# Patient Record
Sex: FEMALE | Race: WHITE | Employment: FULL TIME | URBAN - METROPOLITAN AREA
[De-identification: names, ages, dates, MRNs, and addresses within clinical notes are randomized per-mention and may not be internally consistent; named-entity substitution may affect disease eponyms.]

---

## 2017-02-16 ENCOUNTER — HOSPITAL ENCOUNTER (OUTPATIENT)
Age: 37
Setting detail: SPECIMEN
Discharge: HOME | End: 2017-02-16
Payer: MEDICARE

## 2017-02-16 ENCOUNTER — OFFICE VISIT (OUTPATIENT)
Dept: OBGYN | Facility: CLINIC | Age: 37
End: 2017-02-16

## 2017-02-16 VITALS
SYSTOLIC BLOOD PRESSURE: 114 MMHG | BODY MASS INDEX: 24.91 KG/M2 | HEIGHT: 66 IN | DIASTOLIC BLOOD PRESSURE: 72 MMHG | RESPIRATION RATE: 18 BRPM | HEART RATE: 78 BPM | WEIGHT: 155 LBS

## 2017-02-16 DIAGNOSIS — N89.8 VAGINAL DISCHARGE: Primary | ICD-10-CM

## 2017-02-16 LAB
DIRECT EXAM: ABNORMAL
Lab: ABNORMAL
SPECIMEN DESCRIPTION: ABNORMAL
SPECIMEN DESCRIPTION: ABNORMAL
STATUS: ABNORMAL

## 2017-02-16 PROCEDURE — 87480 CANDIDA DNA DIR PROBE: CPT

## 2017-02-16 PROCEDURE — 99213 OFFICE O/P EST LOW 20 MIN: CPT | Performed by: ADVANCED PRACTICE MIDWIFE

## 2017-02-16 PROCEDURE — 87491 CHLMYD TRACH DNA AMP PROBE: CPT

## 2017-02-16 PROCEDURE — 87510 GARDNER VAG DNA DIR PROBE: CPT

## 2017-02-16 PROCEDURE — 87591 N.GONORRHOEAE DNA AMP PROB: CPT

## 2017-02-16 PROCEDURE — 87660 TRICHOMONAS VAGIN DIR PROBE: CPT

## 2017-02-16 ASSESSMENT — PATIENT HEALTH QUESTIONNAIRE - PHQ9
SUM OF ALL RESPONSES TO PHQ9 QUESTIONS 1 & 2: 0
2. FEELING DOWN, DEPRESSED OR HOPELESS: 0
SUM OF ALL RESPONSES TO PHQ QUESTIONS 1-9: 0
1. LITTLE INTEREST OR PLEASURE IN DOING THINGS: 0

## 2017-02-17 ENCOUNTER — TELEPHONE (OUTPATIENT)
Dept: OBGYN | Facility: CLINIC | Age: 37
End: 2017-02-17

## 2017-02-17 LAB
C TRACH DNA GENITAL QL NAA+PROBE: NEGATIVE
N. GONORRHOEAE DNA: NEGATIVE

## 2017-02-20 RX ORDER — FLUCONAZOLE 150 MG/1
150 TABLET ORAL ONCE
Qty: 2 TABLET | Refills: 0 | Status: SHIPPED | OUTPATIENT
Start: 2017-02-20 | End: 2017-02-20

## 2017-02-23 ENCOUNTER — HOSPITAL ENCOUNTER (OUTPATIENT)
Age: 37
Discharge: HOME | End: 2017-02-23
Payer: MEDICARE

## 2017-02-23 LAB — HCG QUANTITATIVE: <1 IU/L

## 2017-02-23 PROCEDURE — 84702 CHORIONIC GONADOTROPIN TEST: CPT

## 2017-02-23 PROCEDURE — 36415 COLL VENOUS BLD VENIPUNCTURE: CPT

## 2017-02-24 ENCOUNTER — PROCEDURE VISIT (OUTPATIENT)
Dept: OBGYN | Facility: CLINIC | Age: 37
End: 2017-02-24

## 2017-02-24 VITALS
HEART RATE: 60 BPM | WEIGHT: 154 LBS | SYSTOLIC BLOOD PRESSURE: 100 MMHG | HEIGHT: 66 IN | DIASTOLIC BLOOD PRESSURE: 60 MMHG | BODY MASS INDEX: 24.75 KG/M2

## 2017-02-24 DIAGNOSIS — Z30.430 ENCOUNTER FOR IUD INSERTION: Primary | ICD-10-CM

## 2017-02-24 PROCEDURE — 58300 INSERT INTRAUTERINE DEVICE: CPT | Performed by: OBSTETRICS & GYNECOLOGY

## 2017-05-18 ENCOUNTER — HOSPITAL ENCOUNTER (EMERGENCY)
Age: 37
Discharge: HOME | End: 2017-05-18
Attending: EMERGENCY MEDICINE
Payer: MEDICARE

## 2017-05-18 VITALS
WEIGHT: 155 LBS | HEART RATE: 102 BPM | SYSTOLIC BLOOD PRESSURE: 109 MMHG | RESPIRATION RATE: 16 BRPM | BODY MASS INDEX: 24.91 KG/M2 | OXYGEN SATURATION: 100 % | TEMPERATURE: 98.4 F | DIASTOLIC BLOOD PRESSURE: 71 MMHG | HEIGHT: 66 IN

## 2017-05-18 DIAGNOSIS — N30.00 ACUTE CYSTITIS WITHOUT HEMATURIA: Primary | ICD-10-CM

## 2017-05-18 DIAGNOSIS — B09 VIRAL EXANTHEM: ICD-10-CM

## 2017-05-18 LAB
-: ABNORMAL
-: NORMAL
ABSOLUTE EOS #: 0.2 K/UL (ref 0–0.4)
ABSOLUTE LYMPH #: 2 K/UL (ref 1–4.8)
ABSOLUTE MONO #: 0.8 K/UL (ref 0.1–1.3)
ALBUMIN SERPL-MCNC: 4 G/DL (ref 3.5–5.2)
ALBUMIN/GLOBULIN RATIO: NORMAL (ref 1–2.5)
ALP BLD-CCNC: 49 U/L (ref 35–104)
ALT SERPL-CCNC: 6 U/L (ref 5–33)
AMORPHOUS: ABNORMAL
ANION GAP SERPL CALCULATED.3IONS-SCNC: 14 MMOL/L (ref 9–17)
AST SERPL-CCNC: 9 U/L
BACTERIA: ABNORMAL
BASOPHILS # BLD: 1 %
BASOPHILS ABSOLUTE: 0.1 K/UL (ref 0–0.2)
BILIRUB SERPL-MCNC: 0.4 MG/DL (ref 0.3–1.2)
BILIRUBIN DIRECT: 0.12 MG/DL
BILIRUBIN URINE: NEGATIVE
BILIRUBIN, INDIRECT: 0.28 MG/DL (ref 0–1)
BUN BLDV-MCNC: 8 MG/DL (ref 6–20)
CALCIUM SERPL-MCNC: 8.9 MG/DL (ref 8.6–10.4)
CASTS UA: ABNORMAL /LPF
CHLORIDE BLD-SCNC: 106 MMOL/L (ref 98–107)
CHP ED QC CHECK: NORMAL
CO2: 21 MMOL/L (ref 20–31)
COLOR: YELLOW
COMMENT UA: ABNORMAL
CREAT SERPL-MCNC: 0.77 MG/DL (ref 0.5–0.9)
CRYSTALS, UA: ABNORMAL /HPF
DIFFERENTIAL TYPE: NORMAL
DIRECT EXAM: NORMAL
EOSINOPHILS RELATIVE PERCENT: 2 %
EPITHELIAL CELLS UA: ABNORMAL /HPF
GFR AFRICAN AMERICAN: >60 ML/MIN
GFR NON-AFRICAN AMERICAN: >60 ML/MIN
GFR SERPL CREATININE-BSD FRML MDRD: NORMAL ML/MIN/{1.73_M2}
GFR SERPL CREATININE-BSD FRML MDRD: NORMAL ML/MIN/{1.73_M2}
GLUCOSE BLD-MCNC: 96 MG/DL (ref 70–99)
GLUCOSE URINE: NEGATIVE
HCG, PREGNANCY URINE (POC): NEGATIVE
HCT VFR BLD CALC: 44.1 % (ref 36–46)
HEMOGLOBIN: 14.8 G/DL (ref 12–16)
KETONES, URINE: ABNORMAL
LEUKOCYTE ESTERASE, URINE: ABNORMAL
LYMPHOCYTES # BLD: 22 %
Lab: NORMAL
MCH RBC QN AUTO: 30.6 PG (ref 26–34)
MCHC RBC AUTO-ENTMCNC: 33.5 G/DL (ref 31–37)
MCV RBC AUTO: 91.4 FL (ref 80–100)
MONOCYTES # BLD: 9 %
MUCUS: ABNORMAL
NITRITE, URINE: NEGATIVE
OTHER OBSERVATIONS UA: ABNORMAL
PDW BLD-RTO: 13.5 % (ref 11.5–14.9)
PH UA: 6 (ref 5–8)
PLATELET # BLD: 228 K/UL (ref 150–450)
PLATELET ESTIMATE: NORMAL
PMV BLD AUTO: 9.3 FL (ref 6–12)
POTASSIUM SERPL-SCNC: 4.5 MMOL/L (ref 3.7–5.3)
PREGNANCY TEST URINE, POC: NEGATIVE
PROTEIN UA: NEGATIVE
RBC # BLD: 4.83 M/UL (ref 4–5.2)
RBC # BLD: NORMAL 10*6/UL
RBC UA: ABNORMAL /HPF
RENAL EPITHELIAL, UA: ABNORMAL /HPF
SEG NEUTROPHILS: 66 %
SEGMENTED NEUTROPHILS ABSOLUTE COUNT: 5.9 K/UL (ref 1.3–9.1)
SODIUM BLD-SCNC: 141 MMOL/L (ref 135–144)
SPECIFIC GRAVITY UA: 1.01 (ref 1–1.03)
SPECIMEN DESCRIPTION: NORMAL
SPECIMEN DESCRIPTION: NORMAL
STATUS: NORMAL
TOTAL PROTEIN: 7 G/DL (ref 6.4–8.3)
TRICHOMONAS: ABNORMAL
TURBIDITY: ABNORMAL
URINE HGB: ABNORMAL
UROBILINOGEN, URINE: NORMAL
WBC # BLD: 8.8 K/UL (ref 3.5–11)
WBC # BLD: NORMAL 10*3/UL
WBC UA: ABNORMAL /HPF
YEAST: ABNORMAL

## 2017-05-18 PROCEDURE — 85025 COMPLETE CBC W/AUTO DIFF WBC: CPT

## 2017-05-18 PROCEDURE — 84703 CHORIONIC GONADOTROPIN ASSAY: CPT

## 2017-05-18 PROCEDURE — 36415 COLL VENOUS BLD VENIPUNCTURE: CPT

## 2017-05-18 PROCEDURE — 87086 URINE CULTURE/COLONY COUNT: CPT

## 2017-05-18 PROCEDURE — 87880 STREP A ASSAY W/OPTIC: CPT

## 2017-05-18 PROCEDURE — 99283 EMERGENCY DEPT VISIT LOW MDM: CPT

## 2017-05-18 PROCEDURE — 81001 URINALYSIS AUTO W/SCOPE: CPT

## 2017-05-18 PROCEDURE — 82248 BILIRUBIN DIRECT: CPT

## 2017-05-18 PROCEDURE — 6370000000 HC RX 637 (ALT 250 FOR IP): Performed by: PHYSICIAN ASSISTANT

## 2017-05-18 PROCEDURE — 80053 COMPREHEN METABOLIC PANEL: CPT

## 2017-05-18 RX ORDER — PREDNISONE 20 MG/1
20 TABLET ORAL DAILY
Qty: 5 TABLET | Refills: 0 | Status: SHIPPED | OUTPATIENT
Start: 2017-05-18 | End: 2017-05-23

## 2017-05-18 RX ORDER — CEPHALEXIN 500 MG/1
500 CAPSULE ORAL 3 TIMES DAILY
Qty: 21 CAPSULE | Refills: 0 | Status: SHIPPED | OUTPATIENT
Start: 2017-05-18 | End: 2017-05-25

## 2017-05-18 RX ORDER — PREDNISONE 20 MG/1
40 TABLET ORAL ONCE
Status: COMPLETED | OUTPATIENT
Start: 2017-05-18 | End: 2017-05-18

## 2017-05-18 RX ORDER — DIPHENHYDRAMINE HCL 25 MG
25 CAPSULE ORAL EVERY 4 HOURS PRN
Qty: 1 CAPSULE | Refills: 0 | Status: SHIPPED | OUTPATIENT
Start: 2017-05-18 | End: 2017-05-28

## 2017-05-18 RX ORDER — DIPHENHYDRAMINE HCL 25 MG
25 TABLET ORAL ONCE
Status: COMPLETED | OUTPATIENT
Start: 2017-05-18 | End: 2017-05-18

## 2017-05-18 RX ADMIN — DIPHENHYDRAMINE HCL 25 MG: 25 TABLET ORAL at 16:45

## 2017-05-18 RX ADMIN — PREDNISONE 40 MG: 20 TABLET ORAL at 16:45

## 2017-05-18 ASSESSMENT — ENCOUNTER SYMPTOMS
NAUSEA: 0
THROAT SWELLING: 0
VOMITING: 0
WHEEZING: 0
ABDOMINAL PAIN: 0
HOARSE VOICE: 0
DIARRHEA: 1
SHORTNESS OF BREATH: 0
SORE THROAT: 0

## 2017-05-19 LAB
CULTURE: NORMAL
CULTURE: NORMAL
Lab: NORMAL
SPECIMEN DESCRIPTION: NORMAL
SPECIMEN DESCRIPTION: NORMAL
STATUS: NORMAL

## 2017-06-21 ENCOUNTER — TELEPHONE (OUTPATIENT)
Dept: OBGYN CLINIC | Age: 37
End: 2017-06-21

## 2017-06-26 ENCOUNTER — TELEPHONE (OUTPATIENT)
Dept: OBGYN CLINIC | Age: 37
End: 2017-06-26

## 2017-07-12 ENCOUNTER — OFFICE VISIT (OUTPATIENT)
Dept: OBGYN CLINIC | Age: 37
End: 2017-07-12
Payer: MEDICARE

## 2017-07-12 ENCOUNTER — HOSPITAL ENCOUNTER (OUTPATIENT)
Age: 37
Setting detail: SPECIMEN
Discharge: HOME | End: 2017-07-12
Payer: MEDICARE

## 2017-07-12 VITALS
BODY MASS INDEX: 22.82 KG/M2 | HEIGHT: 66 IN | DIASTOLIC BLOOD PRESSURE: 70 MMHG | WEIGHT: 142 LBS | HEART RATE: 72 BPM | SYSTOLIC BLOOD PRESSURE: 114 MMHG | RESPIRATION RATE: 18 BRPM

## 2017-07-12 DIAGNOSIS — Z11.51 SPECIAL SCREENING EXAMINATION FOR HUMAN PAPILLOMAVIRUS (HPV): ICD-10-CM

## 2017-07-12 DIAGNOSIS — Z80.3 FAMILY HISTORY OF BREAST CANCER: ICD-10-CM

## 2017-07-12 DIAGNOSIS — Z01.419 WELL FEMALE EXAM WITH ROUTINE GYNECOLOGICAL EXAM: ICD-10-CM

## 2017-07-12 DIAGNOSIS — Z01.411 ENCOUNTER FOR GYNECOLOGICAL EXAMINATION (GENERAL) (ROUTINE) WITH ABNORMAL FINDINGS: ICD-10-CM

## 2017-07-12 DIAGNOSIS — Z01.419 WELL FEMALE EXAM WITH ROUTINE GYNECOLOGICAL EXAM: Primary | ICD-10-CM

## 2017-07-12 PROCEDURE — 87624 HPV HI-RISK TYP POOLED RSLT: CPT

## 2017-07-12 PROCEDURE — 99395 PREV VISIT EST AGE 18-39: CPT | Performed by: ADVANCED PRACTICE MIDWIFE

## 2017-07-12 PROCEDURE — G0145 SCR C/V CYTO,THINLAYER,RESCR: HCPCS

## 2017-07-12 ASSESSMENT — PATIENT HEALTH QUESTIONNAIRE - PHQ9
1. LITTLE INTEREST OR PLEASURE IN DOING THINGS: 0
SUM OF ALL RESPONSES TO PHQ QUESTIONS 1-9: 0
SUM OF ALL RESPONSES TO PHQ9 QUESTIONS 1 & 2: 0
2. FEELING DOWN, DEPRESSED OR HOPELESS: 0
1. LITTLE INTEREST OR PLEASURE IN DOING THINGS: 0
SUM OF ALL RESPONSES TO PHQ9 QUESTIONS 1 & 2: 0
SUM OF ALL RESPONSES TO PHQ QUESTIONS 1-9: 0
2. FEELING DOWN, DEPRESSED OR HOPELESS: 0
SUM OF ALL RESPONSES TO PHQ QUESTIONS 1-9: 0
2. FEELING DOWN, DEPRESSED OR HOPELESS: 0
SUM OF ALL RESPONSES TO PHQ9 QUESTIONS 1 & 2: 0
1. LITTLE INTEREST OR PLEASURE IN DOING THINGS: 0

## 2017-07-14 LAB
HPV SAMPLE: NORMAL
HPV SOURCE: NORMAL
HPV, GENOTYPE 16: NOT DETECTED
HPV, GENOTYPE 18: NOT DETECTED
HPV, HIGH RISK OTHER: NOT DETECTED
HPV, INTERPRETATION: NORMAL

## 2017-07-17 LAB — CYTOLOGY REPORT: NORMAL

## 2017-08-13 ENCOUNTER — APPOINTMENT (OUTPATIENT)
Dept: GENERAL RADIOLOGY | Age: 37
End: 2017-08-13
Payer: MEDICARE

## 2017-08-13 ENCOUNTER — HOSPITAL ENCOUNTER (EMERGENCY)
Age: 37
Discharge: HOME | End: 2017-08-13
Attending: EMERGENCY MEDICINE
Payer: MEDICARE

## 2017-08-13 VITALS
OXYGEN SATURATION: 96 % | BODY MASS INDEX: 22.5 KG/M2 | HEART RATE: 71 BPM | SYSTOLIC BLOOD PRESSURE: 99 MMHG | RESPIRATION RATE: 14 BRPM | TEMPERATURE: 99.6 F | DIASTOLIC BLOOD PRESSURE: 59 MMHG | HEIGHT: 66 IN | WEIGHT: 140 LBS

## 2017-08-13 DIAGNOSIS — M25.561 ACUTE PAIN OF RIGHT KNEE: Primary | ICD-10-CM

## 2017-08-13 LAB — URIC ACID: 3 MG/DL (ref 2.4–5.7)

## 2017-08-13 PROCEDURE — 20610 DRAIN/INJ JOINT/BURSA W/O US: CPT

## 2017-08-13 PROCEDURE — 84550 ASSAY OF BLOOD/URIC ACID: CPT

## 2017-08-13 PROCEDURE — 6360000002 HC RX W HCPCS: Performed by: EMERGENCY MEDICINE

## 2017-08-13 PROCEDURE — 99284 EMERGENCY DEPT VISIT MOD MDM: CPT

## 2017-08-13 PROCEDURE — 96372 THER/PROPH/DIAG INJ SC/IM: CPT

## 2017-08-13 PROCEDURE — 73562 X-RAY EXAM OF KNEE 3: CPT

## 2017-08-13 PROCEDURE — 2500000003 HC RX 250 WO HCPCS: Performed by: EMERGENCY MEDICINE

## 2017-08-13 PROCEDURE — 36415 COLL VENOUS BLD VENIPUNCTURE: CPT

## 2017-08-13 RX ORDER — LIDOCAINE HYDROCHLORIDE 20 MG/ML
100 INJECTION, SOLUTION INFILTRATION; PERINEURAL ONCE
Status: COMPLETED | OUTPATIENT
Start: 2017-08-13 | End: 2017-08-13

## 2017-08-13 RX ORDER — NAPROXEN 500 MG/1
500 TABLET ORAL 2 TIMES DAILY WITH MEALS
Qty: 60 TABLET | Refills: 0 | Status: ON HOLD | OUTPATIENT
Start: 2017-08-13 | End: 2017-08-20 | Stop reason: HOSPADM

## 2017-08-13 RX ORDER — KETOROLAC TROMETHAMINE 30 MG/ML
30 INJECTION, SOLUTION INTRAMUSCULAR; INTRAVENOUS ONCE
Status: COMPLETED | OUTPATIENT
Start: 2017-08-13 | End: 2017-08-13

## 2017-08-13 RX ADMIN — LIDOCAINE HYDROCHLORIDE 100 MG: 20 INJECTION, SOLUTION INFILTRATION; PERINEURAL at 16:04

## 2017-08-13 RX ADMIN — KETOROLAC TROMETHAMINE 30 MG: 30 INJECTION, SOLUTION INTRAMUSCULAR at 16:05

## 2017-08-13 ASSESSMENT — PAIN SCALES - GENERAL
PAINLEVEL_OUTOF10: 6
PAINLEVEL_OUTOF10: 10
PAINLEVEL_OUTOF10: 5

## 2017-08-13 ASSESSMENT — ENCOUNTER SYMPTOMS
NAUSEA: 0
VOMITING: 0

## 2017-08-15 ENCOUNTER — APPOINTMENT (OUTPATIENT)
Dept: GENERAL RADIOLOGY | Age: 37
End: 2017-08-15
Payer: MEDICARE

## 2017-08-15 ENCOUNTER — HOSPITAL ENCOUNTER (EMERGENCY)
Age: 37
Discharge: ANOTHER HOSPITAL | End: 2017-08-16
Attending: EMERGENCY MEDICINE
Payer: MEDICARE

## 2017-08-15 DIAGNOSIS — D72.825 BANDEMIA: ICD-10-CM

## 2017-08-15 DIAGNOSIS — L03.317 CELLULITIS OF RIGHT BUTTOCK: ICD-10-CM

## 2017-08-15 DIAGNOSIS — M70.41 PREPATELLAR BURSITIS OF RIGHT KNEE: ICD-10-CM

## 2017-08-15 DIAGNOSIS — R79.82 ELEVATED C-REACTIVE PROTEIN (CRP): ICD-10-CM

## 2017-08-15 DIAGNOSIS — R70.0 ELEVATED SED RATE: ICD-10-CM

## 2017-08-15 DIAGNOSIS — A41.9 SEPSIS, DUE TO UNSPECIFIED ORGANISM: Primary | ICD-10-CM

## 2017-08-15 DIAGNOSIS — R73.9 HYPERGLYCEMIA: ICD-10-CM

## 2017-08-15 LAB
ABSOLUTE EOS #: 0.33 K/UL (ref 0–0.4)
ABSOLUTE LYMPH #: 1.49 K/UL (ref 1–4.8)
ABSOLUTE MONO #: 0.5 K/UL (ref 0.1–1.3)
ANION GAP SERPL CALCULATED.3IONS-SCNC: 14 MMOL/L (ref 9–17)
BASOPHILS # BLD: 0 %
BASOPHILS ABSOLUTE: 0 K/UL (ref 0–0.2)
BUN BLDV-MCNC: 9 MG/DL (ref 6–20)
BUN/CREAT BLD: ABNORMAL (ref 9–20)
C-REACTIVE PROTEIN: 126.8 MG/L (ref 0–5)
CALCIUM SERPL-MCNC: 8.6 MG/DL (ref 8.6–10.4)
CHLORIDE BLD-SCNC: 106 MMOL/L (ref 98–107)
CO2: 21 MMOL/L (ref 20–31)
CREAT SERPL-MCNC: 0.71 MG/DL (ref 0.5–0.9)
DIFFERENTIAL TYPE: ABNORMAL
EOSINOPHILS RELATIVE PERCENT: 2 %
GFR AFRICAN AMERICAN: >60 ML/MIN
GFR NON-AFRICAN AMERICAN: >60 ML/MIN
GFR SERPL CREATININE-BSD FRML MDRD: ABNORMAL ML/MIN/{1.73_M2}
GFR SERPL CREATININE-BSD FRML MDRD: ABNORMAL ML/MIN/{1.73_M2}
GLUCOSE BLD-MCNC: 124 MG/DL (ref 70–99)
HCT VFR BLD CALC: 41.2 % (ref 36–46)
HEMOGLOBIN: 13.6 G/DL (ref 12–16)
LACTIC ACID: 0.7 MMOL/L (ref 0.5–2.2)
LYMPHOCYTES # BLD: 9 %
MCH RBC QN AUTO: 30.4 PG (ref 26–34)
MCHC RBC AUTO-ENTMCNC: 32.9 G/DL (ref 31–37)
MCV RBC AUTO: 92.4 FL (ref 80–100)
MONOCYTES # BLD: 3 %
MORPHOLOGY: NORMAL
PDW BLD-RTO: 13.8 % (ref 11.5–14.9)
PLATELET # BLD: 260 K/UL (ref 150–450)
PLATELET ESTIMATE: ABNORMAL
PMV BLD AUTO: 9.7 FL (ref 6–12)
POTASSIUM SERPL-SCNC: 3.8 MMOL/L (ref 3.7–5.3)
RBC # BLD: 4.46 M/UL (ref 4–5.2)
RBC # BLD: ABNORMAL 10*6/UL
SEDIMENTATION RATE, ERYTHROCYTE: 43 MM (ref 0–20)
SEG NEUTROPHILS: 86 %
SEGMENTED NEUTROPHILS ABSOLUTE COUNT: 14.18 K/UL (ref 1.3–9.1)
SODIUM BLD-SCNC: 141 MMOL/L (ref 135–144)
WBC # BLD: 16.5 K/UL (ref 3.5–11)
WBC # BLD: ABNORMAL 10*3/UL

## 2017-08-15 PROCEDURE — 6360000002 HC RX W HCPCS: Performed by: PHYSICIAN ASSISTANT

## 2017-08-15 PROCEDURE — 85651 RBC SED RATE NONAUTOMATED: CPT

## 2017-08-15 PROCEDURE — 87040 BLOOD CULTURE FOR BACTERIA: CPT

## 2017-08-15 PROCEDURE — 36415 COLL VENOUS BLD VENIPUNCTURE: CPT

## 2017-08-15 PROCEDURE — 83605 ASSAY OF LACTIC ACID: CPT

## 2017-08-15 PROCEDURE — 85025 COMPLETE CBC W/AUTO DIFF WBC: CPT

## 2017-08-15 PROCEDURE — 96367 TX/PROPH/DG ADDL SEQ IV INF: CPT

## 2017-08-15 PROCEDURE — 2580000003 HC RX 258: Performed by: EMERGENCY MEDICINE

## 2017-08-15 PROCEDURE — 96365 THER/PROPH/DIAG IV INF INIT: CPT

## 2017-08-15 PROCEDURE — 99284 EMERGENCY DEPT VISIT MOD MDM: CPT

## 2017-08-15 PROCEDURE — 6360000002 HC RX W HCPCS: Performed by: EMERGENCY MEDICINE

## 2017-08-15 PROCEDURE — 80048 BASIC METABOLIC PNL TOTAL CA: CPT

## 2017-08-15 PROCEDURE — 73562 X-RAY EXAM OF KNEE 3: CPT

## 2017-08-15 PROCEDURE — 6370000000 HC RX 637 (ALT 250 FOR IP): Performed by: EMERGENCY MEDICINE

## 2017-08-15 PROCEDURE — 86140 C-REACTIVE PROTEIN: CPT

## 2017-08-15 RX ORDER — 0.9 % SODIUM CHLORIDE 0.9 %
1000 INTRAVENOUS SOLUTION INTRAVENOUS ONCE
Status: COMPLETED | OUTPATIENT
Start: 2017-08-15 | End: 2017-08-16

## 2017-08-15 RX ORDER — ONDANSETRON 4 MG/1
4 TABLET, ORALLY DISINTEGRATING ORAL ONCE
Status: COMPLETED | OUTPATIENT
Start: 2017-08-15 | End: 2017-08-15

## 2017-08-15 RX ORDER — ACETAMINOPHEN 500 MG
1000 TABLET ORAL ONCE
Status: COMPLETED | OUTPATIENT
Start: 2017-08-15 | End: 2017-08-15

## 2017-08-15 RX ADMIN — ONDANSETRON 4 MG: 4 TABLET, ORALLY DISINTEGRATING ORAL at 21:03

## 2017-08-15 RX ADMIN — SODIUM CHLORIDE 1000 ML: 9 INJECTION, SOLUTION INTRAVENOUS at 21:15

## 2017-08-15 RX ADMIN — MEROPENEM 1 G: 1 INJECTION, POWDER, FOR SOLUTION INTRAVENOUS at 22:16

## 2017-08-15 RX ADMIN — ACETAMINOPHEN 1000 MG: 500 TABLET ORAL at 22:16

## 2017-08-15 RX ADMIN — VANCOMYCIN HYDROCHLORIDE 1000 MG: 1 INJECTION, POWDER, LYOPHILIZED, FOR SOLUTION INTRAVENOUS at 23:01

## 2017-08-15 ASSESSMENT — PAIN DESCRIPTION - FREQUENCY
FREQUENCY: CONTINUOUS
FREQUENCY: CONTINUOUS

## 2017-08-15 ASSESSMENT — PAIN DESCRIPTION - ORIENTATION
ORIENTATION: RIGHT
ORIENTATION: RIGHT

## 2017-08-15 ASSESSMENT — PAIN DESCRIPTION - DESCRIPTORS
DESCRIPTORS: SHOOTING;BURNING;SHARP
DESCRIPTORS: BURNING;SHOOTING

## 2017-08-15 ASSESSMENT — PAIN SCALES - GENERAL
PAINLEVEL_OUTOF10: 9

## 2017-08-15 ASSESSMENT — PAIN DESCRIPTION - LOCATION
LOCATION: BUTTOCKS;KNEE
LOCATION: BUTTOCKS;KNEE

## 2017-08-15 ASSESSMENT — PAIN DESCRIPTION - PROGRESSION
CLINICAL_PROGRESSION: GRADUALLY WORSENING
CLINICAL_PROGRESSION: NOT CHANGED

## 2017-08-15 ASSESSMENT — PAIN DESCRIPTION - ONSET
ONSET: ON-GOING
ONSET: ON-GOING

## 2017-08-15 ASSESSMENT — PAIN SCALES - WONG BAKER: WONGBAKER_NUMERICALRESPONSE: 0

## 2017-08-15 ASSESSMENT — PAIN DESCRIPTION - PAIN TYPE: TYPE: ACUTE PAIN

## 2017-08-16 ENCOUNTER — HOSPITAL ENCOUNTER (INPATIENT)
Age: 37
LOS: 4 days | Discharge: HOME | DRG: 710 | End: 2017-08-20
Attending: INTERNAL MEDICINE | Admitting: INTERNAL MEDICINE
Payer: MEDICARE

## 2017-08-16 VITALS
BODY MASS INDEX: 22.5 KG/M2 | HEIGHT: 66 IN | TEMPERATURE: 99.3 F | OXYGEN SATURATION: 97 % | DIASTOLIC BLOOD PRESSURE: 53 MMHG | SYSTOLIC BLOOD PRESSURE: 92 MMHG | RESPIRATION RATE: 16 BRPM | WEIGHT: 140 LBS | HEART RATE: 81 BPM

## 2017-08-16 PROBLEM — M70.40 PREPATELLAR BURSITIS: Status: ACTIVE | Noted: 2017-08-16

## 2017-08-16 PROBLEM — Z72.0 TOBACCO USE: Status: ACTIVE | Noted: 2017-08-16

## 2017-08-16 PROBLEM — M00.9 SEPTIC ARTHRITIS OF KNEE, RIGHT (HCC): Status: ACTIVE | Noted: 2017-08-16

## 2017-08-16 PROBLEM — E83.51 HYPOCALCEMIA: Status: ACTIVE | Noted: 2017-08-16

## 2017-08-16 PROBLEM — I95.9 LOW BLOOD PRESSURE: Status: ACTIVE | Noted: 2017-08-16

## 2017-08-16 PROBLEM — L02.31 ABSCESS, GLUTEAL, RIGHT: Status: ACTIVE | Noted: 2017-08-16

## 2017-08-16 PROBLEM — E87.6 HYPOKALEMIA: Status: ACTIVE | Noted: 2017-08-16

## 2017-08-16 LAB
ABSOLUTE EOS #: 0.2 K/UL (ref 0–0.4)
ABSOLUTE LYMPH #: 1.8 K/UL (ref 1–4.8)
ABSOLUTE MONO #: 1.3 K/UL (ref 0.1–1.2)
ALBUMIN SERPL-MCNC: 2.8 G/DL (ref 3.5–5.2)
ALBUMIN/GLOBULIN RATIO: 1.3 (ref 1–2.5)
ALP BLD-CCNC: 60 U/L (ref 35–104)
ALT SERPL-CCNC: 28 U/L (ref 5–33)
ANION GAP SERPL CALCULATED.3IONS-SCNC: 13 MMOL/L (ref 9–17)
AST SERPL-CCNC: 18 U/L
BASOPHILS # BLD: 0 %
BASOPHILS ABSOLUTE: 0 K/UL (ref 0–0.2)
BILIRUB SERPL-MCNC: 0.55 MG/DL (ref 0.3–1.2)
BUN BLDV-MCNC: 5 MG/DL (ref 6–20)
BUN/CREAT BLD: ABNORMAL (ref 9–20)
CALCIUM IONIZED: 1.11 MMOL/L (ref 1.13–1.33)
CALCIUM SERPL-MCNC: 7.2 MG/DL (ref 8.6–10.4)
CHLORIDE BLD-SCNC: 115 MMOL/L (ref 98–107)
CO2: 17 MMOL/L (ref 20–31)
CREAT SERPL-MCNC: 0.63 MG/DL (ref 0.5–0.9)
CRYSTALS, FLUID: NEGATIVE
DIFFERENTIAL TYPE: ABNORMAL
EOSINOPHILS RELATIVE PERCENT: 1 %
GFR AFRICAN AMERICAN: >60 ML/MIN
GFR NON-AFRICAN AMERICAN: >60 ML/MIN
GFR SERPL CREATININE-BSD FRML MDRD: ABNORMAL ML/MIN/{1.73_M2}
GFR SERPL CREATININE-BSD FRML MDRD: ABNORMAL ML/MIN/{1.73_M2}
GLUCOSE BLD-MCNC: 96 MG/DL (ref 70–99)
HCT VFR BLD CALC: 34.2 % (ref 36–46)
HEMOGLOBIN: 11.5 G/DL (ref 12–16)
LACTIC ACID, WHOLE BLOOD: 0.4 MMOL/L (ref 0.7–2.1)
LACTIC ACID, WHOLE BLOOD: 0.4 MMOL/L (ref 0.7–2.1)
LACTIC ACID: ABNORMAL MMOL/L
LACTIC ACID: ABNORMAL MMOL/L
LYMPHOCYTES # BLD: 13 %
MAGNESIUM: 2.2 MG/DL (ref 1.6–2.6)
MCH RBC QN AUTO: 30.9 PG (ref 26–34)
MCHC RBC AUTO-ENTMCNC: 33.6 G/DL (ref 31–37)
MCV RBC AUTO: 91.9 FL (ref 80–100)
MONOCYTES # BLD: 10 %
PDW BLD-RTO: 13.9 % (ref 12.5–15.4)
PLATELET # BLD: 220 K/UL (ref 140–450)
PLATELET ESTIMATE: ABNORMAL
PMV BLD AUTO: 9.5 FL (ref 6–12)
POTASSIUM SERPL-SCNC: 3.6 MMOL/L (ref 3.7–5.3)
RBC # BLD: 3.72 M/UL (ref 4–5.2)
RBC # BLD: ABNORMAL 10*6/UL
SEG NEUTROPHILS: 76 %
SEGMENTED NEUTROPHILS ABSOLUTE COUNT: 10.4 K/UL (ref 1.8–7.7)
SODIUM BLD-SCNC: 145 MMOL/L (ref 135–144)
SPECIMEN TYPE: NORMAL
TOTAL PROTEIN: 5 G/DL (ref 6.4–8.3)
TROPONIN INTERP: NORMAL
TROPONIN INTERP: NORMAL
TROPONIN T: <0.03 NG/ML
TROPONIN T: <0.03 NG/ML
WBC # BLD: 13.8 K/UL (ref 3.5–11)
WBC # BLD: ABNORMAL 10*3/UL

## 2017-08-16 PROCEDURE — 94762 N-INVAS EAR/PLS OXIMTRY CONT: CPT

## 2017-08-16 PROCEDURE — 96366 THER/PROPH/DIAG IV INF ADDON: CPT

## 2017-08-16 PROCEDURE — 6370000000 HC RX 637 (ALT 250 FOR IP): Performed by: SURGERY

## 2017-08-16 PROCEDURE — 6360000002 HC RX W HCPCS: Performed by: NURSE PRACTITIONER

## 2017-08-16 PROCEDURE — 1200000000 HC SEMI PRIVATE

## 2017-08-16 PROCEDURE — 36415 COLL VENOUS BLD VENIPUNCTURE: CPT

## 2017-08-16 PROCEDURE — 87075 CULTR BACTERIA EXCEPT BLOOD: CPT

## 2017-08-16 PROCEDURE — 99406 BEHAV CHNG SMOKING 3-10 MIN: CPT

## 2017-08-16 PROCEDURE — 89060 EXAM SYNOVIAL FLUID CRYSTALS: CPT

## 2017-08-16 PROCEDURE — 87070 CULTURE OTHR SPECIMN AEROBIC: CPT

## 2017-08-16 PROCEDURE — 85025 COMPLETE CBC W/AUTO DIFF WBC: CPT

## 2017-08-16 PROCEDURE — 2580000003 HC RX 258: Performed by: NURSE PRACTITIONER

## 2017-08-16 PROCEDURE — 6360000002 HC RX W HCPCS: Performed by: INTERNAL MEDICINE

## 2017-08-16 PROCEDURE — 80053 COMPREHEN METABOLIC PANEL: CPT

## 2017-08-16 PROCEDURE — 87205 SMEAR GRAM STAIN: CPT

## 2017-08-16 PROCEDURE — 87040 BLOOD CULTURE FOR BACTERIA: CPT

## 2017-08-16 PROCEDURE — 2580000003 HC RX 258: Performed by: EMERGENCY MEDICINE

## 2017-08-16 PROCEDURE — 87186 SC STD MICRODIL/AGAR DIL: CPT

## 2017-08-16 PROCEDURE — 0J990ZZ DRAINAGE OF BUTTOCK SUBCUTANEOUS TISSUE AND FASCIA, OPEN APPROACH: ICD-10-PCS | Performed by: COLON & RECTAL SURGERY

## 2017-08-16 PROCEDURE — 0S9C3ZX DRAINAGE OF RIGHT KNEE JOINT, PERCUTANEOUS APPROACH, DIAGNOSTIC: ICD-10-PCS | Performed by: ORTHOPAEDIC SURGERY

## 2017-08-16 PROCEDURE — 84484 ASSAY OF TROPONIN QUANT: CPT

## 2017-08-16 PROCEDURE — 6370000000 HC RX 637 (ALT 250 FOR IP): Performed by: NURSE PRACTITIONER

## 2017-08-16 PROCEDURE — 83735 ASSAY OF MAGNESIUM: CPT

## 2017-08-16 PROCEDURE — 89051 BODY FLUID CELL COUNT: CPT

## 2017-08-16 PROCEDURE — 86403 PARTICLE AGGLUT ANTBDY SCRN: CPT

## 2017-08-16 PROCEDURE — 83605 ASSAY OF LACTIC ACID: CPT

## 2017-08-16 PROCEDURE — 2580000003 HC RX 258: Performed by: INTERNAL MEDICINE

## 2017-08-16 PROCEDURE — 99222 1ST HOSP IP/OBS MODERATE 55: CPT | Performed by: INTERNAL MEDICINE

## 2017-08-16 PROCEDURE — 6360000002 HC RX W HCPCS: Performed by: SURGERY

## 2017-08-16 PROCEDURE — 82330 ASSAY OF CALCIUM: CPT

## 2017-08-16 RX ORDER — TRAMADOL HYDROCHLORIDE 50 MG/1
100 TABLET ORAL EVERY 6 HOURS PRN
Status: DISCONTINUED | OUTPATIENT
Start: 2017-08-16 | End: 2017-08-16

## 2017-08-16 RX ORDER — ACETAMINOPHEN 325 MG/1
650 TABLET ORAL EVERY 4 HOURS PRN
Status: DISCONTINUED | OUTPATIENT
Start: 2017-08-16 | End: 2017-08-20 | Stop reason: HOSPADM

## 2017-08-16 RX ORDER — NALBUPHINE HCL 10 MG/ML
10 AMPUL (ML) INJECTION
Status: DISCONTINUED | OUTPATIENT
Start: 2017-08-16 | End: 2017-08-20 | Stop reason: HOSPADM

## 2017-08-16 RX ORDER — 0.9 % SODIUM CHLORIDE 0.9 %
1000 INTRAVENOUS SOLUTION INTRAVENOUS ONCE
Status: COMPLETED | OUTPATIENT
Start: 2017-08-16 | End: 2017-08-16

## 2017-08-16 RX ORDER — FENTANYL CITRATE 50 UG/ML
50 INJECTION, SOLUTION INTRAMUSCULAR; INTRAVENOUS ONCE
Status: COMPLETED | OUTPATIENT
Start: 2017-08-16 | End: 2017-08-16

## 2017-08-16 RX ORDER — HYDROCODONE BITARTRATE AND ACETAMINOPHEN 5; 325 MG/1; MG/1
2 TABLET ORAL EVERY 4 HOURS PRN
Status: DISCONTINUED | OUTPATIENT
Start: 2017-08-16 | End: 2017-08-16

## 2017-08-16 RX ORDER — SODIUM CHLORIDE 9 MG/ML
INJECTION, SOLUTION INTRAVENOUS CONTINUOUS
Status: DISCONTINUED | OUTPATIENT
Start: 2017-08-16 | End: 2017-08-16

## 2017-08-16 RX ORDER — SODIUM CHLORIDE 0.9 % (FLUSH) 0.9 %
10 SYRINGE (ML) INJECTION EVERY 12 HOURS SCHEDULED
Status: DISCONTINUED | OUTPATIENT
Start: 2017-08-16 | End: 2017-08-20 | Stop reason: HOSPADM

## 2017-08-16 RX ORDER — LIDOCAINE HYDROCHLORIDE 10 MG/ML
INJECTION, SOLUTION INFILTRATION; PERINEURAL
Status: DISPENSED
Start: 2017-08-16 | End: 2017-08-16

## 2017-08-16 RX ORDER — SODIUM CHLORIDE 0.9 % (FLUSH) 0.9 %
10 SYRINGE (ML) INJECTION PRN
Status: DISCONTINUED | OUTPATIENT
Start: 2017-08-16 | End: 2017-08-20 | Stop reason: HOSPADM

## 2017-08-16 RX ORDER — VANCOMYCIN HYDROCHLORIDE 1 G/200ML
1000 INJECTION, SOLUTION INTRAVENOUS EVERY 12 HOURS
Status: DISCONTINUED | OUTPATIENT
Start: 2017-08-16 | End: 2017-08-20 | Stop reason: HOSPADM

## 2017-08-16 RX ORDER — HYDROCODONE BITARTRATE AND ACETAMINOPHEN 5; 325 MG/1; MG/1
1 TABLET ORAL EVERY 4 HOURS PRN
Status: DISCONTINUED | OUTPATIENT
Start: 2017-08-16 | End: 2017-08-16

## 2017-08-16 RX ORDER — LIDOCAINE HYDROCHLORIDE 10 MG/ML
20 INJECTION, SOLUTION INFILTRATION; PERINEURAL ONCE
Status: DISCONTINUED | OUTPATIENT
Start: 2017-08-16 | End: 2017-08-20 | Stop reason: HOSPADM

## 2017-08-16 RX ORDER — TRAMADOL HYDROCHLORIDE 50 MG/1
50 TABLET ORAL EVERY 6 HOURS PRN
Status: DISCONTINUED | OUTPATIENT
Start: 2017-08-16 | End: 2017-08-16

## 2017-08-16 RX ORDER — 0.9 % SODIUM CHLORIDE 0.9 %
500 INTRAVENOUS SOLUTION INTRAVENOUS ONCE
Status: COMPLETED | OUTPATIENT
Start: 2017-08-16 | End: 2017-08-16

## 2017-08-16 RX ORDER — OXYCODONE HYDROCHLORIDE AND ACETAMINOPHEN 5; 325 MG/1; MG/1
2 TABLET ORAL EVERY 4 HOURS PRN
Status: DISCONTINUED | OUTPATIENT
Start: 2017-08-16 | End: 2017-08-20 | Stop reason: HOSPADM

## 2017-08-16 RX ORDER — OXYCODONE HYDROCHLORIDE AND ACETAMINOPHEN 5; 325 MG/1; MG/1
1 TABLET ORAL EVERY 4 HOURS PRN
Status: DISCONTINUED | OUTPATIENT
Start: 2017-08-16 | End: 2017-08-20 | Stop reason: HOSPADM

## 2017-08-16 RX ADMIN — NALBUPHINE HYDROCHLORIDE 10 MG: 10 INJECTION, SOLUTION INTRAMUSCULAR; INTRAVENOUS; SUBCUTANEOUS at 09:43

## 2017-08-16 RX ADMIN — ENOXAPARIN SODIUM 40 MG: 40 INJECTION SUBCUTANEOUS at 13:59

## 2017-08-16 RX ADMIN — MEROPENEM 1 G: 1 INJECTION, POWDER, FOR SOLUTION INTRAVENOUS at 06:56

## 2017-08-16 RX ADMIN — NALBUPHINE HYDROCHLORIDE 10 MG: 10 INJECTION, SOLUTION INTRAMUSCULAR; INTRAVENOUS; SUBCUTANEOUS at 05:41

## 2017-08-16 RX ADMIN — VANCOMYCIN HYDROCHLORIDE 1000 MG: 1 INJECTION, SOLUTION INTRAVENOUS at 23:37

## 2017-08-16 RX ADMIN — TRAMADOL HYDROCHLORIDE 100 MG: 50 TABLET, FILM COATED ORAL at 02:34

## 2017-08-16 RX ADMIN — OXYCODONE HYDROCHLORIDE AND ACETAMINOPHEN 1 TABLET: 5; 325 TABLET ORAL at 19:55

## 2017-08-16 RX ADMIN — CALCIUM GLUCONATE 1 G: 94 INJECTION, SOLUTION INTRAVENOUS at 11:17

## 2017-08-16 RX ADMIN — SODIUM CHLORIDE: 9 INJECTION, SOLUTION INTRAVENOUS at 02:13

## 2017-08-16 RX ADMIN — SODIUM CHLORIDE 1000 ML: 9 INJECTION, SOLUTION INTRAVENOUS at 00:50

## 2017-08-16 RX ADMIN — ACETAMINOPHEN 650 MG: 325 TABLET ORAL at 21:06

## 2017-08-16 RX ADMIN — POTASSIUM CHLORIDE: 149 INJECTION, SOLUTION, CONCENTRATE INTRAVENOUS at 12:50

## 2017-08-16 RX ADMIN — SODIUM CHLORIDE 500 ML: 9 INJECTION, SOLUTION INTRAVENOUS at 05:15

## 2017-08-16 RX ADMIN — FENTANYL CITRATE 50 MCG: 50 INJECTION, SOLUTION INTRAMUSCULAR; INTRAVENOUS at 12:19

## 2017-08-16 RX ADMIN — VANCOMYCIN HYDROCHLORIDE 1000 MG: 1 INJECTION, SOLUTION INTRAVENOUS at 12:50

## 2017-08-16 ASSESSMENT — PAIN SCALES - GENERAL
PAINLEVEL_OUTOF10: 7
PAINLEVEL_OUTOF10: 7
PAINLEVEL_OUTOF10: 8
PAINLEVEL_OUTOF10: 7
PAINLEVEL_OUTOF10: 7
PAINLEVEL_OUTOF10: 5
PAINLEVEL_OUTOF10: 8

## 2017-08-16 ASSESSMENT — ENCOUNTER SYMPTOMS
CONSTIPATION: 0
ABDOMINAL PAIN: 0
NAUSEA: 1
NAUSEA: 0
COUGH: 0
SORE THROAT: 0
BACK PAIN: 0
VOMITING: 0
SHORTNESS OF BREATH: 0
VOMITING: 1
BLOOD IN STOOL: 0
SHORTNESS OF BREATH: 0
DIARRHEA: 0
BLOOD IN STOOL: 0
DIARRHEA: 0
RHINORRHEA: 0
CONSTIPATION: 0
COLOR CHANGE: 1
CHEST TIGHTNESS: 0

## 2017-08-16 ASSESSMENT — PAIN DESCRIPTION - PAIN TYPE: TYPE: ACUTE PAIN

## 2017-08-16 ASSESSMENT — PAIN DESCRIPTION - DESCRIPTORS: DESCRIPTORS: BURNING;CONSTANT

## 2017-08-16 ASSESSMENT — PAIN DESCRIPTION - ORIENTATION: ORIENTATION: RIGHT

## 2017-08-16 ASSESSMENT — PAIN DESCRIPTION - LOCATION: LOCATION: BUTTOCKS;KNEE

## 2017-08-16 ASSESSMENT — PAIN DESCRIPTION - FREQUENCY: FREQUENCY: CONTINUOUS

## 2017-08-16 NOTE — ED NOTES
Pt arrive to ED v/o of abscess on lower right buttock. Per pt she first noticed it yesterday. Pt states she couldn't see it at all but it was hard and tender and what she thought was about an inch in circumference. Pt stated her knee was sore/tender about 1 week ago but the pain, discoloration, warm to touch did not start until this past Sunday 8/13. PTA pt states she had a meeting for work today and [facility] [employer] [facility] [place]. Pt states she was dry heaving while at work. Pt states she started getting hot/cold flashes but did not know she had a fever until she got here. Current pain level upon arrival to ER was 9-10 out of 10 scale.   Pt alertt & oriented x4       Triny Barlow RN  08/15/17 1976

## 2017-08-16 NOTE — ED PROVIDER NOTES
Partners: Male      Comment: IUD     Other Topics Concern    Not on file     Social History Narrative    ** Merged History Encounter **            Family History   Problem Relation Age of Onset    Diabetes Paternal Grandfather     Diabetes Paternal Grandmother     Uterine Cancer Paternal Grandmother     Cervical Cancer Paternal Grandmother     Cancer Maternal Grandmother     Breast Cancer Maternal Grandmother        Allergies:  Codeine and Penicillins    Home Medications:  Prior to Admission medications    Medication Sig Start Date End Date Taking? Authorizing Provider   naproxen (NAPROSYN) 500 MG tablet Take 1 tablet by mouth 2 times daily (with meals) for 30 doses 8/13/17 8/28/17 Yes Debbi Spring MD       REVIEW OF SYSTEMS    (2-9 systems for level 4, 10 or more for level 5)      Review of Systems   Constitutional: Positive for chills and fever. Negative for activity change, appetite change and fatigue. HENT: Negative for congestion, rhinorrhea and sore throat. Eyes: Negative for visual disturbance. Respiratory: Negative for chest tightness and shortness of breath. Cardiovascular: Negative for chest pain. Gastrointestinal: Positive for nausea and vomiting. Negative for abdominal pain, blood in stool, constipation and diarrhea. Endocrine: Negative for polyuria. Genitourinary: Negative for dysuria, frequency, hematuria and urgency. Musculoskeletal: Positive for arthralgias and myalgias. Negative for back pain. Skin: Positive for color change and rash. Neurological: Negative for headaches. PHYSICAL EXAM   (up to 7 for level 4, 8 or more for level 5)      INITIAL VITALS:   BP (!) 92/53  Pulse 81  Temp 99.3 °F (37.4 °C) (Oral)   Resp 16  Ht 5' 6\" (1.676 m)  Wt 140 lb (63.5 kg)  LMP 08/07/2017 (Approximate)  SpO2 97%  BMI 22.6 kg/m2    Physical Exam   Constitutional: She is oriented to person, place, and time. She appears well-developed and well-nourished. No distress. Patient is alert and oriented x4, does not appear to be in acute distress, lying uncomfortably in bed   HENT:   Head: Normocephalic and atraumatic. Eyes: Conjunctivae and EOM are normal. Pupils are equal, round, and reactive to light. Right eye exhibits no discharge. Left eye exhibits no discharge. Neck: Normal range of motion. Neck supple. Cardiovascular: Normal rate, regular rhythm, normal heart sounds and intact distal pulses. Exam reveals no gallop and no friction rub. No murmur heard. Pulmonary/Chest: Effort normal and breath sounds normal. No respiratory distress. She has no wheezes. She has no rales. She exhibits no tenderness. Abdominal: Soft. Bowel sounds are normal. She exhibits no distension and no mass. There is no tenderness. There is no rebound and no guarding. Musculoskeletal: Normal range of motion. She exhibits no edema, tenderness or deformity. Neurological: She is alert and oriented to person, place, and time. Skin: Skin is warm and dry. Rash noted. She is not diaphoretic. There is erythema.    8 x 10 area of erythema, induration, warmth, no fluctuance, with noted small area of active bloody drainage,    Right knee as well as lateral right knee with erythema, tenderness to palpation, edema, warmth, no induration or fluctuance, normal range of motion with mild limitation secondary to pain, 5/5 strength in all extremities, dorsalis pedis and posterior tibial pulses intact bilaterally       DIFFERENTIAL  DIAGNOSIS     PLAN (LABS / IMAGING / EKG):  Orders Placed This Encounter   Procedures    Culture Blood #1    Culture Blood #1    XR Knee Right Standard    Lactic Acid    CBC Auto Differential    Basic Metabolic Panel    C-Reactive Protein    Sedimentation Rate       MEDICATIONS ORDERED:  Orders Placed This Encounter   Medications    ondansetron (ZOFRAN-ODT) disintegrating tablet 4 mg    acetaminophen (TYLENOL) tablet 1,000 mg    0.9 % sodium chloride bolus    right knee pain and right buttock abscess. Patient is febrile and tachycardic. Vitals and exam findings concerning for cellulitis/abscess/sepsis. And given analgesics and fluids as well as antiemetics. Extensive workup was done including labs and imaging as well as blood cultures were collected. Imaging results were unremarkable except for prepatellar soft tissue swelling. Labs were remarkable for elevated white count of 16.5, elevated CRP of 126.8, ESR of 43, and hyperglycemia. Given findings, patient was also started on vancomycin and meropenem. Given that patient has allergies to penicillin, I spoke with Carlos Ordoñez from pharmacy who recommended using meropenem and watching patient for any allergic reaction. Given that there are no more beds here, spoke to patient about transferring to [facility] patient was in agreement. I spoke with Sara Arceo NP, who is willing to admit patient for further workup and treatment. Arrangements were made to transfer patient to [facility]. Patient reevaluated in pain is improving. RADIOLOGY:  Xr Knee Right Standard    Result Date: 8/15/2017  EXAMINATION: 3 VIEWS OF THE RIGHT KNEE 8/15/2017 9:37 pm COMPARISON: August 13, 2017 HISTORY: ORDERING SYSTEM PROVIDED HISTORY: swelling, erythema TECHNOLOGIST PROVIDED HISTORY: Reason for exam:->swelling, erythema Ordering Physician Provided Reason for Exam: swelling, erythema Acuity: Acute Type of Exam: Initial Additional signs and symptoms: Pt c/o swelling and redness to right knee - abscess. FINDINGS: There is prepatellar soft tissue swelling. There is no effusion. No evidence of acute fracture or dislocation. No focal osseous lesion. No evidence of joint effusion. No focal soft tissue abnormality. No acute abnormality of the knee. Prepatellar bursitis     Xr Knee Right Standard    Result Date: 8/13/2017  EXAMINATION: 3 VIEWS OF THE RIGHT KNEE 8/13/2017 1:35 pm COMPARISON: None.  HISTORY: ORDERING

## 2017-08-17 DIAGNOSIS — I80.201 DEEP VEIN THROMBOPHLEBITIS OF LOWER LEG, RIGHT (HCC): Primary | ICD-10-CM

## 2017-08-17 LAB
ABSOLUTE EOS #: 0.2 K/UL (ref 0–0.4)
ABSOLUTE LYMPH #: 2 K/UL (ref 1–4.8)
ABSOLUTE MONO #: 0.9 K/UL (ref 0.1–1.2)
ANION GAP SERPL CALCULATED.3IONS-SCNC: 12 MMOL/L (ref 9–17)
APPEARANCE FLUID: NORMAL
BASO FLUID: NORMAL %
BASOPHILS # BLD: 0 %
BASOPHILS ABSOLUTE: 0 K/UL (ref 0–0.2)
BUN BLDV-MCNC: 5 MG/DL (ref 6–20)
BUN/CREAT BLD: ABNORMAL (ref 9–20)
C-REACTIVE PROTEIN: 107.8 MG/L (ref 0–5)
CALCIUM IONIZED: 1.17 MMOL/L (ref 1.13–1.33)
CALCIUM SERPL-MCNC: 7.9 MG/DL (ref 8.6–10.4)
CHLORIDE BLD-SCNC: 112 MMOL/L (ref 98–107)
CO2: 21 MMOL/L (ref 20–31)
COLOR FLUID: NORMAL
CREAT SERPL-MCNC: 0.76 MG/DL (ref 0.5–0.9)
DIFFERENTIAL TYPE: ABNORMAL
EOSINOPHIL FLUID: NORMAL %
EOSINOPHILS RELATIVE PERCENT: 2 %
FLUID DIFF COMMENT: NORMAL
GFR AFRICAN AMERICAN: >60 ML/MIN
GFR NON-AFRICAN AMERICAN: >60 ML/MIN
GFR SERPL CREATININE-BSD FRML MDRD: ABNORMAL ML/MIN/{1.73_M2}
GFR SERPL CREATININE-BSD FRML MDRD: ABNORMAL ML/MIN/{1.73_M2}
GLUCOSE BLD-MCNC: 97 MG/DL (ref 70–99)
HCT VFR BLD CALC: 33.8 % (ref 36–46)
HEMOGLOBIN: 11.4 G/DL (ref 12–16)
INR BLD: 1
LYMPHOCYTES # BLD: 18 %
LYMPHOCYTES, BODY FLUID: 33 %
MAGNESIUM: 2.2 MG/DL (ref 1.6–2.6)
MCH RBC QN AUTO: 31 PG (ref 26–34)
MCHC RBC AUTO-ENTMCNC: 33.7 G/DL (ref 31–37)
MCV RBC AUTO: 92.1 FL (ref 80–100)
MONOCYTE, FLUID: NORMAL %
MONOCYTES # BLD: 8 %
NEUTROPHIL, FLUID: 63 %
OTHER CELLS FLUID: NORMAL %
PDW BLD-RTO: 14.1 % (ref 12.5–15.4)
PLATELET # BLD: 248 K/UL (ref 140–450)
PLATELET ESTIMATE: ABNORMAL
PMV BLD AUTO: 8.8 FL (ref 6–12)
POTASSIUM SERPL-SCNC: 4 MMOL/L (ref 3.7–5.3)
PROTHROMBIN TIME: 11.2 SEC (ref 9.4–12.6)
RBC # BLD: 3.67 M/UL (ref 4–5.2)
RBC # BLD: ABNORMAL 10*6/UL
RBC FLUID: NORMAL /MM3
SEG NEUTROPHILS: 72 %
SEGMENTED NEUTROPHILS ABSOLUTE COUNT: 7.9 K/UL (ref 1.8–7.7)
SODIUM BLD-SCNC: 145 MMOL/L (ref 135–144)
SPECIMEN TYPE: NORMAL
VANCOMYCIN TROUGH DATE LAST DOSE: NORMAL
VANCOMYCIN TROUGH DOSE AMOUNT: NORMAL
VANCOMYCIN TROUGH TIME LAST DOSE: NORMAL
VANCOMYCIN TROUGH: 19.8 UG/ML (ref 10–20)
WBC # BLD: 11 K/UL (ref 3.5–11)
WBC # BLD: ABNORMAL 10*3/UL
WBC FLUID: 611 /MM3

## 2017-08-17 PROCEDURE — 6370000000 HC RX 637 (ALT 250 FOR IP): Performed by: SURGERY

## 2017-08-17 PROCEDURE — 93971 EXTREMITY STUDY: CPT

## 2017-08-17 PROCEDURE — 36415 COLL VENOUS BLD VENIPUNCTURE: CPT

## 2017-08-17 PROCEDURE — 80202 ASSAY OF VANCOMYCIN: CPT

## 2017-08-17 PROCEDURE — 6360000002 HC RX W HCPCS: Performed by: NURSE PRACTITIONER

## 2017-08-17 PROCEDURE — 2580000003 HC RX 258: Performed by: NURSE PRACTITIONER

## 2017-08-17 PROCEDURE — 99232 SBSQ HOSP IP/OBS MODERATE 35: CPT | Performed by: INTERNAL MEDICINE

## 2017-08-17 PROCEDURE — 85610 PROTHROMBIN TIME: CPT

## 2017-08-17 PROCEDURE — 86140 C-REACTIVE PROTEIN: CPT

## 2017-08-17 PROCEDURE — 6360000002 HC RX W HCPCS: Performed by: INTERNAL MEDICINE

## 2017-08-17 PROCEDURE — 2580000003 HC RX 258: Performed by: INTERNAL MEDICINE

## 2017-08-17 PROCEDURE — 85025 COMPLETE CBC W/AUTO DIFF WBC: CPT

## 2017-08-17 PROCEDURE — 94762 N-INVAS EAR/PLS OXIMTRY CONT: CPT

## 2017-08-17 PROCEDURE — 1200000000 HC SEMI PRIVATE

## 2017-08-17 PROCEDURE — 82330 ASSAY OF CALCIUM: CPT

## 2017-08-17 PROCEDURE — 83735 ASSAY OF MAGNESIUM: CPT

## 2017-08-17 PROCEDURE — 80048 BASIC METABOLIC PNL TOTAL CA: CPT

## 2017-08-17 RX ORDER — ONDANSETRON 2 MG/ML
4 INJECTION INTRAMUSCULAR; INTRAVENOUS EVERY 6 HOURS PRN
Status: DISCONTINUED | OUTPATIENT
Start: 2017-08-17 | End: 2017-08-19

## 2017-08-17 RX ADMIN — OXYCODONE HYDROCHLORIDE AND ACETAMINOPHEN 2 TABLET: 5; 325 TABLET ORAL at 11:20

## 2017-08-17 RX ADMIN — VANCOMYCIN HYDROCHLORIDE 1000 MG: 1 INJECTION, SOLUTION INTRAVENOUS at 23:35

## 2017-08-17 RX ADMIN — OXYCODONE HYDROCHLORIDE AND ACETAMINOPHEN 2 TABLET: 5; 325 TABLET ORAL at 00:26

## 2017-08-17 RX ADMIN — NALBUPHINE HYDROCHLORIDE 10 MG: 10 INJECTION, SOLUTION INTRAMUSCULAR; INTRAVENOUS; SUBCUTANEOUS at 15:47

## 2017-08-17 RX ADMIN — VANCOMYCIN HYDROCHLORIDE 1000 MG: 1 INJECTION, SOLUTION INTRAVENOUS at 11:25

## 2017-08-17 RX ADMIN — POTASSIUM CHLORIDE: 149 INJECTION, SOLUTION, CONCENTRATE INTRAVENOUS at 01:41

## 2017-08-17 RX ADMIN — ONDANSETRON 4 MG: 2 INJECTION, SOLUTION INTRAMUSCULAR; INTRAVENOUS at 05:14

## 2017-08-17 RX ADMIN — Medication 10 ML: at 08:44

## 2017-08-17 RX ADMIN — ONDANSETRON 4 MG: 2 INJECTION, SOLUTION INTRAMUSCULAR; INTRAVENOUS at 11:20

## 2017-08-17 RX ADMIN — ENOXAPARIN SODIUM 40 MG: 40 INJECTION SUBCUTANEOUS at 08:43

## 2017-08-17 RX ADMIN — OXYCODONE HYDROCHLORIDE AND ACETAMINOPHEN 2 TABLET: 5; 325 TABLET ORAL at 06:36

## 2017-08-17 ASSESSMENT — PAIN SCALES - GENERAL
PAINLEVEL_OUTOF10: 4
PAINLEVEL_OUTOF10: 4
PAINLEVEL_OUTOF10: 0
PAINLEVEL_OUTOF10: 7
PAINLEVEL_OUTOF10: 8
PAINLEVEL_OUTOF10: 7
PAINLEVEL_OUTOF10: 8
PAINLEVEL_OUTOF10: 4
PAINLEVEL_OUTOF10: 4

## 2017-08-17 ASSESSMENT — ENCOUNTER SYMPTOMS
SHORTNESS OF BREATH: 0
VOMITING: 0
DIARRHEA: 0
COUGH: 0
CONSTIPATION: 0
BLOOD IN STOOL: 0
NAUSEA: 0

## 2017-08-17 ASSESSMENT — PAIN DESCRIPTION - DESCRIPTORS: DESCRIPTORS: BURNING;SHARP

## 2017-08-17 ASSESSMENT — PAIN DESCRIPTION - PAIN TYPE: TYPE: ACUTE PAIN

## 2017-08-17 ASSESSMENT — PAIN DESCRIPTION - LOCATION: LOCATION: BUTTOCKS

## 2017-08-18 PROBLEM — A41.9 SEPSIS (HCC): Status: ACTIVE | Noted: 2017-08-18

## 2017-08-18 LAB
ABSOLUTE EOS #: 0.1 K/UL (ref 0–0.4)
ABSOLUTE LYMPH #: 1.9 K/UL (ref 1–4.8)
ABSOLUTE MONO #: 0.8 K/UL (ref 0.1–1.2)
ANION GAP SERPL CALCULATED.3IONS-SCNC: 14 MMOL/L (ref 9–17)
BASOPHILS # BLD: 0 %
BASOPHILS ABSOLUTE: 0 K/UL (ref 0–0.2)
BUN BLDV-MCNC: 3 MG/DL (ref 6–20)
BUN/CREAT BLD: ABNORMAL (ref 9–20)
CALCIUM SERPL-MCNC: 8.1 MG/DL (ref 8.6–10.4)
CHLORIDE BLD-SCNC: 106 MMOL/L (ref 98–107)
CO2: 20 MMOL/L (ref 20–31)
CREAT SERPL-MCNC: 0.63 MG/DL (ref 0.5–0.9)
DIFFERENTIAL TYPE: ABNORMAL
DIRECT EXAM: NORMAL
DIRECT EXAM: NORMAL
EOSINOPHILS RELATIVE PERCENT: 1 %
GFR AFRICAN AMERICAN: >60 ML/MIN
GFR NON-AFRICAN AMERICAN: >60 ML/MIN
GFR SERPL CREATININE-BSD FRML MDRD: ABNORMAL ML/MIN/{1.73_M2}
GFR SERPL CREATININE-BSD FRML MDRD: ABNORMAL ML/MIN/{1.73_M2}
GLUCOSE BLD-MCNC: 119 MG/DL (ref 65–105)
GLUCOSE BLD-MCNC: 93 MG/DL (ref 70–99)
HCT VFR BLD CALC: 33.4 % (ref 36–46)
HEMOGLOBIN: 11.4 G/DL (ref 12–16)
LYMPHOCYTES # BLD: 16 %
Lab: NORMAL
MAGNESIUM: 2.1 MG/DL (ref 1.6–2.6)
MCH RBC QN AUTO: 30.9 PG (ref 26–34)
MCHC RBC AUTO-ENTMCNC: 34.1 G/DL (ref 31–37)
MCV RBC AUTO: 90.6 FL (ref 80–100)
MONOCYTES # BLD: 7 %
PDW BLD-RTO: 14.1 % (ref 12.5–15.4)
PLATELET # BLD: 276 K/UL (ref 140–450)
PLATELET ESTIMATE: ABNORMAL
PMV BLD AUTO: 9.3 FL (ref 6–12)
POTASSIUM SERPL-SCNC: 3.8 MMOL/L (ref 3.7–5.3)
RBC # BLD: 3.69 M/UL (ref 4–5.2)
RBC # BLD: ABNORMAL 10*6/UL
SEG NEUTROPHILS: 76 %
SEGMENTED NEUTROPHILS ABSOLUTE COUNT: 9 K/UL (ref 1.8–7.7)
SODIUM BLD-SCNC: 140 MMOL/L (ref 135–144)
SPECIMEN DESCRIPTION: NORMAL
STATUS: NORMAL
WBC # BLD: 11.9 K/UL (ref 3.5–11)
WBC # BLD: ABNORMAL 10*3/UL

## 2017-08-18 PROCEDURE — 87205 SMEAR GRAM STAIN: CPT

## 2017-08-18 PROCEDURE — 85025 COMPLETE CBC W/AUTO DIFF WBC: CPT

## 2017-08-18 PROCEDURE — 6360000002 HC RX W HCPCS: Performed by: NURSE PRACTITIONER

## 2017-08-18 PROCEDURE — 0S9C3ZX DRAINAGE OF RIGHT KNEE JOINT, PERCUTANEOUS APPROACH, DIAGNOSTIC: ICD-10-PCS | Performed by: ORTHOPAEDIC SURGERY

## 2017-08-18 PROCEDURE — 87075 CULTR BACTERIA EXCEPT BLOOD: CPT

## 2017-08-18 PROCEDURE — 2580000003 HC RX 258: Performed by: NURSE PRACTITIONER

## 2017-08-18 PROCEDURE — 94762 N-INVAS EAR/PLS OXIMTRY CONT: CPT

## 2017-08-18 PROCEDURE — 87070 CULTURE OTHR SPECIMN AEROBIC: CPT

## 2017-08-18 PROCEDURE — 82947 ASSAY GLUCOSE BLOOD QUANT: CPT

## 2017-08-18 PROCEDURE — 83735 ASSAY OF MAGNESIUM: CPT

## 2017-08-18 PROCEDURE — 99232 SBSQ HOSP IP/OBS MODERATE 35: CPT | Performed by: INTERNAL MEDICINE

## 2017-08-18 PROCEDURE — 89051 BODY FLUID CELL COUNT: CPT

## 2017-08-18 PROCEDURE — 80048 BASIC METABOLIC PNL TOTAL CA: CPT

## 2017-08-18 PROCEDURE — 1200000000 HC SEMI PRIVATE

## 2017-08-18 PROCEDURE — 36415 COLL VENOUS BLD VENIPUNCTURE: CPT

## 2017-08-18 RX ORDER — KETOROLAC TROMETHAMINE 30 MG/ML
15 INJECTION, SOLUTION INTRAMUSCULAR; INTRAVENOUS ONCE
Status: DISCONTINUED | OUTPATIENT
Start: 2017-08-18 | End: 2017-08-18

## 2017-08-18 RX ORDER — FENTANYL CITRATE 50 UG/ML
INJECTION, SOLUTION INTRAMUSCULAR; INTRAVENOUS
Status: DISCONTINUED
Start: 2017-08-18 | End: 2017-08-19

## 2017-08-18 RX ORDER — FENTANYL CITRATE 50 UG/ML
50 INJECTION, SOLUTION INTRAMUSCULAR; INTRAVENOUS ONCE
Status: DISCONTINUED | OUTPATIENT
Start: 2017-08-18 | End: 2017-08-18

## 2017-08-18 RX ADMIN — Medication 10 ML: at 21:04

## 2017-08-18 RX ADMIN — NALBUPHINE HYDROCHLORIDE 5 MG: 10 INJECTION, SOLUTION INTRAMUSCULAR; INTRAVENOUS; SUBCUTANEOUS at 20:05

## 2017-08-18 RX ADMIN — ENOXAPARIN SODIUM 40 MG: 40 INJECTION SUBCUTANEOUS at 09:13

## 2017-08-18 RX ADMIN — VANCOMYCIN HYDROCHLORIDE 1000 MG: 1 INJECTION, SOLUTION INTRAVENOUS at 13:07

## 2017-08-18 ASSESSMENT — ENCOUNTER SYMPTOMS
COUGH: 0
BLOOD IN STOOL: 0
CONSTIPATION: 0
VOMITING: 0
SHORTNESS OF BREATH: 0
DIARRHEA: 0
NAUSEA: 0

## 2017-08-18 ASSESSMENT — PAIN SCALES - GENERAL
PAINLEVEL_OUTOF10: 7
PAINLEVEL_OUTOF10: 0
PAINLEVEL_OUTOF10: 2

## 2017-08-19 PROBLEM — A49.02 MRSA INFECTION: Status: ACTIVE | Noted: 2017-08-19

## 2017-08-19 LAB
ABSOLUTE EOS #: 0.2 K/UL (ref 0–0.4)
ABSOLUTE LYMPH #: 1.9 K/UL (ref 1–4.8)
ABSOLUTE MONO #: 1 K/UL (ref 0.1–1.2)
ANION GAP SERPL CALCULATED.3IONS-SCNC: 13 MMOL/L (ref 9–17)
BASOPHILS # BLD: 1 %
BASOPHILS ABSOLUTE: 0.1 K/UL (ref 0–0.2)
BUN BLDV-MCNC: 6 MG/DL (ref 6–20)
BUN/CREAT BLD: ABNORMAL (ref 9–20)
C-REACTIVE PROTEIN: 54.6 MG/L (ref 0–5)
CALCIUM SERPL-MCNC: 8.5 MG/DL (ref 8.6–10.4)
CHLORIDE BLD-SCNC: 110 MMOL/L (ref 98–107)
CO2: 24 MMOL/L (ref 20–31)
CREAT SERPL-MCNC: 0.54 MG/DL (ref 0.5–0.9)
DIFFERENTIAL TYPE: ABNORMAL
EOSINOPHILS RELATIVE PERCENT: 2 %
GFR AFRICAN AMERICAN: >60 ML/MIN
GFR NON-AFRICAN AMERICAN: >60 ML/MIN
GFR SERPL CREATININE-BSD FRML MDRD: ABNORMAL ML/MIN/{1.73_M2}
GFR SERPL CREATININE-BSD FRML MDRD: ABNORMAL ML/MIN/{1.73_M2}
GLUCOSE BLD-MCNC: 100 MG/DL (ref 70–99)
HCT VFR BLD CALC: 35.3 % (ref 36–46)
HEMOGLOBIN: 11.8 G/DL (ref 12–16)
LYMPHOCYTES # BLD: 21 %
MAGNESIUM: 1.9 MG/DL (ref 1.6–2.6)
MCH RBC QN AUTO: 30.4 PG (ref 26–34)
MCHC RBC AUTO-ENTMCNC: 33.3 G/DL (ref 31–37)
MCV RBC AUTO: 91.5 FL (ref 80–100)
MONOCYTES # BLD: 10 %
PDW BLD-RTO: 14.7 % (ref 12.5–15.4)
PLATELET # BLD: 328 K/UL (ref 140–450)
PLATELET ESTIMATE: ABNORMAL
PMV BLD AUTO: 8.9 FL (ref 6–12)
POTASSIUM SERPL-SCNC: 3.7 MMOL/L (ref 3.7–5.3)
RBC # BLD: 3.86 M/UL (ref 4–5.2)
RBC # BLD: ABNORMAL 10*6/UL
SEG NEUTROPHILS: 66 %
SEGMENTED NEUTROPHILS ABSOLUTE COUNT: 6.2 K/UL (ref 1.8–7.7)
SODIUM BLD-SCNC: 147 MMOL/L (ref 135–144)
WBC # BLD: 9.4 K/UL (ref 3.5–11)
WBC # BLD: ABNORMAL 10*3/UL

## 2017-08-19 PROCEDURE — 86140 C-REACTIVE PROTEIN: CPT

## 2017-08-19 PROCEDURE — 80048 BASIC METABOLIC PNL TOTAL CA: CPT

## 2017-08-19 PROCEDURE — 1200000000 HC SEMI PRIVATE

## 2017-08-19 PROCEDURE — 6360000002 HC RX W HCPCS: Performed by: INTERNAL MEDICINE

## 2017-08-19 PROCEDURE — 36415 COLL VENOUS BLD VENIPUNCTURE: CPT

## 2017-08-19 PROCEDURE — 6370000000 HC RX 637 (ALT 250 FOR IP): Performed by: NURSE PRACTITIONER

## 2017-08-19 PROCEDURE — 6360000002 HC RX W HCPCS: Performed by: NURSE PRACTITIONER

## 2017-08-19 PROCEDURE — 80202 ASSAY OF VANCOMYCIN: CPT

## 2017-08-19 PROCEDURE — 94762 N-INVAS EAR/PLS OXIMTRY CONT: CPT

## 2017-08-19 PROCEDURE — 83735 ASSAY OF MAGNESIUM: CPT

## 2017-08-19 PROCEDURE — 99232 SBSQ HOSP IP/OBS MODERATE 35: CPT | Performed by: INTERNAL MEDICINE

## 2017-08-19 PROCEDURE — 85025 COMPLETE CBC W/AUTO DIFF WBC: CPT

## 2017-08-19 PROCEDURE — 2580000003 HC RX 258: Performed by: NURSE PRACTITIONER

## 2017-08-19 RX ORDER — PROMETHAZINE HYDROCHLORIDE 25 MG/ML
12.5 INJECTION, SOLUTION INTRAMUSCULAR; INTRAVENOUS EVERY 6 HOURS PRN
Status: DISCONTINUED | OUTPATIENT
Start: 2017-08-19 | End: 2017-08-20 | Stop reason: HOSPADM

## 2017-08-19 RX ORDER — MORPHINE SULFATE 2 MG/ML
2 INJECTION, SOLUTION INTRAMUSCULAR; INTRAVENOUS EVERY 4 HOURS PRN
Status: DISCONTINUED | OUTPATIENT
Start: 2017-08-19 | End: 2017-08-20 | Stop reason: HOSPADM

## 2017-08-19 RX ADMIN — ENOXAPARIN SODIUM 40 MG: 40 INJECTION SUBCUTANEOUS at 09:24

## 2017-08-19 RX ADMIN — VANCOMYCIN HYDROCHLORIDE 1000 MG: 1 INJECTION, SOLUTION INTRAVENOUS at 11:26

## 2017-08-19 RX ADMIN — ACETAMINOPHEN 650 MG: 325 TABLET ORAL at 06:13

## 2017-08-19 RX ADMIN — NALBUPHINE HYDROCHLORIDE 10 MG: 10 INJECTION, SOLUTION INTRAMUSCULAR; INTRAVENOUS; SUBCUTANEOUS at 14:10

## 2017-08-19 RX ADMIN — NALBUPHINE HYDROCHLORIDE 10 MG: 10 INJECTION, SOLUTION INTRAMUSCULAR; INTRAVENOUS; SUBCUTANEOUS at 00:19

## 2017-08-19 RX ADMIN — NALBUPHINE HYDROCHLORIDE 10 MG: 10 INJECTION, SOLUTION INTRAMUSCULAR; INTRAVENOUS; SUBCUTANEOUS at 07:04

## 2017-08-19 RX ADMIN — VANCOMYCIN HYDROCHLORIDE 1000 MG: 1 INJECTION, SOLUTION INTRAVENOUS at 00:15

## 2017-08-19 RX ADMIN — Medication 10 ML: at 09:25

## 2017-08-19 RX ADMIN — VANCOMYCIN HYDROCHLORIDE 1000 MG: 1 INJECTION, SOLUTION INTRAVENOUS at 22:44

## 2017-08-19 RX ADMIN — Medication 10 ML: at 20:58

## 2017-08-19 RX ADMIN — MORPHINE SULFATE 2 MG: 2 INJECTION, SOLUTION INTRAMUSCULAR; INTRAVENOUS at 21:02

## 2017-08-19 ASSESSMENT — PAIN DESCRIPTION - PAIN TYPE: TYPE: ACUTE PAIN

## 2017-08-19 ASSESSMENT — PAIN DESCRIPTION - LOCATION: LOCATION: BUTTOCKS

## 2017-08-19 ASSESSMENT — ENCOUNTER SYMPTOMS
VOMITING: 0
SHORTNESS OF BREATH: 0
CONSTIPATION: 0
DIARRHEA: 0
COUGH: 0
BLOOD IN STOOL: 0
NAUSEA: 0

## 2017-08-19 ASSESSMENT — PAIN DESCRIPTION - ONSET: ONSET: ON-GOING

## 2017-08-19 ASSESSMENT — PAIN SCALES - GENERAL
PAINLEVEL_OUTOF10: 3
PAINLEVEL_OUTOF10: 9
PAINLEVEL_OUTOF10: 6
PAINLEVEL_OUTOF10: 0
PAINLEVEL_OUTOF10: 4
PAINLEVEL_OUTOF10: 9
PAINLEVEL_OUTOF10: 2
PAINLEVEL_OUTOF10: 7

## 2017-08-19 ASSESSMENT — PAIN DESCRIPTION - PROGRESSION: CLINICAL_PROGRESSION: NOT CHANGED

## 2017-08-19 ASSESSMENT — PAIN DESCRIPTION - DESCRIPTORS: DESCRIPTORS: BURNING;STABBING

## 2017-08-19 ASSESSMENT — PAIN DESCRIPTION - ORIENTATION: ORIENTATION: RIGHT

## 2017-08-19 ASSESSMENT — PAIN DESCRIPTION - FREQUENCY: FREQUENCY: CONTINUOUS

## 2017-08-20 VITALS
OXYGEN SATURATION: 99 % | RESPIRATION RATE: 16 BRPM | HEART RATE: 70 BPM | HEIGHT: 66 IN | TEMPERATURE: 98.5 F | SYSTOLIC BLOOD PRESSURE: 95 MMHG | BODY MASS INDEX: 22.31 KG/M2 | WEIGHT: 138.8 LBS | DIASTOLIC BLOOD PRESSURE: 61 MMHG

## 2017-08-20 LAB
ABSOLUTE EOS #: 0.2 K/UL (ref 0–0.4)
ABSOLUTE LYMPH #: 1.9 K/UL (ref 1–4.8)
ABSOLUTE MONO #: 0.9 K/UL (ref 0.1–1.2)
ANION GAP SERPL CALCULATED.3IONS-SCNC: 14 MMOL/L (ref 9–17)
BASOPHILS # BLD: 1 %
BASOPHILS ABSOLUTE: 0.1 K/UL (ref 0–0.2)
BUN BLDV-MCNC: 7 MG/DL (ref 6–20)
BUN/CREAT BLD: NORMAL (ref 9–20)
CALCIUM SERPL-MCNC: 8.6 MG/DL (ref 8.6–10.4)
CHLORIDE BLD-SCNC: 104 MMOL/L (ref 98–107)
CO2: 25 MMOL/L (ref 20–31)
CREAT SERPL-MCNC: 0.65 MG/DL (ref 0.5–0.9)
DIFFERENTIAL TYPE: ABNORMAL
EOSINOPHILS RELATIVE PERCENT: 2 %
GFR AFRICAN AMERICAN: >60 ML/MIN
GFR NON-AFRICAN AMERICAN: >60 ML/MIN
GFR SERPL CREATININE-BSD FRML MDRD: NORMAL ML/MIN/{1.73_M2}
GFR SERPL CREATININE-BSD FRML MDRD: NORMAL ML/MIN/{1.73_M2}
GLUCOSE BLD-MCNC: 94 MG/DL (ref 70–99)
HCT VFR BLD CALC: 35.6 % (ref 36–46)
HEMOGLOBIN: 12.3 G/DL (ref 12–16)
LYMPHOCYTES # BLD: 21 %
MAGNESIUM: 2.1 MG/DL (ref 1.6–2.6)
MCH RBC QN AUTO: 31.1 PG (ref 26–34)
MCHC RBC AUTO-ENTMCNC: 34.5 G/DL (ref 31–37)
MCV RBC AUTO: 90.2 FL (ref 80–100)
MONOCYTES # BLD: 10 %
PDW BLD-RTO: 14.3 % (ref 12.5–15.4)
PLATELET # BLD: 365 K/UL (ref 140–450)
PLATELET ESTIMATE: ABNORMAL
PMV BLD AUTO: 8.9 FL (ref 6–12)
POTASSIUM SERPL-SCNC: 4.1 MMOL/L (ref 3.7–5.3)
RBC # BLD: 3.95 M/UL (ref 4–5.2)
RBC # BLD: ABNORMAL 10*6/UL
SEG NEUTROPHILS: 66 %
SEGMENTED NEUTROPHILS ABSOLUTE COUNT: 5.9 K/UL (ref 1.8–7.7)
SODIUM BLD-SCNC: 143 MMOL/L (ref 135–144)
VANCOMYCIN TROUGH DATE LAST DOSE: ABNORMAL
VANCOMYCIN TROUGH DATE LAST DOSE: NORMAL
VANCOMYCIN TROUGH DOSE AMOUNT: ABNORMAL
VANCOMYCIN TROUGH DOSE AMOUNT: NORMAL
VANCOMYCIN TROUGH TIME LAST DOSE: ABNORMAL
VANCOMYCIN TROUGH TIME LAST DOSE: NORMAL
VANCOMYCIN TROUGH: 13.1 UG/ML (ref 10–20)
VANCOMYCIN TROUGH: 49.8 UG/ML (ref 10–20)
WBC # BLD: 9 K/UL (ref 3.5–11)
WBC # BLD: ABNORMAL 10*3/UL

## 2017-08-20 PROCEDURE — 99232 SBSQ HOSP IP/OBS MODERATE 35: CPT | Performed by: INTERNAL MEDICINE

## 2017-08-20 PROCEDURE — 2580000003 HC RX 258: Performed by: NURSE PRACTITIONER

## 2017-08-20 PROCEDURE — 6360000002 HC RX W HCPCS: Performed by: NURSE PRACTITIONER

## 2017-08-20 PROCEDURE — 6360000002 HC RX W HCPCS: Performed by: INTERNAL MEDICINE

## 2017-08-20 PROCEDURE — 36415 COLL VENOUS BLD VENIPUNCTURE: CPT

## 2017-08-20 PROCEDURE — 83735 ASSAY OF MAGNESIUM: CPT

## 2017-08-20 PROCEDURE — 80048 BASIC METABOLIC PNL TOTAL CA: CPT

## 2017-08-20 PROCEDURE — 85025 COMPLETE CBC W/AUTO DIFF WBC: CPT

## 2017-08-20 PROCEDURE — 80202 ASSAY OF VANCOMYCIN: CPT

## 2017-08-20 PROCEDURE — 94762 N-INVAS EAR/PLS OXIMTRY CONT: CPT

## 2017-08-20 RX ORDER — OXYCODONE HYDROCHLORIDE AND ACETAMINOPHEN 5; 325 MG/1; MG/1
1 TABLET ORAL EVERY 4 HOURS PRN
Qty: 20 TABLET | Refills: 0 | Status: SHIPPED | OUTPATIENT
Start: 2017-08-20 | End: 2017-08-27

## 2017-08-20 RX ORDER — CLINDAMYCIN HYDROCHLORIDE 300 MG/1
300 CAPSULE ORAL 4 TIMES DAILY
Qty: 40 CAPSULE | Refills: 0 | Status: SHIPPED | OUTPATIENT
Start: 2017-08-20 | End: 2017-08-30

## 2017-08-20 RX ORDER — IBUPROFEN 800 MG/1
800 TABLET ORAL EVERY 6 HOURS PRN
Qty: 30 TABLET | Refills: 0 | Status: SHIPPED | OUTPATIENT
Start: 2017-08-20 | End: 2019-05-16

## 2017-08-20 RX ADMIN — Medication 10 ML: at 08:54

## 2017-08-20 RX ADMIN — VANCOMYCIN HYDROCHLORIDE 1000 MG: 1 INJECTION, SOLUTION INTRAVENOUS at 11:28

## 2017-08-20 RX ADMIN — MORPHINE SULFATE 2 MG: 2 INJECTION, SOLUTION INTRAMUSCULAR; INTRAVENOUS at 11:43

## 2017-08-20 RX ADMIN — MORPHINE SULFATE 2 MG: 2 INJECTION, SOLUTION INTRAMUSCULAR; INTRAVENOUS at 06:57

## 2017-08-20 ASSESSMENT — PAIN SCALES - GENERAL
PAINLEVEL_OUTOF10: 2
PAINLEVEL_OUTOF10: 4
PAINLEVEL_OUTOF10: 2
PAINLEVEL_OUTOF10: 4

## 2017-08-20 ASSESSMENT — PAIN DESCRIPTION - ORIENTATION: ORIENTATION: RIGHT

## 2017-08-20 ASSESSMENT — PAIN DESCRIPTION - PROGRESSION
CLINICAL_PROGRESSION: NOT CHANGED

## 2017-08-20 ASSESSMENT — ENCOUNTER SYMPTOMS
BLOOD IN STOOL: 0
DIARRHEA: 0
VOMITING: 0
COUGH: 0
NAUSEA: 0
CONSTIPATION: 0
SHORTNESS OF BREATH: 0

## 2017-08-20 ASSESSMENT — PAIN DESCRIPTION - LOCATION: LOCATION: BUTTOCKS

## 2017-08-20 ASSESSMENT — PAIN DESCRIPTION - FREQUENCY: FREQUENCY: CONTINUOUS

## 2017-08-20 ASSESSMENT — PAIN DESCRIPTION - PAIN TYPE: TYPE: ACUTE PAIN

## 2017-08-20 ASSESSMENT — PAIN DESCRIPTION - ONSET: ONSET: ON-GOING

## 2017-08-20 ASSESSMENT — PAIN DESCRIPTION - DESCRIPTORS: DESCRIPTORS: BURNING

## 2017-08-21 LAB
CULTURE: ABNORMAL
DIRECT EXAM: ABNORMAL
Lab: ABNORMAL
Lab: ABNORMAL
ORGANISM: ABNORMAL
SPECIMEN DESCRIPTION: ABNORMAL
SPECIMEN DESCRIPTION: ABNORMAL
STATUS: ABNORMAL
STATUS: ABNORMAL

## 2017-08-22 LAB
CULTURE: ABNORMAL
CULTURE: ABNORMAL
CULTURE: NORMAL
DIRECT EXAM: ABNORMAL
Lab: ABNORMAL
Lab: NORMAL
SPECIMEN DESCRIPTION: ABNORMAL
SPECIMEN DESCRIPTION: NORMAL
STATUS: ABNORMAL
STATUS: NORMAL

## 2017-08-23 LAB
CULTURE: ABNORMAL
CULTURE: ABNORMAL
DIRECT EXAM: ABNORMAL
DIRECT EXAM: ABNORMAL
Lab: ABNORMAL
SPECIMEN DESCRIPTION: ABNORMAL
STATUS: ABNORMAL

## 2017-09-13 ENCOUNTER — HOSPITAL ENCOUNTER (OUTPATIENT)
Dept: WOMENS IMAGING | Age: 37
Discharge: HOME | End: 2017-09-13
Payer: MEDICARE

## 2017-09-13 DIAGNOSIS — Z80.3 FAMILY HISTORY OF BREAST CANCER: ICD-10-CM

## 2017-09-13 PROCEDURE — G0202 SCR MAMMO BI INCL CAD: HCPCS

## 2018-03-05 ENCOUNTER — ANESTHESIA (OUTPATIENT)
Dept: OPERATING ROOM | Age: 38
End: 2018-03-05
Payer: MEDICARE

## 2018-03-05 ENCOUNTER — HOSPITAL ENCOUNTER (OUTPATIENT)
Age: 38
Setting detail: OBSERVATION
Discharge: HOME | End: 2018-03-06
Attending: EMERGENCY MEDICINE | Admitting: SURGERY
Payer: MEDICARE

## 2018-03-05 ENCOUNTER — ANESTHESIA EVENT (OUTPATIENT)
Dept: OPERATING ROOM | Age: 38
End: 2018-03-05
Payer: MEDICARE

## 2018-03-05 ENCOUNTER — APPOINTMENT (OUTPATIENT)
Dept: CT IMAGING | Age: 38
End: 2018-03-05
Payer: MEDICARE

## 2018-03-05 ENCOUNTER — APPOINTMENT (OUTPATIENT)
Dept: ULTRASOUND IMAGING | Age: 38
End: 2018-03-05
Payer: MEDICARE

## 2018-03-05 VITALS — DIASTOLIC BLOOD PRESSURE: 57 MMHG | SYSTOLIC BLOOD PRESSURE: 103 MMHG | OXYGEN SATURATION: 100 % | TEMPERATURE: 95.4 F

## 2018-03-05 DIAGNOSIS — K35.20 ACUTE APPENDICITIS WITH GENERALIZED PERITONITIS: Primary | ICD-10-CM

## 2018-03-05 PROBLEM — K35.80 APPENDICITIS, ACUTE: Status: ACTIVE | Noted: 2018-03-05

## 2018-03-05 PROBLEM — Z90.49 S/P LAPAROSCOPIC APPENDECTOMY: Status: ACTIVE | Noted: 2018-03-05

## 2018-03-05 LAB
-: NORMAL
ABSOLUTE EOS #: 0.2 K/UL (ref 0–0.4)
ABSOLUTE IMMATURE GRANULOCYTE: ABNORMAL K/UL (ref 0–0.3)
ABSOLUTE LYMPH #: 2.5 K/UL (ref 1–4.8)
ABSOLUTE MONO #: 0.6 K/UL (ref 0.1–1.3)
ALBUMIN SERPL-MCNC: 4 G/DL (ref 3.5–5.2)
ALBUMIN/GLOBULIN RATIO: ABNORMAL (ref 1–2.5)
ALP BLD-CCNC: 34 U/L (ref 35–104)
ALT SERPL-CCNC: 11 U/L (ref 5–33)
AMORPHOUS: NORMAL
ANION GAP SERPL CALCULATED.3IONS-SCNC: 12 MMOL/L (ref 9–17)
AST SERPL-CCNC: 11 U/L
BACTERIA: NORMAL
BASOPHILS # BLD: 0 % (ref 0–2)
BASOPHILS ABSOLUTE: 0 K/UL (ref 0–0.2)
BILIRUB SERPL-MCNC: 0.39 MG/DL (ref 0.3–1.2)
BILIRUBIN URINE: NEGATIVE
BUN BLDV-MCNC: 17 MG/DL (ref 6–20)
BUN/CREAT BLD: ABNORMAL (ref 9–20)
CALCIUM SERPL-MCNC: 8.9 MG/DL (ref 8.6–10.4)
CASTS UA: NORMAL /LPF
CHLORIDE BLD-SCNC: 107 MMOL/L (ref 98–107)
CO2: 22 MMOL/L (ref 20–31)
COLOR: YELLOW
COMMENT UA: ABNORMAL
CREAT SERPL-MCNC: 0.83 MG/DL (ref 0.5–0.9)
CRYSTALS, UA: NORMAL /HPF
DIFFERENTIAL TYPE: ABNORMAL
EOSINOPHILS RELATIVE PERCENT: 3 % (ref 0–4)
EPITHELIAL CELLS UA: NORMAL /HPF
GFR AFRICAN AMERICAN: >60 ML/MIN
GFR NON-AFRICAN AMERICAN: >60 ML/MIN
GFR SERPL CREATININE-BSD FRML MDRD: ABNORMAL ML/MIN/{1.73_M2}
GFR SERPL CREATININE-BSD FRML MDRD: ABNORMAL ML/MIN/{1.73_M2}
GLUCOSE BLD-MCNC: 100 MG/DL (ref 70–99)
GLUCOSE URINE: NEGATIVE
HCG QUALITATIVE: NEGATIVE
HCT VFR BLD CALC: 40.5 % (ref 36–46)
HEMOGLOBIN: 13.6 G/DL (ref 12–16)
IMMATURE GRANULOCYTES: ABNORMAL %
KETONES, URINE: NEGATIVE
LEUKOCYTE ESTERASE, URINE: NEGATIVE
LIPASE: 20 U/L (ref 13–60)
LYMPHOCYTES # BLD: 37 % (ref 24–44)
MCH RBC QN AUTO: 31.4 PG (ref 26–34)
MCHC RBC AUTO-ENTMCNC: 33.7 G/DL (ref 31–37)
MCV RBC AUTO: 93.2 FL (ref 80–100)
MONOCYTES # BLD: 9 % (ref 1–7)
MUCUS: NORMAL
NITRITE, URINE: NEGATIVE
NRBC AUTOMATED: ABNORMAL PER 100 WBC
OTHER OBSERVATIONS UA: NORMAL
PDW BLD-RTO: 13.2 % (ref 11.5–14.9)
PH UA: 5 (ref 5–8)
PLATELET # BLD: 239 K/UL (ref 150–450)
PLATELET ESTIMATE: ABNORMAL
PMV BLD AUTO: 8.9 FL (ref 6–12)
POTASSIUM SERPL-SCNC: 3.9 MMOL/L (ref 3.7–5.3)
PROTEIN UA: NEGATIVE
RBC # BLD: 4.34 M/UL (ref 4–5.2)
RBC # BLD: ABNORMAL 10*6/UL
RBC UA: NORMAL /HPF
RENAL EPITHELIAL, UA: NORMAL /HPF
SEG NEUTROPHILS: 51 % (ref 36–66)
SEGMENTED NEUTROPHILS ABSOLUTE COUNT: 3.5 K/UL (ref 1.3–9.1)
SODIUM BLD-SCNC: 141 MMOL/L (ref 135–144)
SPECIFIC GRAVITY UA: 1.02 (ref 1–1.03)
TOTAL PROTEIN: 6.7 G/DL (ref 6.4–8.3)
TRICHOMONAS: NORMAL
TURBIDITY: CLEAR
URINE HGB: ABNORMAL
UROBILINOGEN, URINE: NORMAL
WBC # BLD: 6.8 K/UL (ref 3.5–11)
WBC # BLD: ABNORMAL 10*3/UL
WBC UA: NORMAL /HPF
YEAST: NORMAL

## 2018-03-05 PROCEDURE — 3700000001 HC ADD 15 MINUTES (ANESTHESIA): Performed by: SURGERY

## 2018-03-05 PROCEDURE — 6370000000 HC RX 637 (ALT 250 FOR IP): Performed by: ANESTHESIOLOGY

## 2018-03-05 PROCEDURE — 2580000003 HC RX 258: Performed by: EMERGENCY MEDICINE

## 2018-03-05 PROCEDURE — 96375 TX/PRO/DX INJ NEW DRUG ADDON: CPT

## 2018-03-05 PROCEDURE — 6360000002 HC RX W HCPCS: Performed by: EMERGENCY MEDICINE

## 2018-03-05 PROCEDURE — G0378 HOSPITAL OBSERVATION PER HR: HCPCS

## 2018-03-05 PROCEDURE — 85025 COMPLETE CBC W/AUTO DIFF WBC: CPT

## 2018-03-05 PROCEDURE — 83690 ASSAY OF LIPASE: CPT

## 2018-03-05 PROCEDURE — 88313 SPECIAL STAINS GROUP 2: CPT

## 2018-03-05 PROCEDURE — 7100000000 HC PACU RECOVERY - FIRST 15 MIN: Performed by: SURGERY

## 2018-03-05 PROCEDURE — 2500000003 HC RX 250 WO HCPCS: Performed by: SURGERY

## 2018-03-05 PROCEDURE — 88341 IMHCHEM/IMCYTCHM EA ADD ANTB: CPT

## 2018-03-05 PROCEDURE — 3600000003 HC SURGERY LEVEL 3 BASE: Performed by: SURGERY

## 2018-03-05 PROCEDURE — 2720000010 HC SURG SUPPLY STERILE: Performed by: SURGERY

## 2018-03-05 PROCEDURE — 99285 EMERGENCY DEPT VISIT HI MDM: CPT

## 2018-03-05 PROCEDURE — 7100000001 HC PACU RECOVERY - ADDTL 15 MIN: Performed by: SURGERY

## 2018-03-05 PROCEDURE — 96365 THER/PROPH/DIAG IV INF INIT: CPT

## 2018-03-05 PROCEDURE — 3600000013 HC SURGERY LEVEL 3 ADDTL 15MIN: Performed by: SURGERY

## 2018-03-05 PROCEDURE — 96376 TX/PRO/DX INJ SAME DRUG ADON: CPT

## 2018-03-05 PROCEDURE — 6360000002 HC RX W HCPCS: Performed by: SURGERY

## 2018-03-05 PROCEDURE — 36415 COLL VENOUS BLD VENIPUNCTURE: CPT

## 2018-03-05 PROCEDURE — 3700000000 HC ANESTHESIA ATTENDED CARE: Performed by: SURGERY

## 2018-03-05 PROCEDURE — 88342 IMHCHEM/IMCYTCHM 1ST ANTB: CPT

## 2018-03-05 PROCEDURE — 6360000002 HC RX W HCPCS: Performed by: ANESTHESIOLOGY

## 2018-03-05 PROCEDURE — 81001 URINALYSIS AUTO W/SCOPE: CPT

## 2018-03-05 PROCEDURE — 2780000010 HC IMPLANT OTHER: Performed by: SURGERY

## 2018-03-05 PROCEDURE — 74176 CT ABD & PELVIS W/O CONTRAST: CPT

## 2018-03-05 PROCEDURE — 6360000002 HC RX W HCPCS

## 2018-03-05 PROCEDURE — 2580000003 HC RX 258

## 2018-03-05 PROCEDURE — 76830 TRANSVAGINAL US NON-OB: CPT

## 2018-03-05 PROCEDURE — 84703 CHORIONIC GONADOTROPIN ASSAY: CPT

## 2018-03-05 PROCEDURE — 96366 THER/PROPH/DIAG IV INF ADDON: CPT

## 2018-03-05 PROCEDURE — 96367 TX/PROPH/DG ADDL SEQ IV INF: CPT

## 2018-03-05 PROCEDURE — 80053 COMPREHEN METABOLIC PANEL: CPT

## 2018-03-05 PROCEDURE — 88360 TUMOR IMMUNOHISTOCHEM/MANUAL: CPT

## 2018-03-05 PROCEDURE — 2500000003 HC RX 250 WO HCPCS

## 2018-03-05 PROCEDURE — 88304 TISSUE EXAM BY PATHOLOGIST: CPT

## 2018-03-05 PROCEDURE — 2500000003 HC RX 250 WO HCPCS: Performed by: EMERGENCY MEDICINE

## 2018-03-05 RX ORDER — CIPROFLOXACIN 2 MG/ML
400 INJECTION, SOLUTION INTRAVENOUS EVERY 12 HOURS
Status: COMPLETED | OUTPATIENT
Start: 2018-03-05 | End: 2018-03-05

## 2018-03-05 RX ORDER — DEXAMETHASONE SODIUM PHOSPHATE 4 MG/ML
INJECTION, SOLUTION INTRA-ARTICULAR; INTRALESIONAL; INTRAMUSCULAR; INTRAVENOUS; SOFT TISSUE PRN
Status: DISCONTINUED | OUTPATIENT
Start: 2018-03-05 | End: 2018-03-05 | Stop reason: SDUPTHER

## 2018-03-05 RX ORDER — HYDROCODONE BITARTRATE AND ACETAMINOPHEN 5; 325 MG/1; MG/1
2 TABLET ORAL EVERY 4 HOURS PRN
Status: DISCONTINUED | OUTPATIENT
Start: 2018-03-05 | End: 2018-03-06 | Stop reason: HOSPADM

## 2018-03-05 RX ORDER — PROPOFOL 10 MG/ML
INJECTION, EMULSION INTRAVENOUS PRN
Status: DISCONTINUED | OUTPATIENT
Start: 2018-03-05 | End: 2018-03-05 | Stop reason: SDUPTHER

## 2018-03-05 RX ORDER — KETOROLAC TROMETHAMINE 30 MG/ML
30 INJECTION, SOLUTION INTRAMUSCULAR; INTRAVENOUS EVERY 6 HOURS
Status: DISCONTINUED | OUTPATIENT
Start: 2018-03-05 | End: 2018-03-06 | Stop reason: HOSPADM

## 2018-03-05 RX ORDER — SODIUM CHLORIDE 0.9 % (FLUSH) 0.9 %
10 SYRINGE (ML) INJECTION PRN
Status: DISCONTINUED | OUTPATIENT
Start: 2018-03-05 | End: 2018-03-06 | Stop reason: HOSPADM

## 2018-03-05 RX ORDER — LIDOCAINE HYDROCHLORIDE 10 MG/ML
INJECTION, SOLUTION EPIDURAL; INFILTRATION; INTRACAUDAL; PERINEURAL PRN
Status: DISCONTINUED | OUTPATIENT
Start: 2018-03-05 | End: 2018-03-05 | Stop reason: SDUPTHER

## 2018-03-05 RX ORDER — ACETAMINOPHEN 325 MG/1
650 TABLET ORAL EVERY 4 HOURS PRN
Status: DISCONTINUED | OUTPATIENT
Start: 2018-03-05 | End: 2018-03-06 | Stop reason: HOSPADM

## 2018-03-05 RX ORDER — SODIUM CHLORIDE 0.9 % (FLUSH) 0.9 %
10 SYRINGE (ML) INJECTION EVERY 12 HOURS SCHEDULED
Status: DISCONTINUED | OUTPATIENT
Start: 2018-03-05 | End: 2018-03-06 | Stop reason: HOSPADM

## 2018-03-05 RX ORDER — GLYCOPYRROLATE 0.2 MG/ML
INJECTION INTRAMUSCULAR; INTRAVENOUS PRN
Status: DISCONTINUED | OUTPATIENT
Start: 2018-03-05 | End: 2018-03-05 | Stop reason: SDUPTHER

## 2018-03-05 RX ORDER — MORPHINE SULFATE 4 MG/ML
4 INJECTION, SOLUTION INTRAMUSCULAR; INTRAVENOUS ONCE
Status: COMPLETED | OUTPATIENT
Start: 2018-03-05 | End: 2018-03-05

## 2018-03-05 RX ORDER — ONDANSETRON 2 MG/ML
4 INJECTION INTRAMUSCULAR; INTRAVENOUS ONCE
Status: COMPLETED | OUTPATIENT
Start: 2018-03-05 | End: 2018-03-05

## 2018-03-05 RX ORDER — HYDROCODONE BITARTRATE AND ACETAMINOPHEN 5; 325 MG/1; MG/1
1 TABLET ORAL EVERY 4 HOURS PRN
Status: DISCONTINUED | OUTPATIENT
Start: 2018-03-05 | End: 2018-03-06 | Stop reason: HOSPADM

## 2018-03-05 RX ORDER — ONDANSETRON 2 MG/ML
4 INJECTION INTRAMUSCULAR; INTRAVENOUS EVERY 6 HOURS PRN
Status: DISCONTINUED | OUTPATIENT
Start: 2018-03-05 | End: 2018-03-06 | Stop reason: HOSPADM

## 2018-03-05 RX ORDER — BUPIVACAINE HYDROCHLORIDE 2.5 MG/ML
INJECTION, SOLUTION EPIDURAL; INFILTRATION; INTRACAUDAL PRN
Status: DISCONTINUED | OUTPATIENT
Start: 2018-03-05 | End: 2018-03-05 | Stop reason: HOSPADM

## 2018-03-05 RX ORDER — DEXTROSE, SODIUM CHLORIDE, AND POTASSIUM CHLORIDE 5; .45; .15 G/100ML; G/100ML; G/100ML
INJECTION INTRAVENOUS CONTINUOUS
Status: DISCONTINUED | OUTPATIENT
Start: 2018-03-05 | End: 2018-03-06 | Stop reason: HOSPADM

## 2018-03-05 RX ORDER — SCOLOPAMINE TRANSDERMAL SYSTEM 1 MG/1
1 PATCH, EXTENDED RELEASE TRANSDERMAL
Status: DISCONTINUED | OUTPATIENT
Start: 2018-03-05 | End: 2018-03-06 | Stop reason: HOSPADM

## 2018-03-05 RX ORDER — DIPHENHYDRAMINE HYDROCHLORIDE 50 MG/ML
12.5 INJECTION INTRAMUSCULAR; INTRAVENOUS
Status: DISCONTINUED | OUTPATIENT
Start: 2018-03-05 | End: 2018-03-05 | Stop reason: HOSPADM

## 2018-03-05 RX ORDER — 0.9 % SODIUM CHLORIDE 0.9 %
1000 INTRAVENOUS SOLUTION INTRAVENOUS ONCE
Status: COMPLETED | OUTPATIENT
Start: 2018-03-05 | End: 2018-03-05

## 2018-03-05 RX ORDER — SODIUM CHLORIDE 9 MG/ML
INJECTION, SOLUTION INTRAVENOUS CONTINUOUS PRN
Status: DISCONTINUED | OUTPATIENT
Start: 2018-03-05 | End: 2018-03-05 | Stop reason: SDUPTHER

## 2018-03-05 RX ORDER — SUCCINYLCHOLINE CHLORIDE 20 MG/ML
INJECTION INTRAMUSCULAR; INTRAVENOUS PRN
Status: DISCONTINUED | OUTPATIENT
Start: 2018-03-05 | End: 2018-03-05 | Stop reason: SDUPTHER

## 2018-03-05 RX ORDER — MIDAZOLAM HYDROCHLORIDE 1 MG/ML
INJECTION INTRAMUSCULAR; INTRAVENOUS PRN
Status: DISCONTINUED | OUTPATIENT
Start: 2018-03-05 | End: 2018-03-05 | Stop reason: SDUPTHER

## 2018-03-05 RX ORDER — MORPHINE SULFATE 2 MG/ML
1 INJECTION, SOLUTION INTRAMUSCULAR; INTRAVENOUS EVERY 5 MIN PRN
Status: DISCONTINUED | OUTPATIENT
Start: 2018-03-05 | End: 2018-03-05 | Stop reason: HOSPADM

## 2018-03-05 RX ORDER — PROMETHAZINE HYDROCHLORIDE 25 MG/ML
6.25 INJECTION, SOLUTION INTRAMUSCULAR; INTRAVENOUS
Status: DISCONTINUED | OUTPATIENT
Start: 2018-03-05 | End: 2018-03-05 | Stop reason: HOSPADM

## 2018-03-05 RX ORDER — ROCURONIUM BROMIDE 10 MG/ML
INJECTION, SOLUTION INTRAVENOUS PRN
Status: DISCONTINUED | OUTPATIENT
Start: 2018-03-05 | End: 2018-03-05 | Stop reason: SDUPTHER

## 2018-03-05 RX ORDER — MORPHINE SULFATE 8 MG/ML
INJECTION, SOLUTION INTRAMUSCULAR; INTRAVENOUS PRN
Status: DISCONTINUED | OUTPATIENT
Start: 2018-03-05 | End: 2018-03-05 | Stop reason: SDUPTHER

## 2018-03-05 RX ORDER — ONDANSETRON 2 MG/ML
INJECTION INTRAMUSCULAR; INTRAVENOUS PRN
Status: DISCONTINUED | OUTPATIENT
Start: 2018-03-05 | End: 2018-03-05 | Stop reason: SDUPTHER

## 2018-03-05 RX ADMIN — SUCCINYLCHOLINE CHLORIDE 160 MG: 20 INJECTION, SOLUTION INTRAMUSCULAR; INTRAVENOUS at 12:04

## 2018-03-05 RX ADMIN — HYDROMORPHONE HYDROCHLORIDE 0.5 MG: 1 INJECTION, SOLUTION INTRAMUSCULAR; INTRAVENOUS; SUBCUTANEOUS at 13:34

## 2018-03-05 RX ADMIN — SODIUM CHLORIDE 1000 ML: 9 INJECTION, SOLUTION INTRAVENOUS at 08:50

## 2018-03-05 RX ADMIN — CIPROFLOXACIN 400 MG: 2 INJECTION, SOLUTION INTRAVENOUS at 15:37

## 2018-03-05 RX ADMIN — SODIUM CHLORIDE: 9 INJECTION, SOLUTION INTRAVENOUS at 11:59

## 2018-03-05 RX ADMIN — PROPOFOL 200 MG: 10 INJECTION, EMULSION INTRAVENOUS at 12:04

## 2018-03-05 RX ADMIN — KETOROLAC TROMETHAMINE 30 MG: 30 INJECTION, SOLUTION INTRAMUSCULAR; INTRAVENOUS at 20:58

## 2018-03-05 RX ADMIN — METRONIDAZOLE 500 MG: 500 INJECTION, SOLUTION INTRAVENOUS at 09:04

## 2018-03-05 RX ADMIN — SODIUM CHLORIDE: 9 INJECTION, SOLUTION INTRAVENOUS at 12:00

## 2018-03-05 RX ADMIN — MIDAZOLAM 1 MG: 1 INJECTION INTRAMUSCULAR; INTRAVENOUS at 12:04

## 2018-03-05 RX ADMIN — ONDANSETRON 4 MG: 2 INJECTION INTRAMUSCULAR; INTRAVENOUS at 12:25

## 2018-03-05 RX ADMIN — ROCURONIUM BROMIDE 2.5 MG: 10 INJECTION INTRAVENOUS at 12:04

## 2018-03-05 RX ADMIN — MORPHINE SULFATE 4 MG: 8 INJECTION, SOLUTION INTRAMUSCULAR; INTRAVENOUS at 12:04

## 2018-03-05 RX ADMIN — MORPHINE SULFATE 4 MG: 4 INJECTION, SOLUTION INTRAMUSCULAR; INTRAVENOUS at 09:09

## 2018-03-05 RX ADMIN — KETOROLAC TROMETHAMINE 30 MG: 30 INJECTION, SOLUTION INTRAMUSCULAR; INTRAVENOUS at 15:37

## 2018-03-05 RX ADMIN — MORPHINE SULFATE 4 MG: 4 INJECTION, SOLUTION INTRAMUSCULAR; INTRAVENOUS at 08:19

## 2018-03-05 RX ADMIN — ROCURONIUM BROMIDE 40 MG: 10 INJECTION INTRAVENOUS at 12:23

## 2018-03-05 RX ADMIN — MIDAZOLAM 1 MG: 1 INJECTION INTRAMUSCULAR; INTRAVENOUS at 11:59

## 2018-03-05 RX ADMIN — METRONIDAZOLE 500 MG: 500 INJECTION, SOLUTION INTRAVENOUS at 16:52

## 2018-03-05 RX ADMIN — DEXAMETHASONE SODIUM PHOSPHATE 4 MG: 4 INJECTION, SOLUTION INTRAMUSCULAR; INTRAVENOUS at 12:25

## 2018-03-05 RX ADMIN — WATER 1 G: 1 INJECTION INTRAMUSCULAR; INTRAVENOUS; SUBCUTANEOUS at 09:45

## 2018-03-05 RX ADMIN — HYDROMORPHONE HYDROCHLORIDE 0.5 MG: 1 INJECTION, SOLUTION INTRAMUSCULAR; INTRAVENOUS; SUBCUTANEOUS at 13:18

## 2018-03-05 RX ADMIN — GLYCOPYRROLATE 0.2 MG: 0.2 INJECTION, SOLUTION INTRAMUSCULAR; INTRAVENOUS at 12:25

## 2018-03-05 RX ADMIN — ONDANSETRON 4 MG: 2 INJECTION INTRAMUSCULAR; INTRAVENOUS at 08:19

## 2018-03-05 RX ADMIN — POTASSIUM CHLORIDE, DEXTROSE MONOHYDRATE AND SODIUM CHLORIDE: 150; 5; 450 INJECTION, SOLUTION INTRAVENOUS at 15:37

## 2018-03-05 RX ADMIN — LIDOCAINE HYDROCHLORIDE 50 MG: 10 INJECTION, SOLUTION EPIDURAL; INFILTRATION; INTRACAUDAL; PERINEURAL at 12:04

## 2018-03-05 RX ADMIN — MORPHINE SULFATE 2 MG: 2 INJECTION, SOLUTION INTRAMUSCULAR; INTRAVENOUS at 11:59

## 2018-03-05 RX ADMIN — SUGAMMADEX 200 MG: 100 INJECTION, SOLUTION INTRAVENOUS at 12:50

## 2018-03-05 ASSESSMENT — PULMONARY FUNCTION TESTS
PIF_VALUE: 12
PIF_VALUE: 15
PIF_VALUE: 15
PIF_VALUE: 17
PIF_VALUE: 12
PIF_VALUE: 10
PIF_VALUE: 0
PIF_VALUE: 17
PIF_VALUE: 12
PIF_VALUE: 0
PIF_VALUE: 10
PIF_VALUE: 12
PIF_VALUE: 11
PIF_VALUE: 15
PIF_VALUE: 13
PIF_VALUE: 13
PIF_VALUE: 11
PIF_VALUE: 17
PIF_VALUE: 16
PIF_VALUE: 13
PIF_VALUE: 1
PIF_VALUE: 17
PIF_VALUE: 2
PIF_VALUE: 15
PIF_VALUE: 27
PIF_VALUE: 15
PIF_VALUE: 17
PIF_VALUE: 13
PIF_VALUE: 11
PIF_VALUE: 16
PIF_VALUE: 12
PIF_VALUE: 17
PIF_VALUE: 16
PIF_VALUE: 1
PIF_VALUE: 0
PIF_VALUE: 16
PIF_VALUE: 17
PIF_VALUE: 12
PIF_VALUE: 15
PIF_VALUE: 17
PIF_VALUE: 17
PIF_VALUE: 14
PIF_VALUE: 2
PIF_VALUE: 12
PIF_VALUE: 12
PIF_VALUE: 13
PIF_VALUE: 15
PIF_VALUE: 17
PIF_VALUE: 16
PIF_VALUE: 12
PIF_VALUE: 16
PIF_VALUE: 2
PIF_VALUE: 12

## 2018-03-05 ASSESSMENT — PAIN DESCRIPTION - DESCRIPTORS
DESCRIPTORS: CONSTANT
DESCRIPTORS: ACHING

## 2018-03-05 ASSESSMENT — PAIN DESCRIPTION - LOCATION
LOCATION: ABDOMEN

## 2018-03-05 ASSESSMENT — PAIN DESCRIPTION - PAIN TYPE
TYPE: SURGICAL PAIN
TYPE: SURGICAL PAIN
TYPE: ACUTE PAIN
TYPE: ACUTE PAIN
TYPE: SURGICAL PAIN

## 2018-03-05 ASSESSMENT — PAIN SCALES - GENERAL
PAINLEVEL_OUTOF10: 5
PAINLEVEL_OUTOF10: 7
PAINLEVEL_OUTOF10: 5
PAINLEVEL_OUTOF10: 5
PAINLEVEL_OUTOF10: 9
PAINLEVEL_OUTOF10: 3
PAINLEVEL_OUTOF10: 9
PAINLEVEL_OUTOF10: 7
PAINLEVEL_OUTOF10: 10
PAINLEVEL_OUTOF10: 9

## 2018-03-05 ASSESSMENT — ENCOUNTER SYMPTOMS
SHORTNESS OF BREATH: 0
EYE REDNESS: 0
BACK PAIN: 0
EYE PAIN: 0
ABDOMINAL PAIN: 1
RHINORRHEA: 0
COUGH: 0
VOMITING: 1
NAUSEA: 1

## 2018-03-05 ASSESSMENT — LIFESTYLE VARIABLES: SMOKING_STATUS: 1

## 2018-03-05 ASSESSMENT — PAIN - FUNCTIONAL ASSESSMENT: PAIN_FUNCTIONAL_ASSESSMENT: 0-10

## 2018-03-05 NOTE — CONSULTS
Contrast    Result Date: 3/5/2018  EXAMINATION: CT OF THE ABDOMEN AND PELVIS WITHOUT CONTRAST 3/5/2018 8:27 am TECHNIQUE: CT of the abdomen and pelvis was performed without the administration of intravenous contrast. Multiplanar reformatted images are provided for review. Dose modulation, iterative reconstruction, and/or weight based adjustment of the mA/kV was utilized to reduce the radiation dose to as low as reasonably achievable. COMPARISON: None. HISTORY: ORDERING SYSTEM PROVIDED HISTORY: abd pain TECHNOLOGIST PROVIDED HISTORY: Ordering Physician Provided Reason for Exam: GENERALIZED LOW ABDOMEN PAIN SINCE THIS MORNING AT 0645 FINDINGS: Lower Chest: No acute findings. No pericardial or pleural effusions. Organs: Suboptimal evaluation due to non use of IV contrast.  Liver granulomas noted. Gallbladder, spleen and adrenal glands appear unremarkable. There is questionable fullness along the pancreatic tail incompletely characterized due to lack of IV and oral contrast.  This is seen on series 2, image 51 measuring approximately 6.8 cm in greatest axial dimension. Kidneys demonstrate no evidence of stone or hydronephrosis. Abdominal aorta appears normal in caliber. GI/Bowel: Stomach is grossly unremarkable. Small bowel appears nondilated. The appendix is seen anterior to the cecum and appears dilated measuring 1.1 cm in greatest axial dimension. There is mild inflammatory changes seen within the right lower quadrant. Findings suggest early acute appendicitis. No evidence of perforation is identified. Pelvis: IUD is in place with its tips projecting outside of the uterine wall. No adnexal mass or cyst.  Urinary bladder demonstrate punctate droplets of nondependent air. Peritoneum/Retroperitoneum: No free air or free fluid. No definitive lymphadenopathy. Bones/Soft Tissues: Abdominal wall demonstrates no acute findings. Osseous structures demonstrate degenerative changes.      *CT evidence of early acute appendicitis. No evidence of perforation or abscess. *Questionable fullness along the pancreatic tail incompletely characterized due to lack of IV and oral contrast measuring 6.8 cm in greatest axial dimension. Findings may represent a conglomeration of small-bowel loops, however pancreatic mass cannot be excluded. Repeat evaluation with IV normal contrast is suggested if this finding is of clinical concern. *IUD present with its tips projecting outside of the uterine wall. Finding may represent uterine perforation. *Urinary bladder demonstrates punctate droplets of nondependent air. Please correlate with history of instrumentation. Underlying infectious process cannot be excluded. ASSESSMENT & PLAN:    Ying Salgado is a 44 y.o. female [id number] s/p laparoscopic appendectomy (POD#0)   - IUD placed on 2/24/17   - IUD strings visualized on speculum exam   - Will obtain TVUS to confirm IUD placement   - Pt denies abdominal pain with menses or prior to her appendicitis   - OB/GYN was consulted after patient had laparoscopic procedure performed   - Op note reviewed and no mention of IUD protruding from uterus   - IUD most likely in uterus (in appropriate position) given that strings were visualized on exam with 2cm string protruding from cervical os   - Will follow results of US; Patient will need to follow up outpatient    Thank you for this consult. Please do not hesitate to call with any questions or concerns.      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

## 2018-03-05 NOTE — ANESTHESIA POSTPROCEDURE EVALUATION
POST- ANESTHESIA EVALUATION       Pt Name: Sharda Dubon  MRN: [id number]  YOB: 1980  Date of evaluation: 3/5/2018  Time:  2:24 PM      /61   Pulse 50   Temp 98.1 °F (36.7 °C) (Oral)   Resp 18   Ht 5' 6\" (1.676 m)   Wt 135 lb (61.2 kg)   LMP  (LMP Unknown)   SpO2 100%   BMI 21.79 kg/m²      Consciousness Level  Awake  Cardiopulmonary Status  Stable  Pain Adequately Treated YES  Nausea / Vomiting  NO  Adequate Hydration  YES  Anesthesia Related Complications NONE      Electronically signed by Ricky Leventhal, MD on 3/5/2018 at 2:24 PM       Department of Anesthesiology  Postprocedure Note    Patient: Sharda Dubon  MRN: [id number]  YOB: 1980  Date of evaluation: 3/5/2018  Time:  2:24 PM     Procedure Summary     Date:  03/05/18 Room / Location:  [address] [place] [address] [address] / [address] [place] [address] Arm[address] Crowell    Anesthesia Start:  1159 Anesthesia Stop:  8119    Procedure:  APPENDECTOMY LAPAROSCOPIC (N/A Abdomen) Diagnosis:  (ACUTE APPENDICITIS)    Surgeon:  Aletha Herrera MD Responsible Provider:  Ricky Leventhal, MD    Anesthesia Type:  general ASA Status:  2 - Emergent          Anesthesia Type: general    Tyson Phase I: Tyson Score: 8    Tyson Phase II:      Last vitals: Reviewed and per EMR flowsheets.        Anesthesia Post Evaluation

## 2018-03-05 NOTE — ED PROVIDER NOTES
Pulmonary/Chest: Effort normal and breath sounds normal.   Abdominal: Soft. Bowel sounds are normal. There is tenderness (Moderate generalized with guarding). Musculoskeletal: Normal range of motion. Neurological: She is alert and oriented to person, place, and time. Skin: Skin is warm and dry. Nursing note and vitals reviewed. DIFFERENTIAL DIAGNOSIS/ MDM:     Patient here abdominal pain along with nausea and vomiting. Vital signs are stable. Exam as noted. Labs urine and CT pending    0900 CT with evidence of acute early appendicitis. No competition seen. Patient updated. Patient wanted more pain medication. At this time patient without a surgical preference and Dr. Rebecca Negro who is on staff call was called and is accepting the patient with no further requests. DIAGNOSTIC RESULTS       RADIOLOGY:   I directly visualized the following  images and reviewed the radiologist interpretations:  CT ABDOMEN PELVIS WO CONTRAST   Final Result   *CT evidence of early acute appendicitis. No evidence of perforation or   abscess. *Questionable fullness along the pancreatic tail incompletely characterized   due to lack of IV and oral contrast measuring 6.8 cm in greatest axial   dimension. Findings may represent a conglomeration of small-bowel loops,   however pancreatic mass cannot be excluded. Repeat evaluation with IV normal   contrast is suggested if this finding is of clinical concern. *IUD present with its tips projecting outside of the uterine wall. Finding   may represent uterine perforation. *Urinary bladder demonstrates punctate droplets of nondependent air. Please   correlate with history of instrumentation. Underlying infectious process   cannot be excluded.                 LABS:  Labs Reviewed   CBC WITH AUTO DIFFERENTIAL - Abnormal; Notable for the following:        Result Value    Monocytes 9 (*)     All other components within normal limits   COMPREHENSIVE METABOLIC PANEL -

## 2018-03-05 NOTE — H&P
HISTORY and [address] [facility] [id number]       NAME:  Chely Rodriguez  MRN: [id number]   YOB: 1980   Date: 3/5/2018   Age: 44 y.o. Gender: female       COMPLAINT AND PRESENT HISTORY:   44 y o female who began having abdominal pain morning of ED visit. Pain to 5 1/2 on scale. Started low in abdomen and radiated upward into entire abdomen. Pain associated with nausea and dry heaves. No fever/chills, chest pain or SOB. Abdominal CT reveals:  CT ABDOMEN PELVIS WO CONTRAST   Final Result   *CT evidence of early acute appendicitis. No evidence of perforation or   abscess. *Questionable fullness along the pancreatic tail incompletely characterized   due to lack of IV and oral contrast measuring 6.8 cm in greatest axial   dimension. Findings may represent a conglomeration of small-bowel loops,   however pancreatic mass cannot be excluded. Repeat evaluation with IV normal   contrast is suggested if this finding is of clinical concern. *IUD present with its tips projecting outside of the uterine wall. Finding   may represent uterine perforation. *Urinary bladder demonstrates punctate droplets of nondependent air. Please   correlate with history of instrumentation. Underlying infectious process   cannot be excluded.                 PAST MEDICAL HISTORY     Past Medical History:   Diagnosis Date    Anemia     Family history of breast cancer     Family history of cervical cancer     Family history of uterine cancer     Herpes     MRSA (methicillin resistant staph aureus) culture positive 08/16/2017    abscess    Postpartum depression 11/7/2016       Pt denies any history of Diabetes mellitus type 2, hypertension, stroke, heart disease, COPD, Asthma, GERD, HLD, Cancer, Seizures,Thyroid disease, Kidney Disease, Hepatitis, TB, or Substance abuse.     SURGICAL HISTORY       Past Surgical History:   Procedure Laterality Date    DILATION AND CURETTAGE      DILATION AND CURETTAGE OF swelling. Neuropsychiatric:  No referable complaints. See HPI. GENERAL PHYSICAL EXAM:     Vitals: BP (!) 141/86   Pulse 59   Temp 97.7 °F (36.5 °C) (Oral)   Resp 16   Ht 5' 6\" (1.676 m)   Wt 135 lb (61.2 kg)   LMP  (LMP Unknown)   SpO2 100%   BMI 21.79 kg/m²  Body mass index is 21.79 kg/m². GENERAL APPEARANCE:   Mary Dong is 44 y.o.,  female, not obese, nourished, conscious, alert. Does not appear to be distress or pain at this time. SKIN:  Warm, dry, no cyanosis or jaundice. HEAD:  Normocephalic, atraumatic, no swelling or tenderness. EYES:  Pupils equal, reactive to light. EARS:  No discharge, no marked hearing loss. NOSE:  No rhinorrhea, epistaxis or septal deformity. THROAT:  Not congested. No ulceration bleeding or discharge. NECK:  No stiffness, trachea central.  No palpable masses or L.N.                 CHEST:  Symmetrical and equal on expansion. HEART:  RRR S1 > S2. No audible murmurs or gallops. LUNGS:  Equal on expansion, normal breath sounds. No adventitious sounds. ABDOMEN:    Soft on palpation. Mid abdominal tenderness with some guarding with  tenderness. No guarding or rigidity. No palpable organomegaly. LYMPHATICS:  No palpable cervical lymphadenopathy. LOCOMOTOR, BACK AND SPINE:  No tenderness or deformities. EXTREMITIES:  Symmetrical, no pedal edema. Marlys sign negative. No discoloration or ulcerations. NEUROLOGIC:  The patient is conscious, alert, oriented, No apparent focal sensory or motor deficits.                                                                                      PROVISIONAL DIAGNOSES / SURGERY:      Acute appendicitis  Appendectomy     Patient Active Problem List    Diagnosis Date Noted    History of Congenital

## 2018-03-06 VITALS
BODY MASS INDEX: 21.69 KG/M2 | SYSTOLIC BLOOD PRESSURE: 86 MMHG | HEART RATE: 53 BPM | OXYGEN SATURATION: 99 % | TEMPERATURE: 98.4 F | HEIGHT: 66 IN | RESPIRATION RATE: 16 BRPM | WEIGHT: 135 LBS | DIASTOLIC BLOOD PRESSURE: 46 MMHG

## 2018-03-06 PROCEDURE — 2580000003 HC RX 258: Performed by: SURGERY

## 2018-03-06 PROCEDURE — 6360000002 HC RX W HCPCS: Performed by: SURGERY

## 2018-03-06 PROCEDURE — 96376 TX/PRO/DX INJ SAME DRUG ADON: CPT

## 2018-03-06 PROCEDURE — 96366 THER/PROPH/DIAG IV INF ADDON: CPT

## 2018-03-06 PROCEDURE — 6370000000 HC RX 637 (ALT 250 FOR IP): Performed by: SURGERY

## 2018-03-06 PROCEDURE — 2500000003 HC RX 250 WO HCPCS: Performed by: SURGERY

## 2018-03-06 PROCEDURE — G0378 HOSPITAL OBSERVATION PER HR: HCPCS

## 2018-03-06 RX ORDER — CIPROFLOXACIN 500 MG/1
500 TABLET, FILM COATED ORAL 2 TIMES DAILY
Qty: 6 TABLET | Refills: 0
Start: 2018-03-06 | End: 2018-03-09

## 2018-03-06 RX ORDER — 0.9 % SODIUM CHLORIDE 0.9 %
500 INTRAVENOUS SOLUTION INTRAVENOUS ONCE
Status: COMPLETED | OUTPATIENT
Start: 2018-03-06 | End: 2018-03-06

## 2018-03-06 RX ORDER — HYDROCODONE BITARTRATE AND ACETAMINOPHEN 5; 325 MG/1; MG/1
1 TABLET ORAL EVERY 4 HOURS PRN
Qty: 10 TABLET | Refills: 0
Start: 2018-03-06 | End: 2018-03-13

## 2018-03-06 RX ORDER — 0.9 % SODIUM CHLORIDE 0.9 %
500 INTRAVENOUS SOLUTION INTRAVENOUS ONCE
Status: DISCONTINUED | OUTPATIENT
Start: 2018-03-06 | End: 2018-03-06

## 2018-03-06 RX ADMIN — POTASSIUM CHLORIDE, DEXTROSE MONOHYDRATE AND SODIUM CHLORIDE: 150; 5; 450 INJECTION, SOLUTION INTRAVENOUS at 00:46

## 2018-03-06 RX ADMIN — KETOROLAC TROMETHAMINE 30 MG: 30 INJECTION, SOLUTION INTRAMUSCULAR; INTRAVENOUS at 02:26

## 2018-03-06 RX ADMIN — SODIUM CHLORIDE 500 ML: 0.9 INJECTION, SOLUTION INTRAVENOUS at 07:51

## 2018-03-06 RX ADMIN — METRONIDAZOLE 500 MG: 500 INJECTION, SOLUTION INTRAVENOUS at 00:45

## 2018-03-06 RX ADMIN — HYDROCODONE BITARTRATE AND ACETAMINOPHEN 2 TABLET: 5; 325 TABLET ORAL at 00:45

## 2018-03-06 RX ADMIN — HYDROCODONE BITARTRATE AND ACETAMINOPHEN 1 TABLET: 5; 325 TABLET ORAL at 14:41

## 2018-03-06 RX ADMIN — KETOROLAC TROMETHAMINE 30 MG: 30 INJECTION, SOLUTION INTRAMUSCULAR; INTRAVENOUS at 07:51

## 2018-03-06 ASSESSMENT — PAIN SCALES - GENERAL
PAINLEVEL_OUTOF10: 4
PAINLEVEL_OUTOF10: 6

## 2018-03-06 ASSESSMENT — PAIN DESCRIPTION - PAIN TYPE
TYPE: SURGICAL PAIN
TYPE: SURGICAL PAIN

## 2018-03-06 ASSESSMENT — PAIN DESCRIPTION - LOCATION
LOCATION: ABDOMEN
LOCATION: ABDOMEN

## 2018-03-06 NOTE — FLOWSHEET NOTE
03/06/18 1218   Encounter Summary   Services provided to: Patient   Referral/Consult From: [facility] (3/6/18)   Volunteer Visit Yes   Complexity of Encounter Low   Length of Encounter 15 minutes   Routine   Type Initial   Intervention Provided reading materials/devotional materials

## 2018-03-06 NOTE — DISCHARGE SUMMARY
Hospital Discharge Summary      Patient ID: Nato Kat      Patient's PCP: No primary care provider on file. Admit Date: 3/5/2018     Discharge Date:       Admitting Physician: Wade Avalos MD    Discharge Physician: Wade Avalos MD     Discharge Diagnoses: Active Hospital Problems    Diagnosis Date Noted    Appendicitis, acute [K35.80] 2018    S/P laparoscopic appendectomy [Z90.49] 2018       The patient was seen and examined on day of discharge and this discharge summary is in conjunction with any daily progress note from day of discharge. Hospital Course: satisfactory   The encounter diagnosis was Acute appendicitis with generalized peritonitis. Consults: as noted in the chart    Treatments: as above    Disposition: home    Discharged Condition: Stable    Follow Up:  No Follow-up on file. Discharge Medications:    Rocío Waters   Home Medication Instructions SD    Printed on:18 1041   Medication Information                      ibuprofen (ADVIL;MOTRIN) 800 MG tablet  Take 1 tablet by mouth every 6 hours as needed for Pain                  Activity: activity as tolerated unless otherwise noted     Electronically signed by Wade Avalos MD on 3/6/2018 at 10:41 AM     Thank you No primary care provider on file. for the opportunity to be involved in this patient's care.

## 2018-03-06 NOTE — PROGRESS NOTES
RN notified of pt's transvaginal ultrasound results. Attempted to notify OB residents when seen note stating they had already seen results and would be signing off. Will continue to follow.

## 2018-03-06 NOTE — DISCHARGE INSTR - DIET

## 2018-03-09 ENCOUNTER — OFFICE VISIT (OUTPATIENT)
Dept: OBGYN CLINIC | Age: 38
End: 2018-03-09
Payer: MEDICARE

## 2018-03-09 VITALS
SYSTOLIC BLOOD PRESSURE: 110 MMHG | RESPIRATION RATE: 20 BRPM | HEART RATE: 78 BPM | HEIGHT: 66 IN | DIASTOLIC BLOOD PRESSURE: 70 MMHG | BODY MASS INDEX: 22.5 KG/M2 | WEIGHT: 140 LBS

## 2018-03-09 DIAGNOSIS — T83.32XA MALPOSITIONED INTRAUTERINE DEVICE (IUD), INITIAL ENCOUNTER: Primary | ICD-10-CM

## 2018-03-09 PROCEDURE — G8420 CALC BMI NORM PARAMETERS: HCPCS | Performed by: OBSTETRICS & GYNECOLOGY

## 2018-03-09 PROCEDURE — 4004F PT TOBACCO SCREEN RCVD TLK: CPT | Performed by: OBSTETRICS & GYNECOLOGY

## 2018-03-09 PROCEDURE — G8484 FLU IMMUNIZE NO ADMIN: HCPCS | Performed by: OBSTETRICS & GYNECOLOGY

## 2018-03-09 PROCEDURE — G8427 DOCREV CUR MEDS BY ELIG CLIN: HCPCS | Performed by: OBSTETRICS & GYNECOLOGY

## 2018-03-09 PROCEDURE — 99213 OFFICE O/P EST LOW 20 MIN: CPT | Performed by: OBSTETRICS & GYNECOLOGY

## 2018-03-09 NOTE — PROGRESS NOTES
Norma Paradal  3/9/2018      Mary Dong is a 44 y.o. female [id number]      The patient was seen today. She was here to follow-up regarding her IUD at her ER visit for RLQ pain. She had acute appendicitis. She had a CT scan and Ultrasound:    ICD-10-CM ICD-9-CM    1. Malpositioned intrauterine device (IUD), initial encounter T83. 32XA 996.76        She does not have any specific chief complaint today. Her bowels are regular and she is voiding without difficulty. She has no pain. She recently had surgery and is declining removal today in the office. I instructed her not to have coitus and no lifting. She will RTO 1-3 weeks when ever she feels better form her surgery for removal. She states she was doing string checks without problems then stopped 4 months ago. I reviewed the images and will plan to removal. Op note reviewed from [facility] . No mention of female anatomy evaluation.       Past Medical History:   Diagnosis Date    Anemia     Family history of breast cancer     Family history of cervical cancer     Family history of uterine cancer     Herpes     MRSA (methicillin resistant staph aureus) culture positive 08/16/2017    abscess    Postpartum depression 11/7/2016         Past Surgical History:   Procedure Laterality Date    DILATION AND CURETTAGE      DILATION AND CURETTAGE OF UTERUS      Miscarriage    LAPAROSCOPY      for pain    [place] LAP,APPENDECTOMY N/A 3/5/2018    APPENDECTOMY LAPAROSCOPIC performed by Brianna Chaney MD at [address] [place] [address] History   Problem Relation Age of Onset    Diabetes Paternal Grandfather     Diabetes Paternal Grandmother     Uterine Cancer Paternal Grandmother     Cervical Cancer Paternal Grandmother     Cancer Maternal Grandmother     Breast Cancer Maternal Grandmother          Social History   Substance Use Topics    Smoking status: Current Every Day Smoker     Packs/day: 0.25    Smokeless tobacco: Never Used    Alcohol use No      Comment: social MEDICATIONS:  Current Outpatient Prescriptions   Medication Sig Dispense Refill    HYDROcodone-acetaminophen (NORCO) 5-325 MG per tablet Take 1 tablet by mouth every 4 hours as needed for Pain for up to 7 days. 10 tablet 0    ibuprofen (ADVIL;MOTRIN) 800 MG tablet Take 1 tablet by mouth every 6 hours as needed for Pain 30 tablet 0    ciprofloxacin (CIPRO) 500 MG tablet Take 1 tablet by mouth 2 times daily for 3 days 6 tablet 0     No current facility-administered medications for this visit. ALLERGIES:  Allergies as of 03/09/2018 - Review Complete 03/09/2018   Allergen Reaction Noted    Codeine  05/04/2016    Fentanyl Other (See Comments) 08/19/2017    Penicillins Hives 05/04/2016         Blood pressure 110/70, pulse 78, resp. rate 20, height 5' 6\" (1.676 m), weight 140 lb (63.5 kg), last menstrual period 02/05/2018, not currently breastfeeding. Abdomen: Soft non-tender; good bowel sounds. No guarding, rebound or rigidity. No CVA tenderness bilaterally. Extremities: No calf tenderness, DTR 2/4, and No edema bilaterally    Pelvic: Declined         Diagnostics:  Ct Abdomen Pelvis Wo Contrast    Result Date: 3/5/2018  EXAMINATION: CT OF THE ABDOMEN AND PELVIS WITHOUT CONTRAST 3/5/2018 8:27 am TECHNIQUE: CT of the abdomen and pelvis was performed without the administration of intravenous contrast. Multiplanar reformatted images are provided for review. Dose modulation, iterative reconstruction, and/or weight based adjustment of the mA/kV was utilized to reduce the radiation dose to as low as reasonably achievable. COMPARISON: None. HISTORY: ORDERING SYSTEM PROVIDED HISTORY: abd pain TECHNOLOGIST PROVIDED HISTORY: Ordering Physician Provided Reason for Exam: GENERALIZED LOW ABDOMEN PAIN SINCE THIS MORNING AT 0645 FINDINGS: Lower Chest: No acute findings. No pericardial or pleural effusions. Organs: Suboptimal evaluation due to non use of IV contrast.  Liver granulomas noted.   Gallbladder, ultrasound was performed. No color Doppler evaluation was performed. COMPARISON: CT abdomen pelvis earlier same day HISTORY: ORDERING SYSTEM PROVIDED HISTORY: Confirmation of IUD placement TECHNOLOGIST PROVIDED HISTORY: Acuity: Acute Type of Exam: Initial FINDINGS: Measurements: Uterus:  8.6 x 5.1 x 5.9 cm Endometrial stripe:  5 mm Right Ovary:  2.0 x 1.6 x 2.2 cm Left Ovary:  2.0 x 2.2 x 2.6 cm. Ultrasound Findings: Uterus: Uterus demonstrates normal myometrial echotexture. IUD is in place however it is not position within the fundal endometrium in appears to enter into the myometrium. Endometrial stripe: Endometrial stripe is within normal limits. Right Ovary: Right ovary is within normal limits. Left Ovary:  Left ovary is within normal limits. Free Fluid: No evidence of free fluid. IUD is in place however it is not position within the fundal endometrium in appears to enter into the myometrium as seen on CT examination. The findings were sent to the Radiology Results [address] at 7:49 pm on 3/5/2018to be communicated to a licensed caregiver.          Lab Results:  Results for orders placed or performed during the hospital encounter of 03/05/18   HCG Qualitative, Serum   Result Value Ref Range    hCG Qual NEGATIVE NEG   CBC Auto Differential   Result Value Ref Range    WBC 6.8 3.5 - 11.0 k/uL    RBC 4.34 4.0 - 5.2 m/uL    Hemoglobin 13.6 12.0 - 16.0 g/dL    Hematocrit 40.5 36 - 46 %    MCV 93.2 80 - 100 fL    MCH 31.4 26 - 34 pg    MCHC 33.7 31 - 37 g/dL    RDW 13.2 11.5 - 14.9 %    Platelets 216 418 - 890 k/uL    MPV 8.9 6.0 - 12.0 fL    NRBC Automated NOT REPORTED per 100 WBC    Differential Type NOT REPORTED     Seg Neutrophils 51 36 - 66 %    Lymphocytes 37 24 - 44 %    Monocytes 9 (H) 1 - 7 %    Eosinophils % 3 0 - 4 %    Basophils 0 0 - 2 %    Immature Granulocytes NOT REPORTED 0 %    Segs Absolute 3.50 1.3 - 9.1 k/uL    Absolute Lymph # 2.50 1.0 - 4.8 k/uL    Absolute Mono # 0.60 0.1 - 1.3 k/uL    Absolute Eos # 0.20 0.0 - 0.4 k/uL    Basophils # 0.00 0.0 - 0.2 k/uL    Absolute Immature Granulocyte NOT REPORTED 0.00 - 0.30 k/uL    WBC Morphology NOT REPORTED     RBC Morphology NOT REPORTED     Platelet Estimate NOT REPORTED    Comprehensive Metabolic Panel   Result Value Ref Range    Glucose 100 (H) 70 - 99 mg/dL    BUN 17 6 - 20 mg/dL    CREATININE 0.83 0.50 - 0.90 mg/dL    Bun/Cre Ratio NOT REPORTED 9 - 20    Calcium 8.9 8.6 - 10.4 mg/dL    Sodium 141 135 - 144 mmol/L    Potassium 3.9 3.7 - 5.3 mmol/L    Chloride 107 98 - 107 mmol/L    CO2 22 20 - 31 mmol/L    Anion Gap 12 9 - 17 mmol/L    Alkaline Phosphatase 34 (L) 35 - 104 U/L    ALT 11 5 - 33 U/L    AST 11 <32 U/L    Total Bilirubin 0.39 0.3 - 1.2 mg/dL    Total Protein 6.7 6.4 - 8.3 g/dL    Alb 4.0 3.5 - 5.2 g/dL    Albumin/Globulin Ratio NOT REPORTED 1.0 - 2.5    GFR Non-African American >60 >60 mL/min    GFR African American >60 >60 mL/min    GFR Comment          GFR Staging NOT REPORTED    Lipase   Result Value Ref Range    Lipase 20 13 - 60 U/L   Urinalysis   Result Value Ref Range    Color, UA YELLOW YEL    Turbidity UA CLEAR CLEAR    Glucose, Ur NEGATIVE NEG    Bilirubin Urine NEGATIVE NEG    Ketones, Urine NEGATIVE NEG    Specific [place], UA 1.017 1.000 - 1.030    Urine Hgb TRACE (A) NEG    pH, UA 5.0 5.0 - 8.0    Protein, UA NEGATIVE NEG    Urobilinogen, Urine Normal NORM    Nitrite, Urine NEGATIVE NEG    Leukocyte Esterase, Urine NEGATIVE NEG    Urinalysis Comments NOT REPORTED    Microscopic Urinalysis   Result Value Ref Range    -          WBC, UA 0 TO 2 /HPF    RBC, UA None /HPF    Casts UA NOT REPORTED /LPF    Crystals UA NOT REPORTED NONE /HPF    Epithelial Cells UA NOT REPORTED /HPF    Renal Epithelial, Urine NOT REPORTED 0 /HPF    Bacteria, UA NOT REPORTED NONE    Mucus, UA NOT REPORTED NONE    Trichomonas, UA NOT REPORTED NONE    Amorphous, UA NOT REPORTED NONE    Other Observations UA NOT REPORTED NREQ    Yeast, UA NOT

## 2018-03-15 ENCOUNTER — OFFICE VISIT (OUTPATIENT)
Dept: OBGYN CLINIC | Age: 38
End: 2018-03-15
Payer: MEDICARE

## 2018-03-15 ENCOUNTER — HOSPITAL ENCOUNTER (OUTPATIENT)
Age: 38
Setting detail: SPECIMEN
Discharge: HOME | End: 2018-03-15
Payer: MEDICARE

## 2018-03-15 VITALS
HEART RATE: 64 BPM | SYSTOLIC BLOOD PRESSURE: 98 MMHG | BODY MASS INDEX: 20.89 KG/M2 | WEIGHT: 130 LBS | DIASTOLIC BLOOD PRESSURE: 60 MMHG | HEIGHT: 66 IN

## 2018-03-15 DIAGNOSIS — Z30.432 ENCOUNTER FOR IUD REMOVAL: Primary | ICD-10-CM

## 2018-03-15 PROCEDURE — 88300 SURGICAL PATH GROSS: CPT

## 2018-03-15 PROCEDURE — 58301 REMOVE INTRAUTERINE DEVICE: CPT | Performed by: OBSTETRICS & GYNECOLOGY

## 2018-03-15 NOTE — PROGRESS NOTES
Norma Villaseñorn  3/15/2018  Patient's last menstrual period was 02/05/2018 (approximate). HPI:The patient is requesting that her IUD be removed as she is NOT planning on becoming pregnant. The patient was counseled on the procedure. Risks, benefits and alternatives were reviewed. A consent was reviewed and obtained. The patient denies that she has been completing her string checks as directed. She has no other chief complaint today. Pt had a CT scan and pelvic sono done prior to her appendectomy. CT/ Sono showed IUD migrated into uterine wall. Laparoscopic appendectomy done prior to consult being done. Pt has not been completed string checks. All other forms of birth control both male and female reversible and non were reviewed with the patient. She is not a smoker. The patient denies any family member or herself as having a blood clot in their leg, lung, or brain. She denies that any member of her family has had a sudden cardiac death under the age of 49 yo.     Past Medical History:   Diagnosis Date    Anemia     Family history of breast cancer     Family history of cervical cancer     Family history of uterine cancer     Herpes     MRSA (methicillin resistant staph aureus) culture positive 08/16/2017    abscess    Postpartum depression 11/7/2016       Past Surgical History:   Procedure Laterality Date    DILATION AND CURETTAGE      DILATION AND CURETTAGE OF UTERUS      Miscarriage    LAPAROSCOPY      for pain    [place] LAP,APPENDECTOMY N/A 3/5/2018    APPENDECTOMY LAPAROSCOPIC performed by Edin Martines MD at [address] [place] [address] History   Substance Use Topics    Smoking status: Current Every Day Smoker     Packs/day: 0.25    Smokeless tobacco: Never Used    Alcohol use No      Comment: social            MEDICATIONS:  Current Outpatient Prescriptions   Medication Sig Dispense Refill    ibuprofen (ADVIL;MOTRIN) 800 MG tablet Take 1 tablet by mouth every 6 hours as needed for Pain 30 tablet 0

## 2018-03-16 LAB — SURGICAL PATHOLOGY REPORT: NORMAL

## 2018-03-19 ENCOUNTER — HOSPITAL ENCOUNTER (INPATIENT)
Age: 38
LOS: 2 days | Discharge: HOME | DRG: 231 | End: 2018-03-22
Attending: SURGERY | Admitting: SURGERY
Payer: MEDICARE

## 2018-03-19 ENCOUNTER — TELEPHONE (OUTPATIENT)
Dept: OBGYN CLINIC | Age: 38
End: 2018-03-19

## 2018-03-19 DIAGNOSIS — Z90.49 S/P COLECTOMY: Primary | ICD-10-CM

## 2018-03-19 PROBLEM — D3A.020 CARCINOID TUMOR OF APPENDIX: Status: ACTIVE | Noted: 2018-03-19

## 2018-03-19 LAB
ABSOLUTE EOS #: 0.1 K/UL (ref 0–0.4)
ABSOLUTE IMMATURE GRANULOCYTE: ABNORMAL K/UL (ref 0–0.3)
ABSOLUTE LYMPH #: 2.4 K/UL (ref 1–4.8)
ABSOLUTE MONO #: 0.5 K/UL (ref 0.1–1.3)
ANION GAP SERPL CALCULATED.3IONS-SCNC: 10 MMOL/L (ref 9–17)
BASOPHILS # BLD: 1 % (ref 0–2)
BASOPHILS ABSOLUTE: 0.1 K/UL (ref 0–0.2)
CHLORIDE BLD-SCNC: 105 MMOL/L (ref 98–107)
CO2: 28 MMOL/L (ref 20–31)
DIFFERENTIAL TYPE: ABNORMAL
EOSINOPHILS RELATIVE PERCENT: 3 % (ref 0–4)
HCT VFR BLD CALC: 42.5 % (ref 36–46)
HEMOGLOBIN: 14.1 G/DL (ref 12–16)
IMMATURE GRANULOCYTES: ABNORMAL %
LYMPHOCYTES # BLD: 42 % (ref 24–44)
MCH RBC QN AUTO: 31.2 PG (ref 26–34)
MCHC RBC AUTO-ENTMCNC: 33.2 G/DL (ref 31–37)
MCV RBC AUTO: 93.9 FL (ref 80–100)
MONOCYTES # BLD: 9 % (ref 1–7)
NRBC AUTOMATED: ABNORMAL PER 100 WBC
PDW BLD-RTO: 12.8 % (ref 11.5–14.9)
PLATELET # BLD: 272 K/UL (ref 150–450)
PLATELET ESTIMATE: ABNORMAL
PMV BLD AUTO: 9.2 FL (ref 6–12)
POTASSIUM SERPL-SCNC: 4.6 MMOL/L (ref 3.7–5.3)
RBC # BLD: 4.53 M/UL (ref 4–5.2)
RBC # BLD: ABNORMAL 10*6/UL
SEG NEUTROPHILS: 45 % (ref 36–66)
SEGMENTED NEUTROPHILS ABSOLUTE COUNT: 2.6 K/UL (ref 1.3–9.1)
SODIUM BLD-SCNC: 143 MMOL/L (ref 135–144)
WBC # BLD: 5.7 K/UL (ref 3.5–11)
WBC # BLD: ABNORMAL 10*3/UL

## 2018-03-19 PROCEDURE — 96367 TX/PROPH/DG ADDL SEQ IV INF: CPT

## 2018-03-19 PROCEDURE — G0378 HOSPITAL OBSERVATION PER HR: HCPCS

## 2018-03-19 PROCEDURE — 85025 COMPLETE CBC W/AUTO DIFF WBC: CPT

## 2018-03-19 PROCEDURE — 80051 ELECTROLYTE PANEL: CPT

## 2018-03-19 PROCEDURE — 96366 THER/PROPH/DIAG IV INF ADDON: CPT

## 2018-03-19 PROCEDURE — 2580000003 HC RX 258: Performed by: SURGERY

## 2018-03-19 PROCEDURE — 96365 THER/PROPH/DIAG IV INF INIT: CPT

## 2018-03-19 PROCEDURE — 36415 COLL VENOUS BLD VENIPUNCTURE: CPT

## 2018-03-19 PROCEDURE — 2500000003 HC RX 250 WO HCPCS: Performed by: SURGERY

## 2018-03-19 PROCEDURE — 6370000000 HC RX 637 (ALT 250 FOR IP): Performed by: SURGERY

## 2018-03-19 PROCEDURE — 6360000002 HC RX W HCPCS: Performed by: SURGERY

## 2018-03-19 PROCEDURE — G0379 DIRECT REFER HOSPITAL OBSERV: HCPCS

## 2018-03-19 RX ORDER — CIPROFLOXACIN 2 MG/ML
400 INJECTION, SOLUTION INTRAVENOUS EVERY 12 HOURS
Status: DISCONTINUED | OUTPATIENT
Start: 2018-03-19 | End: 2018-03-19

## 2018-03-19 RX ORDER — SODIUM PHOSPHATE, DIBASIC AND SODIUM PHOSPHATE, MONOBASIC 7; 19 G/133ML; G/133ML
1 ENEMA RECTAL
Status: DISCONTINUED | OUTPATIENT
Start: 2018-03-20 | End: 2018-03-19

## 2018-03-19 RX ORDER — SODIUM CHLORIDE 9 MG/ML
INJECTION, SOLUTION INTRAVENOUS CONTINUOUS
Status: DISCONTINUED | OUTPATIENT
Start: 2018-03-19 | End: 2018-03-22 | Stop reason: HOSPADM

## 2018-03-19 RX ORDER — NEOMYCIN SULFATE 500 MG/1
1000 TABLET ORAL ONCE
Status: COMPLETED | OUTPATIENT
Start: 2018-03-19 | End: 2018-03-19

## 2018-03-19 RX ORDER — ERYTHROMYCIN 250 MG/1
1000 TABLET, DELAYED RELEASE ORAL ONCE
Status: COMPLETED | OUTPATIENT
Start: 2018-03-19 | End: 2018-03-19

## 2018-03-19 RX ORDER — CIPROFLOXACIN 2 MG/ML
400 INJECTION, SOLUTION INTRAVENOUS EVERY 12 HOURS
Status: DISCONTINUED | OUTPATIENT
Start: 2018-03-19 | End: 2018-03-22 | Stop reason: HOSPADM

## 2018-03-19 RX ORDER — SODIUM PHOSPHATE, DIBASIC AND SODIUM PHOSPHATE, MONOBASIC 7; 19 G/133ML; G/133ML
1 ENEMA RECTAL
Status: ACTIVE | OUTPATIENT
Start: 2018-03-20 | End: 2018-03-20

## 2018-03-19 RX ORDER — ERYTHROMYCIN 250 MG/1
1000 CAPSULE, DELAYED RELEASE ORAL ONCE
Status: COMPLETED | OUTPATIENT
Start: 2018-03-19 | End: 2018-03-19

## 2018-03-19 RX ORDER — ERYTHROMYCIN 250 MG/1
1000 TABLET, DELAYED RELEASE ORAL ONCE
Status: DISCONTINUED | OUTPATIENT
Start: 2018-03-19 | End: 2018-03-19

## 2018-03-19 RX ADMIN — NEOMYCIN SULFATE 1000 MG: 500 TABLET ORAL at 17:00

## 2018-03-19 RX ADMIN — ERYTHROMYCIN 1000 MG: 250 CAPSULE, DELAYED RELEASE PELLETS ORAL at 22:02

## 2018-03-19 RX ADMIN — SODIUM CHLORIDE: 9 INJECTION, SOLUTION INTRAVENOUS at 17:01

## 2018-03-19 RX ADMIN — POLYETHYLENE GLYCOL-3350 AND ELECTROLYTES 2000 ML: 236; 6.74; 5.86; 2.97; 22.74 POWDER, FOR SOLUTION ORAL at 13:26

## 2018-03-19 RX ADMIN — METRONIDAZOLE 500 MG: 500 INJECTION, SOLUTION INTRAVENOUS at 16:59

## 2018-03-19 RX ADMIN — NEOMYCIN SULFATE 1000 MG: 500 TABLET ORAL at 22:02

## 2018-03-19 RX ADMIN — ERYTHROMYCIN 1000 MG: 250 TABLET, DELAYED RELEASE ORAL at 16:59

## 2018-03-19 RX ADMIN — CIPROFLOXACIN 400 MG: 2 INJECTION, SOLUTION INTRAVENOUS at 20:00

## 2018-03-19 ASSESSMENT — PAIN SCALES - GENERAL: PAINLEVEL_OUTOF10: 0

## 2018-03-19 NOTE — TELEPHONE ENCOUNTER
Patient called and requested we hold off on scheduling her Tubal occlusion. Patient stated that she is having other medical problems. Patient will call when she is ready to reschedule.

## 2018-03-19 NOTE — CARE COORDINATION
CASE MANAGEMENT NOTE:    Admission Date:  3/19/2018 Noemi Doll is a 44 y.o.  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 No    Family Members/Caregivers that pt would like involved in their care:    Yes    If yes, list name here:  Florencio Arauz    Transportation Provider:  Family                      Discharge Plan:  3/19/18 Doyle Adv Pt is from home with her spouse and six children Pt uses no dme and denies need for vns plan is to discharge to home with no needs will continue to follow for needs . //tv                 Readmission Risk              Readmission Risk:        8       Age 72 or Greater:  0    Admitted from SNF or Requires Paid or Family Care:  0    Currently has CHF,COPD,ARF,CRI,or is on dialysis:  0    Takes more than 5 Prescription Medications:  0    Takes Digoxin,Insulin,Anticoagulants,Narcotics or ASA/Plavix:  [address] in Past 12 Months:  10    On Disability:  0    Patient Considers own Health:  0          Electronically signed by:  Bryan Howard RN on 3/19/2018 at 12:57 PM

## 2018-03-20 ENCOUNTER — ANESTHESIA EVENT (OUTPATIENT)
Dept: OPERATING ROOM | Age: 38
DRG: 231 | End: 2018-03-20
Payer: MEDICARE

## 2018-03-20 ENCOUNTER — ANESTHESIA (OUTPATIENT)
Dept: OPERATING ROOM | Age: 38
DRG: 231 | End: 2018-03-20
Payer: MEDICARE

## 2018-03-20 VITALS
DIASTOLIC BLOOD PRESSURE: 73 MMHG | TEMPERATURE: 97 F | SYSTOLIC BLOOD PRESSURE: 119 MMHG | RESPIRATION RATE: 17 BRPM | OXYGEN SATURATION: 100 %

## 2018-03-20 LAB
GLUCOSE BLD-MCNC: 114 MG/DL (ref 65–105)
GLUCOSE BLD-MCNC: 93 MG/DL (ref 65–105)
HCG(URINE) PREGNANCY TEST: NEGATIVE

## 2018-03-20 PROCEDURE — 87086 URINE CULTURE/COLONY COUNT: CPT

## 2018-03-20 PROCEDURE — 96375 TX/PRO/DX INJ NEW DRUG ADDON: CPT

## 2018-03-20 PROCEDURE — 2500000003 HC RX 250 WO HCPCS: Performed by: SURGERY

## 2018-03-20 PROCEDURE — 7100000000 HC PACU RECOVERY - FIRST 15 MIN: Performed by: SURGERY

## 2018-03-20 PROCEDURE — 3700000000 HC ANESTHESIA ATTENDED CARE: Performed by: SURGERY

## 2018-03-20 PROCEDURE — 6360000002 HC RX W HCPCS: Performed by: SURGERY

## 2018-03-20 PROCEDURE — 82947 ASSAY GLUCOSE BLOOD QUANT: CPT

## 2018-03-20 PROCEDURE — 7100000001 HC PACU RECOVERY - ADDTL 15 MIN: Performed by: SURGERY

## 2018-03-20 PROCEDURE — 84703 CHORIONIC GONADOTROPIN ASSAY: CPT

## 2018-03-20 PROCEDURE — 88307 TISSUE EXAM BY PATHOLOGIST: CPT

## 2018-03-20 PROCEDURE — 96376 TX/PRO/DX INJ SAME DRUG ADON: CPT

## 2018-03-20 PROCEDURE — 0DTF0ZZ RESECTION OF RIGHT LARGE INTESTINE, OPEN APPROACH: ICD-10-PCS | Performed by: SURGERY

## 2018-03-20 PROCEDURE — 2500000003 HC RX 250 WO HCPCS

## 2018-03-20 PROCEDURE — 3700000001 HC ADD 15 MINUTES (ANESTHESIA): Performed by: SURGERY

## 2018-03-20 PROCEDURE — 88331 PATH CONSLTJ SURG 1 BLK 1SPC: CPT

## 2018-03-20 PROCEDURE — 2720000010 HC SURG SUPPLY STERILE: Performed by: SURGERY

## 2018-03-20 PROCEDURE — 6360000002 HC RX W HCPCS

## 2018-03-20 PROCEDURE — 1200000000 HC SEMI PRIVATE

## 2018-03-20 PROCEDURE — 2780000010 HC IMPLANT OTHER: Performed by: SURGERY

## 2018-03-20 PROCEDURE — 88342 IMHCHEM/IMCYTCHM 1ST ANTB: CPT

## 2018-03-20 PROCEDURE — 2580000003 HC RX 258: Performed by: SURGERY

## 2018-03-20 PROCEDURE — 3600000003 HC SURGERY LEVEL 3 BASE: Performed by: SURGERY

## 2018-03-20 PROCEDURE — 96366 THER/PROPH/DIAG IV INF ADDON: CPT

## 2018-03-20 PROCEDURE — 3600000013 HC SURGERY LEVEL 3 ADDTL 15MIN: Performed by: SURGERY

## 2018-03-20 RX ORDER — LIDOCAINE HYDROCHLORIDE 10 MG/ML
INJECTION, SOLUTION EPIDURAL; INFILTRATION; INTRACAUDAL; PERINEURAL PRN
Status: DISCONTINUED | OUTPATIENT
Start: 2018-03-20 | End: 2018-03-20 | Stop reason: SDUPTHER

## 2018-03-20 RX ORDER — CIPROFLOXACIN 2 MG/ML
400 INJECTION, SOLUTION INTRAVENOUS EVERY 12 HOURS
Status: COMPLETED | OUTPATIENT
Start: 2018-03-20 | End: 2018-03-20

## 2018-03-20 RX ORDER — PROPOFOL 10 MG/ML
INJECTION, EMULSION INTRAVENOUS PRN
Status: DISCONTINUED | OUTPATIENT
Start: 2018-03-20 | End: 2018-03-20 | Stop reason: SDUPTHER

## 2018-03-20 RX ORDER — ACETAMINOPHEN 325 MG/1
650 TABLET ORAL EVERY 4 HOURS PRN
Status: DISCONTINUED | OUTPATIENT
Start: 2018-03-20 | End: 2018-03-22 | Stop reason: HOSPADM

## 2018-03-20 RX ORDER — ONDANSETRON 2 MG/ML
4 INJECTION INTRAMUSCULAR; INTRAVENOUS EVERY 6 HOURS PRN
Status: DISCONTINUED | OUTPATIENT
Start: 2018-03-20 | End: 2018-03-22 | Stop reason: HOSPADM

## 2018-03-20 RX ORDER — BUPIVACAINE HYDROCHLORIDE 2.5 MG/ML
INJECTION, SOLUTION EPIDURAL; INFILTRATION; INTRACAUDAL PRN
Status: DISCONTINUED | OUTPATIENT
Start: 2018-03-20 | End: 2018-03-20 | Stop reason: HOSPADM

## 2018-03-20 RX ORDER — DIPHENHYDRAMINE HYDROCHLORIDE 50 MG/ML
12.5 INJECTION INTRAMUSCULAR; INTRAVENOUS
Status: DISCONTINUED | OUTPATIENT
Start: 2018-03-20 | End: 2018-03-20 | Stop reason: HOSPADM

## 2018-03-20 RX ORDER — MEPERIDINE HYDROCHLORIDE 50 MG/ML
12.5 INJECTION INTRAMUSCULAR; INTRAVENOUS; SUBCUTANEOUS EVERY 5 MIN PRN
Status: DISCONTINUED | OUTPATIENT
Start: 2018-03-20 | End: 2018-03-20 | Stop reason: HOSPADM

## 2018-03-20 RX ORDER — CIPROFLOXACIN 2 MG/ML
400 INJECTION, SOLUTION INTRAVENOUS ONCE
Status: DISCONTINUED | OUTPATIENT
Start: 2018-03-20 | End: 2018-03-22 | Stop reason: HOSPADM

## 2018-03-20 RX ORDER — HYDROMORPHONE HCL 110MG/55ML
PATIENT CONTROLLED ANALGESIA SYRINGE INTRAVENOUS PRN
Status: DISCONTINUED | OUTPATIENT
Start: 2018-03-20 | End: 2018-03-20 | Stop reason: SDUPTHER

## 2018-03-20 RX ORDER — DEXTROSE, SODIUM CHLORIDE, AND POTASSIUM CHLORIDE 5; .45; .15 G/100ML; G/100ML; G/100ML
INJECTION INTRAVENOUS CONTINUOUS
Status: DISCONTINUED | OUTPATIENT
Start: 2018-03-20 | End: 2018-03-22 | Stop reason: HOSPADM

## 2018-03-20 RX ORDER — HYDROCODONE BITARTRATE AND ACETAMINOPHEN 5; 325 MG/1; MG/1
2 TABLET ORAL EVERY 4 HOURS PRN
Status: DISCONTINUED | OUTPATIENT
Start: 2018-03-20 | End: 2018-03-22 | Stop reason: HOSPADM

## 2018-03-20 RX ORDER — ONDANSETRON 2 MG/ML
4 INJECTION INTRAMUSCULAR; INTRAVENOUS
Status: DISCONTINUED | OUTPATIENT
Start: 2018-03-20 | End: 2018-03-20 | Stop reason: HOSPADM

## 2018-03-20 RX ORDER — GLYCOPYRROLATE 0.2 MG/ML
INJECTION INTRAMUSCULAR; INTRAVENOUS PRN
Status: DISCONTINUED | OUTPATIENT
Start: 2018-03-20 | End: 2018-03-20 | Stop reason: SDUPTHER

## 2018-03-20 RX ORDER — ONDANSETRON 2 MG/ML
INJECTION INTRAMUSCULAR; INTRAVENOUS PRN
Status: DISCONTINUED | OUTPATIENT
Start: 2018-03-20 | End: 2018-03-20 | Stop reason: SDUPTHER

## 2018-03-20 RX ORDER — MORPHINE SULFATE 2 MG/ML
2 INJECTION, SOLUTION INTRAMUSCULAR; INTRAVENOUS EVERY 5 MIN PRN
Status: DISCONTINUED | OUTPATIENT
Start: 2018-03-20 | End: 2018-03-20 | Stop reason: HOSPADM

## 2018-03-20 RX ORDER — SODIUM CHLORIDE 0.9 % (FLUSH) 0.9 %
10 SYRINGE (ML) INJECTION PRN
Status: DISCONTINUED | OUTPATIENT
Start: 2018-03-20 | End: 2018-03-22 | Stop reason: HOSPADM

## 2018-03-20 RX ORDER — KETOROLAC TROMETHAMINE 30 MG/ML
30 INJECTION, SOLUTION INTRAMUSCULAR; INTRAVENOUS EVERY 6 HOURS
Status: COMPLETED | OUTPATIENT
Start: 2018-03-20 | End: 2018-03-22

## 2018-03-20 RX ORDER — ONDANSETRON 2 MG/ML
4 INJECTION INTRAMUSCULAR; INTRAVENOUS EVERY 6 HOURS PRN
Status: DISCONTINUED | OUTPATIENT
Start: 2018-03-20 | End: 2018-03-22 | Stop reason: SDUPTHER

## 2018-03-20 RX ORDER — DEXAMETHASONE SODIUM PHOSPHATE 4 MG/ML
INJECTION, SOLUTION INTRA-ARTICULAR; INTRALESIONAL; INTRAMUSCULAR; INTRAVENOUS; SOFT TISSUE PRN
Status: DISCONTINUED | OUTPATIENT
Start: 2018-03-20 | End: 2018-03-20 | Stop reason: SDUPTHER

## 2018-03-20 RX ORDER — ROCURONIUM BROMIDE 10 MG/ML
INJECTION, SOLUTION INTRAVENOUS PRN
Status: DISCONTINUED | OUTPATIENT
Start: 2018-03-20 | End: 2018-03-20 | Stop reason: SDUPTHER

## 2018-03-20 RX ORDER — PROMETHAZINE HYDROCHLORIDE 25 MG/ML
12.5 INJECTION, SOLUTION INTRAMUSCULAR; INTRAVENOUS EVERY 6 HOURS PRN
Status: DISCONTINUED | OUTPATIENT
Start: 2018-03-20 | End: 2018-03-22 | Stop reason: HOSPADM

## 2018-03-20 RX ORDER — HYDROCODONE BITARTRATE AND ACETAMINOPHEN 5; 325 MG/1; MG/1
1 TABLET ORAL EVERY 4 HOURS PRN
Status: DISCONTINUED | OUTPATIENT
Start: 2018-03-20 | End: 2018-03-22 | Stop reason: HOSPADM

## 2018-03-20 RX ORDER — NEOSTIGMINE METHYLSULFATE 1 MG/ML
INJECTION, SOLUTION INTRAVENOUS PRN
Status: DISCONTINUED | OUTPATIENT
Start: 2018-03-20 | End: 2018-03-20 | Stop reason: SDUPTHER

## 2018-03-20 RX ORDER — SODIUM CHLORIDE 0.9 % (FLUSH) 0.9 %
10 SYRINGE (ML) INJECTION EVERY 12 HOURS SCHEDULED
Status: DISCONTINUED | OUTPATIENT
Start: 2018-03-20 | End: 2018-03-22 | Stop reason: HOSPADM

## 2018-03-20 RX ORDER — MIDAZOLAM HYDROCHLORIDE 1 MG/ML
INJECTION INTRAMUSCULAR; INTRAVENOUS PRN
Status: DISCONTINUED | OUTPATIENT
Start: 2018-03-20 | End: 2018-03-20 | Stop reason: SDUPTHER

## 2018-03-20 RX ADMIN — HYDROMORPHONE HYDROCHLORIDE 0.5 MG: 2 INJECTION, SOLUTION INTRAMUSCULAR; INTRAVENOUS; SUBCUTANEOUS at 13:49

## 2018-03-20 RX ADMIN — ONDANSETRON 4 MG: 2 INJECTION INTRAMUSCULAR; INTRAVENOUS at 07:33

## 2018-03-20 RX ADMIN — CIPROFLOXACIN 400 MG: 2 INJECTION, SOLUTION INTRAVENOUS at 09:06

## 2018-03-20 RX ADMIN — POTASSIUM CHLORIDE, DEXTROSE MONOHYDRATE AND SODIUM CHLORIDE: 150; 5; 450 INJECTION, SOLUTION INTRAVENOUS at 17:00

## 2018-03-20 RX ADMIN — ONDANSETRON 4 MG: 2 INJECTION INTRAMUSCULAR; INTRAVENOUS at 12:13

## 2018-03-20 RX ADMIN — LIDOCAINE HYDROCHLORIDE 50 MG: 10 INJECTION, SOLUTION EPIDURAL; INFILTRATION; INTRACAUDAL; PERINEURAL at 11:58

## 2018-03-20 RX ADMIN — PROPOFOL 150 MG: 10 INJECTION, EMULSION INTRAVENOUS at 11:58

## 2018-03-20 RX ADMIN — ROCURONIUM BROMIDE 40 MG: 10 INJECTION INTRAVENOUS at 11:58

## 2018-03-20 RX ADMIN — HYDROMORPHONE HYDROCHLORIDE 0.25 MG: 1 INJECTION, SOLUTION INTRAMUSCULAR; INTRAVENOUS; SUBCUTANEOUS at 22:15

## 2018-03-20 RX ADMIN — METRONIDAZOLE 500 MG: 500 INJECTION, SOLUTION INTRAVENOUS at 16:47

## 2018-03-20 RX ADMIN — GLYCOPYRROLATE 0.6 MG: 0.2 INJECTION, SOLUTION INTRAMUSCULAR; INTRAVENOUS at 13:21

## 2018-03-20 RX ADMIN — DEXAMETHASONE SODIUM PHOSPHATE 4 MG: 4 INJECTION, SOLUTION INTRAMUSCULAR; INTRAVENOUS at 12:13

## 2018-03-20 RX ADMIN — PROMETHAZINE HYDROCHLORIDE 12.5 MG: 25 INJECTION INTRAMUSCULAR; INTRAVENOUS at 03:25

## 2018-03-20 RX ADMIN — METRONIDAZOLE 500 MG: 500 INJECTION, SOLUTION INTRAVENOUS at 01:39

## 2018-03-20 RX ADMIN — NEOSTIGMINE METHYLSULFATE 3 MG: 1 INJECTION, SOLUTION INTRAVENOUS at 13:21

## 2018-03-20 RX ADMIN — SODIUM CHLORIDE: 9 INJECTION, SOLUTION INTRAVENOUS at 13:00

## 2018-03-20 RX ADMIN — HYDROMORPHONE HYDROCHLORIDE 1 MG: 2 INJECTION, SOLUTION INTRAMUSCULAR; INTRAVENOUS; SUBCUTANEOUS at 12:20

## 2018-03-20 RX ADMIN — KETOROLAC TROMETHAMINE 30 MG: 30 INJECTION, SOLUTION INTRAMUSCULAR; INTRAVENOUS at 21:44

## 2018-03-20 RX ADMIN — METRONIDAZOLE 500 MG: 500 INJECTION, SOLUTION INTRAVENOUS at 08:01

## 2018-03-20 RX ADMIN — CIPROFLOXACIN 400 MG: 2 INJECTION, SOLUTION INTRAVENOUS at 21:30

## 2018-03-20 RX ADMIN — MIDAZOLAM 4 MG: 1 INJECTION INTRAMUSCULAR; INTRAVENOUS at 11:53

## 2018-03-20 RX ADMIN — ONDANSETRON 4 MG: 2 INJECTION INTRAMUSCULAR; INTRAVENOUS at 01:38

## 2018-03-20 RX ADMIN — KETOROLAC TROMETHAMINE 30 MG: 30 INJECTION, SOLUTION INTRAMUSCULAR; INTRAVENOUS at 16:47

## 2018-03-20 RX ADMIN — HYDROMORPHONE HYDROCHLORIDE 0.5 MG: 2 INJECTION, SOLUTION INTRAMUSCULAR; INTRAVENOUS; SUBCUTANEOUS at 13:41

## 2018-03-20 RX ADMIN — METRONIDAZOLE 500 MG: 500 INJECTION, SOLUTION INTRAVENOUS at 23:57

## 2018-03-20 ASSESSMENT — PULMONARY FUNCTION TESTS
PIF_VALUE: 11
PIF_VALUE: 11
PIF_VALUE: 10
PIF_VALUE: 11
PIF_VALUE: 10
PIF_VALUE: 19
PIF_VALUE: 11
PIF_VALUE: 11
PIF_VALUE: 10
PIF_VALUE: 10
PIF_VALUE: 3
PIF_VALUE: 1
PIF_VALUE: 11
PIF_VALUE: 26
PIF_VALUE: 3
PIF_VALUE: 11
PIF_VALUE: 0
PIF_VALUE: 11
PIF_VALUE: 10
PIF_VALUE: 10
PIF_VALUE: 11
PIF_VALUE: 11
PIF_VALUE: 10
PIF_VALUE: 11
PIF_VALUE: 6
PIF_VALUE: 9
PIF_VALUE: 11
PIF_VALUE: 10
PIF_VALUE: 10
PIF_VALUE: 11
PIF_VALUE: 10
PIF_VALUE: 10
PIF_VALUE: 11
PIF_VALUE: 0
PIF_VALUE: 11
PIF_VALUE: 11
PIF_VALUE: 10
PIF_VALUE: 11
PIF_VALUE: 10
PIF_VALUE: 11
PIF_VALUE: 2
PIF_VALUE: 10
PIF_VALUE: 10
PIF_VALUE: 11
PIF_VALUE: 10
PIF_VALUE: 11
PIF_VALUE: 10
PIF_VALUE: 11
PIF_VALUE: 2
PIF_VALUE: 11
PIF_VALUE: 10
PIF_VALUE: 11
PIF_VALUE: 9
PIF_VALUE: 11
PIF_VALUE: 11
PIF_VALUE: 10
PIF_VALUE: 19
PIF_VALUE: 10
PIF_VALUE: 19
PIF_VALUE: 11
PIF_VALUE: 11
PIF_VALUE: 10
PIF_VALUE: 11
PIF_VALUE: 11
PIF_VALUE: 10
PIF_VALUE: 11
PIF_VALUE: 10
PIF_VALUE: 2
PIF_VALUE: 11
PIF_VALUE: 10
PIF_VALUE: 11
PIF_VALUE: 2
PIF_VALUE: 10
PIF_VALUE: 11
PIF_VALUE: 5
PIF_VALUE: 11
PIF_VALUE: 10
PIF_VALUE: 10
PIF_VALUE: 3
PIF_VALUE: 11

## 2018-03-20 ASSESSMENT — PAIN SCALES - GENERAL
PAINLEVEL_OUTOF10: 6
PAINLEVEL_OUTOF10: 6
PAINLEVEL_OUTOF10: 4
PAINLEVEL_OUTOF10: 6
PAINLEVEL_OUTOF10: 3

## 2018-03-20 ASSESSMENT — PAIN - FUNCTIONAL ASSESSMENT: PAIN_FUNCTIONAL_ASSESSMENT: 0-10

## 2018-03-20 ASSESSMENT — PAIN DESCRIPTION - LOCATION
LOCATION: ABDOMEN
LOCATION: ABDOMEN

## 2018-03-20 ASSESSMENT — ENCOUNTER SYMPTOMS: STRIDOR: 0

## 2018-03-20 NOTE — PLAN OF CARE
Problem: Safety:  Goal: Free from accidental physical injury  Free from accidental physical injury   Outcome: Ongoing  Patient remains free of incidence/ injury. Bed remains in low position. Patient uses call light appropriately. Up per self. Problem: Pain:  Goal: Control of acute pain  Control of acute pain   Outcome: Ongoing  Patient denies need for pain medication during this shift. Expresses some pain while having episodes of emesis. Problem: Fluid Volume - Imbalance:  Goal: Absence of imbalanced fluid volume signs and symptoms  Absence of imbalanced fluid volume signs and symptoms   Outcome: Ongoing  I &Os as charted.

## 2018-03-20 NOTE — ANESTHESIA POSTPROCEDURE EVALUATION
POST- ANESTHESIA EVALUATION       Pt Name: Sha Little  MRN: [id number]  YOB: 1980  Date of evaluation: 3/20/2018  Time:  2:45 PM      BP (!) 95/55   Pulse (!) 47   Temp 97.7 °F (36.5 °C)   Resp 11   Ht 5' 6\" (1.676 m)   Wt 137 lb 2 oz (62.2 kg)   LMP 02/05/2018 (Approximate) Comment: negative  SpO2 99%   BMI 22.13 kg/m²      Consciousness Level  Awake  Cardiopulmonary Status  Stable  Pain Adequately Treated YES  Nausea / Vomiting  NO  Adequate Hydration  YES  Anesthesia Related Complications NONE      Electronically signed by Asha Dupree MD on 3/20/2018 at 2:45 PM       Department of Anesthesiology  Postprocedure Note    Patient: Sha Little  MRN: [id number]  YOB: 1980  Date of evaluation: 3/20/2018  Time:  2:45 PM     Procedure Summary     Date:  03/20/18 Room / Location:  [address] [place] [address] Armani Crowell 06 / [id number]    Anesthesia Start:  0464 Anesthesia Stop:  8711    Procedure:  BOWEL RESECTION HEMICOLECTOMY - Right (N/A ) Diagnosis:  (CARCOMOID TUMOR OF APPENDEX)    Surgeon:  Valerio Kibmrough MD Responsible Provider:  Asha Dupree MD    Anesthesia Type:  general ASA Status:  2          Anesthesia Type: general    Tyson Phase I: Tyson Score: 9    Tyson Phase II:      Last vitals: Reviewed and per EMR flowsheets.        Anesthesia Post Evaluation

## 2018-03-20 NOTE — H&P
HISTORY and [address]       NAME:  Uvaldo Marion  MRN: [id number]   YOB: 1980   Date: 3/20/2018   Age: 44 y.o. Gender: female     H&P Update Note    H&P from 2018  reviewed and updated, Patient examined. Vitals: /70   Pulse 58   Temp 98.7 °F (37.1 °C) (Oral)   Resp 18   Ht 5' 6\" (1.676 m)   Wt 135 lb 5.8 oz (61.4 kg)   LMP 2018 (Approximate)   SpO2 98%   BMI 21.85 kg/m²  Body mass index is 21.85 kg/m². No interval changes except for appendix that was removed was positive for carcinoid tumor. Patient presents for hemicolectomy, bowel resection. Prior Incision is well healed. Patient denies current abdominal pain. She has experienced some weight loss and poor energy. Otherwise denies chills/fever, chest pain or SOB. States she usually feels \"cold\". I concur with the findings.       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     Priority: High    Herpes      Priority: High    Carcinoid tumor of appendix 2018    Appendicitis, acute 2018    S/P laparoscopic appendectomy 2018    MRSA infection - buttock and prepatellar bursa  2017    Sepsis ([address] 2017    Prepatellar bursitis of right knee     Tobacco use 2017    Abscess, gluteal, right 2017    Hypokalemia 2017    Hypocalcemia 2017    Hypotension 2017    Postpartum depression 2016     10/22 M Apg 8/ Wt 5#7 10/22/2016     (spontaneous vaginal delivery)     Family history of breast cancer     Family history of uterine cancer     Family history of cervical cancer            KRISTOPHER BONNER NP on 3/20/2018 at 8:04 AM      Fabian Ryan NP Nurse Practitioner Signed General Surgery  H&P Date of Service: 3/5/2018 10:19 AM   Related encounter: ED to Hosp-Admission (Discharged) from 3/5/2018 in [address] [place] [address] All    []Hide copied text        HISTORY and [address]         NAME:  Ella Claire  MRN: [id number]   YOB: 1980   Date: 3/5/2018   Age: 44 y.o. Gender: female         COMPLAINT AND PRESENT HISTORY:   44 y o female who began having abdominal pain morning of ED visit. Pain to 5 1/2 on scale. Started low in abdomen and radiated upward into entire abdomen. Pain associated with nausea and dry heaves. No fever/chills, chest pain or SOB. Abdominal CT reveals:  CT ABDOMEN PELVIS WO CONTRAST   Final Result   *CT evidence of early acute appendicitis.  No evidence of perforation or   abscess. *Questionable fullness along the pancreatic tail incompletely characterized   due to lack of IV and oral contrast measuring 6.8 cm in greatest axial   dimension.  Findings may represent a conglomeration of small-bowel loops,   however pancreatic mass cannot be excluded.  Repeat evaluation with IV normal   contrast is suggested if this finding is of clinical concern. *IUD present with its tips projecting outside of the uterine wall.  Finding   may represent uterine perforation.    *Urinary bladder demonstrates punctate droplets of nondependent air.  Please   correlate with history of instrumentation.  Underlying infectious process   cannot be excluded.                    PAST MEDICAL HISTORY      Past Medical History        Past Medical History:   Diagnosis Date    Anemia      Family history of breast cancer      Family history of cervical cancer      Family history of uterine cancer      Herpes      MRSA (methicillin resistant staph aureus) culture positive 08/16/2017     abscess    Postpartum depression 11/7/2016            Pt denies any history of Diabetes mellitus type 2, hypertension, stroke, heart disease, COPD, Asthma, GERD, HLD,

## 2018-03-20 NOTE — ANESTHESIA PRE PROCEDURE
2 oz (62.2 kg)   03/15/18 130 lb (59 kg)   03/09/18 140 lb (63.5 kg)     Body mass index is 22.13 kg/m². CBC:   Lab Results   Component Value Date    WBC 5.7 03/19/2018    RBC 4.53 03/19/2018    HGB 14.1 03/19/2018    HCT 42.5 03/19/2018    MCV 93.9 03/19/2018    RDW 12.8 03/19/2018     03/19/2018       CMP:   Lab Results   Component Value Date     03/19/2018    K 4.6 03/19/2018     03/19/2018    CO2 28 03/19/2018    BUN 17 03/05/2018    CREATININE 0.83 03/05/2018    GFRAA >60 03/05/2018    LABGLOM >60 03/05/2018    GLUCOSE 100 03/05/2018    PROT 6.7 03/05/2018    CALCIUM 8.9 03/05/2018    BILITOT 0.39 03/05/2018    ALKPHOS 34 03/05/2018    AST 11 03/05/2018    ALT 11 03/05/2018       POC Tests:   Recent Labs      03/20/18   0710   POCGLU  114*       Coags:   Lab Results   Component Value Date    PROTIME 11.2 08/17/2017    INR 1.0 08/17/2017       HCG (If Applicable):   Lab Results   Component Value Date    PREGTESTUR NEGATIVE 03/20/2018    HCG NEGATIVE 05/18/2017    HCGQUANT <1 02/23/2017        ABGs: No results found for: PHART, PO2ART, LDR1BJA, AZA5NBE, BEART, Z3KQEDTA     Type & Screen (If Applicable):  No results found for: LABABO, [address]    Anesthesia Evaluation  Patient summary reviewed  Airway: Mallampati: II  TM distance: >3 FB   Neck ROM: full  Mouth opening: > = 3 FB Dental:          Pulmonary:Negative Pulmonary ROS and normal exam        (-) rhonchi, wheezes, rales and stridor                           Cardiovascular:Negative CV ROS        (-) murmur,  friction rub, carotid bruit and peripheral edema      Rhythm: regular  Rate: normal                    Neuro/Psych:   (+) psychiatric history: stable without treatment            GI/Hepatic/Renal:   (+) bowel prep,           Endo/Other: Negative Endo/Other ROS                    Abdominal:       Abdomen: tender. Vascular: negative vascular ROS.                                        Anesthesia Plan      general     ASA 2 Induction: intravenous. MIPS: Postoperative opioids intended and Prophylactic antiemetics administered. Anesthetic plan and risks discussed with patient. Plan discussed with CRNA.                   Rome Connelly MD   3/20/2018

## 2018-03-21 LAB
CULTURE: NO GROWTH
CULTURE: NORMAL
Lab: NORMAL
SPECIMEN DESCRIPTION: NORMAL
SPECIMEN DESCRIPTION: NORMAL
STATUS: NORMAL
SURGICAL PATHOLOGY REPORT: NORMAL

## 2018-03-21 PROCEDURE — 1200000000 HC SEMI PRIVATE

## 2018-03-21 PROCEDURE — 6370000000 HC RX 637 (ALT 250 FOR IP): Performed by: SURGERY

## 2018-03-21 PROCEDURE — 6360000002 HC RX W HCPCS: Performed by: SURGERY

## 2018-03-21 PROCEDURE — 2500000003 HC RX 250 WO HCPCS: Performed by: SURGERY

## 2018-03-21 RX ADMIN — KETOROLAC TROMETHAMINE 30 MG: 30 INJECTION, SOLUTION INTRAMUSCULAR; INTRAVENOUS at 09:34

## 2018-03-21 RX ADMIN — POTASSIUM CHLORIDE, DEXTROSE MONOHYDRATE AND SODIUM CHLORIDE: 150; 5; 450 INJECTION, SOLUTION INTRAVENOUS at 05:34

## 2018-03-21 RX ADMIN — KETOROLAC TROMETHAMINE 30 MG: 30 INJECTION, SOLUTION INTRAMUSCULAR; INTRAVENOUS at 16:45

## 2018-03-21 RX ADMIN — KETOROLAC TROMETHAMINE 30 MG: 30 INJECTION, SOLUTION INTRAMUSCULAR; INTRAVENOUS at 03:59

## 2018-03-21 RX ADMIN — HYDROCODONE BITARTRATE AND ACETAMINOPHEN 2 TABLET: 5; 325 TABLET ORAL at 14:44

## 2018-03-21 RX ADMIN — POTASSIUM CHLORIDE, DEXTROSE MONOHYDRATE AND SODIUM CHLORIDE: 150; 5; 450 INJECTION, SOLUTION INTRAVENOUS at 16:45

## 2018-03-21 RX ADMIN — KETOROLAC TROMETHAMINE 30 MG: 30 INJECTION, SOLUTION INTRAMUSCULAR; INTRAVENOUS at 21:44

## 2018-03-21 RX ADMIN — HYDROMORPHONE HYDROCHLORIDE 0.25 MG: 1 INJECTION, SOLUTION INTRAMUSCULAR; INTRAVENOUS; SUBCUTANEOUS at 06:16

## 2018-03-21 ASSESSMENT — PAIN SCALES - GENERAL
PAINLEVEL_OUTOF10: 5
PAINLEVEL_OUTOF10: 8
PAINLEVEL_OUTOF10: 2
PAINLEVEL_OUTOF10: 5
PAINLEVEL_OUTOF10: 4
PAINLEVEL_OUTOF10: 6
PAINLEVEL_OUTOF10: 7
PAINLEVEL_OUTOF10: 4

## 2018-03-21 NOTE — PLAN OF CARE
Problem: Safety:  Goal: Free from accidental physical injury  Free from accidental physical injury   Outcome: Ongoing  Patient remains free of incidence/ injury. Bed remains in low position. Patient uses call light appropriately. Up with standby assist.     Problem: Pain:  Goal: Control of acute pain  Control of acute pain   Outcome: Ongoing  Patient expresses relief following administration of prn pain medication. Problem: Gas Exchange - Impaired:  Goal: Levels of oxygenation will improve  Levels of oxygenation will improve   Outcome: Ongoing  Oxygen saturation as charted on room air.

## 2018-03-21 NOTE — CARE COORDINATION
ONGOING DISCHARGE PLAN:    Plan remains for patient to be discharged home without needs. POD#1 Hemicolectomy. On full liquid diet, doing well. Anticipate d/c tomorrow.      Electronically signed by Nuria Aguilera RN on 3/21/2018 at 12:29 PM

## 2018-03-21 NOTE — PLAN OF CARE
Problem: Safety:  Goal: Free from accidental physical injury  Free from accidental physical injury   Outcome: Met This Shift    Goal: Free from intentional harm  Free from intentional harm   Outcome: Met This Shift      Problem: Daily Care:  Goal: Daily care needs are met  Daily care needs are met   Outcome: Met This Shift      Problem: Pain:  Goal: Patient's pain/discomfort is manageable  Patient's pain/discomfort is manageable   Outcome: Ongoing      Problem: Fluid Volume - Imbalance:  Goal: Absence of imbalanced fluid volume signs and symptoms  Absence of imbalanced fluid volume signs and symptoms   Outcome: Met This Shift

## 2018-03-22 VITALS
HEART RATE: 55 BPM | DIASTOLIC BLOOD PRESSURE: 50 MMHG | WEIGHT: 137.13 LBS | SYSTOLIC BLOOD PRESSURE: 89 MMHG | TEMPERATURE: 98.1 F | OXYGEN SATURATION: 99 % | RESPIRATION RATE: 18 BRPM | BODY MASS INDEX: 22.04 KG/M2 | HEIGHT: 66 IN

## 2018-03-22 PROCEDURE — 6360000002 HC RX W HCPCS: Performed by: SURGERY

## 2018-03-22 PROCEDURE — 6370000000 HC RX 637 (ALT 250 FOR IP): Performed by: SURGERY

## 2018-03-22 PROCEDURE — 2500000003 HC RX 250 WO HCPCS: Performed by: SURGERY

## 2018-03-22 RX ORDER — HYDROCODONE BITARTRATE AND ACETAMINOPHEN 5; 325 MG/1; MG/1
1 TABLET ORAL EVERY 6 HOURS PRN
Qty: 15 TABLET | Refills: 0
Start: 2018-03-22 | End: 2018-03-25

## 2018-03-22 RX ORDER — HEPARIN SODIUM 5000 [USP'U]/ML
5000 INJECTION, SOLUTION INTRAVENOUS; SUBCUTANEOUS 2 TIMES DAILY
Status: DISCONTINUED | OUTPATIENT
Start: 2018-03-22 | End: 2018-03-22 | Stop reason: HOSPADM

## 2018-03-22 RX ADMIN — KETOROLAC TROMETHAMINE 30 MG: 30 INJECTION, SOLUTION INTRAMUSCULAR; INTRAVENOUS at 04:16

## 2018-03-22 RX ADMIN — KETOROLAC TROMETHAMINE 30 MG: 30 INJECTION, SOLUTION INTRAMUSCULAR; INTRAVENOUS at 09:08

## 2018-03-22 RX ADMIN — HYDROCODONE BITARTRATE AND ACETAMINOPHEN 1 TABLET: 5; 325 TABLET ORAL at 04:18

## 2018-03-22 RX ADMIN — POTASSIUM CHLORIDE, DEXTROSE MONOHYDRATE AND SODIUM CHLORIDE: 150; 5; 450 INJECTION, SOLUTION INTRAVENOUS at 01:41

## 2018-03-22 ASSESSMENT — PAIN SCALES - GENERAL
PAINLEVEL_OUTOF10: 3
PAINLEVEL_OUTOF10: 6
PAINLEVEL_OUTOF10: 6

## 2018-03-22 NOTE — DISCHARGE INSTR - ACTIVITY
No heavy lifting pushing or pulling. You may shower, do not submerge incisions. No tub bathing, swimming or hot tubs. Drink plenty of fluids. Monitor incisions for redness, and warmth. Monitor for fever.

## 2018-03-22 NOTE — CARE COORDINATION
Reviewed discharge, follow up and home medications with patient. Patient denies questions at this time.

## 2018-03-22 NOTE — CARE COORDINATION
ONGOING DISCHARGE PLAN:    Spoke with patient regarding discharge plan and patient confirms that plan is to return home, no needs  s/p Right hemicolectomy. , doing well . To d/c home today  Will continue to follow for additional discharge needs.     Electronically signed by Christina Ramírez RN on 3/22/2018 at 11:08 AM

## 2018-03-23 LAB — SURGICAL PATHOLOGY REPORT: NORMAL

## 2018-03-28 ENCOUNTER — TELEPHONE (OUTPATIENT)
Dept: ONCOLOGY | Age: 38
End: 2018-03-28

## 2018-03-29 ENCOUNTER — TELEPHONE (OUTPATIENT)
Dept: OBGYN CLINIC | Age: 38
End: 2018-03-29

## 2018-03-29 RX ORDER — ETONOGESTREL AND ETHINYL ESTRADIOL 11.7; 2.7 MG/1; MG/1
1 INSERT, EXTENDED RELEASE VAGINAL
Qty: 1 EACH | Refills: 3 | Status: SHIPPED | OUTPATIENT
Start: 2018-03-29 | End: 2018-07-30 | Stop reason: SDUPTHER

## 2018-06-25 ENCOUNTER — HOSPITAL ENCOUNTER (EMERGENCY)
Age: 38
Discharge: HOME | End: 2018-06-25
Attending: EMERGENCY MEDICINE
Payer: MEDICARE

## 2018-06-25 VITALS
DIASTOLIC BLOOD PRESSURE: 63 MMHG | HEART RATE: 61 BPM | HEIGHT: 66 IN | WEIGHT: 130 LBS | OXYGEN SATURATION: 100 % | BODY MASS INDEX: 20.89 KG/M2 | TEMPERATURE: 98.4 F | RESPIRATION RATE: 18 BRPM | SYSTOLIC BLOOD PRESSURE: 108 MMHG

## 2018-06-25 DIAGNOSIS — L02.91 ABSCESS: Primary | ICD-10-CM

## 2018-06-25 PROCEDURE — 10061 I&D ABSCESS COMP/MULTIPLE: CPT

## 2018-06-25 PROCEDURE — 99282 EMERGENCY DEPT VISIT SF MDM: CPT

## 2018-06-25 PROCEDURE — 2500000003 HC RX 250 WO HCPCS: Performed by: EMERGENCY MEDICINE

## 2018-06-25 PROCEDURE — 6370000000 HC RX 637 (ALT 250 FOR IP): Performed by: EMERGENCY MEDICINE

## 2018-06-25 RX ORDER — SULFAMETHOXAZOLE AND TRIMETHOPRIM 800; 160 MG/1; MG/1
1 TABLET ORAL 2 TIMES DAILY
Qty: 14 TABLET | Refills: 0 | Status: SHIPPED | OUTPATIENT
Start: 2018-06-25 | End: 2018-07-02

## 2018-06-25 RX ORDER — SULFAMETHOXAZOLE AND TRIMETHOPRIM 800; 160 MG/1; MG/1
1 TABLET ORAL ONCE
Status: COMPLETED | OUTPATIENT
Start: 2018-06-25 | End: 2018-06-25

## 2018-06-25 RX ORDER — IBUPROFEN 800 MG/1
800 TABLET ORAL ONCE
Status: DISCONTINUED | OUTPATIENT
Start: 2018-06-25 | End: 2018-06-25 | Stop reason: HOSPADM

## 2018-06-25 RX ORDER — LIDOCAINE HYDROCHLORIDE 10 MG/ML
20 INJECTION, SOLUTION EPIDURAL; INFILTRATION; INTRACAUDAL; PERINEURAL ONCE
Status: COMPLETED | OUTPATIENT
Start: 2018-06-25 | End: 2018-06-25

## 2018-06-25 RX ADMIN — SULFAMETHOXAZOLE AND TRIMETHOPRIM 1 TABLET: 800; 160 TABLET ORAL at 21:14

## 2018-06-25 RX ADMIN — LIDOCAINE HYDROCHLORIDE 20 ML: 10 INJECTION, SOLUTION EPIDURAL; INFILTRATION; INTRACAUDAL; PERINEURAL at 21:31

## 2018-06-25 ASSESSMENT — ENCOUNTER SYMPTOMS
COUGH: 0
NAUSEA: 0
SHORTNESS OF BREATH: 0
CONSTIPATION: 0
DIARRHEA: 0
VOMITING: 0
ABDOMINAL PAIN: 0

## 2018-06-25 ASSESSMENT — PAIN DESCRIPTION - DESCRIPTORS: DESCRIPTORS: BURNING;PRESSURE;TENDER

## 2018-06-25 ASSESSMENT — PAIN DESCRIPTION - PAIN TYPE: TYPE: ACUTE PAIN

## 2018-06-25 ASSESSMENT — PAIN DESCRIPTION - FREQUENCY: FREQUENCY: CONTINUOUS

## 2018-06-25 ASSESSMENT — PAIN SCALES - GENERAL: PAINLEVEL_OUTOF10: 4

## 2018-06-25 ASSESSMENT — PAIN DESCRIPTION - LOCATION: LOCATION: NECK

## 2018-07-30 RX ORDER — ETONOGESTREL/ETHINYL ESTRADIOL .12-.015MG
RING, VAGINAL VAGINAL
Qty: 3 EACH | Refills: 0 | Status: SHIPPED | OUTPATIENT
Start: 2018-07-30 | End: 2018-11-06 | Stop reason: SDUPTHER

## 2018-11-06 RX ORDER — ETONOGESTREL/ETHINYL ESTRADIOL .12-.015MG
RING, VAGINAL VAGINAL
Qty: 1 EACH | Refills: 5 | Status: SHIPPED | OUTPATIENT
Start: 2018-11-06 | End: 2019-06-17 | Stop reason: SDUPTHER

## 2018-12-03 ENCOUNTER — APPOINTMENT (OUTPATIENT)
Dept: CT IMAGING | Age: 38
End: 2018-12-03
Payer: MEDICARE

## 2018-12-03 ENCOUNTER — HOSPITAL ENCOUNTER (EMERGENCY)
Age: 38
Discharge: HOME | End: 2018-12-04
Attending: EMERGENCY MEDICINE
Payer: MEDICARE

## 2018-12-03 DIAGNOSIS — K64.4 EXTERNAL HEMORRHOID: Primary | ICD-10-CM

## 2018-12-03 LAB
ABSOLUTE EOS #: 0 K/UL (ref 0–0.4)
ABSOLUTE IMMATURE GRANULOCYTE: ABNORMAL K/UL (ref 0–0.3)
ABSOLUTE LYMPH #: 2.8 K/UL (ref 1–4.8)
ABSOLUTE MONO #: 0.8 K/UL (ref 0.1–1.3)
ALBUMIN SERPL-MCNC: 4.1 G/DL (ref 3.5–5.2)
ALBUMIN/GLOBULIN RATIO: ABNORMAL (ref 1–2.5)
ALP BLD-CCNC: 41 U/L (ref 35–104)
ALT SERPL-CCNC: 12 U/L (ref 5–33)
ANION GAP SERPL CALCULATED.3IONS-SCNC: 10 MMOL/L (ref 9–17)
AST SERPL-CCNC: 13 U/L
BASOPHILS # BLD: 1 % (ref 0–2)
BASOPHILS ABSOLUTE: 0.1 K/UL (ref 0–0.2)
BILIRUB SERPL-MCNC: 0.66 MG/DL (ref 0.3–1.2)
BUN BLDV-MCNC: 11 MG/DL (ref 6–20)
BUN/CREAT BLD: ABNORMAL (ref 9–20)
CALCIUM SERPL-MCNC: 9.3 MG/DL (ref 8.6–10.4)
CHLORIDE BLD-SCNC: 107 MMOL/L (ref 98–107)
CO2: 25 MMOL/L (ref 20–31)
CREAT SERPL-MCNC: 0.92 MG/DL (ref 0.5–0.9)
DIFFERENTIAL TYPE: ABNORMAL
EOSINOPHILS RELATIVE PERCENT: 0 % (ref 0–4)
GFR AFRICAN AMERICAN: >60 ML/MIN
GFR NON-AFRICAN AMERICAN: >60 ML/MIN
GFR SERPL CREATININE-BSD FRML MDRD: ABNORMAL ML/MIN/{1.73_M2}
GFR SERPL CREATININE-BSD FRML MDRD: ABNORMAL ML/MIN/{1.73_M2}
GLUCOSE BLD-MCNC: 109 MG/DL (ref 70–99)
HCG QUALITATIVE: NEGATIVE
HCT VFR BLD CALC: 38.1 % (ref 36–46)
HEMOGLOBIN: 12.6 G/DL (ref 12–16)
IMMATURE GRANULOCYTES: ABNORMAL %
LYMPHOCYTES # BLD: 23 % (ref 24–44)
MCH RBC QN AUTO: 29.5 PG (ref 26–34)
MCHC RBC AUTO-ENTMCNC: 33 G/DL (ref 31–37)
MCV RBC AUTO: 89.5 FL (ref 80–100)
MONOCYTES # BLD: 7 % (ref 1–7)
NRBC AUTOMATED: ABNORMAL PER 100 WBC
PDW BLD-RTO: 13 % (ref 11.5–14.9)
PLATELET # BLD: 293 K/UL (ref 150–450)
PLATELET ESTIMATE: ABNORMAL
PMV BLD AUTO: 9.3 FL (ref 6–12)
POTASSIUM SERPL-SCNC: 4 MMOL/L (ref 3.7–5.3)
RBC # BLD: 4.26 M/UL (ref 4–5.2)
RBC # BLD: ABNORMAL 10*6/UL
SEG NEUTROPHILS: 69 % (ref 36–66)
SEGMENTED NEUTROPHILS ABSOLUTE COUNT: 8.2 K/UL (ref 1.3–9.1)
SODIUM BLD-SCNC: 142 MMOL/L (ref 135–144)
TOTAL PROTEIN: 6.7 G/DL (ref 6.4–8.3)
WBC # BLD: 11.9 K/UL (ref 3.5–11)
WBC # BLD: ABNORMAL 10*3/UL

## 2018-12-03 PROCEDURE — 99284 EMERGENCY DEPT VISIT MOD MDM: CPT

## 2018-12-03 PROCEDURE — 96374 THER/PROPH/DIAG INJ IV PUSH: CPT

## 2018-12-03 PROCEDURE — 6360000004 HC RX CONTRAST MEDICATION: Performed by: EMERGENCY MEDICINE

## 2018-12-03 PROCEDURE — 2580000003 HC RX 258: Performed by: EMERGENCY MEDICINE

## 2018-12-03 PROCEDURE — 84703 CHORIONIC GONADOTROPIN ASSAY: CPT

## 2018-12-03 PROCEDURE — 36415 COLL VENOUS BLD VENIPUNCTURE: CPT

## 2018-12-03 PROCEDURE — 74177 CT ABD & PELVIS W/CONTRAST: CPT

## 2018-12-03 PROCEDURE — 80053 COMPREHEN METABOLIC PANEL: CPT

## 2018-12-03 PROCEDURE — 6360000002 HC RX W HCPCS: Performed by: EMERGENCY MEDICINE

## 2018-12-03 PROCEDURE — 85025 COMPLETE CBC W/AUTO DIFF WBC: CPT

## 2018-12-03 RX ORDER — ONDANSETRON 2 MG/ML
4 INJECTION INTRAMUSCULAR; INTRAVENOUS ONCE
Status: COMPLETED | OUTPATIENT
Start: 2018-12-03 | End: 2018-12-03

## 2018-12-03 RX ORDER — 0.9 % SODIUM CHLORIDE 0.9 %
1000 INTRAVENOUS SOLUTION INTRAVENOUS ONCE
Status: COMPLETED | OUTPATIENT
Start: 2018-12-03 | End: 2018-12-04

## 2018-12-03 RX ADMIN — SODIUM CHLORIDE 1000 ML: 9 INJECTION, SOLUTION INTRAVENOUS at 23:22

## 2018-12-03 RX ADMIN — ONDANSETRON 4 MG: 2 INJECTION INTRAMUSCULAR; INTRAVENOUS at 23:24

## 2018-12-03 ASSESSMENT — PAIN DESCRIPTION - DESCRIPTORS: DESCRIPTORS: PRESSURE

## 2018-12-03 ASSESSMENT — ENCOUNTER SYMPTOMS
VOMITING: 0
NAUSEA: 0
BACK PAIN: 0
ABDOMINAL PAIN: 1
COUGH: 0
ANAL BLEEDING: 1
DIARRHEA: 1
BLOOD IN STOOL: 1
SHORTNESS OF BREATH: 0
CHEST TIGHTNESS: 0

## 2018-12-03 ASSESSMENT — PAIN SCALES - GENERAL: PAINLEVEL_OUTOF10: 7

## 2018-12-03 ASSESSMENT — PAIN DESCRIPTION - FREQUENCY: FREQUENCY: CONTINUOUS

## 2018-12-04 VITALS
WEIGHT: 128 LBS | RESPIRATION RATE: 16 BRPM | HEIGHT: 66 IN | DIASTOLIC BLOOD PRESSURE: 58 MMHG | SYSTOLIC BLOOD PRESSURE: 92 MMHG | OXYGEN SATURATION: 95 % | HEART RATE: 88 BPM | TEMPERATURE: 98.5 F | BODY MASS INDEX: 20.57 KG/M2

## 2018-12-04 PROCEDURE — 6360000004 HC RX CONTRAST MEDICATION: Performed by: EMERGENCY MEDICINE

## 2018-12-04 PROCEDURE — 2580000003 HC RX 258: Performed by: EMERGENCY MEDICINE

## 2018-12-04 RX ORDER — 0.9 % SODIUM CHLORIDE 0.9 %
80 INTRAVENOUS SOLUTION INTRAVENOUS ONCE
Status: COMPLETED | OUTPATIENT
Start: 2018-12-04 | End: 2018-12-04

## 2018-12-04 RX ORDER — SODIUM CHLORIDE 0.9 % (FLUSH) 0.9 %
10 SYRINGE (ML) INJECTION PRN
Status: DISCONTINUED | OUTPATIENT
Start: 2018-12-04 | End: 2018-12-04 | Stop reason: HOSPADM

## 2018-12-04 RX ADMIN — Medication 10 ML: at 00:08

## 2018-12-04 RX ADMIN — IOPAMIDOL 75 ML: 755 INJECTION, SOLUTION INTRAVENOUS at 00:08

## 2018-12-04 RX ADMIN — SODIUM CHLORIDE 80 ML: 9 INJECTION, SOLUTION INTRAVENOUS at 00:08

## 2018-12-04 NOTE — ED NOTES

## 2019-05-16 ENCOUNTER — HOSPITAL ENCOUNTER (EMERGENCY)
Age: 39
Discharge: HOME | End: 2019-05-16
Attending: EMERGENCY MEDICINE
Payer: MEDICARE

## 2019-05-16 VITALS
WEIGHT: 129 LBS | HEART RATE: 66 BPM | OXYGEN SATURATION: 99 % | RESPIRATION RATE: 14 BRPM | BODY MASS INDEX: 20.25 KG/M2 | HEIGHT: 67 IN | DIASTOLIC BLOOD PRESSURE: 63 MMHG | SYSTOLIC BLOOD PRESSURE: 96 MMHG | TEMPERATURE: 98.3 F

## 2019-05-16 DIAGNOSIS — R53.83 OTHER FATIGUE: Primary | ICD-10-CM

## 2019-05-16 DIAGNOSIS — R53.1 GENERAL WEAKNESS: ICD-10-CM

## 2019-05-16 DIAGNOSIS — M79.2 NEUROPATHIC PAIN: ICD-10-CM

## 2019-05-16 DIAGNOSIS — R63.4 WEIGHT LOSS: ICD-10-CM

## 2019-05-16 DIAGNOSIS — L65.9 ALOPECIA: ICD-10-CM

## 2019-05-16 LAB
-: ABNORMAL
ABSOLUTE EOS #: 0.07 K/UL (ref 0–0.4)
ABSOLUTE IMMATURE GRANULOCYTE: NORMAL K/UL (ref 0–0.3)
ABSOLUTE LYMPH #: 2.7 K/UL (ref 1–4.8)
ABSOLUTE MONO #: 0.43 K/UL (ref 0.1–1.3)
ALBUMIN SERPL-MCNC: 4.5 G/DL (ref 3.5–5.2)
ALBUMIN/GLOBULIN RATIO: NORMAL (ref 1–2.5)
ALP BLD-CCNC: 39 U/L (ref 35–104)
ALT SERPL-CCNC: 12 U/L (ref 5–33)
AMORPHOUS: ABNORMAL
ANION GAP SERPL CALCULATED.3IONS-SCNC: 15 MMOL/L (ref 9–17)
AST SERPL-CCNC: 11 U/L
ATYPICAL LYMPHOCYTE ABSOLUTE COUNT: 0.5 K/UL
ATYPICAL LYMPHOCYTES: 7 %
BACTERIA: ABNORMAL
BASOPHILS # BLD: 1 % (ref 0–2)
BASOPHILS ABSOLUTE: 0.07 K/UL (ref 0–0.2)
BILIRUB SERPL-MCNC: 0.35 MG/DL (ref 0.3–1.2)
BILIRUBIN URINE: NEGATIVE
BUN BLDV-MCNC: 15 MG/DL (ref 6–20)
BUN/CREAT BLD: NORMAL (ref 9–20)
CALCIUM SERPL-MCNC: 8.9 MG/DL (ref 8.6–10.4)
CASTS UA: ABNORMAL /LPF
CHLORIDE BLD-SCNC: 105 MMOL/L (ref 98–107)
CO2: 22 MMOL/L (ref 20–31)
COLOR: YELLOW
COMMENT UA: ABNORMAL
CREAT SERPL-MCNC: 0.85 MG/DL (ref 0.5–0.9)
CRYSTALS, UA: ABNORMAL /HPF
DIFFERENTIAL TYPE: NORMAL
EOSINOPHILS RELATIVE PERCENT: 1 % (ref 0–4)
EPITHELIAL CELLS UA: ABNORMAL /HPF
GFR AFRICAN AMERICAN: >60 ML/MIN
GFR NON-AFRICAN AMERICAN: >60 ML/MIN
GFR SERPL CREATININE-BSD FRML MDRD: NORMAL ML/MIN/{1.73_M2}
GFR SERPL CREATININE-BSD FRML MDRD: NORMAL ML/MIN/{1.73_M2}
GLUCOSE BLD-MCNC: 89 MG/DL (ref 70–99)
GLUCOSE URINE: NEGATIVE
HCG QUALITATIVE: NEGATIVE
HCT VFR BLD CALC: 42.4 % (ref 36–46)
HEMOGLOBIN: 13.9 G/DL (ref 12–16)
IMMATURE GRANULOCYTES: NORMAL %
KETONES, URINE: NEGATIVE
LEUKOCYTE ESTERASE, URINE: NEGATIVE
LYMPHOCYTES # BLD: 38 % (ref 24–44)
MAGNESIUM: 2.5 MG/DL (ref 1.6–2.6)
MCH RBC QN AUTO: 30 PG (ref 26–34)
MCHC RBC AUTO-ENTMCNC: 32.8 G/DL (ref 31–37)
MCV RBC AUTO: 91.3 FL (ref 80–100)
MONOCYTES # BLD: 6 % (ref 1–7)
MORPHOLOGY: NORMAL
MORPHOLOGY: NORMAL
MUCUS: ABNORMAL
NITRITE, URINE: NEGATIVE
NRBC AUTOMATED: NORMAL PER 100 WBC
OTHER OBSERVATIONS UA: ABNORMAL
PDW BLD-RTO: 13.6 % (ref 11.5–14.9)
PH UA: 7 (ref 5–8)
PHOSPHORUS: 4.1 MG/DL (ref 2.6–4.5)
PLATELET # BLD: 267 K/UL (ref 150–450)
PLATELET ESTIMATE: NORMAL
PMV BLD AUTO: 9.1 FL (ref 6–12)
POTASSIUM SERPL-SCNC: 3.8 MMOL/L (ref 3.7–5.3)
PROTEIN UA: NEGATIVE
RBC # BLD: 4.64 M/UL (ref 4–5.2)
RBC # BLD: NORMAL 10*6/UL
RBC UA: ABNORMAL /HPF
RENAL EPITHELIAL, UA: ABNORMAL /HPF
SEG NEUTROPHILS: 47 % (ref 36–66)
SEGMENTED NEUTROPHILS ABSOLUTE COUNT: 3.33 K/UL (ref 1.3–9.1)
SODIUM BLD-SCNC: 142 MMOL/L (ref 135–144)
SPECIFIC GRAVITY UA: 1.01 (ref 1–1.03)
TOTAL PROTEIN: 7.7 G/DL (ref 6.4–8.3)
TRICHOMONAS: ABNORMAL
TSH SERPL DL<=0.05 MIU/L-ACNC: 1.42 MIU/L (ref 0.3–5)
TURBIDITY: CLEAR
URINE HGB: NEGATIVE
UROBILINOGEN, URINE: NORMAL
WBC # BLD: 7.1 K/UL (ref 3.5–11)
WBC # BLD: NORMAL 10*3/UL
WBC UA: ABNORMAL /HPF
YEAST: ABNORMAL

## 2019-05-16 PROCEDURE — 84443 ASSAY THYROID STIM HORMONE: CPT

## 2019-05-16 PROCEDURE — 85025 COMPLETE CBC W/AUTO DIFF WBC: CPT

## 2019-05-16 PROCEDURE — 84703 CHORIONIC GONADOTROPIN ASSAY: CPT

## 2019-05-16 PROCEDURE — 36415 COLL VENOUS BLD VENIPUNCTURE: CPT

## 2019-05-16 PROCEDURE — 99284 EMERGENCY DEPT VISIT MOD MDM: CPT

## 2019-05-16 PROCEDURE — 6370000000 HC RX 637 (ALT 250 FOR IP): Performed by: STUDENT IN AN ORGANIZED HEALTH CARE EDUCATION/TRAINING PROGRAM

## 2019-05-16 PROCEDURE — 80053 COMPREHEN METABOLIC PANEL: CPT

## 2019-05-16 PROCEDURE — 81001 URINALYSIS AUTO W/SCOPE: CPT

## 2019-05-16 PROCEDURE — 83735 ASSAY OF MAGNESIUM: CPT

## 2019-05-16 PROCEDURE — 84100 ASSAY OF PHOSPHORUS: CPT

## 2019-05-16 RX ORDER — IBUPROFEN 800 MG/1
800 TABLET ORAL ONCE
Status: COMPLETED | OUTPATIENT
Start: 2019-05-16 | End: 2019-05-16

## 2019-05-16 RX ORDER — ACETAMINOPHEN 500 MG
1000 TABLET ORAL ONCE
Status: COMPLETED | OUTPATIENT
Start: 2019-05-16 | End: 2019-05-16

## 2019-05-16 RX ORDER — HYDROXYZINE HYDROCHLORIDE 25 MG/1
25 TABLET, FILM COATED ORAL ONCE
Status: COMPLETED | OUTPATIENT
Start: 2019-05-16 | End: 2019-05-16

## 2019-05-16 RX ORDER — HYDROXYZINE HYDROCHLORIDE 25 MG/1
25 TABLET, FILM COATED ORAL EVERY 6 HOURS PRN
Qty: 20 TABLET | Refills: 0 | Status: SHIPPED | OUTPATIENT
Start: 2019-05-16 | End: 2019-05-26

## 2019-05-16 RX ADMIN — IBUPROFEN 800 MG: 800 TABLET ORAL at 20:03

## 2019-05-16 RX ADMIN — ACETAMINOPHEN 1000 MG: 500 TABLET, FILM COATED ORAL at 20:03

## 2019-05-16 RX ADMIN — HYDROXYZINE HYDROCHLORIDE 25 MG: 25 TABLET, FILM COATED ORAL at 20:03

## 2019-05-16 ASSESSMENT — PAIN DESCRIPTION - LOCATION
LOCATION: ARM;LEG
LOCATION: GENERALIZED

## 2019-05-16 ASSESSMENT — PAIN SCALES - GENERAL
PAINLEVEL_OUTOF10: 7
PAINLEVEL_OUTOF10: 5
PAINLEVEL_OUTOF10: 4

## 2019-05-16 ASSESSMENT — PAIN DESCRIPTION - DESCRIPTORS
DESCRIPTORS: BURNING
DESCRIPTORS: ACHING

## 2019-05-16 ASSESSMENT — PAIN DESCRIPTION - PAIN TYPE
TYPE: CHRONIC PAIN
TYPE: ACUTE PAIN

## 2019-05-16 ASSESSMENT — PAIN DESCRIPTION - FREQUENCY: FREQUENCY: CONTINUOUS

## 2019-05-16 ASSESSMENT — PAIN DESCRIPTION - ORIENTATION: ORIENTATION: LEFT;RIGHT

## 2019-05-16 ASSESSMENT — PAIN DESCRIPTION - ONSET: ONSET: ON-GOING

## 2019-05-16 NOTE — ED PROVIDER NOTES
file    Number of children: Not on file    Years of education: Not on file    Highest education level: Not on file   Occupational History    Not on file   Social Needs    Financial resource strain: Not on file    Food insecurity:     Worry: Not on file     Inability: Not on file    Transportation needs:     Medical: Not on file     Non-medical: Not on file   Tobacco Use    Smoking status: Former Smoker     Packs/day: 0.25    Smokeless tobacco: Never Used   Substance and Sexual Activity    Alcohol use: No     Alcohol/week: 0.0 oz     Comment: social     Drug use: No    Sexual activity: Yes     Partners: Male     Comment: IUD   Lifestyle    Physical activity:     Days per week: Not on file     Minutes per session: Not on file    Stress: Not on file   Relationships    Social connections:     Talks on phone: Not on file     Gets together: Not on file     Attends Zoroastrian service: Not on file     Active member of club or organization: Not on file     Attends meetings of clubs or organizations: Not on file     Relationship status: Not on file    Intimate partner violence:     Fear of current or ex partner: Not on file     Emotionally abused: Not on file     Physically abused: Not on file     Forced sexual activity: Not on file   Other Topics Concern    Not on file   Social History Narrative    ** Merged History Encounter **            Family History   Problem Relation Age of Onset    Diabetes Paternal Grandfather     Diabetes Paternal Grandmother     Uterine Cancer Paternal Grandmother     Cervical Cancer Paternal Grandmother     Cancer Maternal Grandmother     Breast Cancer Maternal Grandmother        Allergies:  Codeine; Fentanyl; and Penicillins    Home Medications:  Prior to Admission medications    Medication Sig Start Date End Date Taking?  Authorizing Provider   hydrOXYzine (ATARAX) 25 MG tablet Take 1 tablet by mouth every 6 hours as needed for Itching 5/16/19 5/26/19 Yes Denia Fox MD NUVARING 0.12-0.015 MG/24HR vaginal ring INSERT ONE RING VAGINALLY FOR 21 DAYS, THEN REMOVE FOR 7 DAYS 11/6/18  Yes EVETTE Duckworth CNM       REVIEW OF SYSTEMS    (2-9 systems for level 4, 10 or more for level 5)      Review of Systems   Constitutional: Positive for activity change, chills, fatigue and unexpected weight change. Negative for fever. HENT: Negative for congestion and sore throat. Eyes: Negative for visual disturbance. Respiratory: Negative for cough and shortness of breath. Cardiovascular: Negative for chest pain. Gastrointestinal: Negative for abdominal pain, diarrhea, nausea and vomiting. Genitourinary: Negative for dysuria, flank pain and hematuria. Musculoskeletal: Negative for arthralgias, gait problem, neck pain and neck stiffness. Skin: Negative for rash and wound. Neurological: Positive for weakness (generalized). Negative for syncope, light-headedness, numbness and headaches. PHYSICAL EXAM   (up to 7 for level 4, 8 or more for level 5)      INITIAL VITALS:   BP 96/63   Pulse 66   Temp 98.3 °F (36.8 °C)   Resp 14   Ht 5' 6.5\" (1.689 m)   Wt 129 lb (58.5 kg)   LMP 05/02/2019   SpO2 99%   BMI 20.51 kg/m²     Physical Exam   Gen. Appearance: patient appears well, nondistressed. HEENT: head atraumatic, normocephalic. Pupils equal and reactive to light. Oropharynx clear and moist.  Neck: Supple, normal range of motion. No lymphadenopathy. Pulmonary: Lungs clear to auscultation bilaterally. Equal air entry right left. Cardiovascular:  Heart sounds normal, no murmurs. Peripheral pulses strong, regular, equal.   Abdomen: Soft, nontender, nondistended. Bowel sounds normal. No palpable masses. Neurology: GCS 15. Oriented to person place and time. Normal tone and power in all 4 extremities. No sensory deficits.     Skin: Warm, dry, well perfused      DIFFERENTIAL  DIAGNOSIS     PLAN (LABS / IMAGING / EKG):  Orders Placed This Encounter Procedures    CBC Auto Differential    Comprehensive Metabolic Panel    TSH w/reflex to FT4    Magnesium    Phosphorus    Urinalysis with Microscopic    HCG Qualitative, Serum       MEDICATIONS ORDERED:  Orders Placed This Encounter   Medications    hydrOXYzine (ATARAX) tablet 25 mg    acetaminophen (TYLENOL) tablet 1,000 mg    ibuprofen (ADVIL;MOTRIN) tablet 800 mg    hydrOXYzine (ATARAX) 25 MG tablet     Sig: Take 1 tablet by mouth every 6 hours as needed for Itching     Dispense:  20 tablet     Refill:  0       DDX: Thyroid abnormality, I'll actually disturbance, glucose derangement, autoimmune disorder, fibromyalgia, malignancy    DIAGNOSTIC RESULTS / EMERGENCY DEPARTMENT COURSE / MDM     LABS:  Results for orders placed or performed during the hospital encounter of 05/16/19   CBC Auto Differential   Result Value Ref Range    WBC 7.1 3.5 - 11.0 k/uL    RBC 4.64 4.0 - 5.2 m/uL    Hemoglobin 13.9 12.0 - 16.0 g/dL    Hematocrit 42.4 36 - 46 %    MCV 91.3 80 - 100 fL    MCH 30.0 26 - 34 pg    MCHC 32.8 31 - 37 g/dL    RDW 13.6 11.5 - 14.9 %    Platelets 454 800 - 758 k/uL    MPV 9.1 6.0 - 12.0 fL    NRBC Automated NOT REPORTED per 100 WBC    Differential Type NOT REPORTED     Immature Granulocytes NOT REPORTED 0 %    Absolute Immature Granulocyte NOT REPORTED 0.00 - 0.30 k/uL    WBC Morphology NOT REPORTED     RBC Morphology NOT REPORTED     Platelet Estimate NOT REPORTED     Seg Neutrophils 47 36 - 66 %    Lymphocytes 38 24 - 44 %    Atypical Lymphocytes 7 %    Monocytes 6 1 - 7 %    Eosinophils % 1 0 - 4 %    Basophils 1 0 - 2 %    Segs Absolute 3.33 1.3 - 9.1 k/uL    Absolute Lymph # 2.70 1.0 - 4.8 k/uL    Atypical Lymphocytes Absolute 0.50 k/uL    Absolute Mono # 0.43 0.1 - 1.3 k/uL    Absolute Eos # 0.07 0.0 - 0.4 k/uL    Basophils # 0.07 0.0 - 0.2 k/uL    Morphology 1+ TEARDROPS     Morphology LARGE PLATELETS PRESENT    Comprehensive Metabolic Panel   Result Value Ref Range    Glucose 89 70 - 99 mg/dL    BUN 15 6 - 20 mg/dL    CREATININE 0.85 0.50 - 0.90 mg/dL    Bun/Cre Ratio NOT REPORTED 9 - 20    Calcium 8.9 8.6 - 10.4 mg/dL    Sodium 142 135 - 144 mmol/L    Potassium 3.8 3.7 - 5.3 mmol/L    Chloride 105 98 - 107 mmol/L    CO2 22 20 - 31 mmol/L    Anion Gap 15 9 - 17 mmol/L    Alkaline Phosphatase 39 35 - 104 U/L    ALT 12 5 - 33 U/L    AST 11 <32 U/L    Total Bilirubin 0.35 0.3 - 1.2 mg/dL    Total Protein 7.7 6.4 - 8.3 g/dL    Alb 4.5 3.5 - 5.2 g/dL    Albumin/Globulin Ratio NOT REPORTED 1.0 - 2.5    GFR Non-African American >60 >60 mL/min    GFR African American >60 >60 mL/min    GFR Comment          GFR Staging NOT REPORTED    TSH w/reflex to FT4   Result Value Ref Range    TSH 1.42 0.30 - 5.00 mIU/L   Magnesium   Result Value Ref Range    Magnesium 2.5 1.6 - 2.6 mg/dL   Phosphorus   Result Value Ref Range    Phosphorus 4.1 2.6 - 4.5 mg/dL   Urinalysis with Microscopic   Result Value Ref Range    Color, UA YELLOW YELLOW    Turbidity UA CLEAR CLEAR    Glucose, Ur NEGATIVE NEGATIVE    Bilirubin Urine NEGATIVE NEGATIVE    Ketones, Urine NEGATIVE NEGATIVE    Specific [place], UA 1.015 1.000 - 1.030    Urine Hgb NEGATIVE NEGATIVE    pH, UA 7.0 5.0 - 8.0    Protein, UA NEGATIVE NEGATIVE    Urobilinogen, Urine Normal Normal    Nitrite, Urine NEGATIVE NEGATIVE    Leukocyte Esterase, Urine NEGATIVE NEGATIVE    Urinalysis Comments NOT REPORTED     -          WBC, UA 0 TO 2 /HPF    RBC, UA 0 TO 2 /HPF    Casts UA NOT REPORTED /LPF    Crystals UA NOT REPORTED None /HPF    Epithelial Cells UA 5 TO 10 /HPF    Renal Epithelial, Urine NOT REPORTED 0 /HPF    Bacteria, UA MODERATE (A) None    Mucus, UA NOT REPORTED None    Trichomonas, UA NOT REPORTED None    Amorphous, UA NOT REPORTED None    Other Observations UA NOT REPORTED NOT REQ.     Yeast, UA NOT REPORTED None   HCG Qualitative, Serum   Result Value Ref Range    hCG Qual NEGATIVE NEGATIVE       IMPRESSION: 43year old patient presents with weight loss, alopecia, fatigue, generalized weakness, cold intolerance, and neuropathic type pain. Patient is afebrile, hemodynamically stable, and in no acute distress. Appropriately interactive and cooperative with interview and examination. Normal respiratory effort, lungs clear bilaterally, heart sounds normal, abdomen benign, thorough neurological examination unremarkable. No rash on examination. Patient does not appear dehydrated clinically. No intraoral abnormality. We'll obtain basic blood work and TSH as well as urinalysis and obtain PCP appointment for patient. RADIOLOGY:  None    EKG  None    All EKG's are interpreted by the Emergency Department Physician who either signs or Co-signs this chart in the absence of a cardiologist.    EMERGENCY DEPARTMENT COURSE:  Laboratory investigations unremarkable. Results discussed with patient. Case management has obtained an appointment with a primary care provider for the patient and she does agree to follow-up. Patient counseled to return to the Emergency Department for any worsening symptoms, pain, shortness breath, excessive vomiting/inability to tolerate fluids within a few hours, or for any other cares or concerns. Patient verbalized understanding and agreement with plan. Discharged to home in stable condition and in no distress. PROCEDURES:  None    CONSULTS:  None    CRITICAL CARE:  None    FINAL IMPRESSION      1. Other fatigue    2. General weakness    3. Weight loss    4. Alopecia    5. Neuropathic pain          DISPOSITION / PLAN     DISPOSITION Decision To Discharge 05/16/2019 09:26:16 PM      PATIENT REFERRED TO:  Dennise Lundborg, MD   [address] [place] [address]  [phone]    On 5/29/2019  For post hospital follw-up appointment. Time: 3:30 PM    [facility]  [facility] [address] [place] [id number]  [phone]    As needed      DISCHARGE MEDICATIONS:  Discharge Medication List as of 5/16/2019  9:28 PM      START taking these medications    Details   hydrOXYzine (ATARAX) 25 MG tablet Take 1 tablet by mouth every 6 hours as needed for Itching, Disp-20 tablet, R-0Print             Miguel Pollack MD  Emergency Medicine Resident    (Please note that portions of thisnote were completed with a voice recognition program.  Efforts were made to edit the dictations but occasionally words are mis-transcribed.)        Miguel Pollack MD  05/17/19 0023

## 2019-05-17 ASSESSMENT — ENCOUNTER SYMPTOMS
VOMITING: 0
COUGH: 0
SHORTNESS OF BREATH: 0
SORE THROAT: 0
DIARRHEA: 0
NAUSEA: 0
ABDOMINAL PAIN: 0

## 2019-05-17 NOTE — ED PROVIDER NOTES
[address] ED  eMERGENCY dEPARTMENT eNCOUnter   Attending Attestation     Pt Name: Mahsa Wilson  MRN: [id number]  [place] 1980  Date of evaluation: 5/16/19       Mahsa Wilson is a 43 y.o. female who presents with Fatigue (for past 4-5 months); Alopecia; and Weight Loss      History:   Patient is here is complaining of multiple months worth of increasing fatigue hair loss weight loss and just feeling chilled. Patient denies any chest pain or shortness of breath. Patient states that her entire body skin just feels like it's on fire coming from her feet all the way to her head. Patient tried to get into a family doctor but they said it was given the months she's just couldn't take that anymore. Exam: Vitals:   Vitals:    05/16/19 1538 05/16/19 1832   BP: 116/69 110/71   Pulse: 78 66   Resp: 12 14   Temp: 98.5 °F (36.9 °C) 98.3 °F (36.8 °C)   SpO2: 100% 100%   Weight: 129 lb (58.5 kg)    Height: 5' 6.5\" (1.689 m)      Heart regular rate rhythm no murmurs. Lungs clear to auscultation bilaterally. Patient has no edema. I performed a history and physical examination of the patient and discussed management with the resident. I reviewed the residents note and agree with the documented findings and plan of care. Any areas of disagreement are noted on the chart. I was personally present for the key portions of any procedures. I have documented in the chart those procedures where I was not present during the key portions. I have personally reviewed all images and agree with the resident's interpretation. I have reviewed the emergency nurses triage note. I agree with the chief complaint, past medical history, past surgical history, allergies, medications, social and family history as documented unless otherwise noted below. Documentation of the HPI, Physical Exam and Medical Decision Making performed by medical students or scribes is based on my personal performance of the HPI, PE and MDM.  I personally evaluated and examined the patient in conjunction with the APC and agree with the assessment, treatment plan, and disposition of the patient as recorded by the APC. Additional findings are as noted.     Pedro Carrillo MD  Attending Emergency  Physician              Brie Campbell MD  05/16/19 2011 unknown

## 2019-05-29 ENCOUNTER — HOSPITAL ENCOUNTER (OUTPATIENT)
Age: 39
Discharge: HOME | End: 2019-05-29
Payer: MEDICARE

## 2019-05-29 ENCOUNTER — OFFICE VISIT (OUTPATIENT)
Dept: INTERNAL MEDICINE CLINIC | Age: 39
End: 2019-05-29
Payer: MEDICARE

## 2019-05-29 VITALS
WEIGHT: 131 LBS | HEIGHT: 66 IN | BODY MASS INDEX: 21.05 KG/M2 | HEART RATE: 72 BPM | OXYGEN SATURATION: 99 % | SYSTOLIC BLOOD PRESSURE: 98 MMHG | DIASTOLIC BLOOD PRESSURE: 70 MMHG

## 2019-05-29 DIAGNOSIS — F32.A DEPRESSION, UNSPECIFIED DEPRESSION TYPE: ICD-10-CM

## 2019-05-29 DIAGNOSIS — R20.8 BURNING SENSATION: Primary | ICD-10-CM

## 2019-05-29 DIAGNOSIS — D22.9 CHANGE IN MOLE: ICD-10-CM

## 2019-05-29 DIAGNOSIS — R20.8 BURNING SENSATION: ICD-10-CM

## 2019-05-29 LAB
FOLATE: 12 NG/ML
VITAMIN B-12: 248 PG/ML (ref 232–1245)
VITAMIN D 25-HYDROXY: 29.7 NG/ML (ref 30–100)

## 2019-05-29 PROCEDURE — 36415 COLL VENOUS BLD VENIPUNCTURE: CPT

## 2019-05-29 PROCEDURE — G8427 DOCREV CUR MEDS BY ELIG CLIN: HCPCS | Performed by: INTERNAL MEDICINE

## 2019-05-29 PROCEDURE — G8420 CALC BMI NORM PARAMETERS: HCPCS | Performed by: INTERNAL MEDICINE

## 2019-05-29 PROCEDURE — 82607 VITAMIN B-12: CPT

## 2019-05-29 PROCEDURE — 82746 ASSAY OF FOLIC ACID SERUM: CPT

## 2019-05-29 PROCEDURE — 1036F TOBACCO NON-USER: CPT | Performed by: INTERNAL MEDICINE

## 2019-05-29 PROCEDURE — 99214 OFFICE O/P EST MOD 30 MIN: CPT | Performed by: INTERNAL MEDICINE

## 2019-05-29 PROCEDURE — 82306 VITAMIN D 25 HYDROXY: CPT

## 2019-05-29 RX ORDER — MIRTAZAPINE 15 MG/1
15 TABLET, FILM COATED ORAL NIGHTLY
Qty: 30 TABLET | Refills: 3 | Status: SHIPPED | OUTPATIENT
Start: 2019-05-29 | End: 2019-06-17 | Stop reason: ALTCHOICE

## 2019-05-29 ASSESSMENT — ENCOUNTER SYMPTOMS
SHORTNESS OF BREATH: 0
EYE DISCHARGE: 0
EYE ITCHING: 0
APNEA: 0
BLOOD IN STOOL: 0
CHEST TIGHTNESS: 0
BACK PAIN: 0
COUGH: 0
COLOR CHANGE: 0
EYE REDNESS: 0
EYE PAIN: 0
ABDOMINAL DISTENTION: 0
CHOKING: 0
DIARRHEA: 0
ABDOMINAL PAIN: 0
CONSTIPATION: 0

## 2019-05-29 NOTE — PROGRESS NOTES
Subjective:      Chief Complaint   Patient presents with    Follow-Up from Hospital     F/u from [facility] Fatigue, skin tingling and burning        Patient ID: Brendon Aguilar is a 43 y.o. female. Visit Information    Have you changed or started any medications since your last visit including any over-the-counter medicines, vitamins, or herbal medicines? no   Are you having any side effects from any of your medications? -  no  Have you stopped taking any of your medications? Is so, why? -  no    Have you seen any other physician or provider since your last visit? No  Have you had any other diagnostic tests since your last visit? No  Have you been seen in the emergency room and/or had an admission to a hospital since we last saw you? Yes - Records Obtained  Have you had your routine dental cleaning in the past 6 months? no    Have you activated your [employer] account? If not, what are your barriers? No:      Patient Care Team:  Christie Tena MD as PCP - General (Internal Medicine)  Yoni Cali RN as Nurse Navigator (Oncology)    Medical History Review  Past Medical, Family, and Social History reviewed and does not contribute to the patient presenting condition    Health Maintenance   Topic Date Due    DTaP/Tdap/Td vaccine (1 - Tdap) 06/26/1998    Flu vaccine (Season Ended) 09/01/2019    Cervical cancer screen  07/12/2022    HIV screen  Completed    Pneumococcal 0-64 years Vaccine  Aged Out     HPI- Patient is here for establishing care .        HPI   1) Location/Symptom - burning sensation in all over skin   2) Timing/Onset: 3 months , worsening   3) Context/Setting: started with tingling in feet   4) Quality: Burning in nature   5) Duration: continuous   6) Modifying Factors: Touching makes it worse , Putting ice under feet gets it better   7) Severity: moderate   Noticed that her hairs is falling down , feels that she has lots of stress in her life   Works 70/hr a week , supporting 6 children and , she has poor appetite   Patient has concern that she might has lost sone weight    Pernicious Anemia Runs in her family in [place] mother     Review of Systems   Constitutional: Negative for activity change, appetite change, chills, diaphoresis, fatigue and fever. HENT: Negative for congestion, dental problem, drooling and ear discharge. Eyes: Negative for pain, discharge, redness and itching. Respiratory: Negative for apnea, cough, choking, chest tightness and shortness of breath. Cardiovascular: Negative for chest pain and leg swelling. Gastrointestinal: Negative for abdominal distention, abdominal pain, blood in stool, constipation and diarrhea. Endocrine: Negative for cold intolerance and heat intolerance. Genitourinary: Negative for difficulty urinating, dysuria, enuresis, flank pain and frequency. Musculoskeletal: Negative for arthralgias, back pain, gait problem and joint swelling. Skin: Negative for color change, pallor and rash. Mole on Back, Right side    Neurological: Negative for dizziness, facial asymmetry, light-headedness, numbness and headaches. Burning sensation in all over the body    Psychiatric/Behavioral: Negative for agitation, behavioral problems, confusion, decreased concentration and dysphoric mood. The patient is nervous/anxious. Objective:   Physical Exam   Constitutional: She is oriented to person, place, and time. She appears well-developed. Thin built person    HENT:   Head: Normocephalic and atraumatic. Mouth/Throat: No oropharyngeal exudate. Eyes: Pupils are equal, round, and reactive to light. Conjunctivae are normal. Right eye exhibits no discharge. Left eye exhibits no discharge. No scleral icterus. Neck: Normal range of motion. Neck supple. No JVD present. No tracheal deviation present. No thyromegaly present. Cardiovascular: Normal rate and normal heart sounds. Exam reveals no gallop. No murmur heard.   Pulmonary/Chest: Effort normal and breath sounds normal. No stridor. No respiratory distress. She has no wheezes. She has no rales. She exhibits no tenderness. Abdominal: Soft. Bowel sounds are normal. She exhibits no distension. There is no tenderness. There is no rebound and no guarding. Musculoskeletal: Normal range of motion. She exhibits no edema. Neurological: She is alert and oriented to person, place, and time. Skin: She is not diaphoretic. Mole around 1.5 cm x1.5 cm in back, on Right side       Assessment / Plan:   1. Burning sensation  - Vitamin B12 & Folate; Future  - Vitamin D 25 Hydroxy; Future    2. Depression, unspecified depression type    - mirtazapine (REMERON) 15 MG tablet; Take 1 tablet by mouth nightly  Dispense: 30 tablet; Refill: 3    3. Change in mole  - Beatriz Traylor MD, Dermatology, [place]      Return in about 1 month (around 6/26/2019). · Reviewed prior labs and health maintenance. · Discussed use, benefit, and side effects of prescribed medications. Barriers to medication compliance addressed. All patient questions answered. Pt voiced understanding. MD BENNIE Ho [facility]  5/29/2019, 3:42 PM    Please note that this chart was generated using voice recognition Dragon dictation software. Although every effort was made to ensure the accuracy of this automated transcription, some errors in transcription may have occurred.

## 2019-05-30 ENCOUNTER — TELEPHONE (OUTPATIENT)
Dept: INTERNAL MEDICINE CLINIC | Age: 39
End: 2019-05-30

## 2019-06-03 ENCOUNTER — NURSE ONLY (OUTPATIENT)
Dept: INTERNAL MEDICINE CLINIC | Age: 39
End: 2019-06-03
Payer: MEDICARE

## 2019-06-03 DIAGNOSIS — E53.8 B12 DEFICIENCY: Primary | ICD-10-CM

## 2019-06-03 PROCEDURE — 96372 THER/PROPH/DIAG INJ SC/IM: CPT | Performed by: INTERNAL MEDICINE

## 2019-06-03 RX ORDER — ETONOGESTREL/ETHINYL ESTRADIOL .12-.015MG
RING, VAGINAL VAGINAL
Refills: 4 | OUTPATIENT
Start: 2019-06-03

## 2019-06-03 RX ORDER — CYANOCOBALAMIN 1000 UG/ML
1000 INJECTION INTRAMUSCULAR; SUBCUTANEOUS ONCE
Status: COMPLETED | OUTPATIENT
Start: 2019-06-03 | End: 2019-06-03

## 2019-06-03 RX ADMIN — CYANOCOBALAMIN 1000 MCG: 1000 INJECTION INTRAMUSCULAR; SUBCUTANEOUS at 14:25

## 2019-06-10 ENCOUNTER — NURSE ONLY (OUTPATIENT)
Dept: INTERNAL MEDICINE CLINIC | Age: 39
End: 2019-06-10
Payer: MEDICARE

## 2019-06-10 DIAGNOSIS — E53.8 B12 DEFICIENCY: Primary | ICD-10-CM

## 2019-06-10 PROCEDURE — 96372 THER/PROPH/DIAG INJ SC/IM: CPT | Performed by: INTERNAL MEDICINE

## 2019-06-10 RX ORDER — CYANOCOBALAMIN 1000 UG/ML
1000 INJECTION INTRAMUSCULAR; SUBCUTANEOUS ONCE
Status: COMPLETED | OUTPATIENT
Start: 2019-06-10 | End: 2019-06-10

## 2019-06-10 RX ADMIN — CYANOCOBALAMIN 1000 MCG: 1000 INJECTION INTRAMUSCULAR; SUBCUTANEOUS at 12:36

## 2019-06-10 NOTE — PROGRESS NOTES
After obtaining consent, and per orders of Dr. Raffi Jensen, injection of B12 given in Left deltoid by Melani Pryor. Patient instructed to report any adverse reaction to me immediately.

## 2019-06-17 ENCOUNTER — OFFICE VISIT (OUTPATIENT)
Dept: INTERNAL MEDICINE CLINIC | Age: 39
End: 2019-06-17
Payer: MEDICARE

## 2019-06-17 ENCOUNTER — TELEPHONE (OUTPATIENT)
Dept: INTERNAL MEDICINE CLINIC | Age: 39
End: 2019-06-17

## 2019-06-17 VITALS
OXYGEN SATURATION: 98 % | SYSTOLIC BLOOD PRESSURE: 98 MMHG | WEIGHT: 127 LBS | BODY MASS INDEX: 20.41 KG/M2 | HEIGHT: 66 IN | DIASTOLIC BLOOD PRESSURE: 78 MMHG | HEART RATE: 97 BPM

## 2019-06-17 DIAGNOSIS — E55.9 VITAMIN D DEFICIENCY: ICD-10-CM

## 2019-06-17 DIAGNOSIS — F32.A DEPRESSION, UNSPECIFIED DEPRESSION TYPE: ICD-10-CM

## 2019-06-17 DIAGNOSIS — E53.8 B12 DEFICIENCY: Primary | ICD-10-CM

## 2019-06-17 PROCEDURE — G8420 CALC BMI NORM PARAMETERS: HCPCS | Performed by: INTERNAL MEDICINE

## 2019-06-17 PROCEDURE — 99214 OFFICE O/P EST MOD 30 MIN: CPT | Performed by: INTERNAL MEDICINE

## 2019-06-17 PROCEDURE — G8427 DOCREV CUR MEDS BY ELIG CLIN: HCPCS | Performed by: INTERNAL MEDICINE

## 2019-06-17 PROCEDURE — 1036F TOBACCO NON-USER: CPT | Performed by: INTERNAL MEDICINE

## 2019-06-17 RX ORDER — ETONOGESTREL/ETHINYL ESTRADIOL .12-.015MG
RING, VAGINAL VAGINAL
Qty: 1 EACH | Refills: 0 | Status: SHIPPED | OUTPATIENT
Start: 2019-06-17 | End: 2020-02-19

## 2019-06-17 RX ORDER — DULOXETIN HYDROCHLORIDE 30 MG/1
30 CAPSULE, DELAYED RELEASE ORAL DAILY
Qty: 30 CAPSULE | Refills: 3 | Status: SHIPPED | OUTPATIENT
Start: 2019-06-17 | End: 2020-02-19 | Stop reason: SDUPTHER

## 2019-06-17 NOTE — PROGRESS NOTES
Subjective:      Chief Complaint   Patient presents with    Tingling     Pt states her burning and tingling has got worst, pt has been getting B12 injection and she states it making the burning worst, pt had to leave work do to the pain     Medication Problem     Pt stop taking REMERON, she states the medication made her have bad moods swings        Patient ID: Jean Dsouza is a 43 y.o. female. Visit Information    Have you changed or started any medications since your last visit including any over-the-counter medicines, vitamins, or herbal medicines? no   Are you having any side effects from any of your medications? -  no  Have you stopped taking any of your medications? Is so, why? -  no    Have you seen any other physician or provider since your last visit? No  Have you had any other diagnostic tests since your last visit? No  Have you been seen in the emergency room and/or had an admission to a hospital since we last saw you? No  Have you had your routine dental cleaning in the past 6 months? no    Have you activated your [employer] account? If not, what are your barriers? No:      Patient Care Team:  Brooke Castelan MD as PCP - General (Internal Medicine)  Brooke Castelan MD as PCP - [facility] Empaneled Provider  Guillaume Culver RN as Nurse Navigator (Oncology)    Medical History Review  Past Medical, Family, and Social History reviewed and does not contribute to the patient presenting condition    Health Maintenance   Topic Date Due    DTaP/Tdap/Td vaccine (1 - Tdap) 06/26/1998    Flu vaccine (Season Ended) 09/01/2019    Cervical cancer screen  07/12/2022    HIV screen  Completed    Pneumococcal 0-64 years Vaccine  Aged Out     HPI-patient is here for evaluation of multiple medical problems. She has generalized body ache, symptoms are going on for long period of time. Pain get worse when she is touched anywhere in the body. During previous visit she was tested for vitamin B12.   Her vitamin B12 was borderline low, started on vitamin B12 replacement. She mentioned that her symptoms are not getting any better  She has depression. Was started on Remeron last visit. She mentioned that she has severe side effects including weird dreams with Remeron, she is stopped taking Remeron by herself. Review of Systems   Constitutional: Positive for fatigue. Negative for activity change, appetite change, chills, diaphoresis and fever. HENT: Negative for congestion, dental problem, drooling and ear discharge. Eyes: Negative for pain, discharge, redness and itching. Respiratory: Negative for apnea, cough, choking, chest tightness and shortness of breath. Cardiovascular: Negative for chest pain and leg swelling. Gastrointestinal: Negative for abdominal distention, abdominal pain, blood in stool, constipation and diarrhea. Endocrine: Negative for cold intolerance and heat intolerance. Genitourinary: Negative for difficulty urinating, dysuria, enuresis, flank pain and frequency. Musculoskeletal: Positive for arthralgias (All joints) and myalgias. Negative for back pain, gait problem and joint swelling. Skin: Negative for color change, pallor and rash. Neurological: Negative for dizziness, facial asymmetry, light-headedness, numbness and headaches. Psychiatric/Behavioral: Positive for dysphoric mood and sleep disturbance. Negative for agitation, behavioral problems, confusion and decreased concentration. Objective:   Physical Exam   Constitutional: She is oriented to person, place, and time. She appears well-developed. Thin built person   HENT:   Head: Normocephalic and atraumatic. Mouth/Throat: No oropharyngeal exudate. Eyes: Pupils are equal, round, and reactive to light. Conjunctivae are normal. Right eye exhibits no discharge. Left eye exhibits no discharge. No scleral icterus. Neck: Normal range of motion. Neck supple. No JVD present. No tracheal deviation present. No thyromegaly present. Cardiovascular: Normal rate and normal heart sounds. Exam reveals no gallop. No murmur heard. Pulmonary/Chest: Effort normal and breath sounds normal. No stridor. No respiratory distress. She has no wheezes. She has no rales. She exhibits no tenderness. Abdominal: Soft. Bowel sounds are normal. She exhibits no distension. There is no tenderness. There is no rebound and no guarding. Musculoskeletal: Normal range of motion. She exhibits no edema. Neurological: She is alert and oriented to person, place, and time. Skin: She is not diaphoretic. Assessment / Plan:   1. B12 deficiency  Has borderline low vitamin B12  - cyanocobalamin injection 1,000 mcg    2. Vitamin D deficiency    - vitamin D (CHOLECALCIFEROL) 1000 UNIT TABS tablet; Take 1 tablet by mouth daily  Dispense: 30 tablet; Refill: 3    3. Depression, unspecified depression type  DC Remeron  - DULoxetine (CYMBALTA) 30 MG extended release capsule; Take 1 capsule by mouth daily  Dispense: 30 capsule; Refill: 3  I suspect patient may have element of fibromyalgia, will stop Remeron and start Cymbalta    · Return in about 1 month (around 7/15/2019). · Reviewed prior labs and health maintenance. · Discussed use, benefit, and side effects of prescribed medications. Barriers to medication compliance addressed. All patient questions answered. Pt voiced understanding. MD [facility]  6/18/2019, 12:50 PM    Please note that this chart was generated using voice recognition Dragon dictation software. Although every effort was made to ensure the accuracy of this automated transcription, some errors in transcription may have occurred.

## 2019-06-17 NOTE — TELEPHONE ENCOUNTER
Pt called & stated that Dr. Roxanna Veronica ordered wkly B12 injections. Pt states that the wkly injections do not seem to be helping. Pt stated she has had 2 injections & if she comes in today it would be her 3 rd injection. Pt wants to know if she just needs to give the injections more time if her B12 levels may have been extremely low. Pt had to leave work d/t the pain. Please advise.

## 2019-06-18 PROCEDURE — 96372 THER/PROPH/DIAG INJ SC/IM: CPT | Performed by: INTERNAL MEDICINE

## 2019-06-18 RX ORDER — CYANOCOBALAMIN 1000 UG/ML
1000 INJECTION INTRAMUSCULAR; SUBCUTANEOUS ONCE
Status: COMPLETED | OUTPATIENT
Start: 2019-06-18 | End: 2019-06-18

## 2019-06-18 RX ADMIN — CYANOCOBALAMIN 1000 MCG: 1000 INJECTION INTRAMUSCULAR; SUBCUTANEOUS at 15:06

## 2019-06-18 ASSESSMENT — ENCOUNTER SYMPTOMS
CHOKING: 0
DIARRHEA: 0
CHEST TIGHTNESS: 0
APNEA: 0
EYE REDNESS: 0
ABDOMINAL DISTENTION: 0
COUGH: 0
BLOOD IN STOOL: 0
ABDOMINAL PAIN: 0
BACK PAIN: 0
EYE PAIN: 0
SHORTNESS OF BREATH: 0
CONSTIPATION: 0
EYE DISCHARGE: 0
COLOR CHANGE: 0
EYE ITCHING: 0

## 2019-08-16 ENCOUNTER — HOSPITAL ENCOUNTER (OUTPATIENT)
Dept: GENERAL RADIOLOGY | Age: 39
Discharge: HOME | End: 2019-08-18
Payer: COMMERCIAL

## 2019-08-16 ENCOUNTER — HOSPITAL ENCOUNTER (OUTPATIENT)
Age: 39
Discharge: HOME | End: 2019-08-16
Payer: COMMERCIAL

## 2019-08-16 DIAGNOSIS — R52 PAIN: ICD-10-CM

## 2019-08-16 PROCEDURE — 72072 X-RAY EXAM THORAC SPINE 3VWS: CPT

## 2019-08-16 PROCEDURE — 72100 X-RAY EXAM L-S SPINE 2/3 VWS: CPT

## 2019-08-20 ENCOUNTER — HOSPITAL ENCOUNTER (EMERGENCY)
Age: 39
Discharge: HOME | End: 2019-08-20
Attending: EMERGENCY MEDICINE
Payer: MEDICARE

## 2019-08-20 ENCOUNTER — APPOINTMENT (OUTPATIENT)
Dept: CT IMAGING | Age: 39
End: 2019-08-20
Payer: MEDICARE

## 2019-08-20 VITALS
OXYGEN SATURATION: 99 % | DIASTOLIC BLOOD PRESSURE: 65 MMHG | SYSTOLIC BLOOD PRESSURE: 103 MMHG | HEIGHT: 66 IN | RESPIRATION RATE: 16 BRPM | HEART RATE: 60 BPM | WEIGHT: 130 LBS | BODY MASS INDEX: 20.89 KG/M2

## 2019-08-20 DIAGNOSIS — R55 SYNCOPE AND COLLAPSE: ICD-10-CM

## 2019-08-20 DIAGNOSIS — E86.0 DEHYDRATION: Primary | ICD-10-CM

## 2019-08-20 LAB
-: ABNORMAL
ABSOLUTE EOS #: 0.1 K/UL (ref 0–0.4)
ABSOLUTE IMMATURE GRANULOCYTE: ABNORMAL K/UL (ref 0–0.3)
ABSOLUTE LYMPH #: 1.8 K/UL (ref 1–4.8)
ABSOLUTE MONO #: 0.5 K/UL (ref 0.1–1.3)
ALBUMIN SERPL-MCNC: 4.2 G/DL (ref 3.5–5.2)
ALBUMIN/GLOBULIN RATIO: ABNORMAL (ref 1–2.5)
ALP BLD-CCNC: 39 U/L (ref 35–104)
ALT SERPL-CCNC: 12 U/L (ref 5–33)
AMORPHOUS: ABNORMAL
ANION GAP SERPL CALCULATED.3IONS-SCNC: 8 MMOL/L (ref 9–17)
AST SERPL-CCNC: 15 U/L
BACTERIA: ABNORMAL
BASOPHILS # BLD: 1 % (ref 0–2)
BASOPHILS ABSOLUTE: 0.1 K/UL (ref 0–0.2)
BILIRUB SERPL-MCNC: 0.28 MG/DL (ref 0.3–1.2)
BILIRUBIN URINE: NEGATIVE
BUN BLDV-MCNC: 14 MG/DL (ref 6–20)
BUN/CREAT BLD: ABNORMAL (ref 9–20)
CALCIUM SERPL-MCNC: 9.2 MG/DL (ref 8.6–10.4)
CASTS UA: ABNORMAL /LPF
CHLORIDE BLD-SCNC: 109 MMOL/L (ref 98–107)
CO2: 26 MMOL/L (ref 20–31)
COLOR: YELLOW
COMMENT UA: ABNORMAL
CREAT SERPL-MCNC: 0.85 MG/DL (ref 0.5–0.9)
CRYSTALS, UA: ABNORMAL /HPF
D-DIMER QUANTITATIVE: 1.15 MG/L FEU (ref 0–0.59)
DIFFERENTIAL TYPE: ABNORMAL
EKG ATRIAL RATE: 65 BPM
EKG P AXIS: 75 DEGREES
EKG P-R INTERVAL: 128 MS
EKG Q-T INTERVAL: 396 MS
EKG QRS DURATION: 80 MS
EKG QTC CALCULATION (BAZETT): 411 MS
EKG R AXIS: 50 DEGREES
EKG T AXIS: 53 DEGREES
EKG VENTRICULAR RATE: 65 BPM
EOSINOPHILS RELATIVE PERCENT: 1 % (ref 0–4)
EPITHELIAL CELLS UA: ABNORMAL /HPF
GFR AFRICAN AMERICAN: >60 ML/MIN
GFR NON-AFRICAN AMERICAN: >60 ML/MIN
GFR SERPL CREATININE-BSD FRML MDRD: ABNORMAL ML/MIN/{1.73_M2}
GFR SERPL CREATININE-BSD FRML MDRD: ABNORMAL ML/MIN/{1.73_M2}
GLUCOSE BLD-MCNC: 100 MG/DL (ref 70–99)
GLUCOSE URINE: NEGATIVE
HCG QUALITATIVE: NEGATIVE
HCT VFR BLD CALC: 41.6 % (ref 36–46)
HEMOGLOBIN: 13.6 G/DL (ref 12–16)
IMMATURE GRANULOCYTES: ABNORMAL %
KETONES, URINE: NEGATIVE
LEUKOCYTE ESTERASE, URINE: NEGATIVE
LYMPHOCYTES # BLD: 27 % (ref 24–44)
MCH RBC QN AUTO: 29.8 PG (ref 26–34)
MCHC RBC AUTO-ENTMCNC: 32.7 G/DL (ref 31–37)
MCV RBC AUTO: 91.2 FL (ref 80–100)
MONOCYTES # BLD: 8 % (ref 1–7)
MUCUS: ABNORMAL
NITRITE, URINE: NEGATIVE
NRBC AUTOMATED: ABNORMAL PER 100 WBC
OTHER OBSERVATIONS UA: ABNORMAL
PDW BLD-RTO: 13.7 % (ref 11.5–14.9)
PH UA: 6 (ref 5–8)
PLATELET # BLD: 268 K/UL (ref 150–450)
PLATELET ESTIMATE: ABNORMAL
PMV BLD AUTO: 9.1 FL (ref 6–12)
POTASSIUM SERPL-SCNC: 5.1 MMOL/L (ref 3.7–5.3)
PROTEIN UA: NEGATIVE
RBC # BLD: 4.57 M/UL (ref 4–5.2)
RBC # BLD: ABNORMAL 10*6/UL
RBC UA: ABNORMAL /HPF
RENAL EPITHELIAL, UA: ABNORMAL /HPF
SEG NEUTROPHILS: 63 % (ref 36–66)
SEGMENTED NEUTROPHILS ABSOLUTE COUNT: 4.2 K/UL (ref 1.3–9.1)
SODIUM BLD-SCNC: 143 MMOL/L (ref 135–144)
SPECIFIC GRAVITY UA: 1.01 (ref 1–1.03)
TOTAL PROTEIN: 6.8 G/DL (ref 6.4–8.3)
TRICHOMONAS: ABNORMAL
TROPONIN INTERP: NORMAL
TROPONIN INTERP: NORMAL
TROPONIN T: NORMAL NG/ML
TROPONIN T: NORMAL NG/ML
TROPONIN, HIGH SENSITIVITY: <6 NG/L (ref 0–14)
TROPONIN, HIGH SENSITIVITY: <6 NG/L (ref 0–14)
TURBIDITY: CLEAR
URINE HGB: ABNORMAL
UROBILINOGEN, URINE: NORMAL
WBC # BLD: 6.6 K/UL (ref 3.5–11)
WBC # BLD: ABNORMAL 10*3/UL
WBC UA: ABNORMAL /HPF
YEAST: ABNORMAL

## 2019-08-20 PROCEDURE — 2580000003 HC RX 258: Performed by: EMERGENCY MEDICINE

## 2019-08-20 PROCEDURE — 71260 CT THORAX DX C+: CPT

## 2019-08-20 PROCEDURE — 85379 FIBRIN DEGRADATION QUANT: CPT

## 2019-08-20 PROCEDURE — 93005 ELECTROCARDIOGRAM TRACING: CPT | Performed by: EMERGENCY MEDICINE

## 2019-08-20 PROCEDURE — 81001 URINALYSIS AUTO W/SCOPE: CPT

## 2019-08-20 PROCEDURE — 96360 HYDRATION IV INFUSION INIT: CPT

## 2019-08-20 PROCEDURE — 85025 COMPLETE CBC W/AUTO DIFF WBC: CPT

## 2019-08-20 PROCEDURE — 70450 CT HEAD/BRAIN W/O DYE: CPT

## 2019-08-20 PROCEDURE — 99284 EMERGENCY DEPT VISIT MOD MDM: CPT

## 2019-08-20 PROCEDURE — 96361 HYDRATE IV INFUSION ADD-ON: CPT

## 2019-08-20 PROCEDURE — 80053 COMPREHEN METABOLIC PANEL: CPT

## 2019-08-20 PROCEDURE — 93010 ELECTROCARDIOGRAM REPORT: CPT | Performed by: INTERNAL MEDICINE

## 2019-08-20 PROCEDURE — 84703 CHORIONIC GONADOTROPIN ASSAY: CPT

## 2019-08-20 PROCEDURE — 36415 COLL VENOUS BLD VENIPUNCTURE: CPT

## 2019-08-20 PROCEDURE — 84484 ASSAY OF TROPONIN QUANT: CPT

## 2019-08-20 PROCEDURE — 6360000004 HC RX CONTRAST MEDICATION: Performed by: EMERGENCY MEDICINE

## 2019-08-20 RX ORDER — 0.9 % SODIUM CHLORIDE 0.9 %
1000 INTRAVENOUS SOLUTION INTRAVENOUS ONCE
Status: COMPLETED | OUTPATIENT
Start: 2019-08-20 | End: 2019-08-20

## 2019-08-20 RX ORDER — SODIUM CHLORIDE 0.9 % (FLUSH) 0.9 %
10 SYRINGE (ML) INJECTION PRN
Status: DISCONTINUED | OUTPATIENT
Start: 2019-08-20 | End: 2019-08-20 | Stop reason: HOSPADM

## 2019-08-20 RX ORDER — 0.9 % SODIUM CHLORIDE 0.9 %
80 INTRAVENOUS SOLUTION INTRAVENOUS ONCE
Status: COMPLETED | OUTPATIENT
Start: 2019-08-20 | End: 2019-08-20

## 2019-08-20 RX ADMIN — SODIUM CHLORIDE 1000 ML: 9 INJECTION, SOLUTION INTRAVENOUS at 11:06

## 2019-08-20 RX ADMIN — SODIUM CHLORIDE 80 ML: 9 INJECTION, SOLUTION INTRAVENOUS at 12:09

## 2019-08-20 RX ADMIN — Medication 10 ML: at 12:09

## 2019-08-20 RX ADMIN — IOVERSOL 75 ML: 741 INJECTION INTRA-ARTERIAL; INTRAVENOUS at 12:09

## 2019-08-20 ASSESSMENT — PAIN DESCRIPTION - PAIN TYPE: TYPE: ACUTE PAIN

## 2019-08-20 ASSESSMENT — PAIN SCALES - GENERAL: PAINLEVEL_OUTOF10: 4

## 2019-08-20 ASSESSMENT — PAIN DESCRIPTION - ORIENTATION: ORIENTATION: MID;UPPER

## 2019-08-20 ASSESSMENT — PAIN DESCRIPTION - DESCRIPTORS: DESCRIPTORS: ACHING

## 2019-08-20 ASSESSMENT — PAIN DESCRIPTION - LOCATION: LOCATION: BACK

## 2019-08-21 ENCOUNTER — TELEPHONE (OUTPATIENT)
Dept: INTERNAL MEDICINE CLINIC | Age: 39
End: 2019-08-21

## 2019-08-21 ASSESSMENT — ENCOUNTER SYMPTOMS
SORE THROAT: 0
CHEST TIGHTNESS: 0
CONSTIPATION: 0
ABDOMINAL PAIN: 0
SHORTNESS OF BREATH: 0
DIARRHEA: 0
VOMITING: 0
NAUSEA: 0
EYE PAIN: 0
COUGH: 0
BACK PAIN: 0

## 2019-09-04 ENCOUNTER — OFFICE VISIT (OUTPATIENT)
Dept: INTERNAL MEDICINE CLINIC | Age: 39
End: 2019-09-04
Payer: MEDICARE

## 2019-09-04 VITALS
OXYGEN SATURATION: 94 % | HEART RATE: 91 BPM | BODY MASS INDEX: 21.69 KG/M2 | DIASTOLIC BLOOD PRESSURE: 84 MMHG | WEIGHT: 135 LBS | SYSTOLIC BLOOD PRESSURE: 102 MMHG | HEIGHT: 66 IN

## 2019-09-04 DIAGNOSIS — J20.9 ACUTE BRONCHITIS, UNSPECIFIED ORGANISM: Primary | ICD-10-CM

## 2019-09-04 DIAGNOSIS — E53.8 B12 DEFICIENCY: ICD-10-CM

## 2019-09-04 PROCEDURE — G8420 CALC BMI NORM PARAMETERS: HCPCS | Performed by: INTERNAL MEDICINE

## 2019-09-04 PROCEDURE — 99213 OFFICE O/P EST LOW 20 MIN: CPT | Performed by: INTERNAL MEDICINE

## 2019-09-04 PROCEDURE — 1036F TOBACCO NON-USER: CPT | Performed by: INTERNAL MEDICINE

## 2019-09-04 PROCEDURE — G8427 DOCREV CUR MEDS BY ELIG CLIN: HCPCS | Performed by: INTERNAL MEDICINE

## 2019-09-04 RX ORDER — MIRTAZAPINE 15 MG/1
TABLET, FILM COATED ORAL
COMMUNITY
Start: 2019-08-30 | End: 2020-02-19

## 2019-09-04 RX ORDER — PREDNISONE 10 MG/1
10 TABLET ORAL DAILY
Qty: 5 TABLET | Refills: 0 | Status: SHIPPED | OUTPATIENT
Start: 2019-09-10 | End: 2019-09-15

## 2019-09-04 RX ORDER — AZITHROMYCIN 250 MG/1
250 TABLET, FILM COATED ORAL SEE ADMIN INSTRUCTIONS
Qty: 6 TABLET | Refills: 0 | Status: SHIPPED | OUTPATIENT
Start: 2019-09-04 | End: 2019-09-09

## 2019-09-04 RX ORDER — CYANOCOBALAMIN 1000 UG/ML
1000 INJECTION INTRAMUSCULAR; SUBCUTANEOUS ONCE
Status: COMPLETED | OUTPATIENT
Start: 2019-09-04 | End: 2019-09-04

## 2019-09-04 RX ORDER — PREDNISONE 20 MG/1
20 TABLET ORAL DAILY
Qty: 5 TABLET | Refills: 0 | Status: SHIPPED | OUTPATIENT
Start: 2019-09-04 | End: 2019-09-09

## 2019-09-04 RX ADMIN — CYANOCOBALAMIN 1000 MCG: 1000 INJECTION INTRAMUSCULAR; SUBCUTANEOUS at 14:10

## 2019-09-04 ASSESSMENT — ENCOUNTER SYMPTOMS
CHEST TIGHTNESS: 0
EYE ITCHING: 0
DIARRHEA: 0
COLOR CHANGE: 0
COUGH: 1
BACK PAIN: 0
RHINORRHEA: 1
SHORTNESS OF BREATH: 0
CONSTIPATION: 0
CHOKING: 0
BLOOD IN STOOL: 0
ABDOMINAL PAIN: 0
ABDOMINAL DISTENTION: 0
EYE DISCHARGE: 0
EYE REDNESS: 0
APNEA: 0
WHEEZING: 1
EYE PAIN: 0

## 2019-10-03 DIAGNOSIS — F32.A DEPRESSION, UNSPECIFIED DEPRESSION TYPE: ICD-10-CM

## 2019-10-03 RX ORDER — MIRTAZAPINE 15 MG/1
TABLET, FILM COATED ORAL
Qty: 30 TABLET | Refills: 5 | Status: SHIPPED | OUTPATIENT
Start: 2019-10-03 | End: 2020-02-19

## 2019-10-14 DIAGNOSIS — E55.9 VITAMIN D DEFICIENCY: ICD-10-CM

## 2019-10-14 RX ORDER — MELATONIN
Qty: 30 TABLET | Refills: 2 | Status: SHIPPED | OUTPATIENT
Start: 2019-10-14 | End: 2020-11-04

## 2020-02-03 ENCOUNTER — TELEPHONE (OUTPATIENT)
Dept: INTERNAL MEDICINE CLINIC | Age: 40
End: 2020-02-03

## 2020-02-19 ENCOUNTER — OFFICE VISIT (OUTPATIENT)
Dept: INTERNAL MEDICINE CLINIC | Age: 40
End: 2020-02-19
Payer: MEDICARE

## 2020-02-19 VITALS
HEART RATE: 93 BPM | OXYGEN SATURATION: 98 % | HEIGHT: 66 IN | DIASTOLIC BLOOD PRESSURE: 78 MMHG | WEIGHT: 140 LBS | BODY MASS INDEX: 22.5 KG/M2 | SYSTOLIC BLOOD PRESSURE: 110 MMHG

## 2020-02-19 PROCEDURE — 1036F TOBACCO NON-USER: CPT | Performed by: INTERNAL MEDICINE

## 2020-02-19 PROCEDURE — G8420 CALC BMI NORM PARAMETERS: HCPCS | Performed by: INTERNAL MEDICINE

## 2020-02-19 PROCEDURE — G8427 DOCREV CUR MEDS BY ELIG CLIN: HCPCS | Performed by: INTERNAL MEDICINE

## 2020-02-19 PROCEDURE — 99214 OFFICE O/P EST MOD 30 MIN: CPT | Performed by: INTERNAL MEDICINE

## 2020-02-19 PROCEDURE — G8484 FLU IMMUNIZE NO ADMIN: HCPCS | Performed by: INTERNAL MEDICINE

## 2020-02-19 RX ORDER — UBIDECARENONE 75 MG
100 CAPSULE ORAL DAILY
Qty: 30 TABLET | Refills: 3 | Status: SHIPPED | OUTPATIENT
Start: 2020-02-19 | End: 2021-03-24

## 2020-02-19 RX ORDER — DULOXETIN HYDROCHLORIDE 60 MG/1
60 CAPSULE, DELAYED RELEASE ORAL DAILY
Qty: 30 CAPSULE | Refills: 3 | Status: SHIPPED | OUTPATIENT
Start: 2020-02-19 | End: 2020-06-22

## 2020-02-19 NOTE — PROGRESS NOTES
sounds: Normal heart sounds. No murmur. No gallop. Pulmonary:      Effort: Pulmonary effort is normal. No respiratory distress. Breath sounds: Normal breath sounds. No stridor. No wheezing or rales. Chest:      Chest wall: No tenderness. Abdominal:      General: Bowel sounds are normal. There is no distension. Palpations: Abdomen is soft. Tenderness: There is no abdominal tenderness. There is no guarding or rebound. Musculoskeletal: Normal range of motion. Neurological:      Mental Status: She is alert and oriented to person, place, and time. Assessment / Plan:   1. Depression, unspecified depression type  - vitamin B-12 (CYANOCOBALAMIN) 100 MCG tablet; Take 1 tablet by mouth daily  Dispense: 30 tablet; Refill: 3  - DULoxetine (CYMBALTA) 60 MG extended release capsule; Take 1 capsule by mouth daily  Dispense: 30 capsule; Refill: 3    2. Neuropathy  Increasing Cymbalta to 60 from 30   - DULoxetine (CYMBALTA) 60 MG extended release capsule; Take 1 capsule by mouth daily  Dispense: 30 capsule; Refill: 3      · Return in about 3 months (around 5/19/2020). · Reviewed prior labs and health maintenance. · Discussed use, benefit, and side effects of prescribed medications. Barriers to medication compliance addressed. All patient questions answered. Pt voiced understanding. MD BENNIE [facility]  2/25/2020, 8:25 PM    Please note that this chart was generated using voice recognition Dragon dictation software. Although every effort was made to ensure the accuracy of this automated transcription, some errors in transcription may have occurred.

## 2020-02-25 ASSESSMENT — ENCOUNTER SYMPTOMS
EYE ITCHING: 0
ABDOMINAL DISTENTION: 0
COLOR CHANGE: 0
EYE DISCHARGE: 0
CHEST TIGHTNESS: 0
APNEA: 0
CONSTIPATION: 0
COUGH: 0
EYE PAIN: 0
CHOKING: 0
SHORTNESS OF BREATH: 0
EYE REDNESS: 0
BACK PAIN: 0
BLOOD IN STOOL: 0
DIARRHEA: 0
ABDOMINAL PAIN: 0

## 2020-04-27 ENCOUNTER — APPOINTMENT (OUTPATIENT)
Dept: CT IMAGING | Age: 40
End: 2020-04-27
Payer: MEDICARE

## 2020-04-27 ENCOUNTER — HOSPITAL ENCOUNTER (EMERGENCY)
Age: 40
Discharge: HOME | End: 2020-04-27
Attending: EMERGENCY MEDICINE
Payer: MEDICARE

## 2020-04-27 ENCOUNTER — OFFICE VISIT (OUTPATIENT)
Dept: INTERNAL MEDICINE CLINIC | Age: 40
End: 2020-04-27
Payer: MEDICARE

## 2020-04-27 VITALS
RESPIRATION RATE: 14 BRPM | HEART RATE: 87 BPM | WEIGHT: 142 LBS | SYSTOLIC BLOOD PRESSURE: 109 MMHG | TEMPERATURE: 98.9 F | HEIGHT: 66 IN | OXYGEN SATURATION: 100 % | DIASTOLIC BLOOD PRESSURE: 73 MMHG | BODY MASS INDEX: 22.82 KG/M2

## 2020-04-27 VITALS
BODY MASS INDEX: 22.82 KG/M2 | DIASTOLIC BLOOD PRESSURE: 68 MMHG | HEART RATE: 101 BPM | HEIGHT: 66 IN | WEIGHT: 142 LBS | OXYGEN SATURATION: 96 % | SYSTOLIC BLOOD PRESSURE: 98 MMHG

## 2020-04-27 LAB
ABSOLUTE EOS #: 0.1 K/UL (ref 0–0.4)
ABSOLUTE IMMATURE GRANULOCYTE: ABNORMAL K/UL (ref 0–0.3)
ABSOLUTE LYMPH #: 2.2 K/UL (ref 1–4.8)
ABSOLUTE MONO #: 0.5 K/UL (ref 0.1–1.3)
ALBUMIN SERPL-MCNC: 4 G/DL (ref 3.5–5.2)
ALBUMIN/GLOBULIN RATIO: NORMAL (ref 1–2.5)
ALP BLD-CCNC: 38 U/L (ref 35–104)
ALT SERPL-CCNC: 11 U/L (ref 5–33)
ANION GAP SERPL CALCULATED.3IONS-SCNC: 11 MMOL/L (ref 9–17)
AST SERPL-CCNC: 15 U/L
BASOPHILS # BLD: 1 % (ref 0–2)
BASOPHILS ABSOLUTE: 0 K/UL (ref 0–0.2)
BILIRUB SERPL-MCNC: 0.48 MG/DL (ref 0.3–1.2)
BUN BLDV-MCNC: 14 MG/DL (ref 6–20)
BUN/CREAT BLD: NORMAL (ref 9–20)
CALCIUM SERPL-MCNC: 8.8 MG/DL (ref 8.6–10.4)
CHLORIDE BLD-SCNC: 107 MMOL/L (ref 98–107)
CO2: 23 MMOL/L (ref 20–31)
CREAT SERPL-MCNC: 0.64 MG/DL (ref 0.5–0.9)
DIFFERENTIAL TYPE: ABNORMAL
EOSINOPHILS RELATIVE PERCENT: 2 % (ref 0–4)
GFR AFRICAN AMERICAN: >60 ML/MIN
GFR NON-AFRICAN AMERICAN: >60 ML/MIN
GFR SERPL CREATININE-BSD FRML MDRD: NORMAL ML/MIN/{1.73_M2}
GFR SERPL CREATININE-BSD FRML MDRD: NORMAL ML/MIN/{1.73_M2}
GLUCOSE BLD-MCNC: 95 MG/DL (ref 70–99)
HCT VFR BLD CALC: 38.4 % (ref 36–46)
HEMOGLOBIN: 12.3 G/DL (ref 12–16)
IMMATURE GRANULOCYTES: ABNORMAL %
LIPASE: 11 U/L (ref 13–60)
LYMPHOCYTES # BLD: 35 % (ref 24–44)
MCH RBC QN AUTO: 27.6 PG (ref 26–34)
MCHC RBC AUTO-ENTMCNC: 31.9 G/DL (ref 31–37)
MCV RBC AUTO: 86.5 FL (ref 80–100)
MONOCYTES # BLD: 9 % (ref 1–7)
NRBC AUTOMATED: ABNORMAL PER 100 WBC
PDW BLD-RTO: 15.1 % (ref 11.5–14.9)
PLATELET # BLD: 270 K/UL (ref 150–450)
PLATELET ESTIMATE: ABNORMAL
PMV BLD AUTO: 8.7 FL (ref 6–12)
POTASSIUM SERPL-SCNC: 3.9 MMOL/L (ref 3.7–5.3)
RBC # BLD: 4.44 M/UL (ref 4–5.2)
RBC # BLD: ABNORMAL 10*6/UL
SEG NEUTROPHILS: 53 % (ref 36–66)
SEGMENTED NEUTROPHILS ABSOLUTE COUNT: 3.3 K/UL (ref 1.3–9.1)
SODIUM BLD-SCNC: 141 MMOL/L (ref 135–144)
TOTAL PROTEIN: 6.6 G/DL (ref 6.4–8.3)
WBC # BLD: 6.1 K/UL (ref 3.5–11)
WBC # BLD: ABNORMAL 10*3/UL

## 2020-04-27 PROCEDURE — 99284 EMERGENCY DEPT VISIT MOD MDM: CPT

## 2020-04-27 PROCEDURE — 1036F TOBACCO NON-USER: CPT | Performed by: PHYSICIAN ASSISTANT

## 2020-04-27 PROCEDURE — G8427 DOCREV CUR MEDS BY ELIG CLIN: HCPCS | Performed by: PHYSICIAN ASSISTANT

## 2020-04-27 PROCEDURE — 2580000003 HC RX 258: Performed by: PHYSICIAN ASSISTANT

## 2020-04-27 PROCEDURE — 6360000004 HC RX CONTRAST MEDICATION: Performed by: PHYSICIAN ASSISTANT

## 2020-04-27 PROCEDURE — 85025 COMPLETE CBC W/AUTO DIFF WBC: CPT

## 2020-04-27 PROCEDURE — 70498 CT ANGIOGRAPHY NECK: CPT

## 2020-04-27 PROCEDURE — 80053 COMPREHEN METABOLIC PANEL: CPT

## 2020-04-27 PROCEDURE — 6370000000 HC RX 637 (ALT 250 FOR IP): Performed by: PHYSICIAN ASSISTANT

## 2020-04-27 PROCEDURE — 36415 COLL VENOUS BLD VENIPUNCTURE: CPT

## 2020-04-27 PROCEDURE — 96374 THER/PROPH/DIAG INJ IV PUSH: CPT

## 2020-04-27 PROCEDURE — 99214 OFFICE O/P EST MOD 30 MIN: CPT | Performed by: PHYSICIAN ASSISTANT

## 2020-04-27 PROCEDURE — 70450 CT HEAD/BRAIN W/O DYE: CPT

## 2020-04-27 PROCEDURE — 83690 ASSAY OF LIPASE: CPT

## 2020-04-27 PROCEDURE — 6360000002 HC RX W HCPCS: Performed by: PHYSICIAN ASSISTANT

## 2020-04-27 PROCEDURE — G8420 CALC BMI NORM PARAMETERS: HCPCS | Performed by: PHYSICIAN ASSISTANT

## 2020-04-27 RX ORDER — 0.9 % SODIUM CHLORIDE 0.9 %
80 INTRAVENOUS SOLUTION INTRAVENOUS ONCE
Status: COMPLETED | OUTPATIENT
Start: 2020-04-27 | End: 2020-04-27

## 2020-04-27 RX ORDER — SODIUM CHLORIDE 0.9 % (FLUSH) 0.9 %
10 SYRINGE (ML) INJECTION PRN
Status: DISCONTINUED | OUTPATIENT
Start: 2020-04-27 | End: 2020-04-27 | Stop reason: HOSPADM

## 2020-04-27 RX ORDER — ONDANSETRON 2 MG/ML
4 INJECTION INTRAMUSCULAR; INTRAVENOUS ONCE
Status: COMPLETED | OUTPATIENT
Start: 2020-04-27 | End: 2020-04-27

## 2020-04-27 RX ORDER — 0.9 % SODIUM CHLORIDE 0.9 %
1000 INTRAVENOUS SOLUTION INTRAVENOUS ONCE
Status: COMPLETED | OUTPATIENT
Start: 2020-04-27 | End: 2020-04-27

## 2020-04-27 RX ORDER — MECLIZINE HYDROCHLORIDE 25 MG/1
25 TABLET ORAL ONCE
Status: COMPLETED | OUTPATIENT
Start: 2020-04-27 | End: 2020-04-27

## 2020-04-27 RX ADMIN — ONDANSETRON 4 MG: 2 INJECTION INTRAMUSCULAR; INTRAVENOUS at 17:30

## 2020-04-27 RX ADMIN — SODIUM CHLORIDE 80 ML: 9 INJECTION, SOLUTION INTRAVENOUS at 18:14

## 2020-04-27 RX ADMIN — Medication 10 ML: at 18:14

## 2020-04-27 RX ADMIN — SODIUM CHLORIDE 1000 ML: 9 INJECTION, SOLUTION INTRAVENOUS at 17:28

## 2020-04-27 RX ADMIN — IOVERSOL 75 ML: 741 INJECTION INTRA-ARTERIAL; INTRAVENOUS at 18:14

## 2020-04-27 RX ADMIN — MECLIZINE HYDROCHLORIDE 25 MG: 25 TABLET ORAL at 17:28

## 2020-04-27 ASSESSMENT — PAIN DESCRIPTION - LOCATION: LOCATION: HEAD;NECK

## 2020-04-27 ASSESSMENT — PAIN DESCRIPTION - ORIENTATION: ORIENTATION: POSTERIOR

## 2020-04-27 ASSESSMENT — PAIN DESCRIPTION - PROGRESSION: CLINICAL_PROGRESSION: GRADUALLY WORSENING

## 2020-04-27 ASSESSMENT — PAIN DESCRIPTION - PAIN TYPE: TYPE: ACUTE PAIN

## 2020-04-27 ASSESSMENT — PAIN DESCRIPTION - ONSET: ONSET: PROGRESSIVE

## 2020-04-27 ASSESSMENT — PAIN SCALES - GENERAL: PAINLEVEL_OUTOF10: 6

## 2020-04-27 NOTE — PROGRESS NOTES
meclizine (ANTIVERT) 25 MG tablet; Take 1 tablet by mouth 3 times daily as needed for Dizziness  Dispense: 30 tablet; Refill: 0  -- ondansetron (ZOFRAN) 4 MG tablet; Take 1 tablet by mouth 3 times daily as needed for Nausea or Vomiting  Dispense: 30 tablet; Refill: 0    FOLLOW UP AND INSTRUCTIONS:   Return if symptoms worsen or fail to improve. Discussed use, benefit, and side effects of prescribed medications. All patient questions answered. Patient voiced understanding. Patient given educational materials - see patient instructions    AJ Kruger [facility]  4/28/2020, 3:49 PM    Please note that this chart wasgenerated using voice recognition Dragon dictation software. Although every effort was made to ensure the accuracy of this automated transcription, some errors in transcription may have occurred.

## 2020-04-27 NOTE — ED PROVIDER NOTES
[address]  eMERGENCY dEPARTMENT eNCOUnter      Pt Name: Georgeanna Osgood  MRN: [id number]  [place] 1980  Date of evaluation: 4/27/20      CHIEF COMPLAINT:   Chief Complaint   Patient presents with    Headache     onset 2 days PTA    Dizziness    Emesis     HISTORY OF PRESENT ILLNESS    Georgeanna Osgood is a 35 y.o. female who presents with headache, dizziness, and vertigo that started 3 days ago while patient was at work. Pt states the pain is 6/10 over posterior head and in neck. Reports associated dizziness. States dizziness and pain is worse with movement, position change, or looking around. Pt denies any vision changes, neck stiffness, chest pain, sob, numbness, and fevers. Pt reports 6 episodes of emesis yesterday and some mild abdominal pain that she associates with emesis. Pt is not on any blood thinners. Pt reports hx of migraines but states this is completely different. No other complaints. REVIEW OF SYSTEMS     Headache  Emesis  Vertigo  Nausea  Abd pain     Negative in 10 essential Systems except as mentioned above and in the HPI. PAST MEDICAL HISTORY   PMH:  has a past medical history of Anemia, Family history of breast cancer, Family history of cervical cancer, Family history of uterine cancer, Herpes, MRSA (methicillin resistant staph aureus) culture positive, and Postpartum depression. none otherwise stated from nurses notes  Surgical History:  has a past surgical history that includes Dilation and curettage of uterus; laparoscopy; Dilation & curettage; pr lap,appendectomy (N/A, 3/5/2018); and pr part removal colon w anastomosis (N/A, 3/20/2018). none otherwise stated from nurses notes  Social History:  reports that she has quit smoking. She smoked 0.25 packs per day. She has never used smokeless tobacco. She reports that she does not drink alcohol or use drugs.  , lives at home with others  Family History: none  Psychiatric History: none    Allergies:is allergic to unremarkable. Lab work unremarkable. Pt states she is feeling much better. Requesting to go home. At this time, pt is ok for discharge. Suspect BPPV vs migraine. Recommend close follow up with PCP or ENT. Strict return instructions discussed with patient. Discussed results and plan with the pt. They expressed appropriate understanding. Pt given close follow up, supportive care instructions and strict return instructions at the bedside. The patient presents with headache without signs of CNS bleed, stroke, infection, temporal arteritis, idiopathic intracranial hypertension, or other serious etiology. The patient is neurologically intact. Given the extremely low risk of these diagnoses further testing and evaluation for these possibilities does not appear to be indicated at this time. The patient has been instructed to return if the symptoms worsen or change in any way. The patient verbalized understanding. FINAL IMPRESSION:     1. Vertigo    2.  Acute nonintractable headache, unspecified headache type          DISPOSITION:  DISPOSITION          PATIENT REFERRED TO:  Danyelle Hammer MD   [address] [place] [address]  [phone]          [facility] ED  Pending sale to [facility] [address]  [address] [facility] [address]  [address] [place] [address]  [phone]            DISCHARGE MEDICATIONS:  Discharge Medication List as of 4/27/2020  6:57 PM          (Please note that portions of this note were completed with a voice recognition program.  Efforts were made to edit the dictations but occasionally words are mis-transcribed.)       Ruth Buckley PA-C  04/27/20 2002

## 2020-04-27 NOTE — ED PROVIDER NOTES
[address]  eMERGENCY dEPARTMENT eNCOUnter   Independent Attestation     Pt Name: Kelsi Persaud  MRN: [id number]  [place] 1980  Date of evaluation: 4/27/20       Kelsi Persaud is a 35 y.o. female who presents with Headache (onset 2 days PTA); Dizziness; and Emesis        Based on the medical record, the care appears appropriate. I was personally available for consultation in the Emergency Department.     Wisam Waldrop MD  Attending Emergency  Physician                  Wisam Waldrop MD  04/27/20 [id number]

## 2020-04-28 RX ORDER — ONDANSETRON 4 MG/1
4 TABLET, FILM COATED ORAL 3 TIMES DAILY PRN
Qty: 30 TABLET | Refills: 0 | Status: SHIPPED | OUTPATIENT
Start: 2020-04-28 | End: 2021-03-24

## 2020-04-28 RX ORDER — MECLIZINE HYDROCHLORIDE 25 MG/1
25 TABLET ORAL 3 TIMES DAILY PRN
Qty: 30 TABLET | Refills: 0 | Status: SHIPPED | OUTPATIENT
Start: 2020-04-28 | End: 2020-05-08

## 2020-04-28 ASSESSMENT — ENCOUNTER SYMPTOMS
SINUS PAIN: 0
COLOR CHANGE: 0
DIARRHEA: 0
EYE PAIN: 0
VOMITING: 1
BLOOD IN STOOL: 0
CHEST TIGHTNESS: 0
NAUSEA: 1
COUGH: 0
WHEEZING: 0
VOICE CHANGE: 0
EYE ITCHING: 0
BACK PAIN: 0
EYE DISCHARGE: 0
TROUBLE SWALLOWING: 0
SORE THROAT: 0
SHORTNESS OF BREATH: 0
ABDOMINAL PAIN: 0
RHINORRHEA: 0
CONSTIPATION: 0

## 2020-06-22 RX ORDER — DULOXETIN HYDROCHLORIDE 60 MG/1
CAPSULE, DELAYED RELEASE ORAL
Qty: 30 CAPSULE | Refills: 11 | Status: SHIPPED | OUTPATIENT
Start: 2020-06-22 | End: 2021-03-24

## 2020-10-27 ENCOUNTER — NURSE TRIAGE (OUTPATIENT)
Dept: OTHER | Facility: CLINIC | Age: 40
End: 2020-10-27

## 2020-10-27 ENCOUNTER — OFFICE VISIT (OUTPATIENT)
Dept: PRIMARY CARE CLINIC | Age: 40
End: 2020-10-27
Payer: MEDICARE

## 2020-10-27 ENCOUNTER — HOSPITAL ENCOUNTER (OUTPATIENT)
Age: 40
Setting detail: SPECIMEN
Discharge: HOME | End: 2020-10-27
Payer: MEDICARE

## 2020-10-27 VITALS
HEART RATE: 98 BPM | OXYGEN SATURATION: 97 % | HEIGHT: 66 IN | WEIGHT: 145 LBS | BODY MASS INDEX: 23.3 KG/M2 | TEMPERATURE: 98.7 F

## 2020-10-27 PROCEDURE — 1036F TOBACCO NON-USER: CPT | Performed by: NURSE PRACTITIONER

## 2020-10-27 PROCEDURE — 99213 OFFICE O/P EST LOW 20 MIN: CPT | Performed by: NURSE PRACTITIONER

## 2020-10-27 PROCEDURE — G8484 FLU IMMUNIZE NO ADMIN: HCPCS | Performed by: NURSE PRACTITIONER

## 2020-10-27 PROCEDURE — G8427 DOCREV CUR MEDS BY ELIG CLIN: HCPCS | Performed by: NURSE PRACTITIONER

## 2020-10-27 PROCEDURE — G8420 CALC BMI NORM PARAMETERS: HCPCS | Performed by: NURSE PRACTITIONER

## 2020-10-27 ASSESSMENT — ENCOUNTER SYMPTOMS
CHEST TIGHTNESS: 0
COUGH: 0
EYE ITCHING: 0
SHORTNESS OF BREATH: 1
VOMITING: 0
ALLERGIC/IMMUNOLOGIC NEGATIVE: 1
EYE DISCHARGE: 0
SORE THROAT: 0
ABDOMINAL PAIN: 0
NAUSEA: 1
DIARRHEA: 0
EYES NEGATIVE: 1

## 2020-10-27 ASSESSMENT — PATIENT HEALTH QUESTIONNAIRE - PHQ9
SUM OF ALL RESPONSES TO PHQ QUESTIONS 1-9: 0
SUM OF ALL RESPONSES TO PHQ QUESTIONS 1-9: 0
1. LITTLE INTEREST OR PLEASURE IN DOING THINGS: 0
2. FEELING DOWN, DEPRESSED OR HOPELESS: 0
SUM OF ALL RESPONSES TO PHQ9 QUESTIONS 1 & 2: 0
SUM OF ALL RESPONSES TO PHQ QUESTIONS 1-9: 0

## 2020-10-27 NOTE — TELEPHONE ENCOUNTER
Reason for Disposition   MILD difficulty breathing (e.g., minimal/no SOB at rest, SOB with walking, pulse <100)    Answer Assessment - Initial Assessment Questions  1. COVID-19 DIAGNOSIS: \"Who made your Coronavirus (COVID-19) diagnosis? \" \"Was it confirmed by a positive lab test?\" If not diagnosed by a HCP, ask \"Are there lots of cases (community spread) where you live? \" (See public health department website, if unsure)      Not tested    2. ONSET: \"When did the COVID-19 symptoms start? \"       Two days ago    3. WORST SYMPTOM: \"What is your worst symptom? \" (e.g., cough, fever, shortness of breath, muscle aches)      Shortness of breath    4. COUGH: \"Do you have a cough? \" If so, ask: \"How bad is the cough? \"        Yes moderate    5. FEVER: \"Do you have a fever? \" If so, ask: \"What is your temperature, how was it measured, and when did it start? \"      Yes 100.6 oral started this morning    6. RESPIRATORY STATUS: \"Describe your breathing? \" (e.g., shortness of breath, wheezing, unable to speak)       Short of breath worse with activity wheezing when coughing    7. BETTER-SAME-WORSE: Lyndal Fly you getting better, staying the same or getting worse compared to yesterday? \"  If getting worse, ask, \"In what way? \"      Worse fever    8. HIGH RISK DISEASE: \"Do you have any chronic medical problems? \" (e.g., asthma, heart or lung disease, weak immune system, etc.)      No    9. PREGNANCY: \"Is there any chance you are pregnant? \" \"When was your last menstrual period? \"      Not sure    10. OTHER SYMPTOMS: \"Do you have any other symptoms? \"  (e.g., chills, fatigue, headache, loss of smell or taste, muscle pain, sore throat)        Headache, nausea    Protocols used: CORONAVIRUS (COVID-19) DIAGNOSED OR SUSPECTED-ADULT-AH    Pt will go to [place] [facility]    Caller provided care advice and instructed to call back with worsening symptoms. Attention Provider: Thank you for allowing me to participate in the care of your patient.   The patient was connected to triage in response to information provided to the ECC. Please do not respond through this encounter as the response is not directed to a shared pool.

## 2020-10-27 NOTE — PATIENT INSTRUCTIONS
Advance Care Planning  People with COVID-19 may have no symptoms, mild symptoms, such as fever, cough, and shortness of breath or they may have more severe illness, developing severe and fatal pneumonia. As a result, Advance Care Planning with attention to naming a health care decision maker (someone you trust to make healthcare decisions for you if you could not speak for yourself) and sharing other health care preferences is important BEFORE a possible health crisis. Please contact your Primary Care Provider to discuss Advance Care Planning. Preventing the Spread of Coronavirus Disease 2019 in Homes and Residential Communities  For the most recent information go to [email or web address]    Prevention steps for People with confirmed or suspected COVID-19 (including persons under investigation) who do not need to be hospitalized  and   People with confirmed COVID-19 who were hospitalized and determined to be medically stable to go home    Your healthcare provider and public health staff will evaluate whether you can be cared for at home. If it is determined that you do not need to be hospitalized and can be isolated at home, you will be monitored by staff from your local or state health department. You should follow the prevention steps below until a healthcare provider or local or state health department says you can return to your normal activities. Stay home except to get medical care  People who are mildly ill with COVID-19 are able to isolate at home during their illness. You should restrict activities outside your home, except for getting medical care. Do not go to work, school, or public areas. Avoid using public transportation, ride-sharing, or taxis. Separate yourself from other people and animals in your home  People: As much as possible, you should stay in a specific room and away from other people in your home.  Also, you should use a separate before eating or preparing food. If soap and water are not readily available, use an alcohol-based hand  with at least 60% alcohol, covering all surfaces of your hands and rubbing them together until they feel dry. Soap and water are the best option if hands are visibly dirty. Avoid touching your eyes, nose, and mouth with unwashed hands. Avoid sharing personal household items  You should not share dishes, drinking glasses, cups, eating utensils, towels, or bedding with other people or pets in your home. After using these items, they should be washed thoroughly with soap and water. Clean all high-touch surfaces everyday  High touch surfaces include counters, tabletops, doorknobs, bathroom fixtures, toilets, phones, keyboards, tablets, and bedside tables. Also, clean any surfaces that may have blood, stool, or body fluids on them. Use a household cleaning spray or wipe, according to the label instructions. Labels contain instructions for safe and effective use of the cleaning product including precautions you should take when applying the product, such as wearing gloves and making sure you have good ventilation during use of the product. Monitor your symptoms  Seek prompt medical attention if your illness is worsening (e.g., difficulty breathing). Before seeking care, call your healthcare provider and tell them that you have, or are being evaluated for, COVID-19. Put on a facemask before you enter the facility. These steps will help the healthcare providers office to keep other people in the office or waiting room from getting infected or exposed. Ask your healthcare provider to call the local or state health department. Persons who are placed under active monitoring or facilitated self-monitoring should follow instructions provided by their local health department or occupational health professionals, as appropriate. When working with your local health department check their available hours.   If you have a medical emergency and need to call 911, notify the dispatch personnel that you have, or are being evaluated for COVID-19. If possible, put on a facemask before emergency medical services arrive. Discontinuing home isolation  Patients with confirmed COVID-19 should remain under home isolation precautions until the risk of secondary transmission to others is thought to be low. The decision to discontinue home isolation precautions should be made on a case-by-case basis, in consultation with healthcare providers and state and local health departments.

## 2020-10-27 NOTE — PROGRESS NOTES
standard drinks     Comment: social       Current Outpatient Medications   Medication Sig Dispense Refill    DULoxetine (CYMBALTA) 60 MG extended release capsule TAKE ONE CAPSULE BY MOUTH DAILY 30 capsule 11    ondansetron (ZOFRAN) 4 MG tablet Take 1 tablet by mouth 3 times daily as needed for Nausea or Vomiting 30 tablet 0    vitamin B-12 (CYANOCOBALAMIN) 100 MCG tablet Take 1 tablet by mouth daily 30 tablet 3    Cholecalciferol (VITAMIN D3) 1000 units TABS TAKE ONE TABLET BY MOUTH DAILY 30 tablet 2     No current facility-administered medications for this visit. Allergies   Allergen Reactions    Codeine     Fentanyl Other (See Comments)     hallucinations    Penicillins Hives           Subjective:      Review of Systems   Constitutional: Positive for fatigue. Negative for appetite change, chills and fever. HENT: Negative for congestion, postnasal drip and sore throat. Eyes: Negative. Negative for discharge and itching. Respiratory: Positive for shortness of breath. Negative for cough and chest tightness. Cardiovascular: Negative for chest pain, palpitations and leg swelling. Gastrointestinal: Positive for nausea. Negative for abdominal pain, diarrhea and vomiting. Endocrine: Negative. Genitourinary: Negative for dysuria, frequency and urgency. Musculoskeletal: Negative for neck pain and neck stiffness. Skin: Negative for rash. Allergic/Immunologic: Negative. Neurological: Positive for headaches. Negative for dizziness, weakness and light-headedness. Hematological: Negative for adenopathy. Psychiatric/Behavioral: Negative for confusion. Objective:      Physical Exam  Vitals signs reviewed. Constitutional:       Appearance: She is well-developed. HENT:      Head: Normocephalic.       Right Ear: External ear normal.      Left Ear: External ear normal.      Nose: Nose normal.   Eyes:      Conjunctiva/sclera: Conjunctivae normal.      Pupils: Pupils are equal, round, and reactive to light. Neck:      Musculoskeletal: Normal range of motion and neck supple. Cardiovascular:      Rate and Rhythm: Normal rate and regular rhythm. Heart sounds: Normal heart sounds, S1 normal and S2 normal. No murmur. No friction rub. No gallop. Pulmonary:      Effort: Pulmonary effort is normal. No respiratory distress. Breath sounds: Normal breath sounds. No stridor, decreased air movement or transmitted upper airway sounds. No decreased breath sounds, wheezing, rhonchi or rales. Abdominal:      General: Bowel sounds are normal.      Palpations: Abdomen is soft. Musculoskeletal: Normal range of motion. Lymphadenopathy:      Cervical: No cervical adenopathy. Skin:     General: Skin is warm and dry. Findings: No rash. Neurological:      Mental Status: She is alert and oriented to person, place, and time. Cranial Nerves: No cranial nerve deficit. Coordination: Coordination normal.      Deep Tendon Reflexes: Reflexes are normal and symmetric. Psychiatric:         Thought Content: Thought content normal.       Pulse 98   Temp 98.7 °F (37.1 °C) (Infrared)   Ht 5' 6\" (1.676 m)   Wt 145 lb (65.8 kg)   SpO2 97%   BMI 23.40 kg/m²     Assessment:       Diagnosis Orders   1. Suspected COVID-19 virus infection  COVID-19 Ambulatory   2. SOB (shortness of breath)  COVID-19 Ambulatory   3. Intractable headache, unspecified chronicity pattern, unspecified headache type  COVID-19 Ambulatory   4. Nausea  COVID-19 Ambulatory   5.  Fatigue, unspecified type  COVID-19 Ambulatory           Plan:     1.) Covid swab obtained and sent to lab- will call with results   2.) Quarantine while waiting for Covid results   3.) Symptom management encouraged   4.) Follow-up with PCP PRN     Advance Care Planning  People with COVID-19 may have no symptoms, mild symptoms, such as fever, cough, and shortness of breath or they may have more severe illness, developing severe and fatal pneumonia. As a result, Advance Care Planning with attention to naming a health care decision maker (someone you trust to make healthcare decisions for you if you could not speak for yourself) and sharing other health care preferences is important BEFORE a possible health crisis. Please contact your Primary Care Provider to discuss Advance Care Planning. Preventing the Spread of Coronavirus Disease 2019 in Homes and Residential Communities  For the most recent information go to [email or web address]    Prevention steps for People with confirmed or suspected COVID-19 (including persons under investigation) who do not need to be hospitalized  and   People with confirmed COVID-19 who were hospitalized and determined to be medically stable to go home    Your healthcare provider and public health staff will evaluate whether you can be cared for at home. If it is determined that you do not need to be hospitalized and can be isolated at home, you will be monitored by staff from your local or state health department. You should follow the prevention steps below until a healthcare provider or local or state health department says you can return to your normal activities. Stay home except to get medical care  People who are mildly ill with COVID-19 are able to isolate at home during their illness. You should restrict activities outside your home, except for getting medical care. Do not go to work, school, or public areas. Avoid using public transportation, ride-sharing, or taxis. Separate yourself from other people and animals in your home  People: As much as possible, you should stay in a specific room and away from other people in your home. Also, you should use a separate bathroom, if available. Animals: You should restrict contact with pets and other animals while you are sick with COVID-19, just like you would around other people.  Although there have not been reports of pets or other animals becoming sick with COVID-19, it is still recommended that people sick with COVID-19 limit contact with animals until more information is known about the virus. When possible, have another member of your household care for your animals while you are sick. If you are sick with COVID-19, avoid contact with your pet, including petting, snuggling, being kissed or licked, and sharing food. If you must care for your pet or be around animals while you are sick, wash your hands before and after you interact with pets and wear a facemask. Call ahead before visiting your doctor  If you have a medical appointment, call the healthcare provider and tell them that you have or may have COVID-19. This will help the healthcare providers office take steps to keep other people from getting infected or exposed. Wear a facemask  You should wear a facemask when you are around other people (e.g., sharing a room or vehicle) or pets and before you enter a healthcare providers office. If you are not able to wear a facemask (for example, because it causes trouble breathing), then people who live with you should not stay in the same room with you, or they should wear a facemask if they enter your room. Cover your coughs and sneezes  Cover your mouth and nose with a tissue when you cough or sneeze. Throw used tissues in a lined trash can. Immediately wash your hands with soap and water for at least 20 seconds or, if soap and water are not available, clean your hands with an alcohol-based hand  that contains at least 60% alcohol. Clean your hands often  Wash your hands often with soap and water for at least 20 seconds, especially after blowing your nose, coughing, or sneezing; going to the bathroom; and before eating or preparing food.  If soap and water are not readily available, use an alcohol-based hand  with at least 60% alcohol, covering all surfaces of your hands and rubbing them together until they feel dry. Soap and water are the best option if hands are visibly dirty. Avoid touching your eyes, nose, and mouth with unwashed hands. Avoid sharing personal household items  You should not share dishes, drinking glasses, cups, eating utensils, towels, or bedding with other people or pets in your home. After using these items, they should be washed thoroughly with soap and water. Clean all high-touch surfaces everyday  High touch surfaces include counters, tabletops, doorknobs, bathroom fixtures, toilets, phones, keyboards, tablets, and bedside tables. Also, clean any surfaces that may have blood, stool, or body fluids on them. Use a household cleaning spray or wipe, according to the label instructions. Labels contain instructions for safe and effective use of the cleaning product including precautions you should take when applying the product, such as wearing gloves and making sure you have good ventilation during use of the product. Monitor your symptoms  Seek prompt medical attention if your illness is worsening (e.g., difficulty breathing). Before seeking care, call your healthcare provider and tell them that you have, or are being evaluated for, COVID-19. Put on a facemask before you enter the facility. These steps will help the healthcare providers office to keep other people in the office or waiting room from getting infected or exposed. Ask your healthcare provider to call the local or state health department. Persons who are placed under active monitoring or facilitated self-monitoring should follow instructions provided by their local health department or occupational health professionals, as appropriate. When working with your local health department check their available hours. If you have a medical emergency and need to call 911, notify the dispatch personnel that you have, or are being evaluated for COVID-19.  If possible, put on a facemask before emergency medical services arrive. Discontinuing home isolation  Patients with confirmed COVID-19 should remain under home isolation precautions until the risk of secondary transmission to others is thought to be low. The decision to discontinue home isolation precautions should be made on a case-by-case basis, in consultation with healthcare providers and state and local health departments. Problem List     None           Patient given educationalmaterials - see patient instructions. Discussed use, benefit, and side effectsof prescribed medications. All patient questions answered. Pt verbalized understanding. Instructed to continue current medications, diet and exercise. Patient agreedwith treatment plan. Follow up as directed.      Electronically signed by EVETTE Jones CNP on 10/27/2020 at 1:06 PM

## 2020-10-30 LAB — SARS-COV-2, NAA: NOT DETECTED

## 2020-11-04 ENCOUNTER — APPOINTMENT (OUTPATIENT)
Dept: GENERAL RADIOLOGY | Age: 40
End: 2020-11-04
Payer: MEDICARE

## 2020-11-04 ENCOUNTER — HOSPITAL ENCOUNTER (OUTPATIENT)
Age: 40
Setting detail: OBSERVATION
Discharge: HOME | End: 2020-11-05
Attending: EMERGENCY MEDICINE | Admitting: INTERNAL MEDICINE
Payer: MEDICARE

## 2020-11-04 PROBLEM — R07.9 CHEST PAIN: Status: ACTIVE | Noted: 2020-11-04

## 2020-11-04 PROBLEM — R79.89 POSITIVE D-DIMER: Status: ACTIVE | Noted: 2020-11-04

## 2020-11-04 PROBLEM — R06.02 SHORTNESS OF BREATH: Status: ACTIVE | Noted: 2020-11-04

## 2020-11-04 PROBLEM — R11.0 NAUSEA: Status: ACTIVE | Noted: 2020-11-04

## 2020-11-04 PROBLEM — R05.9 COUGH: Status: ACTIVE | Noted: 2020-11-04

## 2020-11-04 PROBLEM — R07.89 CHEST PAIN RADIATING TO ARM: Status: ACTIVE | Noted: 2020-11-04

## 2020-11-04 LAB
ABSOLUTE EOS #: 0.1 K/UL (ref 0–0.4)
ABSOLUTE IMMATURE GRANULOCYTE: ABNORMAL K/UL (ref 0–0.3)
ABSOLUTE LYMPH #: 3 K/UL (ref 1–4.8)
ABSOLUTE MONO #: 0.6 K/UL (ref 0.1–1.3)
AMPHETAMINE SCREEN URINE: NEGATIVE
ANION GAP SERPL CALCULATED.3IONS-SCNC: 12 MMOL/L (ref 9–17)
BARBITURATE SCREEN URINE: NEGATIVE
BASOPHILS # BLD: 1 % (ref 0–2)
BASOPHILS ABSOLUTE: 0 K/UL (ref 0–0.2)
BENZODIAZEPINE SCREEN, URINE: NEGATIVE
BILIRUBIN URINE: NEGATIVE
BUN BLDV-MCNC: 14 MG/DL (ref 6–20)
BUN/CREAT BLD: ABNORMAL (ref 9–20)
BUPRENORPHINE URINE: ABNORMAL
CALCIUM SERPL-MCNC: 9.6 MG/DL (ref 8.6–10.4)
CANNABINOID SCREEN URINE: POSITIVE
CHLORIDE BLD-SCNC: 109 MMOL/L (ref 98–107)
CO2: 22 MMOL/L (ref 20–31)
COCAINE METABOLITE, URINE: NEGATIVE
COLOR: YELLOW
COMMENT UA: NORMAL
CREAT SERPL-MCNC: 0.83 MG/DL (ref 0.5–0.9)
D-DIMER QUANTITATIVE: 0.63 MG/L FEU (ref 0–0.59)
DIFFERENTIAL TYPE: ABNORMAL
EOSINOPHILS RELATIVE PERCENT: 2 % (ref 0–4)
GFR AFRICAN AMERICAN: >60 ML/MIN
GFR NON-AFRICAN AMERICAN: >60 ML/MIN
GFR SERPL CREATININE-BSD FRML MDRD: ABNORMAL ML/MIN/{1.73_M2}
GFR SERPL CREATININE-BSD FRML MDRD: ABNORMAL ML/MIN/{1.73_M2}
GLUCOSE BLD-MCNC: 107 MG/DL (ref 70–99)
GLUCOSE URINE: NEGATIVE
HCG(URINE) PREGNANCY TEST: NEGATIVE
HCT VFR BLD CALC: 35.9 % (ref 36–46)
HEMOGLOBIN: 12.3 G/DL (ref 12–16)
IMMATURE GRANULOCYTES: ABNORMAL %
KETONES, URINE: NEGATIVE
LEUKOCYTE ESTERASE, URINE: NEGATIVE
LYMPHOCYTES # BLD: 43 % (ref 24–44)
MCH RBC QN AUTO: 29.6 PG (ref 26–34)
MCHC RBC AUTO-ENTMCNC: 34.3 G/DL (ref 31–37)
MCV RBC AUTO: 86.4 FL (ref 80–100)
MDMA URINE: ABNORMAL
METHADONE SCREEN, URINE: NEGATIVE
METHAMPHETAMINE, URINE: ABNORMAL
MONOCYTES # BLD: 9 % (ref 1–7)
MYOGLOBIN: 23 NG/ML (ref 25–58)
MYOGLOBIN: 25 NG/ML (ref 25–58)
NITRITE, URINE: NEGATIVE
NRBC AUTOMATED: ABNORMAL PER 100 WBC
OPIATES, URINE: NEGATIVE
OXYCODONE SCREEN URINE: NEGATIVE
PDW BLD-RTO: 15.2 % (ref 11.5–14.9)
PH UA: 7.5 (ref 5–8)
PHENCYCLIDINE, URINE: NEGATIVE
PLATELET # BLD: 290 K/UL (ref 150–450)
PLATELET ESTIMATE: ABNORMAL
PMV BLD AUTO: 8.1 FL (ref 6–12)
POTASSIUM SERPL-SCNC: 3.7 MMOL/L (ref 3.7–5.3)
PROPOXYPHENE, URINE: ABNORMAL
PROTEIN UA: NEGATIVE
RBC # BLD: 4.16 M/UL (ref 4–5.2)
RBC # BLD: ABNORMAL 10*6/UL
SEG NEUTROPHILS: 45 % (ref 36–66)
SEGMENTED NEUTROPHILS ABSOLUTE COUNT: 3.2 K/UL (ref 1.3–9.1)
SODIUM BLD-SCNC: 143 MMOL/L (ref 135–144)
SPECIFIC GRAVITY UA: 1.01 (ref 1–1.03)
TEST INFORMATION: ABNORMAL
TRICYCLIC ANTIDEPRESSANTS, UR: ABNORMAL
TROPONIN INTERP: ABNORMAL
TROPONIN INTERP: NORMAL
TROPONIN INTERP: NORMAL
TROPONIN T: ABNORMAL NG/ML
TROPONIN T: NORMAL NG/ML
TROPONIN T: NORMAL NG/ML
TROPONIN, HIGH SENSITIVITY: <6 NG/L (ref 0–14)
TURBIDITY: CLEAR
URINE HGB: NEGATIVE
UROBILINOGEN, URINE: NORMAL
VITAMIN D 25-HYDROXY: 30.1 NG/ML (ref 30–100)
WBC # BLD: 7 K/UL (ref 3.5–11)
WBC # BLD: ABNORMAL 10*3/UL

## 2020-11-04 PROCEDURE — 80307 DRUG TEST PRSMV CHEM ANLYZR: CPT

## 2020-11-04 PROCEDURE — 2580000003 HC RX 258: Performed by: STUDENT IN AN ORGANIZED HEALTH CARE EDUCATION/TRAINING PROGRAM

## 2020-11-04 PROCEDURE — 84484 ASSAY OF TROPONIN QUANT: CPT

## 2020-11-04 PROCEDURE — 83874 ASSAY OF MYOGLOBIN: CPT

## 2020-11-04 PROCEDURE — 71045 X-RAY EXAM CHEST 1 VIEW: CPT

## 2020-11-04 PROCEDURE — 85025 COMPLETE CBC W/AUTO DIFF WBC: CPT

## 2020-11-04 PROCEDURE — 6370000000 HC RX 637 (ALT 250 FOR IP): Performed by: STUDENT IN AN ORGANIZED HEALTH CARE EDUCATION/TRAINING PROGRAM

## 2020-11-04 PROCEDURE — G0378 HOSPITAL OBSERVATION PER HR: HCPCS

## 2020-11-04 PROCEDURE — 93005 ELECTROCARDIOGRAM TRACING: CPT | Performed by: EMERGENCY MEDICINE

## 2020-11-04 PROCEDURE — 81025 URINE PREGNANCY TEST: CPT

## 2020-11-04 PROCEDURE — 2060000000 HC ICU INTERMEDIATE R&B

## 2020-11-04 PROCEDURE — 85379 FIBRIN DEGRADATION QUANT: CPT

## 2020-11-04 PROCEDURE — 99285 EMERGENCY DEPT VISIT HI MDM: CPT

## 2020-11-04 PROCEDURE — 36415 COLL VENOUS BLD VENIPUNCTURE: CPT

## 2020-11-04 PROCEDURE — 80048 BASIC METABOLIC PNL TOTAL CA: CPT

## 2020-11-04 PROCEDURE — 82306 VITAMIN D 25 HYDROXY: CPT

## 2020-11-04 PROCEDURE — 99220 PR INITIAL OBSERVATION CARE/DAY 70 MINUTES: CPT | Performed by: INTERNAL MEDICINE

## 2020-11-04 PROCEDURE — 81003 URINALYSIS AUTO W/O SCOPE: CPT

## 2020-11-04 RX ORDER — DULOXETIN HYDROCHLORIDE 60 MG/1
60 CAPSULE, DELAYED RELEASE ORAL DAILY
Status: DISCONTINUED | OUTPATIENT
Start: 2020-11-04 | End: 2020-11-05 | Stop reason: HOSPADM

## 2020-11-04 RX ORDER — UBIDECARENONE 75 MG
100 CAPSULE ORAL DAILY
Status: DISCONTINUED | OUTPATIENT
Start: 2020-11-04 | End: 2020-11-05 | Stop reason: HOSPADM

## 2020-11-04 RX ORDER — PROMETHAZINE HYDROCHLORIDE 25 MG/1
12.5 TABLET ORAL EVERY 6 HOURS PRN
Status: DISCONTINUED | OUTPATIENT
Start: 2020-11-04 | End: 2020-11-05 | Stop reason: HOSPADM

## 2020-11-04 RX ORDER — ONDANSETRON 2 MG/ML
4 INJECTION INTRAMUSCULAR; INTRAVENOUS EVERY 6 HOURS PRN
Status: DISCONTINUED | OUTPATIENT
Start: 2020-11-04 | End: 2020-11-05 | Stop reason: HOSPADM

## 2020-11-04 RX ORDER — POLYETHYLENE GLYCOL 3350 17 G/17G
17 POWDER, FOR SOLUTION ORAL DAILY PRN
Status: DISCONTINUED | OUTPATIENT
Start: 2020-11-04 | End: 2020-11-05 | Stop reason: HOSPADM

## 2020-11-04 RX ORDER — SODIUM CHLORIDE 0.9 % (FLUSH) 0.9 %
10 SYRINGE (ML) INJECTION PRN
Status: DISCONTINUED | OUTPATIENT
Start: 2020-11-04 | End: 2020-11-05 | Stop reason: HOSPADM

## 2020-11-04 RX ORDER — ACETAMINOPHEN 650 MG/1
650 SUPPOSITORY RECTAL EVERY 6 HOURS PRN
Status: DISCONTINUED | OUTPATIENT
Start: 2020-11-04 | End: 2020-11-05 | Stop reason: HOSPADM

## 2020-11-04 RX ORDER — NITROGLYCERIN 0.4 MG/1
0.4 TABLET SUBLINGUAL EVERY 5 MIN PRN
Status: DISCONTINUED | OUTPATIENT
Start: 2020-11-04 | End: 2020-11-05 | Stop reason: HOSPADM

## 2020-11-04 RX ORDER — ASPIRIN 81 MG/1
324 TABLET, CHEWABLE ORAL ONCE
Status: DISCONTINUED | OUTPATIENT
Start: 2020-11-04 | End: 2020-11-05 | Stop reason: HOSPADM

## 2020-11-04 RX ORDER — SODIUM CHLORIDE 0.9 % (FLUSH) 0.9 %
10 SYRINGE (ML) INJECTION EVERY 12 HOURS SCHEDULED
Status: DISCONTINUED | OUTPATIENT
Start: 2020-11-04 | End: 2020-11-05 | Stop reason: HOSPADM

## 2020-11-04 RX ORDER — ACETAMINOPHEN 325 MG/1
650 TABLET ORAL EVERY 6 HOURS PRN
Status: DISCONTINUED | OUTPATIENT
Start: 2020-11-04 | End: 2020-11-05 | Stop reason: HOSPADM

## 2020-11-04 RX ADMIN — VITAM B12 100 MCG: 100 TAB at 21:11

## 2020-11-04 RX ADMIN — Medication 10 ML: at 21:12

## 2020-11-04 RX ADMIN — ACETAMINOPHEN 650 MG: 325 TABLET, FILM COATED ORAL at 22:08

## 2020-11-04 SDOH — HEALTH STABILITY: MENTAL HEALTH: HOW OFTEN DO YOU HAVE A DRINK CONTAINING ALCOHOL?: NEVER

## 2020-11-04 ASSESSMENT — ENCOUNTER SYMPTOMS
NAUSEA: 0
EYE PAIN: 0
CHEST TIGHTNESS: 0
BLOOD IN STOOL: 0
DIARRHEA: 0
FACIAL SWELLING: 0
WHEEZING: 0
CONSTIPATION: 0
COUGH: 1
EYE DISCHARGE: 0
SORE THROAT: 0
SHORTNESS OF BREATH: 1
TROUBLE SWALLOWING: 0
ABDOMINAL PAIN: 0
COLOR CHANGE: 0
BACK PAIN: 0
EYE REDNESS: 0
RHINORRHEA: 0
VOMITING: 0
SINUS PRESSURE: 0

## 2020-11-04 ASSESSMENT — PAIN DESCRIPTION - DESCRIPTORS
DESCRIPTORS: CONSTANT;SHARP;SHOOTING
DESCRIPTORS: STABBING
DESCRIPTORS: CONSTANT;SHARP
DESCRIPTORS: SHARP;STABBING

## 2020-11-04 ASSESSMENT — PAIN DESCRIPTION - ORIENTATION
ORIENTATION: LEFT

## 2020-11-04 ASSESSMENT — PAIN DESCRIPTION - PAIN TYPE
TYPE: ACUTE PAIN

## 2020-11-04 ASSESSMENT — PAIN DESCRIPTION - FREQUENCY
FREQUENCY: CONTINUOUS

## 2020-11-04 ASSESSMENT — PAIN SCALES - GENERAL
PAINLEVEL_OUTOF10: 9
PAINLEVEL_OUTOF10: 6
PAINLEVEL_OUTOF10: 6
PAINLEVEL_OUTOF10: 5

## 2020-11-04 ASSESSMENT — PAIN DESCRIPTION - LOCATION
LOCATION: CHEST

## 2020-11-04 ASSESSMENT — PAIN DESCRIPTION - ONSET
ONSET: PROGRESSIVE

## 2020-11-04 NOTE — ED PROVIDER NOTES
[address]  eMERGENCY dEPARTMENT eNCOUnter      Pt Name: Wisam Apodaca  MRN: [id number]  [place] 1980  Date of evaluation: 11/4/20      CHIEF COMPLAINT       Chief Complaint   Patient presents with    Chest Pain    Shortness of Breath         HISTORY OF PRESENT ILLNESS    Wisam Apodaca is a 38 y.o. female who presents complaining of chest pain. Patient states that about 1/2-hour ago she had sudden onset of severe pain in the middle to the left side of her chest radiating into her shoulder back and up into her neck. Patient does have shortness of breath associated with this. Patient states she has had a slight cough but has been tested within the last 3 days for Covid just precautionary. Patient states that she has no pain or swelling in the legs. Patient has no other infectious symptoms. Patient states that she has a history of having some tachycardia in the past but no cardiac problems. Patient denies any work-ups for her heart in the past.      REVIEW OF SYSTEMS       Review of Systems   Constitutional: Negative for activity change, appetite change, chills, diaphoresis and fever. HENT: Negative for congestion, ear pain, facial swelling, nosebleeds, rhinorrhea, sinus pressure, sore throat and trouble swallowing. Eyes: Negative for pain, discharge and redness. Respiratory: Positive for cough and shortness of breath. Negative for chest tightness and wheezing. Cardiovascular: Positive for chest pain. Negative for palpitations and leg swelling. Gastrointestinal: Negative for abdominal pain, blood in stool, constipation, diarrhea, nausea and vomiting. Genitourinary: Negative for difficulty urinating, dysuria, flank pain, frequency, genital sores and hematuria. Musculoskeletal: Negative for arthralgias, back pain, gait problem, joint swelling, myalgias and neck pain. Skin: Negative for color change, pallor, rash and wound.    Neurological: Negative for dizziness, tremors, seizures, syncope, speech difficulty, weakness, numbness and headaches. Psychiatric/Behavioral: Negative for confusion, decreased concentration, hallucinations, self-injury, sleep disturbance and suicidal ideas. PAST MEDICAL HISTORY     Past Medical History:   Diagnosis Date    Anemia     Family history of breast cancer     Family history of cervical cancer     Family history of uterine cancer     Herpes     MRSA (methicillin resistant staph aureus) culture positive 08/16/2017    abscess    Postpartum depression 11/7/2016       SURGICAL HISTORY       Past Surgical History:   Procedure Laterality Date    DILATION AND CURETTAGE      DILATION AND CURETTAGE OF UTERUS      Miscarriage    LAPAROSCOPY      for pain    [place] LAP,APPENDECTOMY N/A 3/5/2018    APPENDECTOMY LAPAROSCOPIC performed by Hector Ye MD at [address] [place] [address] N/A 3/20/2018    BOWEL RESECTION HEMICOLECTOMY - Right performed by Hector Ye MD at [address]       Previous Medications    CHOLECALCIFEROL (VITAMIN D3) 1000 UNITS TABS    TAKE ONE TABLET BY MOUTH DAILY    DULOXETINE (CYMBALTA) 60 MG EXTENDED RELEASE CAPSULE    TAKE ONE CAPSULE BY MOUTH DAILY    ONDANSETRON (ZOFRAN) 4 MG TABLET    Take 1 tablet by mouth 3 times daily as needed for Nausea or Vomiting    VITAMIN B-12 (CYANOCOBALAMIN) 100 MCG TABLET    Take 1 tablet by mouth daily       ALLERGIES     is allergic to codeine; fentanyl; and penicillins. FAMILY HISTORY     [unfilled]     SOCIAL HISTORY      reports that she has been smoking. She has been smoking about 0.25 packs per day. She has never used smokeless tobacco. She reports current drug use. Drug: Marijuana. She reports that she does not drink alcohol.     PHYSICAL EXAM     INITIAL VITALS: /76   Pulse 97   Temp 98.7 °F (37.1 °C) (Oral)   Resp 16   Ht 5' 6\" (1.676 m)   Wt 145 lb (65.8 kg)   LMP  (LMP Unknown)   SpO2 100%   BMI 23.40 kg/m²      Physical Exam  Vitals signs and nursing note reviewed. Constitutional:       General: She is not in acute distress. Appearance: She is well-developed. She is not diaphoretic. HENT:      Head: Normocephalic and atraumatic. Eyes:      General: No scleral icterus. Right eye: No discharge. Left eye: No discharge. Conjunctiva/sclera: Conjunctivae normal.      Pupils: Pupils are equal, round, and reactive to light. Cardiovascular:      Rate and Rhythm: Regular rhythm. Tachycardia present. Heart sounds: Normal heart sounds. No murmur. No friction rub. No gallop. Pulmonary:      Effort: Pulmonary effort is normal. No respiratory distress. Breath sounds: Normal breath sounds. No wheezing or rales. Chest:      Chest wall: Tenderness present. Abdominal:      General: Bowel sounds are normal. There is no distension. Palpations: Abdomen is soft. There is no mass. Tenderness: There is no abdominal tenderness. There is no guarding or rebound. Musculoskeletal: Normal range of motion. General: No tenderness. Skin:     General: Skin is warm and dry. Coloration: Skin is not pale. Findings: No erythema or rash. Neurological:      Mental Status: She is alert and oriented to person, place, and time. Cranial Nerves: No cranial nerve deficit. Sensory: No sensory deficit. Motor: No abnormal muscle tone. Coordination: Coordination normal.      Deep Tendon Reflexes: Reflexes normal.   Psychiatric:         Behavior: Behavior normal.         Thought Content: Thought content normal.         Judgment: Judgment normal.         MEDICAL DECISION MAKING:     Patient has atypical symptoms but concerning symptoms for cardiac disease but no real risk factors. We will do a full cardiac work-up also get a D-dimer because of her tachycardia though she does have a history of this.   If work-up is negative will have to discuss admission for further work-up versus discharge home to follow-up with PCP. DIAGNOSTIC RESULTS     EKG: All EKG's are interpreted by the Emergency Department Physician who either signs or Co-signs this chart in the absence of a cardiologist.    EKG Interpretation    Interpreted by emergency department physician    Rhythm: normal sinus   Rate: normal  Axis: normal  Ectopy: none  Conduction: Prolonged QTC  ST Segments: normal  T Waves: normal  Q Waves: none    Clinical Impression: EKG: normal sinus rhythm, prolonged QT interval.      [facility]        RADIOLOGY:All plain film, CT, MRI, and formal ultrasound images (except ED bedside ultrasound)are read by the radiologist and interpretations are directly viewed by the emergency physician. Xr Chest Portable    Result Date: 11/4/2020  EXAMINATION: ONE XRAY VIEW OF THE CHEST 11/4/2020 2:58 pm COMPARISON: None. HISTORY: ORDERING SYSTEM PROVIDED HISTORY: Chest Pain TECHNOLOGIST PROVIDED HISTORY: Chest Pain Reason for Exam: shortness of breath and chest pain Acuity: Unknown Type of Exam: Unknown FINDINGS: Single portable frontal view of the chest is submitted for review. The cardiac silhouette is normal in size. Lung parenchyma is clear without focal airspace consolidation, sizeable pleural effusion, or pneumothorax. Trachea is midline. Visualized osseous structures and soft tissues are grossly intact. No acute cardiopulmonary pathology. LABS: All lab results were reviewed bymyself, and all abnormals are listed below.   Labs Reviewed   BASIC METABOLIC PANEL - Abnormal; Notable for the following components:       Result Value    Glucose 107 (*)     Chloride 109 (*)     All other components within normal limits   CBC WITH AUTO DIFFERENTIAL - Abnormal; Notable for the following components:    Hematocrit 35.9 (*)     RDW 15.2 (*)     Monocytes 9 (*)     All other components within normal limits   D-DIMER, QUANTITATIVE - Abnormal; Notable for the following components:    D-Dimer, Quant 0.63 (*)     All other components within normal limits   TROP/MYOGLOBIN   TROP/MYOGLOBIN         EMERGENCY DEPARTMENT COURSE:   Vitals:    Vitals:    11/04/20 1430   BP: 119/76   Pulse: 97   Resp: 16   Temp: 98.7 °F (37.1 °C)   TempSrc: Oral   SpO2: 100%   Weight: 145 lb (65.8 kg)   Height: 5' 6\" (1.676 m)       The patient was given the following medications while in the emergency department:     Orders Placed This Encounter   Medications    aspirin chewable tablet 324 mg    nitroGLYCERIN (NITROSTAT) SL tablet 0.4 mg       -------------------------  3:16 PM EST  Patient has been evaluated still having pain. I would not admit her for cardiac work-up. I spoke with Dr. Baljit Eldridge who agrees to the admission. Residents been notified. CRITICAL CARE:   None    CONSULTS:  None    PROCEDURES:  none    FINAL IMPRESSION      1. Chest pain, unspecified type          DISPOSITION/PLAN   DISPOSITION Decision To Admit 11/04/2020 03:06:53 PM      PATIENT REFERRED TO:  No follow-up provider specified.     DISCHARGE MEDICATIONS:  New Prescriptions    No medications on file       (Please note that portions of this note were completed with a voice recognition program.  Efforts were made to edit the dictations but occasionally words are mis-transcribed.)    Ghassan Curran MD  Attending Joanne Lamar MD  11/04/20 8156

## 2020-11-04 NOTE — ED NOTES
Bed: F  Expected date: 11/4/20  Expected time: 2:19 PM  Means of arrival:   Comments:  [address] [place] [address], [address] [place] [address]  11/04/20 0544

## 2020-11-04 NOTE — LETTER
[address]  Phone: [phone]    No name on file. November 5, 2020     Patient: Mayito Pina   YOB: 1980   Date of Visit: 11/4/2020       To Whom It May Concern: It is my medical opinion that Shantal Chin may return to work on Monday November 9th 2020. If you have any questions or concerns, please don't hesitate to call. Sincerely,        No name on file.

## 2020-11-04 NOTE — H&P
[address]      [address] [employer]     HISTORY AND PHYSICAL EXAMINATION            Date:   11/4/2020  Patient name:  Jose Rowley  Date of admission:  11/4/2020  2:24 PM  MRN:   [id number]  Account:  [de-identified]  YOB: 1980  PCP:    Nura Rose MD  Room:   F/F  Code Status:    Prior    Chief Complaint:     Chief Complaint   Patient presents with    Chest Pain    Shortness of Breath       History Obtained From:     patient    History of Present Illness: The patient is a 38 y.o. Non-/non  female who presents withChest Pain and Shortness of Breath  and she is admitted to the hospital for the management of Left sided chest pain. 18-year-old female patient with no significant past medical history except for intermittent palpitations, presented with a sudden onset left-sided chest pain 5/10, pressure like sensation with radiation to the back, neck and inner left arm started about an hour prior to her presentation to the ER. She was lifting some boxes and placing them on the shelves when this pain was brought up. Her chest pain is constant and is associated with dizziness, nausea and shortness of breath. Patient started to smoke about a year ago and denied any recreational drugs or alcohol intake. No fever. She reported coughing that is chronic and she attributed it to smoking. No change in her cough frequency or intensity. No abdominal pain, no urinary symptoms. No altered bowel motions, no vomiting. Her pain was still present at the time of history taking with no worsening. No history of recent traveling or prolonged immobilization. The patient did not have this chest pain before.      Past Medical History:     Past Medical History:   Diagnosis Date    Anemia     Family history of breast cancer     Family history of cervical cancer     Family history of uterine cancer     Herpes     MRSA (methicillin resistant staph aureus) culture positive 08/16/2017    abscess    Postpartum depression 11/7/2016      Past SurgicalHistory:     Past Surgical History:   Procedure Laterality Date    DILATION AND CURETTAGE      DILATION AND CURETTAGE OF UTERUS      Miscarriage    LAPAROSCOPY      for pain    [place] LAP,APPENDECTOMY N/A 3/5/2018    APPENDECTOMY LAPAROSCOPIC performed by Dougie Méndez MD at [address] N/A 3/20/2018    BOWEL RESECTION HEMICOLECTOMY - Right performed by Dougie Méndez MD at [address]      Medications Prior to Admission:        Prior to Admission medications    Medication Sig Start Date End Date Taking? Authorizing Provider   DULoxetine (CYMBALTA) 60 MG extended release capsule TAKE ONE CAPSULE BY MOUTH DAILY 6/22/20  Yes Mir Woods MD   ondansetron (ZOFRAN) 4 MG tablet Take 1 tablet by mouth 3 times daily as needed for Nausea or Vomiting 4/28/20  Yes Amy Dodd PA-C   vitamin B-12 (CYANOCOBALAMIN) 100 MCG tablet Take 1 tablet by mouth daily 2/19/20 2/18/21 Yes Mir Woods MD        Allergies:     Codeine; Fentanyl; and Penicillins    Social History:     Tobacco:    reports that she has been smoking. She has been smoking about 0.25 packs per day. She has never used smokeless tobacco.  Alcohol:      reports no history of alcohol use. Drug Use:  reports current drug use. Drug: Marijuana. Family History:     Family History   Problem Relation Age of Onset    Diabetes Paternal Grandfather     Diabetes Paternal Grandmother     Uterine Cancer Paternal Grandmother     Cervical Cancer Paternal Grandmother     Cancer Maternal Grandmother     Breast Cancer Maternal Grandmother        Review of Systems:     Positive and Negative as described in HPI. CONSTITUTIONAL:  no fevers  EYES: negative for blury vision  HEENT: No headaches, no nasal congestion, no difficulty swallowing. REPORTED    CBC Auto Differential    Collection Time: 11/04/20  2:37 PM   Result Value Ref Range    WBC 7.0 3.5 - 11.0 k/uL    RBC 4.16 4.0 - 5.2 m/uL    Hemoglobin 12.3 12.0 - 16.0 g/dL    Hematocrit 35.9 (L) 36 - 46 %    MCV 86.4 80 - 100 fL    MCH 29.6 26 - 34 pg    MCHC 34.3 31 - 37 g/dL    RDW 15.2 (H) 11.5 - 14.9 %    Platelets 371 275 - 059 k/uL    MPV 8.1 6.0 - 12.0 fL    NRBC Automated NOT REPORTED per 100 WBC    Differential Type NOT REPORTED     Seg Neutrophils 45 36 - 66 %    Lymphocytes 43 24 - 44 %    Monocytes 9 (H) 1 - 7 %    Eosinophils % 2 0 - 4 %    Basophils 1 0 - 2 %    Immature Granulocytes NOT REPORTED 0 %    Segs Absolute 3.20 1.3 - 9.1 k/uL    Absolute Lymph # 3.00 1.0 - 4.8 k/uL    Absolute Mono # 0.60 0.1 - 1.3 k/uL    Absolute Eos # 0.10 0.0 - 0.4 k/uL    Basophils Absolute 0.00 0.0 - 0.2 k/uL    Absolute Immature Granulocyte NOT REPORTED 0.00 - 0.30 k/uL    WBC Morphology NOT REPORTED     RBC Morphology NOT REPORTED     Platelet Estimate NOT REPORTED    D-Dimer, Quantitative    Collection Time: 11/04/20  2:37 PM   Result Value Ref Range    D-Dimer, Quant 0.63 (H) 0.00 - 0.59 mg/L FEU   TROP/MYOGLOBIN    Collection Time: 11/04/20  2:37 PM   Result Value Ref Range    Troponin, High Sensitivity <6 0 - 14 ng/L    Troponin T NOT REPORTED <0.03 ng/mL    Troponin Interp NOT REPORTED     Myoglobin 25 25 - 58 ng/mL   EKG 12 Lead    Collection Time: 11/04/20  3:24 PM   Result Value Ref Range    Ventricular Rate 98 BPM    Atrial Rate 98 BPM    P-R Interval 126 ms    QRS Duration 82 ms    Q-T Interval 384 ms    QTc Calculation (Bazett) 490 ms    P Axis 49 degrees    R Axis 59 degrees    T Axis 41 degrees         Current Facility-Administered Medications   Medication Dose Route Frequency Provider Last Rate Last Dose    aspirin chewable tablet 324 mg  324 mg Oral Once Mandielillian Louise MD        nitroGLYCERIN (NITROSTAT) SL tablet 0.4 mg  0.4 mg Sublingual Q5 Min PRN Madnie Louise MD         Current Outpatient Medications   Medication Sig Dispense Refill    DULoxetine (CYMBALTA) 60 MG extended release capsule TAKE ONE CAPSULE BY MOUTH DAILY 30 capsule 11    ondansetron (ZOFRAN) 4 MG tablet Take 1 tablet by mouth 3 times daily as needed for Nausea or Vomiting 30 tablet 0    vitamin B-12 (CYANOCOBALAMIN) 100 MCG tablet Take 1 tablet by mouth daily 30 tablet 3       Imaging/Diagnostics:    Xr Chest Portable    Result Date: 11/4/2020  EXAMINATION: ONE XRAY VIEW OF THE CHEST 11/4/2020 2:58 pm COMPARISON: None. HISTORY: ORDERING SYSTEM PROVIDED HISTORY: Chest Pain TECHNOLOGIST PROVIDED HISTORY: Chest Pain Reason for Exam: shortness of breath and chest pain Acuity: Unknown Type of Exam: Unknown FINDINGS: Single portable frontal view of the chest is submitted for review. The cardiac silhouette is normal in size. Lung parenchyma is clear without focal airspace consolidation, sizeable pleural effusion, or pneumothorax. Trachea is midline. Visualized osseous structures and soft tissues are grossly intact. No acute cardiopulmonary pathology.      Assessment :      Primary Problem  Chest pain radiating to arm    Active Hospital Problems    Diagnosis Date Noted    Chest pain radiating to arm [R07.89] 11/04/2020    Positive D-dimer [R79.89] 11/04/2020    Nausea [R11.0] 11/04/2020    Shortness of breath [R06.02] 11/04/2020    Cough [R05] 11/04/2020    S/P laparoscopic appendectomy [Z90.49] 03/05/2018    MRSA infection - buttock and prepatellar bursa  [A49.02] 08/19/2017    Tobacco use [Z72.0] 08/16/2017    Family history of breast cancer [Z80.3]     Family history of uterine cancer [Z80.49]     Family history of cervical cancer [Z80.49]        Plan:     Patient status Admit as inpatient in the  Progressive Unit/Step down    Consultations:   [address] TO SOCIAL WORK    Chest pain rule out ACS and cardiac causes:  -Consultation to cardiology.  -Echocardiography ordered.  -Troponin negative on presentation, will trend and follow pattern.  -Cardiac stress test ordered.  -Chest x-ray: No signs of heart failure, no signs of pneumonia, no pneumothorax  -Probably MSK in origin  -EKG on presentation: Normal sinus rhythm, QT slightly prolonged 490. Positive D-dimer:  -D-Dimers: 0.63  -Patient reported left lower calf pain, Doppler ultrasound ordered  -R/O DVT. Depression-on medical treatment:  -Cymbalta 60 mg daily daily. Patient is admitted as inpatient status because of co-morbiditieslisted above, severity of signs and symptoms as outlined, requirement for current medical therapies and most importantly because of direct risk to patient if care not provided in a hospital setting. Mary Linda MD  11/4/2020  4:42 PM    Copy sent to Dr. Edison Kwan MD  Attending Physician Statement  I have discussed the care of Shreyas Tuttle and I have examined the patient myselft and taken ros and hpi , including pertinent history and exam findings,  with the resident. I have reviewed the key elements of all parts of the encounter with the resident. I agree with the assessment, plan and orders as documented by the resident.   Sudden onset of left-sided chest pain with radiation to jaw and back with diaphoresis and nausea active smoker 3 to 4 cigarettes a day no history of MI no drug abuse no family history of early MI at a young age pain is reproducible  D-dimer elevated pain is persistent but better stress test results pending we will do a CTA rule out PE or dissection if negative discharge planning for outpatient PCP follow-up    Electronically signed by Thuy Galloway MD

## 2020-11-05 ENCOUNTER — APPOINTMENT (OUTPATIENT)
Dept: CT IMAGING | Age: 40
End: 2020-11-05
Payer: MEDICARE

## 2020-11-05 ENCOUNTER — APPOINTMENT (OUTPATIENT)
Dept: NUCLEAR MEDICINE | Age: 40
End: 2020-11-05
Payer: MEDICARE

## 2020-11-05 VITALS
RESPIRATION RATE: 16 BRPM | SYSTOLIC BLOOD PRESSURE: 109 MMHG | OXYGEN SATURATION: 100 % | TEMPERATURE: 98.2 F | WEIGHT: 145.5 LBS | DIASTOLIC BLOOD PRESSURE: 67 MMHG | BODY MASS INDEX: 23.38 KG/M2 | HEIGHT: 66 IN | HEART RATE: 69 BPM

## 2020-11-05 LAB
ABSOLUTE EOS #: 0.3 K/UL (ref 0–0.4)
ABSOLUTE IMMATURE GRANULOCYTE: ABNORMAL K/UL (ref 0–0.3)
ABSOLUTE LYMPH #: 2.7 K/UL (ref 1–4.8)
ABSOLUTE MONO #: 0.6 K/UL (ref 0.1–1.3)
ALBUMIN SERPL-MCNC: 3.7 G/DL (ref 3.5–5.2)
ALBUMIN/GLOBULIN RATIO: ABNORMAL (ref 1–2.5)
ALP BLD-CCNC: 35 U/L (ref 35–104)
ALT SERPL-CCNC: 10 U/L (ref 5–33)
ANION GAP SERPL CALCULATED.3IONS-SCNC: 9 MMOL/L (ref 9–17)
AST SERPL-CCNC: 13 U/L
BASOPHILS # BLD: 1 % (ref 0–2)
BASOPHILS ABSOLUTE: 0 K/UL (ref 0–0.2)
BILIRUB SERPL-MCNC: 0.34 MG/DL (ref 0.3–1.2)
BUN BLDV-MCNC: 18 MG/DL (ref 6–20)
BUN/CREAT BLD: ABNORMAL (ref 9–20)
CALCIUM SERPL-MCNC: 8.8 MG/DL (ref 8.6–10.4)
CHLORIDE BLD-SCNC: 108 MMOL/L (ref 98–107)
CO2: 25 MMOL/L (ref 20–31)
CREAT SERPL-MCNC: 0.77 MG/DL (ref 0.5–0.9)
DIFFERENTIAL TYPE: ABNORMAL
EOSINOPHILS RELATIVE PERCENT: 4 % (ref 0–4)
GFR AFRICAN AMERICAN: >60 ML/MIN
GFR NON-AFRICAN AMERICAN: >60 ML/MIN
GFR SERPL CREATININE-BSD FRML MDRD: ABNORMAL ML/MIN/{1.73_M2}
GFR SERPL CREATININE-BSD FRML MDRD: ABNORMAL ML/MIN/{1.73_M2}
GLUCOSE BLD-MCNC: 102 MG/DL (ref 70–99)
HCG QUALITATIVE: NEGATIVE
HCT VFR BLD CALC: 35.7 % (ref 36–46)
HEMOGLOBIN: 12.1 G/DL (ref 12–16)
IMMATURE GRANULOCYTES: ABNORMAL %
LV EF: 50 %
LV EF: 52 %
LVEF MODALITY: NORMAL
LVEF MODALITY: NORMAL
LYMPHOCYTES # BLD: 42 % (ref 24–44)
MCH RBC QN AUTO: 29.5 PG (ref 26–34)
MCHC RBC AUTO-ENTMCNC: 33.8 G/DL (ref 31–37)
MCV RBC AUTO: 87.3 FL (ref 80–100)
MONOCYTES # BLD: 10 % (ref 1–7)
NRBC AUTOMATED: ABNORMAL PER 100 WBC
PDW BLD-RTO: 15.2 % (ref 11.5–14.9)
PLATELET # BLD: 278 K/UL (ref 150–450)
PLATELET ESTIMATE: ABNORMAL
PMV BLD AUTO: 8.1 FL (ref 6–12)
POTASSIUM SERPL-SCNC: 4 MMOL/L (ref 3.7–5.3)
RBC # BLD: 4.08 M/UL (ref 4–5.2)
RBC # BLD: ABNORMAL 10*6/UL
SEG NEUTROPHILS: 43 % (ref 36–66)
SEGMENTED NEUTROPHILS ABSOLUTE COUNT: 2.7 K/UL (ref 1.3–9.1)
SODIUM BLD-SCNC: 142 MMOL/L (ref 135–144)
TOTAL PROTEIN: 6.1 G/DL (ref 6.4–8.3)
TROPONIN INTERP: NORMAL
TROPONIN INTERP: NORMAL
TROPONIN T: NORMAL NG/ML
TROPONIN T: NORMAL NG/ML
TROPONIN, HIGH SENSITIVITY: <6 NG/L (ref 0–14)
TROPONIN, HIGH SENSITIVITY: <6 NG/L (ref 0–14)
WBC # BLD: 6.4 K/UL (ref 3.5–11)
WBC # BLD: ABNORMAL 10*3/UL

## 2020-11-05 PROCEDURE — 36415 COLL VENOUS BLD VENIPUNCTURE: CPT

## 2020-11-05 PROCEDURE — 85025 COMPLETE CBC W/AUTO DIFF WBC: CPT

## 2020-11-05 PROCEDURE — 96372 THER/PROPH/DIAG INJ SC/IM: CPT

## 2020-11-05 PROCEDURE — G0378 HOSPITAL OBSERVATION PER HR: HCPCS

## 2020-11-05 PROCEDURE — 6360000004 HC RX CONTRAST MEDICATION: Performed by: STUDENT IN AN ORGANIZED HEALTH CARE EDUCATION/TRAINING PROGRAM

## 2020-11-05 PROCEDURE — 3430000000 HC RX DIAGNOSTIC RADIOPHARMACEUTICAL: Performed by: STUDENT IN AN ORGANIZED HEALTH CARE EDUCATION/TRAINING PROGRAM

## 2020-11-05 PROCEDURE — 84703 CHORIONIC GONADOTROPIN ASSAY: CPT

## 2020-11-05 PROCEDURE — 2580000003 HC RX 258: Performed by: STUDENT IN AN ORGANIZED HEALTH CARE EDUCATION/TRAINING PROGRAM

## 2020-11-05 PROCEDURE — 6360000002 HC RX W HCPCS: Performed by: STUDENT IN AN ORGANIZED HEALTH CARE EDUCATION/TRAINING PROGRAM

## 2020-11-05 PROCEDURE — 84484 ASSAY OF TROPONIN QUANT: CPT

## 2020-11-05 PROCEDURE — 80053 COMPREHEN METABOLIC PANEL: CPT

## 2020-11-05 PROCEDURE — 93017 CV STRESS TEST TRACING ONLY: CPT

## 2020-11-05 PROCEDURE — 2580000003 HC RX 258: Performed by: INTERNAL MEDICINE

## 2020-11-05 PROCEDURE — 71260 CT THORAX DX C+: CPT

## 2020-11-05 PROCEDURE — 6360000002 HC RX W HCPCS: Performed by: INTERNAL MEDICINE

## 2020-11-05 PROCEDURE — 93970 EXTREMITY STUDY: CPT

## 2020-11-05 PROCEDURE — 96374 THER/PROPH/DIAG INJ IV PUSH: CPT

## 2020-11-05 PROCEDURE — 93306 TTE W/DOPPLER COMPLETE: CPT

## 2020-11-05 PROCEDURE — 78452 HT MUSCLE IMAGE SPECT MULT: CPT

## 2020-11-05 PROCEDURE — 99217 PR OBSERVATION CARE DISCHARGE MANAGEMENT: CPT | Performed by: INTERNAL MEDICINE

## 2020-11-05 PROCEDURE — 6360000002 HC RX W HCPCS: Performed by: NURSE PRACTITIONER

## 2020-11-05 PROCEDURE — 6370000000 HC RX 637 (ALT 250 FOR IP): Performed by: STUDENT IN AN ORGANIZED HEALTH CARE EDUCATION/TRAINING PROGRAM

## 2020-11-05 PROCEDURE — A9500 TC99M SESTAMIBI: HCPCS | Performed by: STUDENT IN AN ORGANIZED HEALTH CARE EDUCATION/TRAINING PROGRAM

## 2020-11-05 RX ORDER — ALBUTEROL SULFATE 2.5 MG/3ML
2.5 SOLUTION RESPIRATORY (INHALATION) EVERY 6 HOURS PRN
Status: DISCONTINUED | OUTPATIENT
Start: 2020-11-05 | End: 2020-11-05 | Stop reason: HOSPADM

## 2020-11-05 RX ORDER — SODIUM CHLORIDE 0.9 % (FLUSH) 0.9 %
10 SYRINGE (ML) INJECTION PRN
Status: ACTIVE | OUTPATIENT
Start: 2020-11-05 | End: 2020-11-05

## 2020-11-05 RX ORDER — ATROPINE SULFATE 0.1 MG/ML
0.5 INJECTION INTRAVENOUS EVERY 5 MIN PRN
Status: ACTIVE | OUTPATIENT
Start: 2020-11-05 | End: 2020-11-05

## 2020-11-05 RX ORDER — SODIUM CHLORIDE 0.9 % (FLUSH) 0.9 %
10 SYRINGE (ML) INJECTION PRN
Status: DISCONTINUED | OUTPATIENT
Start: 2020-11-05 | End: 2020-11-05 | Stop reason: HOSPADM

## 2020-11-05 RX ORDER — 0.9 % SODIUM CHLORIDE 0.9 %
80 INTRAVENOUS SOLUTION INTRAVENOUS ONCE
Status: COMPLETED | OUTPATIENT
Start: 2020-11-05 | End: 2020-11-05

## 2020-11-05 RX ORDER — NITROGLYCERIN 0.4 MG/1
0.4 TABLET SUBLINGUAL EVERY 5 MIN PRN
Status: ACTIVE | OUTPATIENT
Start: 2020-11-05 | End: 2020-11-05

## 2020-11-05 RX ORDER — MORPHINE SULFATE 2 MG/ML
1 INJECTION, SOLUTION INTRAMUSCULAR; INTRAVENOUS ONCE
Status: COMPLETED | OUTPATIENT
Start: 2020-11-05 | End: 2020-11-05

## 2020-11-05 RX ORDER — METOPROLOL TARTRATE 5 MG/5ML
5 INJECTION INTRAVENOUS EVERY 5 MIN PRN
Status: ACTIVE | OUTPATIENT
Start: 2020-11-05 | End: 2020-11-05

## 2020-11-05 RX ORDER — AMINOPHYLLINE DIHYDRATE 25 MG/ML
50 INJECTION, SOLUTION INTRAVENOUS PRN
Status: ACTIVE | OUTPATIENT
Start: 2020-11-05 | End: 2020-11-05

## 2020-11-05 RX ORDER — SODIUM CHLORIDE 9 MG/ML
500 INJECTION, SOLUTION INTRAVENOUS CONTINUOUS PRN
Status: ACTIVE | OUTPATIENT
Start: 2020-11-05 | End: 2020-11-05

## 2020-11-05 RX ORDER — ACETAMINOPHEN 500 MG
1000 TABLET ORAL 3 TIMES DAILY
Qty: 60 TABLET | Refills: 0 | Status: SHIPPED | OUTPATIENT
Start: 2020-11-05 | End: 2021-03-24

## 2020-11-05 RX ADMIN — SODIUM CHLORIDE 80 ML: 9 INJECTION, SOLUTION INTRAVENOUS at 12:56

## 2020-11-05 RX ADMIN — Medication 10 ML: at 12:56

## 2020-11-05 RX ADMIN — MORPHINE SULFATE 1 MG: 2 INJECTION, SOLUTION INTRAMUSCULAR; INTRAVENOUS at 00:21

## 2020-11-05 RX ADMIN — Medication 10 ML: at 09:16

## 2020-11-05 RX ADMIN — DULOXETINE HYDROCHLORIDE 60 MG: 60 CAPSULE, DELAYED RELEASE ORAL at 12:17

## 2020-11-05 RX ADMIN — VITAM B12 100 MCG: 100 TAB at 12:17

## 2020-11-05 RX ADMIN — IOPAMIDOL 75 ML: 755 INJECTION, SOLUTION INTRAVENOUS at 12:56

## 2020-11-05 RX ADMIN — Medication 10 ML: at 09:15

## 2020-11-05 RX ADMIN — REGADENOSON 0.4 MG: 0.08 INJECTION, SOLUTION INTRAVENOUS at 09:15

## 2020-11-05 RX ADMIN — ENOXAPARIN SODIUM 40 MG: 40 INJECTION SUBCUTANEOUS at 12:17

## 2020-11-05 RX ADMIN — Medication 10 ML: at 07:24

## 2020-11-05 RX ADMIN — Medication 10 ML: at 12:17

## 2020-11-05 RX ADMIN — TETRAKIS(2-METHOXYISOBUTYLISOCYANIDE)COPPER(I) TETRAFLUOROBORATE 33.5 MILLICURIE: 1 INJECTION, POWDER, LYOPHILIZED, FOR SOLUTION INTRAVENOUS at 09:17

## 2020-11-05 RX ADMIN — TETRAKIS(2-METHOXYISOBUTYLISOCYANIDE)COPPER(I) TETRAFLUOROBORATE 11.2 MILLICURIE: 1 INJECTION, POWDER, LYOPHILIZED, FOR SOLUTION INTRAVENOUS at 07:24

## 2020-11-05 ASSESSMENT — PAIN DESCRIPTION - ORIENTATION: ORIENTATION: LEFT

## 2020-11-05 ASSESSMENT — PAIN DESCRIPTION - FREQUENCY: FREQUENCY: CONTINUOUS

## 2020-11-05 ASSESSMENT — PAIN SCALES - GENERAL
PAINLEVEL_OUTOF10: 6
PAINLEVEL_OUTOF10: 2

## 2020-11-05 ASSESSMENT — PAIN DESCRIPTION - PAIN TYPE: TYPE: ACUTE PAIN

## 2020-11-05 ASSESSMENT — PAIN DESCRIPTION - DESCRIPTORS: DESCRIPTORS: CONSTANT;SHARP

## 2020-11-05 ASSESSMENT — PAIN DESCRIPTION - LOCATION: LOCATION: CHEST

## 2020-11-05 NOTE — PROCEDURES
[address] [facility]                    [facility]. [address]                              CARDIAC STRESS TEST    PATIENT NAME: Yudelka Romo                      :        1980  MED REC NO:   [id number]                              ROOM:       2089  ACCOUNT NO:   [de-identified]                           ADMIT DATE: 2020  PROVIDER:     Sommer Aguilar    DATE OF STUDY:  2020    ORDERING PROVIDER:  Nitza Nieves MD    INTERPRETING PHYSICIAN:  Chi Carvajal MD    LEXISCAN STRESS TEST    INDICATION:  CHEST PAIN    INTERPRETATION:  Resting heart rate:  71 bpm.  Resting blood pressure:  90/69 mmhg. Resting EKg:  Normal.  Stress heart response:  Normal response. Blood pressure response:  Appropriate. Stress EKGs:  Normal.  No chest pain during stress. No ischemic Ekg changes. IMPRESSION:  NEGATIVE LEXISCAN STRESS TEST. THE NUCLEAR SCAN TO FOLLOW.       Cortez Robbins    D: 2020 11:04:46       T: 2020 11:05:48     CAM/KEVIN  Job#: [id number]     Doc#: Unknown    CC:    (Retain this field even if not dictated or not decipherable)

## 2020-11-05 NOTE — PLAN OF CARE
Pt remained free from injury and falls this shift. Call light within reach, non-slip socks on, bedside table within reach, 2/4 side rails up, and bed in lowest position. Will continue to monitor. Problem: Falls - Risk of:  Goal: Will remain free from falls  Description: Will remain free from falls  Outcome: Ongoing  Goal: Absence of physical injury  Description: Absence of physical injury  Outcome: Ongoing     Problem: Pain:  Goal: Pain level will decrease  Description: Pain level will decrease  Outcome: Ongoing  Goal: Control of acute pain  Description: Control of acute pain  Outcome: Ongoing  Note: Pt has c/o of chest pain that isn't relieved with tylenol, nitro, or morphine but states it's tolerable. Goal: Control of chronic pain  Description: Control of chronic pain  Outcome: Ongoing     Problem: Cardiac Output - Decreased:  Goal: Hemodynamic stability will improve  Description: Hemodynamic stability will improve  Outcome: Ongoing  Note: Vitals are stable this shift. Heart rate has been brendon at times this shift with mild hypotension which pt states isn't uncommon for her.

## 2020-11-05 NOTE — PROGRESS NOTES
Pt admitted to room 2089 via stretcher from ER. Heart monitor applied, vitals stable. Pt c/o of on-going chest pain but is tolerable at the moment. No other s/s of distress at this time. Call light within reach, bedside table within reach, non-slip socks on, and bed low and locked. Will continue to monitor.

## 2020-11-05 NOTE — PROGRESS NOTES
[address] [facility]    PROGRESS NOTE             11/5/2020    9:34 AM    Name:   Jeanie Fang  MRN:     [id number]     Guilleide:      [de-identified]   Room:   [id number] Day:  1  Admit Date:  11/4/2020  2:24 PM    PCP:  Mir Woods MD  Code Status:  Full Code    Subjective:     C/C:   Chief Complaint   Patient presents with    Chest Pain    Shortness of Breath     Interval History Status: not changed. Patient seen and examined at the bed side, Patient stated she has still chest pain, it is somehow better compared to yesterday. No change in character of the pain,. No new events overnight. Notes from nursing staff and Consults had been reviewed, and the overnight progress had been checked with the nursing staff as well. Brief History:     Please see H&P. Review of Systems:     CONSTITUTIONAL:  no fevers  EYES: negative for blury vision  HEENT: No headaches, no nasal congestion, no difficulty swallowing  RESPIRATORY:negative for dyspnea, no wheezing, no Cough. CARDIOVASCULAR: Positive chest pain, no palpitations. GASTROINTESTINAL: no nausea, no vomiting, no change in bowel habits, no abdominal pain   GENITOURINARY: negative for dysuria, no hematuria   MUSCULOSKELETAL: no joint pains, no muscle aches, no swelling of joints or extremities  NEUROLOGICAL: No weakness or numbness    Medications: Allergies:     Allergies   Allergen Reactions    Codeine      hallucinations    Fentanyl Other (See Comments)     hallucinations    Penicillins Hives       Current Meds:   Scheduled Meds:    aspirin  324 mg Oral Once    sodium chloride flush  10 mL Intravenous 2 times per day    enoxaparin  40 mg Subcutaneous Daily    DULoxetine  60 mg Oral Daily    vitamin B-12  100 mcg Oral Daily     Continuous Infusions:    sodium chloride       PRN Meds: sodium chloride flush, sodium chloride flush, sodium chloride, atropine, nitroGLYCERIN, metoprolol, aminophylline, albuterol, nitroGLYCERIN, sodium chloride flush, acetaminophen **OR** acetaminophen, polyethylene glycol, promethazine **OR** ondansetron    Data:     Past Medical History:   has a past medical history of Anemia, Family history of breast cancer, Family history of cervical cancer, Family history of uterine cancer, Herpes, MRSA (methicillin resistant staph aureus) culture positive, and Postpartum depression. Social History:   reports that she has been smoking. She has been smoking about 0.25 packs per day. She has never used smokeless tobacco. She reports current drug use. Drug: Marijuana. She reports that she does not drink alcohol. Family History:   Family History   Problem Relation Age of Onset    Diabetes Paternal Grandfather     Diabetes Paternal Grandmother     Uterine Cancer Paternal Grandmother     Cervical Cancer Paternal Grandmother     Cancer Maternal Grandmother     Breast Cancer Maternal Grandmother        Vitals:  BP 98/69   Pulse 71   Temp 97.9 °F (36.6 °C) (Oral)   Resp 18   Ht 5' 6\" (1.676 m)   Wt 145 lb 8.1 oz (66 kg)   LMP  (LMP Unknown)   SpO2 99%   BMI 23.48 kg/m²   Temp (24hrs), Av.2 °F (36.8 °C), Min:97.9 °F (36.6 °C), Max:98.7 °F (37.1 °C)      No results for input(s): POCGLU in the last 72 hours. I/O(24Hr):     Intake/Output Summary (Last 24 hours) at 2020 0934  Last data filed at 2020 0819  Gross per 24 hour   Intake 200 ml   Output 300 ml   Net -100 ml       Labs:    CBC:   Lab Results   Component Value Date    WBC 6.4 2020    RBC 4.08 2020    HGB 12.1 2020    HCT 35.7 2020    MCV 87.3 2020    MCH 29.5 2020    MCHC 33.8 2020    RDW 15.2 2020     2020    MPV 8.1 2020     CBC with Differential:    Lab Results   Component Value Date    WBC 6.4 2020    RBC 4.08 2020    HGB 12.1 2020    HCT 35.7 2020     2020    MCV 87.3 2020 MCH 29.5 11/05/2020    MCHC 33.8 11/05/2020    RDW 15.2 11/05/2020    LYMPHOPCT 42 11/05/2020    MONOPCT 10 11/05/2020    BASOPCT 1 11/05/2020    MONOSABS 0.60 11/05/2020    LYMPHSABS 2.70 11/05/2020    EOSABS 0.30 11/05/2020    BASOSABS 0.00 11/05/2020    DIFFTYPE NOT REPORTED 11/05/2020     WBC:    Lab Results   Component Value Date    WBC 6.4 11/05/2020     Platelets:    Lab Results   Component Value Date     11/05/2020     Hemoglobin/Hematocrit:    Lab Results   Component Value Date    HGB 12.1 11/05/2020    HCT 35.7 11/05/2020     CMP:    Lab Results   Component Value Date     11/05/2020    K 4.0 11/05/2020     11/05/2020    CO2 25 11/05/2020    BUN 18 11/05/2020    CREATININE 0.77 11/05/2020    GFRAA >60 11/05/2020    LABGLOM >60 11/05/2020    GLUCOSE 102 11/05/2020    PROT 6.1 11/05/2020    LABALBU 3.7 11/05/2020    CALCIUM 8.8 11/05/2020    BILITOT 0.34 11/05/2020    ALKPHOS 35 11/05/2020    AST 13 11/05/2020    ALT 10 11/05/2020     BMP:    Lab Results   Component Value Date     11/05/2020    K 4.0 11/05/2020     11/05/2020    CO2 25 11/05/2020    BUN 18 11/05/2020    LABALBU 3.7 11/05/2020    CREATININE 0.77 11/05/2020    CALCIUM 8.8 11/05/2020    GFRAA >60 11/05/2020    LABGLOM >60 11/05/2020    GLUCOSE 102 11/05/2020     Sodium:    Lab Results   Component Value Date     11/05/2020     Potassium:    Lab Results   Component Value Date    K 4.0 11/05/2020     BUN/Creatinine:    Lab Results   Component Value Date    BUN 18 11/05/2020    CREATININE 0.77 11/05/2020     Hepatic Function Panel:    Lab Results   Component Value Date    ALKPHOS 35 11/05/2020    ALT 10 11/05/2020    AST 13 11/05/2020    PROT 6.1 11/05/2020    BILITOT 0.34 11/05/2020    BILIDIR 0.12 05/18/2017    IBILI 0.28 05/18/2017    LABALBU 3.7 11/05/2020     Albumin:    Lab Results   Component Value Date    LABALBU 3.7 11/05/2020       Lab Results   Component Value Date/Time    SPECIAL NOT REPORTED 03/20/2018 11:30 AM     Lab Results   Component Value Date/Time    CULTURE NO GROWTH 03/20/2018 11:30 AM    CULTURE  03/20/2018 11:30 AM     Performed at [facility], [address] [place] [address] [phone]       Radiology:    Xr Chest Portable    Result Date: 11/4/2020  EXAMINATION: ONE XRAY VIEW OF THE CHEST 11/4/2020 2:58 pm COMPARISON: None. HISTORY: ORDERING SYSTEM PROVIDED HISTORY: Chest Pain TECHNOLOGIST PROVIDED HISTORY: Chest Pain Reason for Exam: shortness of breath and chest pain Acuity: Unknown Type of Exam: Unknown FINDINGS: Single portable frontal view of the chest is submitted for review. The cardiac silhouette is normal in size. Lung parenchyma is clear without focal airspace consolidation, sizeable pleural effusion, or pneumothorax. Trachea is midline. Visualized osseous structures and soft tissues are grossly intact. No acute cardiopulmonary pathology. Physical Examination:        PHYSICAL EXAM:  General Appearance  Alert , awake, not in acute distress  HEENT - Head is normocephalic, atraumatic. Lungs - Bilateral equal air entry, no wheezes, rales or rhonchi, aeration good  Cardiovascular - Heart sounds are normal.  Regular rhythm, normal rate without murmur, gallop or rub. Tender chest wall on the left side.    Abdomen - Soft, nontender, nondistended, no masses or organomegaly  Neurologic - There are no new focal motor or sensory deficits  Skin - No bruising or bleeding on exposed skin area  Extremities - No cyanosis, clubbing or edema    Assessment:        Primary Problem  Chest pain radiating to arm    Active Hospital Problems    Diagnosis Date Noted    Chest pain radiating to arm [R07.89] 11/04/2020    Positive D-dimer [R79.89] 11/04/2020    Nausea [R11.0] 11/04/2020    Shortness of breath [R06.02] 11/04/2020    Cough [R05] 11/04/2020    Chest pain [R07.9] 11/04/2020    S/P laparoscopic appendectomy [Z90.49] 03/05/2018    MRSA infection - buttock and prepatellar bursa  [A49.02] 08/19/2017    Tobacco use [Z72.0] 08/16/2017    Family history of breast cancer [Z80.3]     Family history of uterine cancer [Z80.49]     Family history of cervical cancer [Z80.49]        Plan:        Chest pain rule out ACS and cardiac causes:  -Consultation to cardiology.  -Echocardiography ordered.  -Troponin negative on presentation, Trend was flat  -Cardiac stress test ordered.  -Chest x-ray: No signs of heart failure, no signs of pneumonia, no pneumothorax  -Probably MSK in origin  -EKG on presentation: Normal sinus rhythm, QT slightly prolonged 490.     Positive D-dimer:  -D-Dimers: 0.63  -Patient reported left lower calf pain, Doppler ultrasound ordered-awaiting results   -R/O DVT.     Depression-on medical treatment:  -Cymbalta 60 mg daily daily. DVT prophylaxis : Lovenox 40 mg subcutaneous daily  PT/OT/SW    Tanai Cline MD  11/5/2020  9:34 AM     Attending Physician Statement  I have discussed the care of Billie Hale and I have examined the patient myselft and taken ros and hpi , including pertinent history and exam findings,  with the resident. I have reviewed the key elements of all parts of the encounter with the resident. I agree with the assessment, plan and orders as documented by the resident.     Sudden onset of left-sided chest pain with radiation to jaw and back with diaphoresis and nausea active smoker 3 to 4 cigarettes a day no history of MI no drug abuse no family history of early MI at a young age pain is reproducible  D-dimer elevated pain is persistent but better stress test results pending we will do a CTA rule out PE or dissection if negative discharge planning for outpatient PCP follow-up  Electronically signed by Radu Tinsley MD

## 2020-11-05 NOTE — CONSULTS
[facility]   Consult Note                 Date:   11/5/2020  Date of admission:  11/4/2020  2:24 PM  MRN:   [id number]  YOB: 1980    Reason for Consult: Chest pain    HISTORY OF PRESENT ILLNESS:    The patient is a 38 y.o.  female who is admitted to the hospital .  Patient while working lifting boxes got chest pain on the left-sided left shoulder some short of breath some palpitations it were worse somewhat constant no specific relation to exertion or taking a deep breath. Patient was concerned and came to the ER so far tropes are negative EKG is negative  Patient is here in stress test I saw her and examined her getting Lexiscan stress test  Patient also denies any history of high blood pressure diabetes but patient is a smoker    Past Medical History:   has a past medical history of Anemia, Family history of breast cancer, Family history of cervical cancer, Family history of uterine cancer, Herpes, MRSA (methicillin resistant staph aureus) culture positive, and Postpartum depression. Past Surgical History:   has a past surgical history that includes Dilation and curettage of uterus; laparoscopy; Dilation & curettage; pr lap,appendectomy (N/A, 3/5/2018); and pr part removal colon w anastomosis (N/A, 3/20/2018). Home Medications:    Prior to Admission medications    Medication Sig Start Date End Date Taking?  Authorizing Provider   DULoxetine (CYMBALTA) 60 MG extended release capsule TAKE ONE CAPSULE BY MOUTH DAILY 6/22/20  Yes Lex Cowden, MD   ondansetron (ZOFRAN) 4 MG tablet Take 1 tablet by mouth 3 times daily as needed for Nausea or Vomiting 4/28/20  Yes Jossie Scott PA-C   vitamin B-12 (CYANOCOBALAMIN) 100 MCG tablet Take 1 tablet by mouth daily 2/19/20 2/18/21 Yes Lex Cowden, MD       Current Medications: Scheduled Meds:   aspirin  324 mg Oral Once    sodium chloride flush  10 mL Intravenous 2 times per day    enoxaparin  40 mg Subcutaneous Daily    DULoxetine  60 mg Oral Daily    vitamin B-12  100 mcg Oral Daily     Continuous Infusions:   sodium chloride       PRN Meds:.sodium chloride flush, sodium chloride flush, sodium chloride, atropine, nitroGLYCERIN, metoprolol, aminophylline, albuterol, nitroGLYCERIN, sodium chloride flush, acetaminophen **OR** acetaminophen, polyethylene glycol, promethazine **OR** ondansetron     Allergies:  Codeine; Fentanyl; and Penicillins    Social History:   reports that she has been smoking. She has been smoking about 0.25 packs per day. She has never used smokeless tobacco. She reports current drug use. Drug: Marijuana. She reports that she does not drink alcohol. Family History: family history includes Breast Cancer in her maternal grandmother; Cancer in her maternal grandmother; Cervical Cancer in her paternal grandmother; Diabetes in her paternal grandfather and paternal grandmother; Uterine Cancer in her paternal grandmother. Review of Systems   CONSTITUTIONAL:  negative for fevers, chills, fatigue and malaise    EYES:  negative for discharge    HEENT:  negative for epistaxis and sore throat    RESPIRATORY:  negative for cough, shortness of breath, wheezing    CARDIOVASCULAR:   As above chest pain, palpitations, syncope, edema    GASTROINTESTINAL:  negative for nausea, vomiting, diarrhea, constipation, abdominal pain    GENITOURINARY:  negative for incontinence    MUSCULOSKELETAL:  negative for neck or back pain    NEUROLOGICAL:  negative for headaches, seizures and double vision   PSYCHIATRIC:  negative               PHYSICAL EXAM:    Blood pressure 98/69, pulse 71, temperature 97.9 °F (36.6 °C), temperature source Oral, resp. rate 18, height 5' 6\" (1.676 m), weight 145 lb 8.1 oz (66 kg), SpO2 99 %, not currently breastfeeding.     CONSTITUTIONAL: AOx4, no apparent distress, appears stated age   HEAD: normocephalic, atraumatic   EYES: PERRLA, EOMI   ENT: moist mucous membranes, uvula midline   NECK:  symmetric, no midline tenderness to palpation   LUNGS: clear to auscultation bilaterally   CARDIOVASCULAR: regular rate and rhythm, no murmurs, rubs or gallops   ABDOMEN: Soft, non-tender, non-distended with normal active bowel sounds   SKIN: no rash       DATA:    ECG:NSR   Echo:  Stress: Getting today  Cath:    Labs:     CBC:   Recent Labs     20  1437 20  0533   WBC 7.0 6.4   HGB 12.3 12.1   HCT 35.9* 35.7*    278     BMP:   Recent Labs     20  1437 20  0533    142   K 3.7 4.0   CO2 22 25   BUN 14 18   CREATININE 0.83 0.77   LABGLOM >60 >60   GLUCOSE 107* 102*     BNP: No results for input(s): BNP in the last 72 hours. PT/INR: No results for input(s): PROTIME, INR in the last 72 hours. APTT:No results for input(s): APTT in the last 72 hours. CARDIAC ENZYMES:No results for input(s): CKTOTAL, CKMB, CKMBINDEX, TROPONINI in the last 72 hours.   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     (spontaneous vaginal delivery)     10/22 M Apg 8/9 Wt 5#7    Postpartum depression    Tobacco use    Abscess, gluteal, right    Hypokalemia    Hypocalcemia    Hypotension    Prepatellar bursitis of right knee    Sepsis ([address]    MRSA infection - buttock and prepatellar bursa     Appendicitis, acute    S/P laparoscopic appendectomy    Carcinoid tumor of appendix    Chest pain radiating to arm    Positive D-dimer    Nausea    Shortness of breath  Cough    Chest pain           RECOMMENDATIONS:  Atypical chest pain, locally tender, normal tropes, normal EKG  Patient is getting Lexiscan stress test  If negative patient can be discharged  Patient do heavy work lifting chest pain could be from secondary to some muscle sprain  Advised patient to quit smoking    Discussed with patient and nursing.     Donnie Islas [facility]        [phone]

## 2020-11-05 NOTE — FLOWSHEET NOTE
11/05/20 7641   Provider Notification   Reason for Communication Review case   Provider Name Dr. Catrachita Ortega   Provider Notification Physician   Method of Communication Page   Response Waiting for response  (Regarding type of stress test to be performed)   Notification Time 1119     Dr. Catrachita Ortega returned call, updated that stress lab called and we do not have a type of stress test ordered. Order placed for [address] [place] [address].

## 2020-11-05 NOTE — PROGRESS NOTES
Physical Therapy  DATE: 2020    NAME: Wisam Apodaca  MRN: [id number]   : 1980    Patient not seen this date for Physical Therapy due to:  [] Blood transfusion in progress  [] Hemodialysis  []  Patient Declined  [] Spine Precautions   [] Strict Bedrest  [] Surgery/ Procedure  [x] Testing 20: BLE dopplers ordered. await results     [] Other        [] PT being discontinued at this time. Patient independent. No further needs. [] PT being discontinued at this time as the patient has been transferred to palliative care. No further needs.     Jonas Moore, PT

## 2020-11-05 NOTE — PROCEDURES
Spoke to Ramy Flores concerning no stress order in Epic only Nuclear order in, need to know what type of stress is to be done.

## 2020-11-06 LAB
EKG ATRIAL RATE: 59 BPM
EKG ATRIAL RATE: 98 BPM
EKG P AXIS: 44 DEGREES
EKG P AXIS: 49 DEGREES
EKG P-R INTERVAL: 126 MS
EKG P-R INTERVAL: 136 MS
EKG Q-T INTERVAL: 384 MS
EKG Q-T INTERVAL: 440 MS
EKG QRS DURATION: 82 MS
EKG QRS DURATION: 84 MS
EKG QTC CALCULATION (BAZETT): 435 MS
EKG QTC CALCULATION (BAZETT): 490 MS
EKG R AXIS: 44 DEGREES
EKG R AXIS: 59 DEGREES
EKG T AXIS: 40 DEGREES
EKG T AXIS: 41 DEGREES
EKG VENTRICULAR RATE: 59 BPM
EKG VENTRICULAR RATE: 98 BPM

## 2020-11-06 PROCEDURE — 93010 ELECTROCARDIOGRAM REPORT: CPT | Performed by: INTERNAL MEDICINE

## 2020-11-06 NOTE — DISCHARGE SUMMARY
[address]    Discharge Summary     Patient ID: Shreyas Tuttle  :  1980   MRN: [id number]     ACCOUNT:  [de-identified]   Patient's PCP: Edison Kwan MD  Admit Date: 2020   Discharge Date: 2020  Length of Stay: 0  Code Status:  Prior  Admitting Physician: Thuy Galloway MD  Discharge Physician: Mary Linda MD     Active Discharge Diagnoses:       Primary Problem  Chest pain radiating to arm      [place] Problems    Diagnosis Date Noted    Chest pain radiating to arm [R07.89] 2020    Positive D-dimer [R79.89] 2020    Nausea [R11.0] 2020    Shortness of breath [R06.02] 2020    Cough [R05] 2020    Chest pain [R07.9] 2020    S/P laparoscopic appendectomy [Z90.49] 2018    MRSA infection - buttock and prepatellar bursa  [A49.02] 2017    Tobacco use [Z72.0] 2017    Family history of breast cancer [Z80.3]     Family history of uterine cancer [Z80.49]     Family history of cervical cancer [Z80.49]        Admission Condition:  fair     Discharged Condition: good    Hospital Stay:       Hospital Course:  Shreyas Tuttle is a 38 y.o. female who was admitted for the management of   Chest pain radiating to arm , presented to ER with Chest Pain and Shortness of Breath    38years old female patient with no significant past medical history other than intermittent episodes of palpitations and tachycardia prior to her admission. Presented with chest pain. Cardiology consulted and workup for chest pain was done including: ECHO, EKG, cardiac stress testing, Troponin, CTA chest, CXR and all were negative for myocardial ischemia or PE or dissection. Patient had a positive D-Dimer test and had a history of smoking for one year. Patient was advised to stop smoking and had CTA pulmonary scan done and was negative.  The patient's chest pain is believed to be MSK in origin given the chest wall tenderness on palpation in addition to her history of chest pain which was brought up by heavy lifting. Patient was discharged after clearance from cardiology and was given Tylenol. Her chest pain has decreased in severity on the day of discharge and patient was stable. Significant therapeutic interventions:     Significant Diagnostic Studies:   Labs / Micro:    Radiology:    Xr Chest Portable    Result Date: 11/4/2020  EXAMINATION: ONE XRAY VIEW OF THE CHEST 11/4/2020 2:58 pm COMPARISON: None. HISTORY: ORDERING SYSTEM PROVIDED HISTORY: Chest Pain TECHNOLOGIST PROVIDED HISTORY: Chest Pain Reason for Exam: shortness of breath and chest pain Acuity: Unknown Type of Exam: Unknown FINDINGS: Single portable frontal view of the chest is submitted for review. The cardiac silhouette is normal in size. Lung parenchyma is clear without focal airspace consolidation, sizeable pleural effusion, or pneumothorax. Trachea is midline. Visualized osseous structures and soft tissues are grossly intact. No acute cardiopulmonary pathology. Ct Chest Pulmonary Embolism W Contrast    Result Date: 11/5/2020  EXAMINATION: CTA OF THE CHEST 11/5/2020 12:30 pm TECHNIQUE: CTA of the chest was performed after the administration of intravenous contrast.  Multiplanar reformatted images are provided for review. MIP images are provided for review. Dose modulation, iterative reconstruction, and/or weight based adjustment of the mA/kV was utilized to reduce the radiation dose to as low as reasonably achievable.  COMPARISON: 08/20/2019 HISTORY: ORDERING SYSTEM PROVIDED HISTORY: Hx of Chest pain, r/o PE/Aortic Dissection TECHNOLOGIST PROVIDED HISTORY: Hx of Chest pain, r/o PE/Aortic Dissection Reason for Exam: chest pain, rule out pe/aortic dissection Acuity: Acute Type of Exam: Initial Additional signs and symptoms: PT STATES SUDDEN ONSET CHEST PAIN THAT RADIATES TO HER BACK, ACROSS HER CHEST AND UP HER NECK Relevant Medical/Surgical History: NO SURGERIES TO AREA FINDINGS: Pulmonary Arteries: Pulmonary arteries show no evidence of intraluminal filling defect to suggest pulmonary embolism. Main pulmonary artery is normal in caliber. Mediastinum:  Normal cardiac size. Aorta enhances normally and is normal in caliber. The main pulmonary arteries are grossly normal.  No significant mediastinal, hilar or axillary lymphadenopathy. Thyroid gland shows no significant abnormalities. Esophagus shows no significant abnormalities. Lungs/pleura: The lungs are without acute process. No focal consolidation or pulmonary edema. No evidence of pleural effusion or pneumothorax. Upper Abdomen: Limited images of the upper abdomen are unremarkable. Soft Tissues/Bones: No acute bone or soft tissue abnormality. No evidence of pulmonary embolism or acute pulmonary abnormality. Vl Lower Extremity Bilateral Venous Duplex    Result Date: 11/5/2020    [facility]  Vascular Lower Extremities DVT Study Procedure   Patient Name   Maggie Hylton Date of Study           11/05/2020                 M   Date of Birth  1980  Gender                  Female   Age            38 year(s)  Race                       Room Number    2089        Height:                 66 inch, 167.64 cm   Corporate ID # [id number]    Weight:                 145 pounds, 65.8 kg   Patient Acct # [de-identified]   BSA:        1.74 m^2    BMI:       23.4 kg/m^2   MR #           [id number]      Sonographer             Deric Culver   Accession #    [id number]  Interpreting Physician  [address] [place] [address]   Referring                  Referring Physician     Paul Gibbons  Nurse  Practitioner  Procedure Type of Study:   Veins: Lower Extremities DVT Study, Venous Scan Lower Bilateral.  Indications for Study:R/O DVT and Calf tenderness. Patient Status: In Patient. Technical Quality:Adequate visualization.   Conclusions   Summary   No evidence of superficial or deep venous thrombosis in both lower  extremities. Signature   ----------------------------------------------------------------  Electronically signed by Stef Guerrier(Sonographer) on  11/05/2020 08:23 AM  ----------------------------------------------------------------   ----------------------------------------------------------------  Electronically signed by Berdie Feather Reyes,Arthur(Interpreting  physician) on 11/05/2020 07:23 PM  ----------------------------------------------------------------  Findings:   Right Impression:                    Left Impression:  The common femoral, femoral,         The common femoral, femoral,  popliteal and tibial veins           popliteal and tibial veins  demonstrate normal compressibility   demonstrate normal compressibility  and augmentation. and augmentation. Normal compressibility of the great  Normal compressibility of the great  saphenous vein. saphenous vein. Normal compressibility of the small  Normal compressibility of the small  saphenous vein. saphenous vein. Velocities are measured in cm/s ; Diameters are measured in cm Right Lower Extremities DVT Study Measurements Right 2D Measurements +------------------------------------+----------+---------------+----------+ ! Location                            ! Visualized! Compressibility! Thrombosis! +------------------------------------+----------+---------------+----------+ ! Common Femoral                      !Yes       ! Yes            ! None      ! +------------------------------------+----------+---------------+----------+ ! Prox Femoral                        !Yes       ! Yes            ! None      ! +------------------------------------+----------+---------------+----------+ ! Mid Femoral                         !Yes       ! Yes            ! None      ! +------------------------------------+----------+---------------+----------+ ! Dist Femoral !Yes       !Yes            ! None      ! +------------------------------------+----------+---------------+----------+ ! Popliteal                           !Yes       ! Yes            ! None      ! +------------------------------------+----------+---------------+----------+ ! Sapheno Femoral Junction            ! Yes       ! Yes            ! None      ! +------------------------------------+----------+---------------+----------+ ! PTV                                 ! Yes       ! Yes            ! None      ! +------------------------------------+----------+---------------+----------+ ! Peroneal                            !Yes       ! Yes            ! None      ! +------------------------------------+----------+---------------+----------+ ! Gastroc                             ! Yes       ! Yes            ! None      ! +------------------------------------+----------+---------------+----------+ ! GSV Thigh                           ! Yes       ! Yes            ! None      ! +------------------------------------+----------+---------------+----------+ ! GSV Knee                            ! Yes       ! Yes            ! None      ! +------------------------------------+----------+---------------+----------+ ! GSV Ankle                           ! Yes       ! Yes            ! None      ! +------------------------------------+----------+---------------+----------+ ! SSV                                 ! Yes       ! Yes            ! None      ! +------------------------------------+----------+---------------+----------+ Right Doppler Measurements +---------------------------+------+------+--------------------------------+ ! Location                   ! Signal!Reflux! Reflux (msec)                   ! +---------------------------+------+------+--------------------------------+ ! Common Femoral             !Phasic!      !                                ! +---------------------------+------+------+--------------------------------+ ! Prox Femoral +------------------------------------+----------+---------------+----------+ ! GSV Thigh                           ! Yes       ! Yes            ! None      ! +------------------------------------+----------+---------------+----------+ ! GSV Knee                            ! Yes       ! Yes            ! None      ! +------------------------------------+----------+---------------+----------+ ! GSV Ankle                           ! Yes       ! Yes            ! None      ! +------------------------------------+----------+---------------+----------+ ! SSV                                 ! Yes       ! Yes            ! None      ! +------------------------------------+----------+---------------+----------+ Left Doppler Measurements +---------------------------+------+------+--------------------------------+ ! Location                   ! Signal!Reflux! Reflux (msec)                   ! +---------------------------+------+------+--------------------------------+ ! Common Femoral             !Phasic!      !                                ! +---------------------------+------+------+--------------------------------+ ! Prox Femoral               !Phasic!      !                                ! +---------------------------+------+------+--------------------------------+ ! Popliteal                  !Phasic!      !                                ! +---------------------------+------+------+--------------------------------+    Nm Myocardial Spect Rest Exercise Or Rx    Result Date: 11/5/2020  EXAMINATION: MYOCARDIAL PERFUSION IMAGING 11/5/2020 7:14 am TECHNIQUE: For the rest study, 11.2 mCi of Tc 99 labeled sestamibi were injected. SPECT images were acquired. Under cardiology supervision, 0.4mg Gera Jessica was infused. After pharmacologic stress, 33.5 mCi of Tc 99 labeled sestamibi were injected. SPECT images with ECG gating were acquired. COMPARISON: None Available.  HISTORY: ORDERING SYSTEM PROVIDED HISTORY: atypical chest pain, R/O angina TECHNOLOGIST PROVIDED HISTORY: atypical chest pain, R/O angina Reason for Exam: Chest pain Procedure Type->Exercise Is the patient pregnant?->No Reason for Exam: atypical chest pain, r/o angina, chest pain Acuity: Unknown Type of Exam: Unknown FINDINGS: Images interpreted utilizing [employer]. Moderate severity medium-size reversible anterior wall perfusion defect noted which resolves on prone imaging. The gated images show no wall motion abnormalities. Perfusion scores are visually adjusted to account for artifact. Summed stress score:  0 Summed rest score:  0 Summed reversibility score:  0 Function: End diastolic volume:  77DZ Left ventricular ejection fraction:  52% TID score:  1.15 (scores greater than 1.39 are considered elevated for Lexiscan stress with Tc99m) Notes concerning risk stratification: Risk stratification incorporates both clinical history and some testing results. Final risk determination is the responsibility of the ordering provider as other patient information and test results may increase or decrease the risk assessment reported for this examination. Risk stratification criteria are adapted from \"Noninvasive Risk Stratification\" criteria from [employer]. Al, ACC/AATS/AHA/ASE/ASNC/SCAI/SCCT/STS 2017 Appropriate Use Criteria For Coronary Revascularization in Patients With Stable Ischemic Heart Disease [facility] Volume 69, Issue 17, May 2017 High risk (>3% annual death or MI) 1. Severe resting LV dysfunction (LVEF <35%) not readily explained by non coronary causes 2. Resting perfusion abnormalities greater than 10% of the myocardium in patients without prior history or evidence of MI 3. Stress-induced perfusion abnormalities encumbering greater than or equal to 10% myocardium or stress segmental scores indicating multiple vascular territories with abnormalities 4.  Stress-induced LV dilatation (TID ratio greater than 1.19 for exercise and greater than 1.39 for regadenoson) Intermediate risk (1% to 3% annual death or MI) 1. Mild/moderate resting LV dysfunction (LVEF 35% to 49%) not readily explained by non coronary causes. 2. Resting perfusion abnormalities in 5%-9.9% of the myocardium in patients without a history or prior evidence of MI 3. Stress-induced perfusion abnormality encumbering 5%-9.9% of the myocardium or stress segmental scores indicating 1 vascular territory with abnormalities but without LV dilation 4. Small wall motion abnormality involving 1-2 segments and only 1 coronary bed. Low Risk (Less than 1% annual death or MI) 1. Normal or small myocardial perfusion defect at rest or with stress encumbering less than 5% of the myocardium. Breast attenuation artifact in the anterior wall. No stress-induced ischemia or infarct. Normal LVEF. Risk stratification: Low         Consultations:    Consults:     Final Specialist Recommendations/Findings:   IP CONSULT TO CARDIOLOGY  IP CONSULT TO SOCIAL WORK      The patient was seen and examined on day of discharge and this discharge summary is in conjunction with any daily progress note from day of discharge.     Discharge plan:       Disposition: Home    Physician Follow Up:     Robb Bolanos MD  [address]  [phone]    In 1 week         Requiring Further Evaluation/Follow Up POST HOSPITALIZATION/Incidental Findings:     Diet: regular diet    Activity: As tolerated    Instructions to Patient:     Discharge Medications:      Medication List      START taking these medications    acetaminophen 500 MG tablet  Commonly known as:  TYLENOL  Take 2 tablets by mouth 3 times daily for 10 days        CONTINUE taking these medications    DULoxetine 60 MG extended release capsule  Commonly known as:  CYMBALTA  TAKE ONE CAPSULE BY MOUTH DAILY     ondansetron 4 MG tablet  Commonly known as:  ZOFRAN  Take 1 tablet by mouth 3 times daily as needed for Nausea or Vomiting     vitamin B-12 100 MCG tablet  Commonly known as:  CYANOCOBALAMIN  Take 1 tablet by mouth daily           Where to Get Your Medications      These medications were sent to [facility], [facility] [address]  [place], [address]    Phone:  [phone]   · acetaminophen 500 MG tablet         Time Spent on discharge is  34 mins in patient examination, evaluation, counseling as well as medication reconciliation, prescriptions for required medications, discharge plan and follow up. Electronically signed by   Olga Moreno MD  11/6/2020  12:57 PM      Thank you Dr. Farida Hope MD for the opportunity to be involved in this patient's care.

## 2020-12-04 PROBLEM — R05.9 COUGH: Status: RESOLVED | Noted: 2020-11-04 | Resolved: 2020-12-04

## 2021-03-08 DIAGNOSIS — N92.6 MISSED PERIOD: Primary | ICD-10-CM

## 2021-03-09 ENCOUNTER — HOSPITAL ENCOUNTER (OUTPATIENT)
Age: 41
Discharge: HOME | End: 2021-03-09
Payer: MEDICARE

## 2021-03-09 DIAGNOSIS — N92.6 MISSED PERIOD: ICD-10-CM

## 2021-03-09 LAB — HCG QUANTITATIVE: ABNORMAL IU/L

## 2021-03-09 PROCEDURE — 36415 COLL VENOUS BLD VENIPUNCTURE: CPT

## 2021-03-09 PROCEDURE — 84702 CHORIONIC GONADOTROPIN TEST: CPT

## 2021-03-10 ENCOUNTER — APPOINTMENT (OUTPATIENT)
Dept: ULTRASOUND IMAGING | Age: 41
End: 2021-03-10
Payer: MEDICARE

## 2021-03-10 ENCOUNTER — TELEPHONE (OUTPATIENT)
Dept: OBGYN CLINIC | Age: 41
End: 2021-03-10

## 2021-03-10 ENCOUNTER — HOSPITAL ENCOUNTER (EMERGENCY)
Age: 41
Discharge: HOME | End: 2021-03-10
Attending: EMERGENCY MEDICINE
Payer: MEDICARE

## 2021-03-10 VITALS
DIASTOLIC BLOOD PRESSURE: 68 MMHG | HEIGHT: 66 IN | SYSTOLIC BLOOD PRESSURE: 100 MMHG | OXYGEN SATURATION: 99 % | RESPIRATION RATE: 16 BRPM | HEART RATE: 88 BPM | WEIGHT: 135 LBS | BODY MASS INDEX: 21.69 KG/M2 | TEMPERATURE: 99 F

## 2021-03-10 DIAGNOSIS — N30.00 ACUTE CYSTITIS WITHOUT HEMATURIA: Primary | ICD-10-CM

## 2021-03-10 DIAGNOSIS — B96.89 BACTERIAL VAGINOSIS: ICD-10-CM

## 2021-03-10 DIAGNOSIS — N76.0 BACTERIAL VAGINOSIS: ICD-10-CM

## 2021-03-10 DIAGNOSIS — Z3A.10 10 WEEKS GESTATION OF PREGNANCY: ICD-10-CM

## 2021-03-10 LAB
-: ABNORMAL
ABO/RH: NORMAL
ABSOLUTE EOS #: 0 K/UL (ref 0–0.4)
ABSOLUTE IMMATURE GRANULOCYTE: ABNORMAL K/UL (ref 0–0.3)
ABSOLUTE LYMPH #: 2.4 K/UL (ref 1–4.8)
ABSOLUTE MONO #: 0.7 K/UL (ref 0.1–1.3)
ALBUMIN SERPL-MCNC: 4.2 G/DL (ref 3.5–5.2)
ALBUMIN/GLOBULIN RATIO: NORMAL (ref 1–2.5)
ALP BLD-CCNC: 44 U/L (ref 35–104)
ALT SERPL-CCNC: 13 U/L (ref 5–33)
AMORPHOUS: ABNORMAL
ANION GAP SERPL CALCULATED.3IONS-SCNC: 10 MMOL/L (ref 9–17)
AST SERPL-CCNC: 14 U/L
BACTERIA: ABNORMAL
BASOPHILS # BLD: 1 % (ref 0–2)
BASOPHILS ABSOLUTE: 0.1 K/UL (ref 0–0.2)
BILIRUB SERPL-MCNC: 0.43 MG/DL (ref 0.3–1.2)
BILIRUBIN URINE: NEGATIVE
BUN BLDV-MCNC: 12 MG/DL (ref 6–20)
BUN/CREAT BLD: NORMAL (ref 9–20)
CALCIUM SERPL-MCNC: 9.4 MG/DL (ref 8.6–10.4)
CASTS UA: ABNORMAL /LPF
CHLORIDE BLD-SCNC: 102 MMOL/L (ref 98–107)
CO2: 25 MMOL/L (ref 20–31)
COLOR: YELLOW
COMMENT UA: ABNORMAL
CREAT SERPL-MCNC: 0.54 MG/DL (ref 0.5–0.9)
CRYSTALS, UA: ABNORMAL /HPF
DIFFERENTIAL TYPE: ABNORMAL
DIRECT EXAM: ABNORMAL
EOSINOPHILS RELATIVE PERCENT: 0 % (ref 0–4)
EPITHELIAL CELLS UA: ABNORMAL /HPF
GFR AFRICAN AMERICAN: >60 ML/MIN
GFR NON-AFRICAN AMERICAN: >60 ML/MIN
GFR SERPL CREATININE-BSD FRML MDRD: NORMAL ML/MIN/{1.73_M2}
GFR SERPL CREATININE-BSD FRML MDRD: NORMAL ML/MIN/{1.73_M2}
GLUCOSE BLD-MCNC: 93 MG/DL (ref 70–99)
GLUCOSE URINE: NEGATIVE
HCG QUANTITATIVE: ABNORMAL IU/L
HCT VFR BLD CALC: 38.6 % (ref 36–46)
HEMOGLOBIN: 12.6 G/DL (ref 12–16)
IMMATURE GRANULOCYTES: ABNORMAL %
KETONES, URINE: ABNORMAL
LEUKOCYTE ESTERASE, URINE: ABNORMAL
LYMPHOCYTES # BLD: 22 % (ref 24–44)
Lab: ABNORMAL
MCH RBC QN AUTO: 29.5 PG (ref 26–34)
MCHC RBC AUTO-ENTMCNC: 32.7 G/DL (ref 31–37)
MCV RBC AUTO: 90.2 FL (ref 80–100)
MONOCYTES # BLD: 6 % (ref 1–7)
MUCUS: ABNORMAL
NITRITE, URINE: NEGATIVE
NRBC AUTOMATED: ABNORMAL PER 100 WBC
OTHER OBSERVATIONS UA: ABNORMAL
PDW BLD-RTO: 15.6 % (ref 11.5–14.9)
PH UA: 6.5 (ref 5–8)
PLATELET # BLD: 311 K/UL (ref 150–450)
PLATELET ESTIMATE: ABNORMAL
PMV BLD AUTO: 8.3 FL (ref 6–12)
POTASSIUM SERPL-SCNC: 5.3 MMOL/L (ref 3.7–5.3)
PROTEIN UA: NEGATIVE
RBC # BLD: 4.28 M/UL (ref 4–5.2)
RBC # BLD: ABNORMAL 10*6/UL
RBC UA: ABNORMAL /HPF
RENAL EPITHELIAL, UA: ABNORMAL /HPF
SEG NEUTROPHILS: 71 % (ref 36–66)
SEGMENTED NEUTROPHILS ABSOLUTE COUNT: 7.7 K/UL (ref 1.3–9.1)
SODIUM BLD-SCNC: 137 MMOL/L (ref 135–144)
SPECIFIC GRAVITY UA: 1.02 (ref 1–1.03)
SPECIMEN DESCRIPTION: ABNORMAL
TOTAL PROTEIN: 7.1 G/DL (ref 6.4–8.3)
TRICHOMONAS: ABNORMAL
TURBIDITY: ABNORMAL
URINE HGB: NEGATIVE
UROBILINOGEN, URINE: NORMAL
WBC # BLD: 10.9 K/UL (ref 3.5–11)
WBC # BLD: ABNORMAL 10*3/UL
WBC UA: ABNORMAL /HPF
YEAST: ABNORMAL

## 2021-03-10 PROCEDURE — 81001 URINALYSIS AUTO W/SCOPE: CPT

## 2021-03-10 PROCEDURE — 86901 BLOOD TYPING SEROLOGIC RH(D): CPT

## 2021-03-10 PROCEDURE — 36415 COLL VENOUS BLD VENIPUNCTURE: CPT

## 2021-03-10 PROCEDURE — 87480 CANDIDA DNA DIR PROBE: CPT

## 2021-03-10 PROCEDURE — 80053 COMPREHEN METABOLIC PANEL: CPT

## 2021-03-10 PROCEDURE — 87591 N.GONORRHOEAE DNA AMP PROB: CPT

## 2021-03-10 PROCEDURE — 99285 EMERGENCY DEPT VISIT HI MDM: CPT

## 2021-03-10 PROCEDURE — 84702 CHORIONIC GONADOTROPIN TEST: CPT

## 2021-03-10 PROCEDURE — 85025 COMPLETE CBC W/AUTO DIFF WBC: CPT

## 2021-03-10 PROCEDURE — 86900 BLOOD TYPING SEROLOGIC ABO: CPT

## 2021-03-10 PROCEDURE — 87660 TRICHOMONAS VAGIN DIR PROBE: CPT

## 2021-03-10 PROCEDURE — 87491 CHLMYD TRACH DNA AMP PROBE: CPT

## 2021-03-10 PROCEDURE — 76801 OB US < 14 WKS SINGLE FETUS: CPT

## 2021-03-10 PROCEDURE — 81025 URINE PREGNANCY TEST: CPT

## 2021-03-10 PROCEDURE — 87510 GARDNER VAG DNA DIR PROBE: CPT

## 2021-03-10 RX ORDER — CEPHALEXIN 500 MG/1
500 CAPSULE ORAL 4 TIMES DAILY
Qty: 40 CAPSULE | Refills: 0 | Status: SHIPPED | OUTPATIENT
Start: 2021-03-10 | End: 2021-03-20

## 2021-03-10 NOTE — ED PROVIDER NOTES
[address]  eMERGENCY dEPARTMENT eNCOUnter      Pt Name: Mireya Lew  MRN: [id number]  [place] 1980  Date of evaluation: 3/10/2021  Provider: Ward Garland PA-C    CHIEF COMPLAINT       Chief Complaint   Patient presents with    Abdominal Cramping           HISTORY OF PRESENT ILLNESS  (Location/Symptom, Timing/Onset, Context/Setting, Quality, Duration, Modifying Factors, Severity.)   Mireya Lew is a 38 y.o. female who presents to the emergency department complaints of abdominal cramping and vaginal discharge. Patient states symptoms began this morning. Patient reports she is pregnant. States she had a blood pregnancy test drawn yesterday. Has yet to follow-up with her OB/GYN. Has not had an ultrasound. Patient denies any vaginal bleeding, nausea, vomiting, dysuria, urgency, frequency, hematuria, chest pain, shortness of breath, fevers. Patient states this is her ninth pregnancy. She has 6 living children and has had 2 miscarriages. Patient has no concerns for STDs. No other complaints. Nursing Notes were reviewed. REVIEW OF SYSTEMS    (2-9 systems for level 4, 10 or more for level 5)     Review of Systems   abd cramping   Vaginal discharge      Except as noted above the remainder of the review of systems was reviewed and negative.        PAST MEDICAL HISTORY     Past Medical History:   Diagnosis Date    Anemia     Family history of breast cancer     Family history of cervical cancer     Family history of uterine cancer     Herpes     MRSA (methicillin resistant staph aureus) culture positive 08/16/2017    abscess    Postpartum depression 11/7/2016     None otherwise stated in nurses notes    SURGICAL HISTORY       Past Surgical History:   Procedure Laterality Date    DILATION AND CURETTAGE      DILATION AND CURETTAGE OF UTERUS      Miscarriage    LAPAROSCOPY      for pain    LA LAP,APPENDECTOMY N/A 3/5/2018    APPENDECTOMY LAPAROSCOPIC performed by Rosa Maria Morales MD at [address] ANASTOMOSIS N/A 3/20/2018    BOWEL RESECTION HEMICOLECTOMY - Right performed by Tori Wallace MD at [address]     None otherwise stated in nurses notes    CURRENT MEDICATIONS       Previous Medications    ACETAMINOPHEN (TYLENOL) 500 MG TABLET    Take 2 tablets by mouth 3 times daily for 10 days    DULOXETINE (CYMBALTA) 60 MG EXTENDED RELEASE CAPSULE    TAKE ONE CAPSULE BY MOUTH DAILY    ONDANSETRON (ZOFRAN) 4 MG TABLET    Take 1 tablet by mouth 3 times daily as needed for Nausea or Vomiting    VITAMIN B-12 (CYANOCOBALAMIN) 100 MCG TABLET    Take 1 tablet by mouth daily       ALLERGIES     Codeine, Fentanyl, and Penicillins    FAMILY HISTORY           Problem Relation Age of Onset    Diabetes Paternal Grandfather     Diabetes Paternal Grandmother     Uterine Cancer Paternal Grandmother     Cervical Cancer Paternal Grandmother     Cancer Maternal Grandmother     Breast Cancer Maternal Grandmother      Family Status   Relation Name Status    PGF      PGM  Alive    MGM  Alive    MGF      Father  Alive    Mother  Alive      None otherwise stated in nurses notes    SOCIAL HISTORY      reports that she has been smoking. She has been smoking about 0.25 packs per day. She has never used smokeless tobacco. She reports current drug use. Drug: Marijuana. She reports that she does not drink alcohol. lives at home with others     PHYSICAL EXAM    (up to 7 for level 4, 8 or more for level 5)     ED Triage Vitals [03/10/21 1227]   BP Temp Temp src Pulse Resp SpO2 Height Weight   99/75 (!) 86 °F (30 °C) -- 86 14 100 % 5' 6\" (1.676 m) 135 lb (61.2 kg)       Physical Exam  Vitals signs reviewed. Exam conducted with a chaperone present. Constitutional:       Appearance: Normal appearance. She is normal weight. Cardiovascular:      Rate and Rhythm: Normal rate and regular rhythm. Pulses: Normal pulses.       Heart sounds: Normal heart sounds, S1 normal and S2 normal.   Pulmonary:      Effort: Pulmonary effort is normal.      Breath sounds: Normal breath sounds and air entry. Abdominal:      Tenderness: There is no abdominal tenderness. There is no right CVA tenderness, left CVA tenderness, guarding or rebound. Genitourinary:     Exam position: Supine. Labia:         Right: No rash, tenderness, lesion or injury. Left: No rash, tenderness, lesion or injury. Vagina: No signs of injury and foreign body. Vaginal discharge present. No erythema, tenderness, bleeding, lesions or prolapsed vaginal walls. Cervix: Discharge present. No cervical motion tenderness, friability, lesion, erythema, cervical bleeding or eversion. Skin:     Capillary Refill: Capillary refill takes less than 2 seconds. Neurological:      General: No focal deficit present. Mental Status: She is alert and oriented to person, place, and time. Mental status is at baseline. DIAGNOSTIC RESULTS     EKG: All EKG's are interpreted by the Emergency Department Physician who either signs or Co-signs this chart in the absence of a cardiologist.        RADIOLOGY:   All plain film, CT, MRI, and formal ultrasound images (except ED bedside ultrasound) are read by the radiologist, see reports below, unless otherwise noted in MDM or here. US OB LESS THAN 14 WEEKS SINGLE OR FIRST GESTATION   Final Result   Single live intrauterine pregnancy             No results found. LABS:  Labs Reviewed   VAGINITIS DNA PROBE - Abnormal; Notable for the following components:       Result Value    Direct Exam POSITIVE for Gardnerella vaginalis.  (*)     All other components within normal limits   URINE RT REFLEX TO CULTURE - Abnormal; Notable for the following components:    Turbidity UA CLOUDY (*)     Ketones, Urine TRACE (*)     Leukocyte Esterase, Urine SMALL (*)     All other components within normal limits   CBC WITH AUTO DIFFERENTIAL - Abnormal; Notable for the following components:    RDW 15.6 (*)     Seg Neutrophils 71 (*)     Lymphocytes 22 (*)     All other components within normal limits   HCG, QUANTITATIVE, PREGNANCY - Abnormal; Notable for the following components:    hCG Quant 82,022 (*)     All other components within normal limits   MICROSCOPIC URINALYSIS - Abnormal; Notable for the following components:    Bacteria, UA FEW (*)     Amorphous, UA 1+ (*)     All other components within normal limits   C.TRACHOMATIS N.GONORRHOEAE DNA   COMPREHENSIVE METABOLIC PANEL W/ REFLEX TO MG FOR LOW K   PREGNANCY, URINE   ABO/RH       All other labs were within normal range or not returned as of this dictation. EMERGENCY DEPARTMENT COURSE and DIFFERENTIAL DIAGNOSIS/MDM:   Vitals:    Vitals:    03/10/21 1227   BP: 99/75   Pulse: 86   Resp: 14   Temp: (!) 86 °F (30 °C)   SpO2: 100%   Weight: 135 lb (61.2 kg)   Height: 5' 6\" (1.676 m)     Patient instructed to return to the emergency room if symptoms worsen, return, or any other concern right away which is agreed by the patient    ED MEDS:  Orders Placed This Encounter   Medications    cephALEXin (KEFLEX) 500 MG capsule     Sig: Take 1 capsule by mouth 4 times daily for 10 days     Dispense:  40 capsule     Refill:  0         CONSULTS:  None    PROCEDURES:  None      FINAL IMPRESSION      1. Acute cystitis without hematuria    2.  Bacterial vaginosis    3. 10 weeks gestation of pregnancy          DISPOSITION/PLAN   DISPOSITION     PATIENT REFERRED TO:  Twilla Rinne, MD  [address] [place] [address] [id number] [place] [address] [id number] [phone]    Call         DISCHARGE MEDICATIONS:  New Prescriptions    CEPHALEXIN (KEFLEX) 500 MG CAPSULE    Take 1 capsule by mouth 4 times daily for 10 days         Summation      Patient Course:   abdominal cramping and vaginal discharge that began today. Patient states she is pregnant. Denies any vaginal bleeding, dysuria, vomiting, fevers. No concern for STDs. Urinalysis shows small leukocytes. Will treat for UTI. Vaginitis swab is positive for BV. Discussed with our pharmacist Ting Orantes and Dr. Pamela Valera. We will hold off on treatment at this time. Patient does have an OB/GYN at [facility]. I recommend she call the OB/GYN for recommendations on treatment versus waiting until the second trimester. Patient is agreeable with this plan. No leukocytosis on CBC. No electrolyte abnormality. Blood type is a positive. Jamar Manning has increased from 80,0-80,56. Ultrasound reveals live intrauterine pregnancy. Patient is okay for discharge home. I suspect BV is causing the abdominal cramping. Patient will follow-up with OB/GYN. Strict return instructions discussed with patient. She is agreeable with plan. Discussed results and plan with the pt. They expressed appropriate understanding. Pt given close follow up, supportive care instructions and strict return instructions at the bedside. ED Medications administered this visit:  Medications - No data to display    New Prescriptions from this visit:    New Prescriptions    CEPHALEXIN (KEFLEX) 500 MG CAPSULE    Take 1 capsule by mouth 4 times daily for 10 days       Follow-up:  Ghada Merino, [address] [facility] [address] [id number]  [address] [facility] [facility] [address]  [address] [place] [address] 75, [address] [address]  [phone]    Call           Final Impression:   1. Acute cystitis without hematuria    2.  Bacterial vaginosis    3. 10 weeks gestation of pregnancy               (Please note that portions of this note were completed with a voice recognition program.  Efforts were made to edit the dictations but occasionally words are mis-transcribed.)      (Please note that portions of this note were completed with a voice recognition program.  Efforts were made to edit the dictations but occasionally words are mis-transcribed.)    Erica Kendall, [address] Chou PA-C  03/10/21 5012

## 2021-03-10 NOTE — RESULT ENCOUNTER NOTE
Patient aware of +quant hcg level. Directed to the ED today by office given severe cramping of the pelvic region in first trimester of pregnancy.

## 2021-03-10 NOTE — ED TRIAGE NOTES
Pt presents in ED c/o lower abdominal cramping and white vaginal discharge; ongoing since 09:00 a.m. Pt stated the pain occurred while she was at work; pt reports strenuous activity while at work. Pt unable to recall LMP. Reports that her LMP could have possibly been around the last week of January. Pt stated that she had a positive pregnancy test; verified with lab work on 3/10. Pt stated that she has had two previous miscarriages; six live births. Pt denies vaginal bleeding or further concerns.

## 2021-03-10 NOTE — ED NOTES
Mode of arrival (squad #, walk in, police, etc) : walk in from home        Chief complaint(s): abdominal cramping        Arrival Note (brief scenario, treatment PTA, etc). : Pt arrives due to lower abdominal cramping beginning this morning. Pt reports having a positive pregnancy test but is unsure of gestation. Pt denies vaginal bleeding. Pt is A&Ox4, eupneic, PWD. GCS=15. Call light in reach. C= \"Have you ever felt that you should Cut down on your drinking? \"  No  A= \"Have people Annoyed you by criticizing your drinking? \"  No  G= \"Have you ever felt bad or Guilty about your drinking? \"  No  E= \"Have you ever had a drink as an Eye-opener first thing in the morning to steady your nerves or to help a hangover? \"  No      Deferred []      Reason for deferring: N/A    *If yes to two or more: probable alcohol abuse. Girish Bee RN  03/10/21 6990

## 2021-03-10 NOTE — TELEPHONE ENCOUNTER
Patient called the office in early stage of pregnancy. Patient had a quant hcg level completed on 3/8/21. Has not yet ultrasound to confirm viability/location of pregnancy. Denied vaginal bleeding. Patient voiced cramping is severe. Patient, per CNP, advised to seek ER evaluation now for pregnancy with inconclusive viability in the first trimester.

## 2021-03-11 NOTE — ED PROVIDER NOTES
[address] ED  eMERGENCY dEPARTMENT eNCOUnter   Independent Attestation     Pt Name: Sugar Miguel  MRN: [id number]  [place] 1980  Date of evaluation: 3/10/21   Sugar Miguel is a 38 y.o. female who presents with Abdominal Cramping    Vitals:   Vitals:    03/10/21 1227 03/10/21 1415 03/10/21 1423   BP: 99/75 100/68    Pulse: 86  88   Resp: 14  16   Temp:   99 °F (37.2 °C)   TempSrc:   Oral   SpO2: 100%  99%   Weight: 135 lb (61.2 kg)     Height: 5' 6\" (1.676 m)       Impression:   1. Acute cystitis without hematuria    2. Bacterial vaginosis    3. 10 weeks gestation of pregnancy      I was personally available for consultation in the Emergency Department. I have reviewed the chart and agree with the documentation as recorded by the [facility], including the assessment, treatment plan and disposition.   Toby Bueno MD  Attending Emergency  Physician                 Toby Bueno MD  03/10/21 7134

## 2021-03-12 LAB
C TRACH DNA GENITAL QL NAA+PROBE: NEGATIVE
N. GONORRHOEAE DNA: NEGATIVE
SPECIMEN DESCRIPTION: NORMAL

## 2021-03-24 ENCOUNTER — HOSPITAL ENCOUNTER (OUTPATIENT)
Age: 41
Discharge: HOME | End: 2021-03-24
Payer: MEDICARE

## 2021-03-24 ENCOUNTER — HOSPITAL ENCOUNTER (OUTPATIENT)
Age: 41
Setting detail: SPECIMEN
Discharge: HOME | End: 2021-03-24
Payer: MEDICARE

## 2021-03-24 ENCOUNTER — ROUTINE PRENATAL (OUTPATIENT)
Dept: OBGYN CLINIC | Age: 41
End: 2021-03-24
Payer: MEDICARE

## 2021-03-24 ENCOUNTER — INITIAL PRENATAL (OUTPATIENT)
Dept: OBGYN CLINIC | Age: 41
End: 2021-03-24

## 2021-03-24 VITALS
DIASTOLIC BLOOD PRESSURE: 60 MMHG | HEART RATE: 90 BPM | TEMPERATURE: 98.2 F | SYSTOLIC BLOOD PRESSURE: 102 MMHG | BODY MASS INDEX: 26.31 KG/M2 | WEIGHT: 163 LBS

## 2021-03-24 DIAGNOSIS — Z34.90 EARLY STAGE OF PREGNANCY: ICD-10-CM

## 2021-03-24 DIAGNOSIS — Z86.14 HISTORY OF MRSA INFECTION: ICD-10-CM

## 2021-03-24 DIAGNOSIS — Z34.90 EARLY STAGE OF PREGNANCY: Primary | ICD-10-CM

## 2021-03-24 PROBLEM — Z72.0 TOBACCO USE: Status: RESOLVED | Noted: 2017-08-16 | Resolved: 2021-03-24

## 2021-03-24 PROBLEM — E87.6 HYPOKALEMIA: Status: RESOLVED | Noted: 2017-08-16 | Resolved: 2021-03-24

## 2021-03-24 PROBLEM — R07.9 CHEST PAIN: Status: RESOLVED | Noted: 2020-11-04 | Resolved: 2021-03-24

## 2021-03-24 PROBLEM — E83.51 HYPOCALCEMIA: Status: RESOLVED | Noted: 2017-08-16 | Resolved: 2021-03-24

## 2021-03-24 PROBLEM — Z90.49 S/P LAPAROSCOPIC APPENDECTOMY: Status: RESOLVED | Noted: 2018-03-05 | Resolved: 2021-03-24

## 2021-03-24 PROBLEM — R07.89 CHEST PAIN RADIATING TO ARM: Status: RESOLVED | Noted: 2020-11-04 | Resolved: 2021-03-24

## 2021-03-24 PROBLEM — I95.9 HYPOTENSION: Status: RESOLVED | Noted: 2017-08-16 | Resolved: 2021-03-24

## 2021-03-24 PROBLEM — R11.0 NAUSEA: Status: RESOLVED | Noted: 2020-11-04 | Resolved: 2021-03-24

## 2021-03-24 PROBLEM — K35.80 APPENDICITIS, ACUTE: Status: RESOLVED | Noted: 2018-03-05 | Resolved: 2021-03-24

## 2021-03-24 PROBLEM — R06.02 SHORTNESS OF BREATH: Status: RESOLVED | Noted: 2020-11-04 | Resolved: 2021-03-24

## 2021-03-24 LAB
ABO/RH: NORMAL
ANTIBODY SCREEN: NEGATIVE
BILIRUBIN URINE: NEGATIVE
COLOR: YELLOW
COMMENT UA: NORMAL
GLUCOSE BLD-MCNC: 85 MG/DL (ref 70–99)
GLUCOSE URINE: NEGATIVE
HEPATITIS B SURFACE ANTIGEN: NONREACTIVE
HIV AG/AB: NONREACTIVE
KETONES, URINE: NEGATIVE
LEUKOCYTE ESTERASE, URINE: NEGATIVE
NITRITE, URINE: NEGATIVE
PH UA: 7 (ref 5–8)
PROTEIN UA: NEGATIVE
RUBV IGG SER QL: 44.4 IU/ML
SPECIFIC GRAVITY UA: 1.01 (ref 1–1.03)
T. PALLIDUM, IGG: NONREACTIVE
TOXOPLASM IGM: 0.64 INDEX
TOXOPLASMA BLOOD FOR RATIO: 80.5 IU/ML
TSH SERPL DL<=0.05 MIU/L-ACNC: 1.12 MIU/L (ref 0.3–5)
TURBIDITY: CLEAR
URINE HGB: NEGATIVE
UROBILINOGEN, URINE: NORMAL

## 2021-03-24 PROCEDURE — 87641 MR-STAPH DNA AMP PROBE: CPT

## 2021-03-24 PROCEDURE — 84443 ASSAY THYROID STIM HORMONE: CPT

## 2021-03-24 PROCEDURE — 36415 COLL VENOUS BLD VENIPUNCTURE: CPT

## 2021-03-24 PROCEDURE — H1000 PRENATAL CARE ATRISK ASSESSM: HCPCS | Performed by: NURSE PRACTITIONER

## 2021-03-24 PROCEDURE — G0145 SCR C/V CYTO,THINLAYER,RESCR: HCPCS

## 2021-03-24 PROCEDURE — 86762 RUBELLA ANTIBODY: CPT

## 2021-03-24 PROCEDURE — 86850 RBC ANTIBODY SCREEN: CPT

## 2021-03-24 PROCEDURE — 82947 ASSAY GLUCOSE BLOOD QUANT: CPT

## 2021-03-24 PROCEDURE — 86901 BLOOD TYPING SEROLOGIC RH(D): CPT

## 2021-03-24 PROCEDURE — 86780 TREPONEMA PALLIDUM: CPT

## 2021-03-24 PROCEDURE — 87389 HIV-1 AG W/HIV-1&-2 AB AG IA: CPT

## 2021-03-24 PROCEDURE — 86777 TOXOPLASMA ANTIBODY: CPT

## 2021-03-24 PROCEDURE — 86900 BLOOD TYPING SEROLOGIC ABO: CPT

## 2021-03-24 PROCEDURE — 87624 HPV HI-RISK TYP POOLED RSLT: CPT

## 2021-03-24 PROCEDURE — 81003 URINALYSIS AUTO W/O SCOPE: CPT

## 2021-03-24 PROCEDURE — 99214 OFFICE O/P EST MOD 30 MIN: CPT | Performed by: NURSE PRACTITIONER

## 2021-03-24 PROCEDURE — 87340 HEPATITIS B SURFACE AG IA: CPT

## 2021-03-24 PROCEDURE — 86778 TOXOPLASMA ANTIBODY IGM: CPT

## 2021-03-24 NOTE — PROGRESS NOTES
Norma Cobb  3/24/2021    YOB: 1980    3/24/2021   No LMP recorded (lmp unknown). Patient is pregnant. 12w1d  [id number]      Primary Care Physician: Akosua Treviño MD        CC: Initial Prenatal Visit    Subjective:     Fredrick Cid is a 38 y.o. female [id number]    is being seen today for her first obstetrical visit. This is not a planned pregnancy. She is at 12w1d  Her obstetrical history is significant for AMA, hx SAB x2, hx D&C, HSV, family hx breast/uterine/cervical cancer, hx MRSA, grand multiparity      Relationship with FOB: spouse, living together. Patient does intend to breast feed. Pregnancy history fully reviewed. Mother's ethnicity:   Father's ethnicity:       Objective:   Blood pressure 102/60, pulse 90, temperature 98.2 °F (36.8 °C), temperature source Temporal, weight 163 lb (73.9 kg), not currently breastfeeding. OB History    Para Term  AB Living   9 6 0 6 2 5   SAB TAB Ectopic Molar Multiple Live Births   2 0 0 0 0 5      # Outcome Date GA Lbr Kaden/2nd Weight Sex Delivery Anes PTL Lv   9 Current            8  10/22/16 34w2d  5 lb 7.1 oz (2.47 kg) M Vag-Spont         Name: Pete Scott: 8  Apgar5: 9   7  13 36w4d  5 lb 13 oz (2.637 kg) M    MALLIKA   6 SAB  6w0d    SAB      5   36w0d  6 lb 4 oz (2.835 kg) F Vag-Spont   MALLIKA   4   36w0d  6 lb 2 oz (2.778 kg) M Vag-Spont   MALLIKA      Birth Comments: Spont PTL/PTD   3 SAB  10w0d             Birth Comments: D&C   2   35w0d  5 lb 2 oz (2.325 kg) M Vag-Spont   MALLIKA      Birth Comments: Spont PTL/PTD   1   36w0d  6 lb 1 oz (2.75 kg) F Vag-Spont   MALLIKA      Birth Comments: Spont PTL/PTD      Obstetric Comments   Adjusted pregnancy's and dates today 16. Went over each one with patient in detail. The original chart today said 14th pregnancy. Gary Emmanuel ... which is not true. 8 total- 2 sab's 5  deliveries.   3 boys and 2 girls.        Past Medical History:   Diagnosis Date    Anemia     Family history of breast cancer     Family history of cervical cancer     Family history of uterine cancer     Herpes     MRSA (methicillin resistant staph aureus) culture positive 08/16/2017    abscess    Postpartum depression 11/7/2016     Past Surgical History:   Procedure Laterality Date    DILATION AND CURETTAGE      DILATION AND CURETTAGE OF UTERUS      Miscarriage    LAPAROSCOPY      for pain    [place] LAP,APPENDECTOMY N/A 3/5/2018    APPENDECTOMY LAPAROSCOPIC performed by Taylor Parks MD at [address] N/A 3/20/2018    BOWEL RESECTION HEMICOLECTOMY - Right performed by Taylor Parks MD at [address] History     Socioeconomic History    Marital status:      Spouse name: Not on file    Number of children: Not on file    Years of education: Not on file    Highest education level: Not on file   Occupational History    Not on file   Social Needs    Financial resource strain: Not on file    Food insecurity     Worry: Not on file     Inability: Not on file    Transportation needs     Medical: Not on file     Non-medical: Not on file   Tobacco Use    Smoking status: Former Smoker     Packs/day: 0.25    Smokeless tobacco: Never Used   Substance and Sexual Activity    Alcohol use: No     Alcohol/week: 0.0 standard drinks     Frequency: Never    Drug use: Not Currently     Types: Marijuana    Sexual activity: Yes     Partners: Male     Comment: IUD   Lifestyle    Physical activity     Days per week: Not on file     Minutes per session: Not on file    Stress: Not on file   Relationships    Social connections     Talks on phone: Not on file     Gets together: Not on file     Attends Confucianist service: Not on file     Active member of club or organization: Not on file     Attends meetings of clubs or organizations: Not on file     Relationship status: Not on file    Intimate partner violence     Fear of current or ex partner: Not on file     Emotionally abused: Not on file     Physically abused: Not on file     Forced sexual activity: Not on file   Other Topics Concern    Not on file   Social History Narrative    ** Merged History Encounter **          Family History   Problem Relation Age of Onset    Diabetes Paternal Grandfather     Diabetes Paternal Grandmother     Uterine Cancer Paternal Grandmother     Cervical Cancer Paternal Grandmother     Cancer Maternal Grandmother     Breast Cancer Maternal Grandmother        MEDICATIONS:  Current Outpatient Medications   Medication Sig Dispense Refill    Prenatal Vit-Fe Fumarate-FA (PRENATAL VITAMIN PO) Take 1 tablet by mouth daily       No current facility-administered medications for this visit. ALLERGIES:  Allergies as of 03/24/2021 - Review Complete 03/24/2021   Allergen Reaction Noted    Codeine  05/04/2016    Fentanyl Other (See Comments) 08/19/2017    Penicillins Hives 05/04/2016           Physical Exam Completed-See Epic Navigator    Chaperone for Intimate Exam   Chaperone was offered and accepted as part of the rooming process.  Chaperone: Yuki CASSIDY             Assessment:      Pregnancy at 15 and 1/7 weeks    Diagnosis Orders   1. Early stage of pregnancy  Urinalysis Reflex to Culture    HIV Screen    Hepatitis B Surface Antigen Obstetric Panel    TSH with Reflex    Glucose, random    Prenatal type and screen    Rubella antibody, IgG    T. pallidum Ab    Toxoplasma Gondii Antibody, IgG    Toxoplasma Gondii Antibody, IgM    PAP SMEAR   2.  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 declined opportunity for non-syndromic hearing loss carrier testing by [facility]  Declined connexin diagnostic testing in her daughter, Danyelle Galloway, and has declined testing for non-syndromic hearing loss      Herpes      Priority: High     2016 History of genital herpes: last outbreak years ago  Start valtrex QD at 34 weeks or if any outbreaks in pregnancy  Call office with any new outbreaks      Chest pain radiating to arm 2020    Positive D-dimer 2020    Nausea 2020    Shortness of breath 2020    Chest pain 2020    Carcinoid tumor of appendix 2018    Appendicitis, acute 2018    S/P laparoscopic appendectomy 2018    MRSA infection - buttock and prepatellar bursa  2017    Sepsis ([address] 2017    Prepatellar bursitis of right knee     Tobacco use 2017    Abscess, gluteal, right 2017    Hypokalemia 2017    Hypocalcemia 2017    Hypotension 2017    Postpartum depression 2016 Started on Zoloft QD.       10/22 M Apg  Wt 5#7 10/22/2016     (spontaneous vaginal delivery)     Family history of breast cancer      Patient's maternal grandmother diagnosed in 66's      Family history of uterine cancer      Paternal grandmother diagnosed with uterine cancer in her [de-identified]      Family history of cervical cancer      Advanced Maternal Age Counseling    If a woman is over the age of 28, she is considered to be of Advanced Maternal Age, and with this title comes risks for mom and baby.  Increased risk of Down Syndrome - the most common chromosomal birth defect (chromosomes - cells that carry genes and transmit heredity information)    Increased risk of miscarriage    20% increase at ages 28 to 44    35% increase at ages 42-38    Over 50% increase by age 39   Increased risk of  Section () for delivery method    Gestational diabetes - diabetes that develops for the first time during pregnancy.  Women who have this could possibly have a very large baby who then is at risk for injuries during delivery.  Pregnancy Induced Hypertension - High blood pressure    Placental Problems - one of the most common placental problems is placenta previa in which the placenta covers all or part of the uterine opening (cervix). This can cause severe bleeding during delivery and can make  delivery necessary. Even with all of these increased risks, with the advancement in medical procedures and testing, there are several ways to reduce your risk. They include early and regular prenatal care, taking the multivitamin with folic acid prescribed by your health care manager, beginning pregnancy at a healthy weight, not smoking or drinking alcoholic beverages, and eating healthy foods. There are tests that all pregnant women are encouraged to take, but there is additional prenatal testing that is regularly offered to any woman 28 or older because of the potential of increased risks to the mother and baby. These tests are chorionic villus sampling (CVS) and amniocentesis. NIPT is also available which is a non-invasive option for fetal karyotyping. With CVS, a small sample of cells (called chorionic villi) is taken from the placenta where it attached to the wall of the uterus. Chorionic villi are tiny parts of the placenta; therefore they have the same genes as the baby. This test is 98% accurate, but can carry a slightly higher risk of miscarriage than amniocentesis, since the procedure is done in early pregnancy. Amniocentesis is a procedure where a sample of fluid is removed from the amniotic sac for analysis and evaluation. During this procedure, fluid is removed by placing a long needle through the abdominal wall into the amniotic sac. The amniocentesis needle is typically guided into the sac with the help of ultrasound imaging. Once the needle is in the sac, a syringe is used to withdraw the clear miriam-colored amniotic fluid, which looks a bit like urine.   NIPT, utilizes the maternal blood to test for fetal cells. These fetal cells are then karyotyped for genetic evaluation. All of these tests, CVS, NIPT and amniocentesis, let couples know if the fetus will have genetic abnormalities, and will help them make informed decisions regarding their pregnancy. Karyotyping by either CVS, NIPT or Amniocentesis is the ONLY way to confirm the genetic chromosomal structure regarding trisomy; Down's Syndrome, Edward's or Pateau's. [facility] was reviewed. If NIPT is positive then a confirmatory amniocentesis would be recommended to confirm the diagnosis. [facility] guidelines were reviewed. Plan:      Initial labs drawn. Desires scheduled c/s with tubal ligation  Instructed to discuss c/s with dr Homa Stiles   Prenatal vitamins. Pap smear collected   Problem list reviewed and updated. NT Screen/Quad Testing and MSAFP Single Marker: declined  Role of ultrasound in pregnancy discussed; fetal survey: requests  Amniocentesis discussed: Not indicated  NIPT Testing not indicated  Cultures & Wet Prep Collected  Follow-up in 4 weeks  Repeat Annual every 1 year  Cervical Cytology Evaluation begins at 24years old. If Negative Cytology, Follow-up screening per current guidelines. M appointment scheduled      COUNSELING COMPLETED:  INITIAL OBSTETRICAL VISIT EVALUATION:  The patient was seen full history and physical was completed/reviewed. Cytology was collected for patients over 24years of age. Cultures were collected. The patient was counseled on office policies and she was counseled on termination of pregnancy in the state of [place]. The patient was counseled on Toxoplasmosis, HIV, Tobacco Abuse, Group Beta Strep Infections, Cystic Fibrosis,  Labor precautions and Sickle Cell disease. The patient was counseled on the risks of tobacco abuse. Both maternal and fetal. She was instructed to stop smoking if currently using tobacco. Morbidity, mortality, and cessation programs were reviewed.  The risks include but are not limited to increased risks of  labor,  delivery, premature rupture of membranes, intrauterine growth restriction, intrauterine fetal demise and abruptio placenta. Secondary smoke risks were also reviewed. Increases in cancer, respiratory problems, and sudden infant death syndrome were reviewed as well. The patient was informed of a 2-4% risk of congenital anomalies in the general population. She was also informed that karyotyping is the only way to evaluate the fetus for genetic problems and genetic lethal anomalies. Chorionic villous sampling, amniocentesis and NIPT testing were also discussed with morbidity rates in detail. She requested the procedure. Route of delivery and counseling on vaginal, operative vaginal, and  sections were completed with the risks of each to both the patient as well as her baby. The possibility of a blood transfusion was discussed as well. The patient was not opposed to receiving a transfusion if needed. Nuchal translucency/Quad Evaluation and MSAFP single marker testing was reviewed in detail with attention to timing of testing and their windows. For patients beyond the gestational age for Nuchal translucency evaluation Quad testing was recommended. Timing for the Quad test was reviewed. Benefits of the above testing was reviewed. A second trimester amniocentesis was also made available to the patient. Risks, Benefits and non-invasive alternative testing was reviewed. The literature regarding a questionable link to pitocin augmentation and induction of labor, the assistance of labor contractions and the initiation of contractions to help delivery, have been reviewed with the patient regarding the increased potential of having a  with Attention Deficit Hyperactivity Disorder and or Autism.  These two disorders and the ramifications of their impact on a child and the family caring for that child has been reviewed with the patient in detail. She was given the risks, benefits and alternatives of the use of this medication. She has agreed to its use in the delivery of her unborn child if needed at the time of delivery, Yes. The patient was questioned in detail regarding any genetic misnomer history, chromosomal abnormalities, or learning disabilities in  herself, the father of the baby or their families. SHE DENIED ANY HISTORY AS STATED ABOVE: Yes    Upon completion of the visit all questions were answered and the patients follow-up and testing schedule were reviewed. Prenatal vitamins were given. T-dap Vaccine recommendations reviewed with the patient. Patient notified of timing of vaccination 27-36 weeks gestation. Patient aware Vaccine is NOT Live. Yes. The patient, Sarina Jeffrey is a 38 y.o. female, was seen with a total time spent of 30 minutes for the visit on this date of service by the E/M provider. The time component had both face to face and non face to face time spent in determining the total time component. Counseling and education regarding her diagnosis listed below and her options regarding those diagnoses were also included in determining her time component. Diagnosis Orders   1. Early stage of pregnancy  Urinalysis Reflex to Culture    HIV Screen    Hepatitis B Surface Antigen Obstetric Panel    TSH with Reflex    Glucose, random    Prenatal type and screen    Rubella antibody, IgG    T. pallidum Ab    Toxoplasma Gondii Antibody, IgG    Toxoplasma Gondii Antibody, IgM    PAP SMEAR   2. History of MRSA infection  Culture, MRSA, Screening        The patient had her preventative health maintenance recommendations and follow-up reviewed with her at the completion of her visit.

## 2021-03-24 NOTE — PROGRESS NOTES
Norma Brooks  3/24/2021     YOB: 1980     3/24/2021   No LMP recorded (lmp unknown). Patient is pregnant. 12w1d  [id number]        Primary Care Physician: Ghada Merino MD           CC: Initial Prenatal Visit     Subjective:      Corbin Min is a 38 y.o. female [id number]     is being seen today for her first obstetrical visit. This is not a planned pregnancy. She is at 12w1d  Her obstetrical history is significant for AMA, hx SAB x2, hx D&C, HSV, family hx breast/uterine/cervical cancer, hx MRSA, grand multiparity      Relationship with FOB: spouse, living together. Patient does intend to breast feed. Pregnancy history fully reviewed. Mother's ethnicity:   Father's ethnicity:       Objective:   Blood pressure 102/60, pulse 90, temperature 98.2 °F (36.8 °C), temperature source Temporal, weight 163 lb (73.9 kg), not currently breastfeeding.                       OB History    Para Term  AB Living   9 6 0 6 2 5   SAB TAB Ectopic Molar Multiple Live Births    2 0 0 0 0 5       # Outcome Date GA Lbr Kaden/2nd Weight Sex Delivery Anes PTL Lv   9 Current                     8  10/22/16 34w2d   5 lb 7.1 oz (2.47 kg) M Vag-Spont            Name: Genevieve Preciado: 8  Apgar5: 9   7  13 36w4d   5 lb 13 oz (2.637 kg) M       MALLIKA   6 SAB  6w0d       SAB         5   36w0d   6 lb 4 oz (2.835 kg) F Vag-Spont     MALLIKA   4   36w0d   6 lb 2 oz (2.778 kg) M Vag-Spont     MALLIKA      Birth Comments: Spont PTL/PTD   3 SAB  10w0d                    Birth Comments: D&C   2   35w0d   5 lb 2 oz (2.325 kg) M Vag-Spont     MALLIKA      Birth Comments: Spont PTL/PTD   1   36w0d   6 lb 1 oz (2.75 kg) F Vag-Spont     MALLIKA      Birth Comments: Spont PTL/PTD       Obstetric Comments   Adjusted pregnancy's and dates today 16. Went over each one with patient in detail.  The original chart today said 14th pregnancy. [facility] ... which is not true. 8 total- 2 sab's 5  deliveries. 3 boys and 2 girls.         Past Medical History        Past Medical History:   Diagnosis Date    Anemia      Family history of breast cancer      Family history of cervical cancer      Family history of uterine cancer      Herpes      MRSA (methicillin resistant staph aureus) culture positive 2017     abscess    Postpartum depression 2016         Past Surgical History         Past Surgical History:   Procedure Laterality Date    DILATION AND CURETTAGE        DILATION AND CURETTAGE OF UTERUS         Miscarriage    LAPAROSCOPY         for pain    TX LAP,APPENDECTOMY N/A 3/5/2018     APPENDECTOMY LAPAROSCOPIC performed by Bautista Humphrey MD at [address] [facility] [address] ANASTOMOSIS N/A 3/20/2018     BOWEL RESECTION HEMICOLECTOMY - Right performed by Bautista Humphrey MD at [address]  History               Socioeconomic History    Marital status:        Spouse name: Not on file    Number of children: Not on file    Years of education: Not on file    Highest education level: Not on file   Occupational History    Not on file   Social Needs    Financial resource strain: Not on file    Food insecurity       Worry: Not on file       Inability: Not on file    Transportation needs       Medical: Not on file       Non-medical: Not on file   Tobacco Use    Smoking status: Former Smoker       Packs/day: 0.25    Smokeless tobacco: Never Used   Substance and Sexual Activity    Alcohol use:  No       Alcohol/week: 0.0 standard drinks       Frequency: Never    Drug use: Not Currently       Types: Marijuana    Sexual activity: Yes       Partners: Male       Comment: IUD   Lifestyle    Physical activity       Days per week: Not on file       Minutes per session: Not on file    Stress: Not on file   Relationships    Social connections       Talks on phone: Not on file       Gets together: Not on file       Attends [employer] service: Not on file       Active member of club or organization: Not on file       Attends meetings of clubs or organizations: Not on file       Relationship status: Not on file    Intimate partner violence       Fear of current or ex partner: Not on file       Emotionally abused: Not on file       Physically abused: Not on file       Forced sexual activity: Not on file   Other Topics Concern    Not on file   Social History Narrative     ** Merged History Encounter **               Family History         Family History   Problem Relation Age of Onset    Diabetes Paternal Grandfather      Diabetes Paternal Grandmother      Uterine Cancer Paternal Grandmother      Cervical Cancer Paternal Grandmother      Cancer Maternal Grandmother      Breast Cancer Maternal Grandmother              MEDICATIONS:  Current Facility-Administered Medications          Current Outpatient Medications   Medication Sig Dispense Refill    Prenatal Vit-Fe Fumarate-FA (PRENATAL VITAMIN PO) Take 1 tablet by mouth daily          No current facility-administered medications for this visit.                   ALLERGIES:        Allergies as of 03/24/2021 - Review Complete 03/24/2021   Allergen Reaction Noted    Codeine   05/04/2016    Fentanyl Other (See Comments) 08/19/2017    Penicillins Hives 05/04/2016               Physical Exam Completed-See Epic Navigator     Chaperone for Intimate Exam  · Chaperone was offered and accepted as part of the rooming process. · Chaperone: Yuki CASSIDY                 Assessment:       Pregnancy at 15 and 1/7 weeks     Diagnosis Orders   1. Early stage of pregnancy  Urinalysis Reflex to Culture     HIV Screen     Hepatitis B Surface Antigen Obstetric Panel     TSH with Reflex     Glucose, random     Prenatal type and screen     Rubella antibody, IgG     T. pallidum Ab     Toxoplasma Gondii Antibody, IgG     Toxoplasma Gondii Antibody, IgM     PAP SMEAR   2.  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       Priority: High       Daughter was born deaf   2016 declined opportunity for non-syndromic hearing loss carrier testing by MFM  Declined connexin diagnostic testing in her daughter, Lieutenant Aguilera, and has declined testing for non-syndromic hearing loss       Herpes         Priority: High       2016 History of genital herpes: last outbreak years ago  Start valtrex QD at 34 weeks or if any outbreaks in pregnancy  Call office with any new outbreaks       Chest pain radiating to arm 2020    Positive D-dimer 2020    Nausea 2020    Shortness of breath 2020    Chest pain 2020    Carcinoid tumor of appendix 2018    Appendicitis, acute 2018    S/P laparoscopic appendectomy 2018    MRSA infection - buttock and prepatellar bursa  2017    Sepsis ([address] 2017    Prepatellar bursitis of right knee      Tobacco use 2017    Abscess, gluteal, right 2017    Hypokalemia 2017    Hypocalcemia 2017    Hypotension 2017    Postpartum depression 2016 Started on Zoloft QD.        10/22 M Apg  Wt 5#7 10/22/2016     (spontaneous vaginal delivery)      Family history of breast cancer         Patient's maternal grandmother diagnosed in 66's       Family history of uterine cancer         Paternal grandmother diagnosed with uterine cancer in her [de-identified]       Family history of cervical cancer        Advanced Maternal Age Counseling     If a woman is over the age of 28, she is considered to be of Advanced Maternal Age, and with this title comes risks for mom and baby.    · Increased risk of Down Syndrome - the most common chromosomal birth defect (chromosomes - cells that carry genes and transmit heredity evaluation. During this procedure, fluid is removed by placing a long needle through the abdominal wall into the amniotic sac. The amniocentesis needle is typically guided into the sac with the help of ultrasound imaging. Once the needle is in the sac, a syringe is used to withdraw the clear miriam-colored amniotic fluid, which looks a bit like urine. NIPT, utilizes the maternal blood to test for fetal cells. These fetal cells are then karyotyped for genetic evaluation. All of these tests, CVS, NIPT and amniocentesis, let couples know if the fetus will have genetic abnormalities, and will help them make informed decisions regarding their pregnancy. Karyotyping by either CVS, NIPT or Amniocentesis is the ONLY way to confirm the genetic chromosomal structure regarding trisomy; Down's Syndrome, Edward's or Pateau's. TOP  [place] was reviewed. If NIPT is positive then a confirmatory amniocentesis would be recommended to confirm the diagnosis. [facility] guidelines were reviewed.                       Plan:       Initial labs drawn. Desires scheduled c/s with tubal ligation  Instructed to discuss c/s with dr Yomi Thomas   Prenatal vitamins. Pap smear collected   Problem list reviewed and updated. NT Screen/Quad Testing and MSAFP Single Marker: declined  Role of ultrasound in pregnancy discussed; fetal survey: requests  Amniocentesis discussed: Not indicated  NIPT Testing not indicated  Cultures & Wet Prep Collected  Follow-up in 4 weeks  Repeat Annual every 1 year  Cervical Cytology Evaluation begins at 24years old. If Negative Cytology, Follow-up screening per current guidelines. [facility] appointment scheduled        COUNSELING COMPLETED:  INITIAL OBSTETRICAL VISIT EVALUATION:  The patient was seen full history and physical was completed/reviewed. Cytology was collected for patients over 24years of age. Cultures were collected.  The patient was counseled on office policies and she was counseled on termination of pregnancy in the state of [place].      The patient was counseled on Toxoplasmosis, HIV, Tobacco Abuse, Group Beta Strep Infections, Cystic Fibrosis,  Labor precautions and Sickle Cell disease.     The patient was counseled on the risks of tobacco abuse. Both maternal and fetal. She was instructed to stop smoking if currently using tobacco. Morbidity, mortality, and cessation programs were reviewed. The risks include but are not limited to increased risks of  labor,  delivery, premature rupture of membranes, intrauterine growth restriction, intrauterine fetal demise and abruptio placenta. Secondary smoke risks were also reviewed. Increases in cancer, respiratory problems, and sudden infant death syndrome were reviewed as well.     The patient was informed of a 2-4% risk of congenital anomalies in the general population. She was also informed that karyotyping is the only way to evaluate the fetus for genetic problems and genetic lethal anomalies. Chorionic villous sampling, amniocentesis and NIPT testing were also discussed with morbidity rates in detail. She requested the procedure.     Route of delivery and counseling on vaginal, operative vaginal, and  sections were completed with the risks of each to both the patient as well as her baby. The possibility of a blood transfusion was discussed as well. The patient was not opposed to receiving a transfusion if needed.     Nuchal translucency/Quad Evaluation and MSAFP single marker testing was reviewed in detail with attention to timing of testing and their windows. For patients beyond the gestational age for Nuchal translucency evaluation Quad testing was recommended. Timing for the Quad test was reviewed. Benefits of the above testing was reviewed. A second trimester amniocentesis was also made available to the patient.  Risks, Benefits and non-invasive alternative testing was reviewed.      The literature regarding a questionable link to pitocin augmentation and induction of labor, the assistance of labor contractions and the initiation of contractions to help delivery, have been reviewed with the patient regarding the increased potential of having a  with Attention Deficit Hyperactivity Disorder and or Autism. These two disorders and the ramifications of their impact on a child and the family caring for that child has been reviewed with the patient in detail. She was given the risks, benefits and alternatives of the use of this medication. She has agreed to its use in the delivery of her unborn child if needed at the time of delivery, Yes.     The patient was questioned in detail regarding any genetic misnomer history, chromosomal abnormalities, or learning disabilities in  herself, the father of the baby or their families. SHE DENIED ANY HISTORY AS STATED ABOVE: Yes     Upon completion of the visit all questions were answered and the patients follow-up and testing schedule were reviewed. Prenatal vitamins were given.     T-dap Vaccine recommendations reviewed with the patient. Patient notified of timing of vaccination 27-36 weeks gestation. Patient aware Vaccine is NOT Live. Yes.                The patient, Pratibha Sweet is a 38 y.o. female, was seen with a total time spent of 30 minutes for the visit on this date of service by the E/M provider. The time component had both face to face and non face to face time spent in determining the total time component. Counseling and education regarding her diagnosis listed below and her options regarding those diagnoses were also included in determining her time component.        Diagnosis Orders   1.  Early stage of pregnancy  Urinalysis Reflex to Culture     HIV Screen     Hepatitis B Surface Antigen Obstetric Panel     TSH with Reflex     Glucose, random     Prenatal type and screen     Rubella antibody, IgG     T. pallidum Ab     Toxoplasma Gondii Antibody, IgG     Toxoplasma Gondii Antibody, IgM

## 2021-03-25 ENCOUNTER — TELEPHONE (OUTPATIENT)
Dept: OBGYN CLINIC | Age: 41
End: 2021-03-25

## 2021-03-25 LAB
MRSA, DNA, NASAL: NORMAL
SPECIMEN DESCRIPTION: NORMAL

## 2021-03-25 NOTE — TELEPHONE ENCOUNTER
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 Patient aware that no further follow up is indicated at this time given no recent or current infection detected.

## 2021-03-25 NOTE — TELEPHONE ENCOUNTER
----- Message from EVETTE Valdes NP sent at 3/25/2021  8:14 AM EDT -----  Toxo IgG reactive  IgM nonreactive   Meaning exposed in the past

## 2021-03-26 LAB
HPV SAMPLE: NORMAL
HPV, GENOTYPE 16: NOT DETECTED
HPV, GENOTYPE 18: NOT DETECTED
HPV, HIGH RISK OTHER: NOT DETECTED
HPV, INTERPRETATION: NORMAL
SPECIMEN DESCRIPTION: NORMAL

## 2021-03-30 LAB — CYTOLOGY REPORT: NORMAL

## 2021-04-14 DIAGNOSIS — O09.522 MULTIGRAVIDA OF ADVANCED MATERNAL AGE IN SECOND TRIMESTER: Primary | ICD-10-CM

## 2021-04-21 ENCOUNTER — HOSPITAL ENCOUNTER (OUTPATIENT)
Age: 41
Setting detail: SPECIMEN
Discharge: HOME | End: 2021-04-21
Payer: MEDICARE

## 2021-04-21 ENCOUNTER — ROUTINE PRENATAL (OUTPATIENT)
Dept: OBGYN CLINIC | Age: 41
End: 2021-04-21
Payer: MEDICARE

## 2021-04-21 VITALS
DIASTOLIC BLOOD PRESSURE: 70 MMHG | HEART RATE: 80 BPM | SYSTOLIC BLOOD PRESSURE: 102 MMHG | TEMPERATURE: 97.2 F | WEIGHT: 141.8 LBS | HEIGHT: 66 IN | BODY MASS INDEX: 22.79 KG/M2

## 2021-04-21 DIAGNOSIS — Z64.1 GRAND MULTIPARITY: ICD-10-CM

## 2021-04-21 DIAGNOSIS — B00.9 HERPES: ICD-10-CM

## 2021-04-21 DIAGNOSIS — Q16.9: ICD-10-CM

## 2021-04-21 DIAGNOSIS — Z86.14 HISTORY OF MRSA INFECTION: ICD-10-CM

## 2021-04-21 DIAGNOSIS — Z3A.16 16 WEEKS GESTATION OF PREGNANCY: ICD-10-CM

## 2021-04-21 DIAGNOSIS — Z3A.16 16 WEEKS GESTATION OF PREGNANCY: Primary | ICD-10-CM

## 2021-04-21 PROCEDURE — 1036F TOBACCO NON-USER: CPT | Performed by: NURSE PRACTITIONER

## 2021-04-21 PROCEDURE — G8420 CALC BMI NORM PARAMETERS: HCPCS | Performed by: NURSE PRACTITIONER

## 2021-04-21 PROCEDURE — G8427 DOCREV CUR MEDS BY ELIG CLIN: HCPCS | Performed by: NURSE PRACTITIONER

## 2021-04-21 PROCEDURE — 99213 OFFICE O/P EST LOW 20 MIN: CPT | Performed by: NURSE PRACTITIONER

## 2021-04-21 PROCEDURE — 87070 CULTURE OTHR SPECIMN AEROBIC: CPT

## 2021-04-21 PROCEDURE — 87641 MR-STAPH DNA AMP PROBE: CPT

## 2021-04-21 NOTE — PROGRESS NOTES
Edin Soriano is a 38 y.o. female 16w1d    [id number]    OB History    Para Term  AB Living   9 6 0 6 2 5   SAB TAB Ectopic Molar Multiple Live Births   2 0 0     5      # Outcome Date GA Lbr Kaden/2nd Weight Sex Delivery Anes PTL Lv   9 Current            8  10/22/16 34w2d  5 lb 7.1 oz (2.47 kg) M Vag-Spont      7  13 36w4d  5 lb 13 oz (2.637 kg) M    MALLIKA   6 SAB  6w0d    SAB      5   36w0d  6 lb 4 oz (2.835 kg) F Vag-Spont   MALLIKA   4   36w0d  6 lb 2 oz (2.778 kg) M Vag-Spont   MALLIKA      Birth Comments: Spont PTL/PTD   3 SAB  10w0d             Birth Comments: D&C   2   35w0d  5 lb 2 oz (2.325 kg) M Vag-Spont   MALLIKA      Birth Comments: Spont PTL/PTD   1   36w0d  6 lb 1 oz (2.75 kg) F Vag-Spont   MALLIKA      Birth Comments: Spont PTL/PTD      Obstetric Comments   Adjusted pregnancy's and dates today 16. Went over each one with patient in detail. The original chart today said 14th pregnancy. [facility] ... which is not true. 8 total- 2 sab's 5  deliveries. 3 boys and 2 girls. Vitals  BP: 102/70  Weight: 141 lb 12.8 oz (64.3 kg)  Pulse: 80  Patient Position: Sitting  Albumin: Negative  Glucose: Negative      The patient was seen and evaluated. There was Positive fetal movements. No contractions or leakage of fluid. Signs and symptoms of  labor as well as labor were reviewed. The Nuchal Translucency testing was reviewed and found to be declines. A single marker MSAFP was declines. TOP ST [place] Reviewed. Dates were reviewed with the patient. An 18-22 week anatomy ultrasound has been ordered. The patient will return to the office for her next visit in 4 weeks. See antepartum flow sheet. 3/24/21 - Declined first trimester screening  3/24/21 - First MRSA nasal swab collected        Assessment:  1. Edin Soriano is a 38 y.o. female  2.  [id number]  3. 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     Priority: High     Daughter was born deaf   2016 declined opportunity for non-syndromic hearing loss carrier testing by MFM  Declined connexin diagnostic testing in her daughter, Edwar Blancas, and has declined testing for non-syndromic hearing loss      Grand multiparity 2016     Priority: High      36 weeks    35 weeks   2002 SAB  2004 36 weeks   2006 37 weeks   2013 37 weeks         Herpes      Priority: High     2016 History of genital herpes: last outbreak years ago  Call office with any new outbreaks      16 weeks gestation of pregnancy 2021    Positive D-dimer 2020    Carcinoid tumor of appendix 2018    MRSA infection - buttock and prepatellar bursa  2017     3/25/2021 MRSA swab negative       Sepsis ([facility] [address] 2017    Prepatellar bursitis of right knee     Abscess, gluteal, right 2017    Family history of breast cancer      Patient's maternal grandmother diagnosed in 78's      Family history of uterine cancer      Paternal grandmother diagnosed with uterine cancer in her [de-identified]      Family history of cervical cancer         Diagnosis Orders   1. History of MRSA infection  MRSA DNA Probe, Nasal   2. Herpes     3. Hx Postpartum depression     4. [address] multiparity     5. 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 16 weeks gestation of pregnancy  Culture, Genital         Plan:  RTO in 4 weeks for OB visit. Declines maternal QUAD screen. MFM consult 2021. MRSA nasal swab collected. Genital GBS collected. Patient was seen with total face to face time of 20 minutes.  More than 50% of this visit was on counseling and education regarding her    Diagnosis Orders   1. History of MRSA infection  MRSA DNA Probe, Nasal   2. Herpes     3. Hx Postpartum depression     4. [address] multiparity     5. 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 16 weeks gestation of pregnancy  Culture, Genital    and her options. She was also counseled on her preventative health maintenance recommendations and follow-up.

## 2021-04-22 LAB
MRSA, DNA, NASAL: NORMAL
SPECIMEN DESCRIPTION: NORMAL

## 2021-04-24 LAB
CULTURE: NORMAL
Lab: NORMAL
SPECIMEN DESCRIPTION: NORMAL

## 2021-05-10 ENCOUNTER — TELEPHONE (OUTPATIENT)
Dept: OBGYN CLINIC | Age: 41
End: 2021-05-10

## 2021-05-10 ENCOUNTER — HOSPITAL ENCOUNTER (EMERGENCY)
Age: 41
Discharge: HOME | End: 2021-05-10
Attending: EMERGENCY MEDICINE
Payer: MEDICARE

## 2021-05-10 VITALS
OXYGEN SATURATION: 100 % | RESPIRATION RATE: 16 BRPM | DIASTOLIC BLOOD PRESSURE: 63 MMHG | TEMPERATURE: 98.3 F | WEIGHT: 140 LBS | SYSTOLIC BLOOD PRESSURE: 103 MMHG | HEIGHT: 66 IN | HEART RATE: 71 BPM | BODY MASS INDEX: 22.5 KG/M2

## 2021-05-10 DIAGNOSIS — O26.892 VAGINAL DISCHARGE DURING PREGNANCY IN SECOND TRIMESTER: ICD-10-CM

## 2021-05-10 DIAGNOSIS — N89.8 VAGINAL DISCHARGE DURING PREGNANCY IN SECOND TRIMESTER: ICD-10-CM

## 2021-05-10 DIAGNOSIS — R10.9 ABDOMINAL PAIN DURING PREGNANCY IN SECOND TRIMESTER: Primary | ICD-10-CM

## 2021-05-10 DIAGNOSIS — O26.892 ABDOMINAL PAIN DURING PREGNANCY IN SECOND TRIMESTER: Primary | ICD-10-CM

## 2021-05-10 LAB
ABO/RH: NORMAL
ABSOLUTE EOS #: 0.1 K/UL (ref 0–0.4)
ABSOLUTE IMMATURE GRANULOCYTE: ABNORMAL K/UL (ref 0–0.3)
ABSOLUTE LYMPH #: 2.2 K/UL (ref 1–4.8)
ABSOLUTE MONO #: 0.6 K/UL (ref 0.1–1.3)
ALBUMIN SERPL-MCNC: 3.6 G/DL (ref 3.5–5.2)
ALBUMIN/GLOBULIN RATIO: ABNORMAL (ref 1–2.5)
ALP BLD-CCNC: 53 U/L (ref 35–104)
ALT SERPL-CCNC: 7 U/L (ref 5–33)
ANION GAP SERPL CALCULATED.3IONS-SCNC: 9 MMOL/L (ref 9–17)
AST SERPL-CCNC: 13 U/L
BASOPHILS # BLD: 0 % (ref 0–2)
BASOPHILS ABSOLUTE: 0 K/UL (ref 0–0.2)
BILIRUB SERPL-MCNC: 0.17 MG/DL (ref 0.3–1.2)
BILIRUBIN URINE: NEGATIVE
BUN BLDV-MCNC: 7 MG/DL (ref 6–20)
BUN/CREAT BLD: ABNORMAL (ref 9–20)
CALCIUM SERPL-MCNC: 9 MG/DL (ref 8.6–10.4)
CHLORIDE BLD-SCNC: 106 MMOL/L (ref 98–107)
CO2: 24 MMOL/L (ref 20–31)
COLOR: YELLOW
COMMENT UA: NORMAL
CREAT SERPL-MCNC: 0.46 MG/DL (ref 0.5–0.9)
DIFFERENTIAL TYPE: ABNORMAL
DIRECT EXAM: NORMAL
EOSINOPHILS RELATIVE PERCENT: 1 % (ref 0–4)
GFR AFRICAN AMERICAN: >60 ML/MIN
GFR NON-AFRICAN AMERICAN: >60 ML/MIN
GFR SERPL CREATININE-BSD FRML MDRD: ABNORMAL ML/MIN/{1.73_M2}
GFR SERPL CREATININE-BSD FRML MDRD: ABNORMAL ML/MIN/{1.73_M2}
GLUCOSE BLD-MCNC: 85 MG/DL (ref 70–99)
GLUCOSE URINE: NEGATIVE
HCT VFR BLD CALC: 34.2 % (ref 36–46)
HEMOGLOBIN: 11.2 G/DL (ref 12–16)
IMMATURE GRANULOCYTES: ABNORMAL %
KETONES, URINE: NEGATIVE
LEUKOCYTE ESTERASE, URINE: NEGATIVE
LIPASE: 21 U/L (ref 13–60)
LYMPHOCYTES # BLD: 23 % (ref 24–44)
Lab: NORMAL
MCH RBC QN AUTO: 29.9 PG (ref 26–34)
MCHC RBC AUTO-ENTMCNC: 32.8 G/DL (ref 31–37)
MCV RBC AUTO: 91.1 FL (ref 80–100)
MONOCYTES # BLD: 7 % (ref 1–7)
NITRITE, URINE: NEGATIVE
NRBC AUTOMATED: ABNORMAL PER 100 WBC
PDW BLD-RTO: 14.2 % (ref 11.5–14.9)
PH UA: 7 (ref 5–8)
PLATELET # BLD: 272 K/UL (ref 150–450)
PLATELET ESTIMATE: ABNORMAL
PMV BLD AUTO: 8.6 FL (ref 6–12)
POTASSIUM SERPL-SCNC: 4.3 MMOL/L (ref 3.7–5.3)
PROTEIN UA: NEGATIVE
RBC # BLD: 3.75 M/UL (ref 4–5.2)
RBC # BLD: ABNORMAL 10*6/UL
SEG NEUTROPHILS: 69 % (ref 36–66)
SEGMENTED NEUTROPHILS ABSOLUTE COUNT: 6.6 K/UL (ref 1.3–9.1)
SODIUM BLD-SCNC: 139 MMOL/L (ref 135–144)
SPECIFIC GRAVITY UA: 1 (ref 1–1.03)
SPECIMEN DESCRIPTION: NORMAL
TOTAL PROTEIN: 6.5 G/DL (ref 6.4–8.3)
TURBIDITY: CLEAR
URINE HGB: NEGATIVE
UROBILINOGEN, URINE: NORMAL
WBC # BLD: 9.5 K/UL (ref 3.5–11)
WBC # BLD: ABNORMAL 10*3/UL

## 2021-05-10 PROCEDURE — 87591 N.GONORRHOEAE DNA AMP PROB: CPT

## 2021-05-10 PROCEDURE — 87660 TRICHOMONAS VAGIN DIR PROBE: CPT

## 2021-05-10 PROCEDURE — 36415 COLL VENOUS BLD VENIPUNCTURE: CPT

## 2021-05-10 PROCEDURE — 86900 BLOOD TYPING SEROLOGIC ABO: CPT

## 2021-05-10 PROCEDURE — 87491 CHLMYD TRACH DNA AMP PROBE: CPT

## 2021-05-10 PROCEDURE — 80053 COMPREHEN METABOLIC PANEL: CPT

## 2021-05-10 PROCEDURE — 86901 BLOOD TYPING SEROLOGIC RH(D): CPT

## 2021-05-10 PROCEDURE — 81003 URINALYSIS AUTO W/O SCOPE: CPT

## 2021-05-10 PROCEDURE — 2580000003 HC RX 258: Performed by: PHYSICIAN ASSISTANT

## 2021-05-10 PROCEDURE — 87510 GARDNER VAG DNA DIR PROBE: CPT

## 2021-05-10 PROCEDURE — 83690 ASSAY OF LIPASE: CPT

## 2021-05-10 PROCEDURE — 99283 EMERGENCY DEPT VISIT LOW MDM: CPT

## 2021-05-10 PROCEDURE — 87480 CANDIDA DNA DIR PROBE: CPT

## 2021-05-10 PROCEDURE — 85025 COMPLETE CBC W/AUTO DIFF WBC: CPT

## 2021-05-10 RX ORDER — 0.9 % SODIUM CHLORIDE 0.9 %
1000 INTRAVENOUS SOLUTION INTRAVENOUS ONCE
Status: COMPLETED | OUTPATIENT
Start: 2021-05-10 | End: 2021-05-10

## 2021-05-10 RX ADMIN — SODIUM CHLORIDE 1000 ML: 9 INJECTION, SOLUTION INTRAVENOUS at 13:45

## 2021-05-10 ASSESSMENT — PAIN DESCRIPTION - DESCRIPTORS: DESCRIPTORS: CONSTANT;STABBING

## 2021-05-10 ASSESSMENT — PAIN DESCRIPTION - LOCATION: LOCATION: ABDOMEN

## 2021-05-10 NOTE — ED PROVIDER NOTES
[address]  eMERGENCY dEPARTMENT eNCOUnter      Pt Name: Edin Soriano  MRN: [id number]  [place] 1980  Date of evaluation: 5/10/2021  Provider: John Ramires PA-C    CHIEF COMPLAINT       Chief Complaint   Patient presents with    Abdominal Pain           HISTORY OF PRESENT ILLNESS  (Location/Symptom, Timing/Onset, Context/Setting, Quality, Duration, Modifying Factors, Severity.)   Edin Soriano is a 38 y.o. female who is 19 weeks pregnant presents to the emergency department with complaints of abdominal pain. Symptoms began this morning. Pt reports pain feels like she is being stabbed with an ice pick. She denies radiation of pain. Reports urinary urgency, frequency, and clear/ white vaginal discharge. There is no emesis, nausea, vaginal bleeding, dysuria, chest pain, sob, cough, fevers, chills. This is patients 9th pregnancy. She has 6 living children with 2 miscarriages. Follows with . No other complaints. Pt reports she did test positive for BV in her first trimester but was no treated. Nursing Notes were reviewed. REVIEW OF SYSTEMS    (2-9 systems for level 4, 10 or more for level 5)     Review of Systems   Abd pain  Vaginal discharge  Urinary frequency  Pregnant     Except as noted above the remainder of the review of systems was reviewed and negative.        PAST MEDICAL HISTORY     Past Medical History:   Diagnosis Date    Anemia     Family history of breast cancer     Family history of cervical cancer     Family history of uterine cancer     Herpes     MRSA (methicillin resistant staph aureus) culture positive 08/16/2017    abscess    Postpartum depression 11/7/2016    Tobacco use 8/16/2017     None otherwise stated in nurses notes    SURGICAL HISTORY       Past Surgical History:   Procedure Laterality Date    DILATION AND CURETTAGE      DILATION AND CURETTAGE OF UTERUS      Miscarriage    LAPAROSCOPY      for pain    VT LAP,APPENDECTOMY N/A heart sounds, S1 normal and S2 normal.   Pulmonary:      Effort: Pulmonary effort is normal. No respiratory distress. Breath sounds: Normal breath sounds and air entry. No stridor. No decreased breath sounds, wheezing, rhonchi or rales. Abdominal:      General: There is distension (19 weeks pregnant). Palpations: Abdomen is soft. Tenderness: There is abdominal tenderness in the suprapubic area. There is guarding. There is no right CVA tenderness, left CVA tenderness or rebound. Genitourinary:     Labia:         Right: No rash, tenderness, lesion or injury. Left: No rash, tenderness, lesion or injury. Vagina: Normal.      Cervix: Discharge present. No cervical motion tenderness, friability, lesion, erythema, cervical bleeding or eversion. Uterus: Normal.       Adnexa: Right adnexa normal and left adnexa normal.      Comments: Os is closed. Skin:     Capillary Refill: Capillary refill takes less than 2 seconds. Neurological:      General: No focal deficit present. Mental Status: She is alert and oriented to person, place, and time. Mental status is at baseline. DIAGNOSTIC RESULTS     EKG: All EKG's are interpreted by the Emergency Department Physician who either signs or Co-signs this chart in the absence of a cardiologist.        RADIOLOGY:   All plain film, CT, MRI, and formal ultrasound images (except ED bedside ultrasound) are read by the radiologist, see reports below, unless otherwise noted in MDM or here. No orders to display       No results found.           LABS:  Labs Reviewed   CBC WITH AUTO DIFFERENTIAL - Abnormal; Notable for the following components:       Result Value    RBC 3.75 (*)     Hemoglobin 11.2 (*)     Hematocrit 34.2 (*)     Seg Neutrophils 69 (*)     Lymphocytes 23 (*)     All other components within normal limits   COMPREHENSIVE METABOLIC PANEL W/ REFLEX TO MG FOR LOW K - Abnormal; Notable for the following components: CREATININE 0.46 (*)     Total Bilirubin 0.17 (*)     All other components within normal limits   VAGINITIS DNA PROBE   C.TRACHOMATIS N.GONORRHOEAE DNA   URINE RT REFLEX TO CULTURE   LIPASE   ABO/RH       All other labs were within normal range or not returned as of this dictation. EMERGENCY DEPARTMENT COURSE and DIFFERENTIAL DIAGNOSIS/MDM:   Vitals:    Vitals:    05/10/21 1251   BP: 103/63   Pulse: 71   Resp: 16   Temp: 98.3 °F (36.8 °C)   TempSrc: Oral   SpO2: 100%   Weight: 140 lb (63.5 kg)   Height: 5' 6\" (1.676 m)     Patient instructed to return to the emergency room if symptoms worsen, return, or any other concern right away which is agreed by the patient    ED MEDS:  Orders Placed This Encounter   Medications    0.9 % sodium chloride bolus         CONSULTS:  None    PROCEDURES:  None      FINAL IMPRESSION      1. Abdominal pain during pregnancy in second trimester    2. Vaginal discharge during pregnancy in second trimester          DISPOSITION/PLAN   DISPOSITION     PATIENT REFERRED TO:  Yue Mitchell MD  [facility]  [facility] [address] [facility] [address] [id number]  [address] [facility] [address]  [address] [place] [address], [facility]ly [facility] [address] [place] [address]  [phone]    Call         DISCHARGE MEDICATIONS:  Discharge Medication List as of 5/10/2021  4:36 PM            Summation      Patient Course:     Lower abdominal cramping and stabbing pain since this morning. 19 weeks pregnant. Follows with Dr. Martine Menendez. Reports vaginal discharge, no bleeding. Afebrile. Lab work is unremarkable. Bedside US performed by Dr. Lis Cook reveals fetal heart tones of 147. Lots of fetal movement. UA is pending. Will consult with OB    Low concern for appendicitis, torsion, placenta abruption. Spoke with Vivian Bernabe resident. She discussed case with Dr. Martine Menendez. Pt is ok for dc home. Vaginitis swab negative.   Will not treat for BV. Strict return instruction discussed at bedside. Dr. Pascual Villagran evaluated patient and is agreeable. Discussed results and plan with the pt. They expressed appropriate understanding. Pt given close follow up, supportive care instructions and strict return instructions at the bedside. ED Medications administered this visit:    Medications   0.9 % sodium chloride bolus (0 mLs Intravenous Stopped 5/10/21 1453)       New Prescriptions from this visit:    Discharge Medication List as of 5/10/2021  4:36 PM          Follow-up:  Jovanny Friend MD  [address] [facility] [address] [id number]  [address] [facility] [address]  [address] [place] [address] 75, [address]  [address] [place] [address]  [phone]    Call           Final Impression:   1. Abdominal pain during pregnancy in second trimester    2.  Vaginal discharge during pregnancy in second trimester               (Please note that portions of this note were completed with a voice recognition program.  Efforts were made to edit the dictations but occasionally words are mis-transcribed.)      (Please note that portions of this note were completed with a voice recognition program.  Efforts were made to edit the dictations but occasionally words are mis-transcribed.)    Jennifer Bey. Felicia [address], PA-C  05/10/21 5883

## 2021-05-10 NOTE — TELEPHONE ENCOUNTER
Pt called stating she is having severe abdominal pain. Pt states it is like being \"stabbed with ice pick\" and she is doubled over in pain. Per Acosta Coni pt to nearest ED for evaluation.

## 2021-05-10 NOTE — ED PROVIDER NOTES
EMERGENCY DEPARTMENT ENCOUNTER   ATTENDING ATTESTATION     Pt Name: Princess Ventura  MRN: [id number]  [place] 1980  Date of evaluation: 5/10/21   Princess Ventura is a 38 y.o. female with CC: Abdominal Pain    MDM:   Low abd pains, bilateral, cramping, woke up with them today    Unknown LMP, she states she is 18.5 weeks pregnant though per her OB  Her ob is Dr Purnell Apgar to prenatal visits  No bleeding    PROCEDURE:  Bedside ultrasound performed by myself, I interpreted images, images archived. Live IUP second term, fetal movement, FHT in 140s, images saved. abd nontender  Do not suspect appendicitis or placental abruption or kidney stone or ovarian torsion  Nontoxic well appearing  Consulting GYN  Likely follow up outpatient         LABS: All lab results were reviewed by myself, and all abnormals are listed below.   Labs Reviewed   CBC WITH AUTO DIFFERENTIAL - Abnormal; Notable for the following components:       Result Value    RBC 3.75 (*)     Hemoglobin 11.2 (*)     Hematocrit 34.2 (*)     Seg Neutrophils 69 (*)     Lymphocytes 23 (*)     All other components within normal limits   COMPREHENSIVE METABOLIC PANEL W/ REFLEX TO MG FOR LOW K - Abnormal; Notable for the following components:    CREATININE 0.46 (*)     Total Bilirubin 0.17 (*)     All other components within normal limits   VAGINITIS DNA PROBE   C.TRACHOMATIS N.GONORRHOEAE DNA   LIPASE   URINE RT REFLEX TO CULTURE   ABO/RH     PASTMEDICAL HISTORY     Past Medical History:   Diagnosis Date    Anemia     Family history of breast cancer     Family history of cervical cancer     Family history of uterine cancer     Herpes     MRSA (methicillin resistant staph aureus) culture positive 2017    abscess    Postpartum depression 2016    Tobacco use 2017     SURGICAL HISTORY       Past Surgical History:   Procedure Laterality Date    DILATION AND CURETTAGE      DILATION AND CURETTAGE OF UTERUS      Miscarriage    LAPAROSCOPY      for pain    MD LAP,APPENDECTOMY N/A 3/5/2018    APPENDECTOMY LAPAROSCOPIC performed by Aminata Lamar MD at [address] [facility] N/A 3/20/2018    BOWEL RESECTION HEMICOLECTOMY - Right performed by Aminata Lamar MD at [address] [place] [address]       Previous Medications    PRENATAL VIT-FE FUMARATE-FA (PRENATAL VITAMIN PO)    Take 1 tablet by mouth daily     ALLERGIES     is allergic to codeine; fentanyl; and penicillins. FAMILY HISTORY     She indicated that her mother is alive. She indicated that her father is alive. She indicated that her maternal grandmother is alive. She indicated that her maternal grandfather is . She indicated that her paternal grandmother is alive. She indicated that her paternal grandfather is . She indicated that the status of her daughter is unknown. She indicated that the status of her son is unknown. SOCIAL HISTORY       Social History     Tobacco Use    Smoking status: Former Smoker     Packs/day: 0.25    Smokeless tobacco: Never Used   Substance Use Topics    Alcohol use: No     Alcohol/week: 0.0 standard drinks     Frequency: Never    Drug use: Not Currently     Types: Marijuana       I personally evaluated and examined the patient in conjunction with the APC and agree with the assessment, treatment plan, and disposition of the patient as recorded by the APC.    Santos Marquez MD  Attending Emergency Physician         Santos Marquez MD  05/10/21 3874

## 2021-05-10 NOTE — ED NOTES
Pt given instructions for follow-up and discharge. Pt verbalizes understanding. Pt is A&O x4, PWD, eupneic, and ambulatory with steady, even gait upon discharge.         Clemencia Faust RN  05/10/21 3134

## 2021-05-10 NOTE — TELEPHONE ENCOUNTER
Pt called in she is 18 weeks and has some concerns of off /on abdominal pain and weight loss , pt just would like someone to call her regarding her issues and give her any recommendations.  Please call her at [phone]

## 2021-05-13 ENCOUNTER — ROUTINE PRENATAL (OUTPATIENT)
Dept: OBGYN CLINIC | Age: 41
End: 2021-05-13
Payer: MEDICARE

## 2021-05-13 VITALS
WEIGHT: 141 LBS | HEART RATE: 80 BPM | DIASTOLIC BLOOD PRESSURE: 76 MMHG | BODY MASS INDEX: 22.76 KG/M2 | SYSTOLIC BLOOD PRESSURE: 114 MMHG

## 2021-05-13 DIAGNOSIS — Q16.9: ICD-10-CM

## 2021-05-13 DIAGNOSIS — B00.9 HERPES: ICD-10-CM

## 2021-05-13 DIAGNOSIS — Z64.1 GRAND MULTIPARITY: ICD-10-CM

## 2021-05-13 DIAGNOSIS — M99.05 SOMATIC DYSFUNCTION OF PUBIC BONE: ICD-10-CM

## 2021-05-13 DIAGNOSIS — Z3A.19 19 WEEKS GESTATION OF PREGNANCY: ICD-10-CM

## 2021-05-13 DIAGNOSIS — O26.892 PELVIC PAIN AFFECTING PREGNANCY IN SECOND TRIMESTER, ANTEPARTUM: Primary | ICD-10-CM

## 2021-05-13 DIAGNOSIS — R10.2 PELVIC PAIN AFFECTING PREGNANCY IN SECOND TRIMESTER, ANTEPARTUM: Primary | ICD-10-CM

## 2021-05-13 DIAGNOSIS — M99.05 SOMATIC DYSFUNCTION OF PUBIC REGION: ICD-10-CM

## 2021-05-13 DIAGNOSIS — Z86.14 HISTORY OF MRSA INFECTION: Primary | ICD-10-CM

## 2021-05-13 PROCEDURE — G8420 CALC BMI NORM PARAMETERS: HCPCS | Performed by: OBSTETRICS & GYNECOLOGY

## 2021-05-13 PROCEDURE — 99213 OFFICE O/P EST LOW 20 MIN: CPT | Performed by: OBSTETRICS & GYNECOLOGY

## 2021-05-13 PROCEDURE — G8427 DOCREV CUR MEDS BY ELIG CLIN: HCPCS | Performed by: OBSTETRICS & GYNECOLOGY

## 2021-05-13 PROCEDURE — 1036F TOBACCO NON-USER: CPT | Performed by: OBSTETRICS & GYNECOLOGY

## 2021-05-13 NOTE — PROGRESS NOTES
Graeme Sacks is a 38 y.o. female 19w2d    [id number]    OB History    Para Term  AB Living   9 6 0 6 2 5   SAB TAB Ectopic Molar Multiple Live Births   2 0 0     5      # Outcome Date GA Lbr Kaden/2nd Weight Sex Delivery Anes PTL Lv   9 Current            8  10/22/16 34w2d  5 lb 7.1 oz (2.47 kg) M Vag-Spont      7  13 36w4d  5 lb 13 oz (2.637 kg) M    MALLIKA   6 SAB  6w0d    SAB      5   36w0d  6 lb 4 oz (2.835 kg) F Vag-Spont   MALLIKA   4   36w0d  6 lb 2 oz (2.778 kg) M Vag-Spont   MALLIKA      Birth Comments: Spont PTL/PTD   3 SAB  10w0d             Birth Comments: D&C   2   35w0d  5 lb 2 oz (2.325 kg) M Vag-Spont   MALLIKA      Birth Comments: Spont PTL/PTD   1   36w0d  6 lb 1 oz (2.75 kg) F Vag-Spont   MALLIKA      Birth Comments: Spont PTL/PTD      Obstetric Comments   Adjusted pregnancy's and dates today 16. Went over each one with patient in detail. The original chart today said 14th pregnancy. Michael  ... which is not true. 8 total- 2 sab's 5  deliveries. 3 boys and 2 girls. Vitals  BP: 114/76  Weight: 141 lb (64 kg)  Pulse: 80  Patient Position: Sitting  Albumin: Negative  Glucose: Negative    The patient was seen and evaluated. There was positive fetal movements. No contractions or leakage of fluid. Signs and symptoms of  labor as well as labor were reviewed. The Nuchal Translucency testing was reviewed and found to be declined A single marker MSAFP was reviewed and found to be declined. The patients anatomy ultrasound has been completed and reviewed with patient. TOP ST OH Reviewed. A 28 week lab panel was ordered. This includes a (HH, 1 hr GTT, U/A C&S). The patient is to complete this in the next two to four weeks. The S/S of Pre-Eclampsia were reviewed with the patient in detail. She is to report any of these if they occur. She currently denies any of these.     The patient is RH positive Rhogam Ordered no    The patient was instructed on fetal kick counts and was given a kick sheet to complete every 8 hours. This is to begin at 28 weeks gestation. She was instructed that the baby should move at a minimum of ten times within one hour after a meal. The patient was instructed to lay down on her left side twenty minutes after eating and count movements for up to one hour with a target value of ten movements. She was instructed to notify the office if she did not make that target after two attempts or if after any attempt there was less than four movements. 3/24/21 - Declined first trimester screening  3/24/21 - First MRSA nasal swab collected      Assessment:  Nagi Espinosa is a 38 y.o. female  2.  [id number]  3. 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     Priority: High     Daughter was born deaf   2016 declined opportunity for non-syndromic hearing loss carrier testing by M  Declined connexin diagnostic testing in her daughter, Edwar Blancas, and has declined testing for non-syndromic hearing loss      Grand multiparity 2016     Priority: High     2000 36 weeks   2001 35 weeks   2002 SAB  2004 36 weeks   2006 37 weeks   2013 37 weeks         Herpes      Priority: High     2016 History of genital herpes: last outbreak years ago  Call office with any new outbreaks      Somatic dysfunction of pubic bone 2021 Physical therapy       16 weeks gestation of pregnancy 2021    History of MRSA infection 2021     3/24/21 - Nares MRSA culture #1 completed  2021 MRSA swab #2 negative       Positive D-dimer 2020    Carcinoid tumor of appendix 2018    MRSA infection - buttock and prepatellar bursa  2017     3/25/2021 MRSA swab negative       Sepsis ([address] 2017    Prepatellar bursitis of right knee     Abscess, gluteal, right 2017    Hx Postpartum depression 2016    Family history of breast cancer      Patient's maternal grandmother diagnosed in 78's      Family history of uterine cancer      Paternal grandmother diagnosed with uterine cancer in her [de-identified]      Family history of cervical cancer         Diagnosis Orders   1. History of MRSA infection     2. [place] multiparity     3. 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 Herpes     5. Postpartum depression     6. 19 weeks gestation of pregnancy     7. Somatic dysfunction of pubic bone           Plan:  The patient will return to the office for her next visit in 4 weeks. See antepartum flow sheet. Pt with pubic dysfunction worse when ambulating & on her feet. Pt started using belly band. Will refer to physical therapy. [address] [facility]  [place] [address] [place] [phone]  2021  No LMP recorded (lmp unknown). Patient is pregnant. INITIAL OBSTETRICAL VISIT EVALUATION:  The patient was seen full history and physical was completed/reviewed. Cytology was collected for patients over 24years of age. Cultures were collected. The patient was counseled on office policies and she was counseled on termination of pregnancy in the state of [place]. The patient was counseled on Toxoplasmosis, HIV, Tobacco Abuse, Group Beta Strep Infections, Cystic Fibrosis,  Labor precautions and Sickle Cell disease. The patient was counseled on the risks of tobacco abuse. Both maternal and fetal. She was instructed to stop smoking if currently using tobacco. Morbidity, mortality, and cessation programs were reviewed. The risks include but are not limited to increased risks of  labor,  delivery, premature rupture of membranes, intrauterine growth restriction, intrauterine fetal demise and abruptio placenta. Secondary smoke risks were also reviewed.  Increases in cancer, respiratory problems, and sudden infant death syndrome were reviewed as well. The patient was informed of a 2-4% risk of congenital anomalies in the general population. She was also informed that karyotyping is the only way to evaluate the fetus for genetic problems and genetic lethal anomalies. Chorionic villous sampling, amniocentesis and Maternal Genetic Blood Sampling-(NIPT Testing) were also discussed with morbidity rates in detail. She declined any of the options. Route of delivery and counseling on vaginal, operative vaginal, and  sections were completed with the risks of each to both the patient as well as her baby. The possibility of a blood transfusion was discussed as well. The patient was not opposed to receiving a transfusion if needed. Nuchal translucency and MSAFP single marker testing was reviewed in detail with attention to timing of testing and their windows. For patients beyond the gestational age for Nuchal translucency evaluation, ([id number]) Quad testing was recommended. Timing for the Quad test was reviewed. Benefits of the above testing was reviewed. A second trimester amniocentesis was also made available to the patient. Risks, Benefits and non-invasive alternative testing was reviewed. The patients diagnosed with Advanced maternal age, AMA (age of 29 yo or beyond at delivery), had NIPT recommended. This was discussed in lay terms with Risks and Benefits reviewed. The literature regarding a questionable link to pitocin augmentation and induction of labor, the assistance of labor contractions and the initiation of contractions to help delivery, have been reviewed with the patient regarding the increased potential of having a  with Attention Deficit Hyperactivity Disorder and or Autism. These two disorders and the ramifications of their impact on a child and the family caring for that child has been reviewed with the patient in detail.  She was given the risks, benefits and alternatives of the use of this medication. She has agreed to its use in the delivery of her unborn child if needed at the time of delivery, Yes. The patient was questioned in detail regarding any genetic misnomer history, chromosomal abnormalities, or learning disabilities in  herself, the father of the baby or their families. SHE DENIED ANY HISTORY AS STATED ABOVE: Yes    Upon completion of the visit all questions were answered and the patients follow-up and testing schedule were reviewed. Prenatal vitamins were given. Patient was seen with total face to face time of 20 minutes. More than 50% of this visit was on counseling and education regarding her    Diagnosis Orders   1. History of MRSA infection     2. [address] multiparity     3. 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 Herpes     5. Postpartum depression     6. 19 weeks gestation of pregnancy     7. Somatic dysfunction of pubic bone      and her options. She was also counseled on her preventative health maintenance recommendations and follow-up.

## 2021-05-19 ENCOUNTER — TELEPHONE (OUTPATIENT)
Dept: PERINATAL CARE | Age: 41
End: 2021-05-19

## 2021-05-19 ENCOUNTER — ROUTINE PRENATAL (OUTPATIENT)
Dept: PERINATAL CARE | Age: 41
End: 2021-05-19
Payer: MEDICARE

## 2021-05-19 VITALS
DIASTOLIC BLOOD PRESSURE: 57 MMHG | TEMPERATURE: 97.2 F | HEART RATE: 74 BPM | WEIGHT: 145 LBS | RESPIRATION RATE: 16 BRPM | SYSTOLIC BLOOD PRESSURE: 89 MMHG | HEIGHT: 66 IN | BODY MASS INDEX: 23.3 KG/M2

## 2021-05-19 DIAGNOSIS — O09.212 CURRENT PREGNANCY WITH HISTORY OF PRE-TERM LABOR IN SECOND TRIMESTER: ICD-10-CM

## 2021-05-19 DIAGNOSIS — O43.102 PLACENTAL ABNORMALITY IN SECOND TRIMESTER: ICD-10-CM

## 2021-05-19 DIAGNOSIS — O44.40 LOW IMPLANTATION OF PLACENTA WITHOUT HEMORRHAGE: ICD-10-CM

## 2021-05-19 DIAGNOSIS — O09.42 GRAND MULTIPARITY WITH ANTENATAL PROBLEM IN SECOND TRIMESTER: ICD-10-CM

## 2021-05-19 DIAGNOSIS — Z3A.20 20 WEEKS GESTATION OF PREGNANCY: ICD-10-CM

## 2021-05-19 DIAGNOSIS — O09.522 MULTIGRAVIDA OF ADVANCED MATERNAL AGE IN SECOND TRIMESTER: Primary | ICD-10-CM

## 2021-05-19 PROBLEM — A41.9 SEPSIS (HCC): Status: RESOLVED | Noted: 2017-08-18 | Resolved: 2021-05-19

## 2021-05-19 PROBLEM — Z3A.16 16 WEEKS GESTATION OF PREGNANCY: Status: RESOLVED | Noted: 2021-04-21 | Resolved: 2021-05-19

## 2021-05-19 PROBLEM — R79.89 POSITIVE D-DIMER: Status: RESOLVED | Noted: 2020-11-04 | Resolved: 2021-05-19

## 2021-05-19 LAB
ABDOMINAL CIRCUMFERENCE: NORMAL
ABDOMINAL CIRCUMFERENCE: NORMAL
BIPARIETAL DIAMETER: NORMAL
BIPARIETAL DIAMETER: NORMAL
ESTIMATED FETAL WEIGHT: NORMAL
ESTIMATED FETAL WEIGHT: NORMAL
FEMORAL DIAMETER: NORMAL
FEMORAL DIAMETER: NORMAL
HC/AC: NORMAL
HC/AC: NORMAL
HEAD CIRCUMFERENCE: NORMAL
HEAD CIRCUMFERENCE: NORMAL

## 2021-05-19 PROCEDURE — 76817 TRANSVAGINAL US OBSTETRIC: CPT | Performed by: OBSTETRICS & GYNECOLOGY

## 2021-05-19 PROCEDURE — 76811 OB US DETAILED SNGL FETUS: CPT | Performed by: OBSTETRICS & GYNECOLOGY

## 2021-05-19 NOTE — TELEPHONE ENCOUNTER
Rx called into the pharmacy [facility]  for vaginal progesterone suppositories/capsules 200 mg insert one into the vagina nightly a 14 week supply with no refills, Rx called in by BERTHA MARTINEZ per Dr. Andres Phelps

## 2021-05-20 PROBLEM — O26.879 SHORT CERVIX, ANTEPARTUM: Status: ACTIVE | Noted: 2021-05-20

## 2021-05-20 PROBLEM — O44.40 LOW-LYING PLACENTA: Status: ACTIVE | Noted: 2021-05-20

## 2021-05-24 ENCOUNTER — TELEPHONE (OUTPATIENT)
Dept: OBGYN CLINIC | Age: 41
End: 2021-05-24

## 2021-05-24 NOTE — TELEPHONE ENCOUNTER
Torres Espinoza pt notified of low lying placenta and pelvic rest instructions given. Pt stating she was previously informed by [facility] at her appointment on her low lying placenta and restrictions. Pt scheduled for follow up with [facility]. Pt to contact [facility] regarding [facility] referral.  Pt states she left appointment before it was competed.

## 2021-06-03 RX ORDER — PROGESTERONE 200 MG/1
CAPSULE ORAL
Status: ON HOLD | COMMUNITY
Start: 2021-05-19 | End: 2021-09-25 | Stop reason: HOSPADM

## 2021-06-04 ENCOUNTER — ROUTINE PRENATAL (OUTPATIENT)
Dept: PERINATAL CARE | Age: 41
End: 2021-06-04
Payer: MEDICARE

## 2021-06-04 VITALS
HEIGHT: 66 IN | SYSTOLIC BLOOD PRESSURE: 92 MMHG | RESPIRATION RATE: 16 BRPM | WEIGHT: 146 LBS | TEMPERATURE: 97.2 F | HEART RATE: 83 BPM | BODY MASS INDEX: 23.46 KG/M2 | DIASTOLIC BLOOD PRESSURE: 57 MMHG

## 2021-06-04 DIAGNOSIS — O09.522 MULTIGRAVIDA OF ADVANCED MATERNAL AGE IN SECOND TRIMESTER: ICD-10-CM

## 2021-06-04 DIAGNOSIS — Z3A.22 22 WEEKS GESTATION OF PREGNANCY: ICD-10-CM

## 2021-06-04 DIAGNOSIS — O09.42 GRAND MULTIPARITY WITH ANTENATAL PROBLEM IN SECOND TRIMESTER: ICD-10-CM

## 2021-06-04 DIAGNOSIS — O09.212 CURRENT PREGNANCY WITH HISTORY OF PRE-TERM LABOR IN SECOND TRIMESTER: Primary | ICD-10-CM

## 2021-06-04 DIAGNOSIS — O43.102 PLACENTAL ABNORMALITY IN SECOND TRIMESTER: ICD-10-CM

## 2021-06-04 LAB
ABDOMINAL CIRCUMFERENCE: NORMAL
BIPARIETAL DIAMETER: NORMAL
ESTIMATED FETAL WEIGHT: NORMAL
FEMORAL DIAMETER: NORMAL
HC/AC: NORMAL
HEAD CIRCUMFERENCE: NORMAL

## 2021-06-04 PROCEDURE — 76815 OB US LIMITED FETUS(S): CPT | Performed by: OBSTETRICS & GYNECOLOGY

## 2021-06-04 PROCEDURE — 76817 TRANSVAGINAL US OBSTETRIC: CPT | Performed by: OBSTETRICS & GYNECOLOGY

## 2021-06-07 ENCOUNTER — ROUTINE PRENATAL (OUTPATIENT)
Dept: OBGYN CLINIC | Age: 41
End: 2021-06-07
Payer: MEDICARE

## 2021-06-07 VITALS
WEIGHT: 144 LBS | BODY MASS INDEX: 23.24 KG/M2 | DIASTOLIC BLOOD PRESSURE: 58 MMHG | HEART RATE: 81 BPM | SYSTOLIC BLOOD PRESSURE: 92 MMHG

## 2021-06-07 DIAGNOSIS — Z3A.22 22 WEEKS GESTATION OF PREGNANCY: Primary | ICD-10-CM

## 2021-06-07 DIAGNOSIS — M99.05 SOMATIC DYSFUNCTION OF PUBIC BONE: ICD-10-CM

## 2021-06-07 DIAGNOSIS — B00.9 HERPES: ICD-10-CM

## 2021-06-07 DIAGNOSIS — Z64.1 GRAND MULTIPARITY: ICD-10-CM

## 2021-06-07 DIAGNOSIS — O44.40 LOW-LYING PLACENTA: ICD-10-CM

## 2021-06-07 DIAGNOSIS — Q16.9: ICD-10-CM

## 2021-06-07 DIAGNOSIS — Z86.14 HISTORY OF MRSA INFECTION: ICD-10-CM

## 2021-06-07 DIAGNOSIS — O26.879 SHORT CERVIX, ANTEPARTUM: ICD-10-CM

## 2021-06-07 PROCEDURE — G8427 DOCREV CUR MEDS BY ELIG CLIN: HCPCS | Performed by: NURSE PRACTITIONER

## 2021-06-07 PROCEDURE — G8420 CALC BMI NORM PARAMETERS: HCPCS | Performed by: NURSE PRACTITIONER

## 2021-06-07 PROCEDURE — 99213 OFFICE O/P EST LOW 20 MIN: CPT | Performed by: NURSE PRACTITIONER

## 2021-06-07 PROCEDURE — 1036F TOBACCO NON-USER: CPT | Performed by: NURSE PRACTITIONER

## 2021-06-07 NOTE — PROGRESS NOTES
Cristóbal Beasley is a 38 y.o. female 22w6d    [id number]    OB History    Para Term  AB Living   9 6 0 6 2 5   SAB TAB Ectopic Molar Multiple Live Births   2 0 0     5      # Outcome Date GA Lbr Kaden/2nd Weight Sex Delivery Anes PTL Lv   9 Current            8  10/22/16 34w2d  5 lb 7.1 oz (2.47 kg) M Vag-Spont      7  13 36w4d  5 lb 13 oz (2.637 kg) M    MALLIKA   6 SAB  6w0d    SAB      5   36w0d  6 lb 4 oz (2.835 kg) F Vag-Spont   MALLIKA   4   36w0d  6 lb 2 oz (2.778 kg) M Vag-Spont   MALLIKA      Birth Comments: Spont PTL/PTD   3 SAB  10w0d             Birth Comments: D&C   2   35w0d  5 lb 2 oz (2.325 kg) M Vag-Spont   MALLIKA      Birth Comments: Spont PTL/PTD   1   36w0d  6 lb 1 oz (2.75 kg) F Vag-Spont   MALLIKA      Birth Comments: Spont PTL/PTD      Obstetric Comments   Adjusted pregnancy's and dates today 16. Went over each one with patient in detail. The original chart today said 14th pregnancy. Mena Brittle ... which is not true. 8 total- 2 sab's 5  deliveries. 3 boys and 2 girls. Vitals  BP: (!) 92/58  Weight: 144 lb (65.3 kg)  Pulse: 81  Patient Position: Sitting    The patient was seen and evaluated. There was positive fetal movements. No contractions or leakage of fluid. Signs and symptoms of  labor as well as labor were reviewed. The Nuchal Translucency testing was reviewed and found to be not completed- declined. A single marker MSAFP was reviewed and found to be declined. The patients anatomy ultrasound has been completed and reviewed with patient. TOP ST OH Reviewed. A 28 week lab panel was ordered. This includes a (HH, 1 hr GTT, U/A C&S). The patient is to complete this in the next two to four weeks. The S/S of Pre-Eclampsia were reviewed with the patient in detail. She is to report any of these if they occur. She currently denies any of these.     The patient is RH positive Rhogam Ordered no    The patient was Somatic dysfunction of pubic bone 05/13/2021 5/13/2021 Physical therapy       Carcinoid tumor of appendix 03/19/2018    MRSA infection - buttock and prepatellar bursa  08/19/2017     3/25/2021 MRSA swab negative       Prepatellar bursitis of right knee     Abscess, gluteal, right 08/16/2017    Family history of breast cancer      Patient's maternal grandmother diagnosed in 78's      Family history of uterine cancer      Paternal grandmother diagnosed with uterine cancer in her [de-identified]      Family history of cervical cancer         Diagnosis Orders   1. 22 weeks gestation of pregnancy     2. Low-lying placenta     3. [address] multiparity     4. 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 Herpes     6. Somatic dysfunction of pubic bone     7. History of MRSA infection     8. Postpartum depression     9. Short cervix, antepartum           Plan:  The patient will return to the office for her next visit in 4 weeks. See antepartum flow sheet. Continue daily progesterone vaginal suppositories. Denies S/S of labor. Continues to decline maternal quad screen. Patient was seen with total face to face time of 20 minutes. More than 50% of this visit was on counseling and education regarding her    Diagnosis Orders   1. 22 weeks gestation of pregnancy     2. Low-lying placenta     3. [address] multiparity     4. 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 Herpes     6. Somatic dysfunction of pubic bone     7. History of MRSA infection     8. Postpartum depression     9. Short cervix, antepartum      and her options. She was also counseled on her preventative health maintenance recommendations and follow-up.

## 2021-06-17 ENCOUNTER — ROUTINE PRENATAL (OUTPATIENT)
Dept: PERINATAL CARE | Age: 41
End: 2021-06-17
Payer: MEDICARE

## 2021-06-17 VITALS
RESPIRATION RATE: 16 BRPM | DIASTOLIC BLOOD PRESSURE: 58 MMHG | HEART RATE: 82 BPM | BODY MASS INDEX: 23.78 KG/M2 | SYSTOLIC BLOOD PRESSURE: 114 MMHG | WEIGHT: 148 LBS | HEIGHT: 66 IN | TEMPERATURE: 97.2 F

## 2021-06-17 DIAGNOSIS — O09.212 CURRENT PREGNANCY WITH HISTORY OF PRE-TERM LABOR IN SECOND TRIMESTER: ICD-10-CM

## 2021-06-17 DIAGNOSIS — O09.522 MULTIGRAVIDA OF ADVANCED MATERNAL AGE IN SECOND TRIMESTER: Primary | ICD-10-CM

## 2021-06-17 DIAGNOSIS — O35.2XX0 HEREDITARY FAMILIAL DISEASE AFFECTING MANAGEMENT OF MOTHER AND POSSIBLY AFFECTING FETUS, ANTEPARTUM, SINGLE OR UNSPECIFIED FETUS: ICD-10-CM

## 2021-06-17 DIAGNOSIS — Z36.4 ANTENATAL SCREENING FOR FETAL GROWTH RETARDATION USING ULTRASONICS: ICD-10-CM

## 2021-06-17 DIAGNOSIS — Z3A.24 24 WEEKS GESTATION OF PREGNANCY: ICD-10-CM

## 2021-06-17 DIAGNOSIS — O09.42 GRAND MULTIPARITY WITH ANTENATAL PROBLEM IN SECOND TRIMESTER: ICD-10-CM

## 2021-06-17 PROCEDURE — 76817 TRANSVAGINAL US OBSTETRIC: CPT | Performed by: OBSTETRICS & GYNECOLOGY

## 2021-06-17 PROCEDURE — 76816 OB US FOLLOW-UP PER FETUS: CPT | Performed by: OBSTETRICS & GYNECOLOGY

## 2021-06-17 PROCEDURE — 99243 OFF/OP CNSLTJ NEW/EST LOW 30: CPT | Performed by: OBSTETRICS & GYNECOLOGY

## 2021-06-17 PROCEDURE — G8427 DOCREV CUR MEDS BY ELIG CLIN: HCPCS | Performed by: OBSTETRICS & GYNECOLOGY

## 2021-06-17 PROCEDURE — G8420 CALC BMI NORM PARAMETERS: HCPCS | Performed by: OBSTETRICS & GYNECOLOGY

## 2021-06-17 RX ORDER — HYDROXYPROGESTERONE CAPROATE 250 MG/ML
INJECTION INTRAMUSCULAR
Status: ON HOLD | COMMUNITY
Start: 2021-06-14 | End: 2021-09-25 | Stop reason: HOSPADM

## 2021-06-29 ENCOUNTER — HOSPITAL ENCOUNTER (OUTPATIENT)
Age: 41
Discharge: HOME | End: 2021-06-29
Attending: OBSTETRICS & GYNECOLOGY | Admitting: OBSTETRICS & GYNECOLOGY
Payer: MEDICARE

## 2021-06-29 VITALS — TEMPERATURE: 98.1 F | DIASTOLIC BLOOD PRESSURE: 59 MMHG | HEART RATE: 82 BPM | SYSTOLIC BLOOD PRESSURE: 112 MMHG

## 2021-06-29 PROBLEM — D3A.020 CARCINOID TUMOR OF APPENDIX: Chronic | Status: ACTIVE | Noted: 2018-03-19

## 2021-06-29 PROBLEM — Z3A.26 26 WEEKS GESTATION OF PREGNANCY: Status: ACTIVE | Noted: 2021-06-29

## 2021-06-29 LAB
BILIRUBIN URINE: NEGATIVE
COLOR: YELLOW
COMMENT UA: NORMAL
DIRECT EXAM: NORMAL
GLUCOSE URINE: NEGATIVE
KETONES, URINE: NEGATIVE
LEUKOCYTE ESTERASE, URINE: NEGATIVE
Lab: NORMAL
NITRITE, URINE: NEGATIVE
PH UA: 7 (ref 5–8)
PROTEIN UA: NEGATIVE
SPECIFIC GRAVITY UA: 1.01 (ref 1–1.03)
SPECIMEN DESCRIPTION: NORMAL
TURBIDITY: CLEAR
URINE HGB: NEGATIVE
UROBILINOGEN, URINE: NORMAL

## 2021-06-29 PROCEDURE — 87510 GARDNER VAG DNA DIR PROBE: CPT

## 2021-06-29 PROCEDURE — 87660 TRICHOMONAS VAGIN DIR PROBE: CPT

## 2021-06-29 PROCEDURE — 87491 CHLMYD TRACH DNA AMP PROBE: CPT

## 2021-06-29 PROCEDURE — 87591 N.GONORRHOEAE DNA AMP PROB: CPT

## 2021-06-29 PROCEDURE — 99213 OFFICE O/P EST LOW 20 MIN: CPT

## 2021-06-29 PROCEDURE — 81003 URINALYSIS AUTO W/O SCOPE: CPT

## 2021-06-29 PROCEDURE — 87480 CANDIDA DNA DIR PROBE: CPT

## 2021-06-29 RX ORDER — ACETAMINOPHEN 500 MG
1000 TABLET ORAL EVERY 6 HOURS PRN
Status: DISCONTINUED | OUTPATIENT
Start: 2021-06-29 | End: 2021-06-29 | Stop reason: HOSPADM

## 2021-06-29 RX ORDER — ONDANSETRON 2 MG/ML
4 INJECTION INTRAMUSCULAR; INTRAVENOUS EVERY 6 HOURS PRN
Status: DISCONTINUED | OUTPATIENT
Start: 2021-06-29 | End: 2021-06-29 | Stop reason: HOSPADM

## 2021-06-29 RX ORDER — PROMETHAZINE HYDROCHLORIDE 12.5 MG/1
12.5 TABLET ORAL EVERY 6 HOURS PRN
Status: DISCONTINUED | OUTPATIENT
Start: 2021-06-29 | End: 2021-06-29 | Stop reason: HOSPADM

## 2021-06-30 NOTE — H&P
OBSTETRICAL HISTORY Gennaro Bundy    Date: 2021       Time: 10:16 PM   Patient Name: Alise Lizama     Patient : 1980  Room/Bed: [place] [address]    Admission Date/Time: 2021  7:18 PM      CC: Leakage of fluid      HPI: Alise Lizama is a 42 y.o. [id number] at 26w0d who presents to L&D triage complaining of leakage of fluid. Patient denies any fever, chills, N/V, headaches, vision changes, chest pain, shortness of breath, RUQ pain, abdominal pain, and increased swelling/tenderness in bilateral lower extremities. Patient denies any vaginal discharge and any urinary complaints. The patient reports fetal movement is present, denies contractions, reports loss of fluid, denies vaginal bleeding. DATING:  LMP: No LMP recorded (lmp unknown). Patient is pregnant.   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   [address] multiparity    Hx Postpartum depression    Abscess, gluteal, right    Prepatellar bursitis of right knee    MRSA infection - buttock and prepatellar bursa     Carcinoid tumor of appendix and colon    History of MRSA infection    Somatic dysfunction of pubic bone    Low-lying placenta: RESOLVED 2021    Short cervix, antepartum    26 weeks gestation of pregnancy        Steroids Given In This Pregnancy:  no     REVIEW OF SYSTEMS:  Constitutional: negative fever, chills   HEENT: negative headaches, visual disturbances  Respiratory: negative dyspnea, cough, wheezing  Cardiovascular: negative chest pain, palpitations  Gastrointestinal: negative abdominal pain, RUQ pain, N/V, diarrhea, constipation  Genitourinary: negative dysuria, changes in vaginal discharge, vaginal bleeding, positive leakage of fluid  Dermatological: negative rash  Hematologic: negative bruising  Immunologic/Lymphatic: negative recent illness, recent sick contact, LE swelling   Musculoskeletal: negative back pain, myalgias, arthralgias  Neurological: negative dizziness, weakness, loss of sensation, tingling  Behavior/Psych: negative depression, anxiety    OBSTETRICAL HISTORY:   OB History    Para Term  AB Living   9 6 0 6 2 6   SAB TAB Ectopic Molar Multiple Live Births   2 0 0 0 0 6      # Outcome Date GA Lbr Kaden/2nd Weight Sex Delivery Anes PTL Lv   9 Current            8  10/22/16 34w2d  5 lb 7.1 oz (2.47 kg) M Vag-Spont   MALLIKA      Name: Dev Smiley: 8  Apgar5: 9   7  13 36w4d  5 lb 13 oz (2.637 kg) M    MALLIKA   6 SAB  6w0d    SAB      5   36w0d  6 lb 4 oz (2.835 kg) F Vag-Spont   MALLIKA   4   36w0d  6 lb 2 oz (2.778 kg) M Vag-Spont   MALLIKA      Birth Comments: Spont PTL/PTD   3 SAB  10w0d             Birth Comments: D&C   2   35w0d  5 lb 2 oz (2.325 kg) M Vag-Spont   MALLIKA      Birth Comments: Spont PTL/PTD   1   36w0d  6 lb 1 oz (2.75 kg) F Vag-Spont   MALLIKA      Birth Comments: Spont PTL/PTD      Obstetric Comments   Adjusted pregnancy's and dates today 16. Went over each one with patient in detail. The original chart today said 14th pregnancy. Lili Kid ... which is not true. 8 total- 2 sab's 5  deliveries. 3 boys and 2 girls. PAST MEDICAL HISTORY:   has a past medical history of Anemia, Family history of breast cancer, Family history of cervical cancer, Family history of uterine cancer, Herpes, MRSA (methicillin resistant staph aureus) culture positive, Postpartum depression, and Tobacco use.     PAST SURGICAL HISTORY:   has a past surgical history that includes Dilation and curettage of uterus; laparoscopy; Dilation & curettage; pr lap,appendectomy (N/A, 3/5/2018); and pr part removal colon w negative   - Nitrazine negative   - Ferning negative   - Low suspicion for rupture of membranes at this time. Overall reassuring maternal and fetal status. Patient stable for discharge home. Patient is aware that she should return to the hospital if she has worsening contractions, LOF, VB or decreased fetal movements. She voices understanding. Patient is aware that she should return to hospital if she experiences unrelenting headache, vision changes, RUQ pain, nausea, vomiting, and peripheral edema. She voices understanding.        Short Cervical Length    - Cervical length measured 24-28mm on anatomy US 5/19/21   - Patient was prescribed vaginal progesterone, reports compliance with medication   - Most recent cervical length 32-36mm on 6/17/21    Hx Genital Herpes    - Denies recent outbreak or prodromal symptoms    - No evidence of lesions on pelvic exam   - Plan for Valtrex suppressive therapy at 36 weeks     Hx sPTD x6   - Most recent G8 (2016) @ 34w2d   - Referred for Yvette injections    - Patient reports in process of receiving [place]    Hx Carcinoid tumor (2018)   - S/p laparoscopic appendectomy and right sided hemicolectomy     FamHx Congenital Hearing Loss   - Patient's daughter (G1) born with congenital hearing loss    - No NIPT available     Advanced Maternal Age    - No First trimester screen/MSAFP/NIPT available     Grand Multiparity    BMI 23.9         Patient Active Problem List    Diagnosis Date Noted    Low-lying placenta: RESOLVED 6/4/2021 05/20/2021     Priority: High    Short cervix, antepartum 05/20/2021     Priority: High     24-28mm on anatomy US 5/19/21  32-36mm on 6/17/21  Continue cervical length every 2 weeks until 32-34 weeks  Referred to [facility] for 17OHP injections - not taking   200 mg vaginal progesterone until 34 weeks      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

## 2021-07-01 PROBLEM — Z3A.26 26 WEEKS GESTATION OF PREGNANCY: Status: RESOLVED | Noted: 2021-06-29 | Resolved: 2021-07-01

## 2021-07-06 ENCOUNTER — ROUTINE PRENATAL (OUTPATIENT)
Dept: OBGYN CLINIC | Age: 41
End: 2021-07-06
Payer: MEDICARE

## 2021-07-06 VITALS
SYSTOLIC BLOOD PRESSURE: 96 MMHG | BODY MASS INDEX: 24.05 KG/M2 | HEART RATE: 80 BPM | DIASTOLIC BLOOD PRESSURE: 58 MMHG | WEIGHT: 149 LBS

## 2021-07-06 DIAGNOSIS — M99.05 SOMATIC DYSFUNCTION OF PUBIC BONE: ICD-10-CM

## 2021-07-06 DIAGNOSIS — Z86.14 HISTORY OF MRSA INFECTION: ICD-10-CM

## 2021-07-06 DIAGNOSIS — A49.02 MRSA INFECTION: ICD-10-CM

## 2021-07-06 DIAGNOSIS — O26.879 SHORT CERVIX, ANTEPARTUM: ICD-10-CM

## 2021-07-06 DIAGNOSIS — O44.40 LOW-LYING PLACENTA: ICD-10-CM

## 2021-07-06 DIAGNOSIS — O09.93 HIGH-RISK PREGNANCY IN THIRD TRIMESTER: Primary | ICD-10-CM

## 2021-07-06 DIAGNOSIS — Q16.9: ICD-10-CM

## 2021-07-06 DIAGNOSIS — Z3A.27 27 WEEKS GESTATION OF PREGNANCY: ICD-10-CM

## 2021-07-06 DIAGNOSIS — Z64.1 GRAND MULTIPARITY: ICD-10-CM

## 2021-07-06 DIAGNOSIS — B00.9 HERPES: ICD-10-CM

## 2021-07-06 PROCEDURE — 1036F TOBACCO NON-USER: CPT | Performed by: OBSTETRICS & GYNECOLOGY

## 2021-07-06 PROCEDURE — G8420 CALC BMI NORM PARAMETERS: HCPCS | Performed by: OBSTETRICS & GYNECOLOGY

## 2021-07-06 PROCEDURE — 99213 OFFICE O/P EST LOW 20 MIN: CPT | Performed by: OBSTETRICS & GYNECOLOGY

## 2021-07-06 PROCEDURE — G8427 DOCREV CUR MEDS BY ELIG CLIN: HCPCS | Performed by: OBSTETRICS & GYNECOLOGY

## 2021-07-06 NOTE — PROGRESS NOTES
Mayito Pina is a 42 y.o. female 27w0d    [id number]    OB History    Para Term  AB Living   9 6 0 6 2 6   SAB TAB Ectopic Molar Multiple Live Births   2 0 0     6      # Outcome Date GA Lbr Kaden/2nd Weight Sex Delivery Anes PTL Lv   9 Current            8  10/22/16 34w2d  5 lb 7.1 oz (2.47 kg) M Vag-Spont   MALLIKA   7  13 36w4d  5 lb 13 oz (2.637 kg) M    MALLIKA   6 SAB  6w0d    SAB      5   36w0d  6 lb 4 oz (2.835 kg) F Vag-Spont   MALLIKA   4   36w0d  6 lb 2 oz (2.778 kg) M Vag-Spont   MALLIKA      Birth Comments: Spont PTL/PTD   3 SAB  10w0d             Birth Comments: D&C   2   35w0d  5 lb 2 oz (2.325 kg) M Vag-Spont   MALLIKA      Birth Comments: Spont PTL/PTD   1   36w0d  6 lb 1 oz (2.75 kg) F Vag-Spont   MALLIKA      Birth Comments: Spont PTL/PTD      Obstetric Comments   Adjusted pregnancy's and dates today 16. Went over each one with patient in detail. The original chart today said 14th pregnancy. [place] ... which is not true. 8 total- 2 sab's 5  deliveries. 3 boys and 2 girls. Vitals         The patient was seen and evaluated. There was positive fetal movements. No contractions or leakage of fluid. Signs and symptoms of  labor as well as labor were reviewed. The S/S of Pre-Eclampsia were reviewed with the patient in detail. She is to report any of these if they occur. She currently denies any of these. The patient had her 28 week labs ordered.   Admission on 2021, Discharged on 2021   Component Date Value Ref Range Status    Color, UA 2021 YELLOW  YELLOW Final    Turbidity UA 2021 CLEAR  CLEAR Final    Glucose, Ur 2021 NEGATIVE  NEGATIVE Final    Bilirubin Urine 2021 NEGATIVE  NEGATIVE Final    Ketones, Urine 2021 NEGATIVE  NEGATIVE Final    Specific [place], UA 2021 1.010  1.005 - 1.030 Final    Urine Hgb 2021 NEGATIVE  NEGATIVE Final    pH, UA 2021 7.0  5.0 - 8.0 Final    Protein, UA 06/29/2021 NEGATIVE  NEGATIVE Final    Urobilinogen, Urine 06/29/2021 Normal  Normal Final    Nitrite, Urine 06/29/2021 NEGATIVE  NEGATIVE Final    Leukocyte Esterase, Urine 06/29/2021 NEGATIVE  NEGATIVE Final    Urinalysis Comments 06/29/2021 Microscopic exam not performed based on chemical results unless requested in original order. Final    Specimen Description 06/29/2021 . CERVIX   Final    C. trachomatis DNA 06/29/2021 NEGATIVE  NEGATIVE Final    Comment: CHLAMYDIA TRACHOMATIS DNA not detected by nucleic acid amplification. This test is intended for medical purposes only and is not valid for the evaluation of   suspected sexual abuse or for other forensic purposes. In certain contexts, culture may be required to meet applicable laws and regulations for   diagnosis of C. trachomatis and N. gonorrhoeae infections. Per 2014  CDC recommendations, this test does not include confirmation of positive results   by an alternative nucleic acid target.  N. gonorrhoeae DNA 06/29/2021 NEGATIVE  NEGATIVE Final    Comment: NEISSERIA GONORRHOEAE DNA not detected by nucleic acid amplification. This test is intended for medical purposes only and is not valid for the evaluation of   suspected sexual abuse or for other forensic purposes. In certain contexts, culture may be required to meet applicable laws and regulations for   diagnosis of C. trachomatis and N. gonorrhoeae infections. Per 2014  CDC recommendations, this test does not include confirmation of positive results   by an alternative nucleic acid target.  Specimen Description 06/29/2021 . VAGINA   Final    Special Requests 06/29/2021 NOT REPORTED   Final    Direct Exam 06/29/2021 NEGATIVE for Trichomonas vaginalis   Final    Direct Exam 06/29/2021 NEGATIVE for Gardnerella vaginalis   Final    Direct Exam 06/29/2021 NEGATIVE for Candida sp.    Final    Direct Exam 06/29/2021 Method of testing is a DNA probe intended for detection and identification of Candida species, Gardnerella vaginalis, and Trichomonas vaginalis nucleic acid in vaginal fluid specimens from patients with symptoms of vaginitis/vaginosis. Final   ]    21 pt declined Tdap  3/24/21 - Declined first trimester screening  3/24/21 - First MRSA nasal swab collected      T-Dap Vaccine (27-36 weeks): declined    The patient was instructed on fetal kick counts and was given a kick sheet to complete every 8 hours. She was instructed that the baby should move at a minimum of ten times within one hour after a meal. The patient was instructed to lay down on her left side twenty minutes after eating and count movements for up to one hour with a target value of ten movements. She was instructed to notify the office if she did not make that target after two attempts or if after any attempt there was less than four movements. The patient reports that the targets have been made Yes.  Testing:  Not indicated    Assessment:  1Dar Holden is a 42 y.o. female  2.  [id number]  3. 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     Priority: High     Daughter was born deaf   2016 declined opportunity for non-syndromic hearing loss carrier testing by [facility]  Declined connexin diagnostic testing in her daughter, Deon Zambrano, and has declined testing for non-syndromic hearing loss      Grand multiparity 2016     Priority: High     2000 36 weeks    35 weeks    SAB  2004 36 weeks    37 weeks    37 weeks         Herpes      Priority: High     2016 History of genital herpes: last outbreak years ago  Call office with any new outbreaks      Somatic dysfunction of pubic bone 2021 Physical therapy       History of MRSA infection 2021     3/24/21 - Nares MRSA culture #1 completed  2021 MRSA swab #2 negative       Carcinoid tumor of appendix and colon 2018     S/p laparoscopic appendectomy and right sided hemicolectomy ()      MRSA infection - buttock and prepatellar bursa  2017 MRSA swab negative  3/25/2021 MRSA swab negative       Prepatellar bursitis of right knee     Abscess, gluteal, right 2017    Hx Postpartum depression 2016    Family history of breast cancer      Patient's maternal grandmother diagnosed in 78's      Family history of uterine cancer      Paternal grandmother diagnosed with uterine cancer in her [de-identified]          Diagnosis Orders   1. High-risk pregnancy in third trimester  CBC Auto Differential    Glucose tolerance, 1 hour    Urinalysis   2. Low-lying placenta     3. [address] multiparity     4. 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 Herpes     6. Somatic dysfunction of pubic bone     7. History of MRSA infection     8. Postpartum depression     9. Short cervix, antepartum     10. 27 weeks gestation of pregnancy  CBC Auto Differential    Glucose tolerance, 1 hour    Urinalysis   11. MRSA infection - buttock and prepatellar bursa   CBC Auto Differential    Glucose tolerance, 1 hour    Urinalysis             Plan:  The patient will return to the office for her next visit in 3 weeks. See antepartum flow sheet.  Testing Indicated: No  Scheduled with Nursing-Pt notified: No      [facility]ne[facility]  [id number] [place] [phone]  2021  No LMP recorded (lmp unknown).  Patient is pregnant. INITIAL OBSTETRICAL VISIT EVALUATION:  The patient was seen full history and physical was completed/reviewed. Cytology was collected for patients over 24years of age. Cultures were collected. The patient was counseled on office policies and she was counseled on termination of pregnancy in the state of [place]. The patient was counseled on Toxoplasmosis, HIV, Tobacco Abuse, Group Beta Strep Infections, Cystic Fibrosis,  Labor precautions and Sickle Cell disease. The patient was counseled on the risks of tobacco abuse. Both maternal and fetal. She was instructed to stop smoking if currently using tobacco. Morbidity, mortality, and cessation programs were reviewed. The risks include but are not limited to increased risks of  labor,  delivery, premature rupture of membranes, intrauterine growth restriction, intrauterine fetal demise and abruptio placenta. Secondary smoke risks were also reviewed. Increases in cancer, respiratory problems, and sudden infant death syndrome were reviewed as well. The patient was informed of a 2-4% risk of congenital anomalies in the general population. She was also informed that karyotyping is the only way to evaluate the fetus for genetic problems and genetic lethal anomalies. Chorionic villous sampling, amniocentesis and Maternal Genetic Blood Sampling-(NIPT Testing) were also discussed with morbidity rates in detail. She declined any of the options. Route of delivery and counseling on vaginal, operative vaginal, and  sections were completed with the risks of each to both the patient as well as her baby. The possibility of a blood transfusion was discussed as well. The patient was not opposed to receiving a transfusion if needed. Nuchal translucency and MSAFP single marker testing was reviewed in detail with attention to timing of testing and their windows.  For patients beyond the gestational age for Nuchal translucency evaluation, ([id number]) Quad testing was recommended. Timing for the Quad test was reviewed. Benefits of the above testing was reviewed. A second trimester amniocentesis was also made available to the patient. Risks, Benefits and non-invasive alternative testing was reviewed. The patients diagnosed with Advanced maternal age, AMA (age of 27 yo or beyond at delivery), had NIPT recommended. This was discussed in lay terms with Risks and Benefits reviewed. The literature regarding a questionable link to pitocin augmentation and induction of labor, the assistance of labor contractions and the initiation of contractions to help delivery, have been reviewed with the patient regarding the increased potential of having a  with Attention Deficit Hyperactivity Disorder and or Autism. These two disorders and the ramifications of their impact on a child and the family caring for that child has been reviewed with the patient in detail. She was given the risks, benefits and alternatives of the use of this medication. She has agreed to its use in the delivery of her unborn child if needed at the time of delivery, Yes. The patient was questioned in detail regarding any genetic misnomer history, chromosomal abnormalities, or learning disabilities in  herself, the father of the baby or their families. SHE DENIED ANY HISTORY AS STATED ABOVE: Yes    Upon completion of the visit all questions were answered and the patients follow-up and testing schedule were reviewed. Prenatal vitamins were given. Patient was seen with total face to face time of 20 minutes. More than 50% of this visit was on counseling and education regarding her    Diagnosis Orders   1. High-risk pregnancy in third trimester  CBC Auto Differential    Glucose tolerance, 1 hour    Urinalysis   2. Low-lying placenta     3. [address] multiparity     4. 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 Herpes     6. Somatic dysfunction of pubic bone     7.

## 2021-07-08 ENCOUNTER — ROUTINE PRENATAL (OUTPATIENT)
Dept: PERINATAL CARE | Age: 41
End: 2021-07-08
Payer: MEDICARE

## 2021-07-08 VITALS
TEMPERATURE: 97.2 F | BODY MASS INDEX: 24.21 KG/M2 | DIASTOLIC BLOOD PRESSURE: 53 MMHG | WEIGHT: 150 LBS | RESPIRATION RATE: 16 BRPM | HEART RATE: 98 BPM | SYSTOLIC BLOOD PRESSURE: 92 MMHG

## 2021-07-08 DIAGNOSIS — Z3A.27 27 WEEKS GESTATION OF PREGNANCY: ICD-10-CM

## 2021-07-08 DIAGNOSIS — O35.2XX0 HEREDITARY FAMILIAL DISEASE AFFECTING MANAGEMENT OF MOTHER AND POSSIBLY AFFECTING FETUS, ANTEPARTUM, SINGLE OR UNSPECIFIED FETUS: ICD-10-CM

## 2021-07-08 DIAGNOSIS — O09.212 CURRENT PREGNANCY WITH HISTORY OF PRE-TERM LABOR IN SECOND TRIMESTER: ICD-10-CM

## 2021-07-08 DIAGNOSIS — O09.522 MULTIGRAVIDA OF ADVANCED MATERNAL AGE IN SECOND TRIMESTER: Primary | ICD-10-CM

## 2021-07-08 DIAGNOSIS — Z36.4 ANTENATAL SCREENING FOR FETAL GROWTH RETARDATION USING ULTRASONICS: ICD-10-CM

## 2021-07-08 DIAGNOSIS — O09.42 GRAND MULTIPARITY WITH ANTENATAL PROBLEM IN SECOND TRIMESTER: ICD-10-CM

## 2021-07-08 PROCEDURE — 76817 TRANSVAGINAL US OBSTETRIC: CPT | Performed by: OBSTETRICS & GYNECOLOGY

## 2021-07-08 PROCEDURE — 76816 OB US FOLLOW-UP PER FETUS: CPT | Performed by: OBSTETRICS & GYNECOLOGY

## 2021-07-08 NOTE — PROGRESS NOTES
Obstetric/Gynecology Maternal Fetal Medicine Resident Note    Wilfredo Burt is a 43 y/o [id number] with MFM US today notable for shortened cervical length 19-22mm with funneling, changed from prior US showing 32-36mm cervical length on 21. Patient seen and evaluated. Denies any contractions, leakage of fluid different from her normal discharge, vaginal bleeding, or decreased fetal movement. Denies s/s Pre-eclampsia. Given patient's Hx of sPTD x6, she was thoroughly counseled on s/s  labor. Patient was recently evaluated on labor and delivery on 21 at [facility]. [facility] and exam was unremarkable. R/B/A discussed. Patient in agreement with plan to monitor any changes closely and to return to the hospital for any concerns or s/s  labor. Discussed with Dr. Oswaldo Rojas.     DO NOMAN Downing Resident, PGY2  [facility]  2021, 9:14 AM

## 2021-07-22 ENCOUNTER — TELEPHONE (OUTPATIENT)
Dept: PERINATAL CARE | Age: 41
End: 2021-07-22

## 2021-07-23 ENCOUNTER — ROUTINE PRENATAL (OUTPATIENT)
Dept: PERINATAL CARE | Age: 41
End: 2021-07-23
Payer: MEDICARE

## 2021-07-23 ENCOUNTER — HOSPITAL ENCOUNTER (OUTPATIENT)
Age: 41
Setting detail: OBSERVATION
LOS: 1 days | Discharge: HOME | End: 2021-07-24
Attending: OBSTETRICS & GYNECOLOGY | Admitting: OBSTETRICS & GYNECOLOGY
Payer: MEDICARE

## 2021-07-23 VITALS
DIASTOLIC BLOOD PRESSURE: 65 MMHG | HEIGHT: 66 IN | RESPIRATION RATE: 16 BRPM | SYSTOLIC BLOOD PRESSURE: 103 MMHG | BODY MASS INDEX: 24.27 KG/M2 | TEMPERATURE: 97.1 F | HEART RATE: 93 BPM | WEIGHT: 151 LBS

## 2021-07-23 DIAGNOSIS — Z3A.29 29 WEEKS GESTATION OF PREGNANCY: ICD-10-CM

## 2021-07-23 DIAGNOSIS — O09.523 MULTIGRAVIDA OF ADVANCED MATERNAL AGE IN THIRD TRIMESTER: ICD-10-CM

## 2021-07-23 DIAGNOSIS — O26.879 CERVICAL SHORTENING, ANTEPARTUM CONDITION OR COMPLICATION: ICD-10-CM

## 2021-07-23 DIAGNOSIS — O09.213 CURRENT PREGNANCY WITH HISTORY OF PRE-TERM LABOR IN THIRD TRIMESTER: Primary | ICD-10-CM

## 2021-07-23 DIAGNOSIS — O47.00 THREATENED PRETERM LABOR, ANTEPARTUM: ICD-10-CM

## 2021-07-23 DIAGNOSIS — O43.103 PLACENTAL ABNORMALITY IN THIRD TRIMESTER: ICD-10-CM

## 2021-07-23 PROBLEM — O09.93 HRP (HIGH RISK PREGNANCY), THIRD TRIMESTER: Status: ACTIVE | Noted: 2021-07-23

## 2021-07-23 LAB
ABDOMINAL CIRCUMFERENCE: NORMAL
BILIRUBIN URINE: NEGATIVE
BIPARIETAL DIAMETER: NORMAL
COLOR: YELLOW
COMMENT UA: NORMAL
DIRECT EXAM: NORMAL
ESTIMATED FETAL WEIGHT: NORMAL
FEMORAL DIAMETER: NORMAL
GLUCOSE URINE: NEGATIVE
HC/AC: NORMAL
HEAD CIRCUMFERENCE: NORMAL
KETONES, URINE: NEGATIVE
LEUKOCYTE ESTERASE, URINE: NEGATIVE
Lab: NORMAL
NITRITE, URINE: NEGATIVE
PH UA: 7.5 (ref 5–8)
PROTEIN UA: NEGATIVE
SPECIFIC GRAVITY UA: 1.01 (ref 1–1.03)
SPECIMEN DESCRIPTION: NORMAL
TURBIDITY: CLEAR
URINE HGB: NEGATIVE
UROBILINOGEN, URINE: NORMAL

## 2021-07-23 PROCEDURE — 87081 CULTURE SCREEN ONLY: CPT

## 2021-07-23 PROCEDURE — 76819 FETAL BIOPHYS PROFIL W/O NST: CPT | Performed by: OBSTETRICS & GYNECOLOGY

## 2021-07-23 PROCEDURE — 76817 TRANSVAGINAL US OBSTETRIC: CPT | Performed by: OBSTETRICS & GYNECOLOGY

## 2021-07-23 PROCEDURE — 87510 GARDNER VAG DNA DIR PROBE: CPT

## 2021-07-23 PROCEDURE — G0378 HOSPITAL OBSERVATION PER HR: HCPCS

## 2021-07-23 PROCEDURE — 6360000002 HC RX W HCPCS: Performed by: STUDENT IN AN ORGANIZED HEALTH CARE EDUCATION/TRAINING PROGRAM

## 2021-07-23 PROCEDURE — 87660 TRICHOMONAS VAGIN DIR PROBE: CPT

## 2021-07-23 PROCEDURE — 6370000000 HC RX 637 (ALT 250 FOR IP): Performed by: STUDENT IN AN ORGANIZED HEALTH CARE EDUCATION/TRAINING PROGRAM

## 2021-07-23 PROCEDURE — 87591 N.GONORRHOEAE DNA AMP PROB: CPT

## 2021-07-23 PROCEDURE — 87480 CANDIDA DNA DIR PROBE: CPT

## 2021-07-23 PROCEDURE — 96372 THER/PROPH/DIAG INJ SC/IM: CPT

## 2021-07-23 PROCEDURE — 81003 URINALYSIS AUTO W/O SCOPE: CPT

## 2021-07-23 PROCEDURE — 76815 OB US LIMITED FETUS(S): CPT | Performed by: OBSTETRICS & GYNECOLOGY

## 2021-07-23 PROCEDURE — 87491 CHLMYD TRACH DNA AMP PROBE: CPT

## 2021-07-23 RX ORDER — ONDANSETRON 4 MG/1
4 TABLET, ORALLY DISINTEGRATING ORAL EVERY 8 HOURS PRN
Status: DISCONTINUED | OUTPATIENT
Start: 2021-07-23 | End: 2021-07-24 | Stop reason: HOSPADM

## 2021-07-23 RX ORDER — VITAMIN A, ASCORBIC ACID, CHOLECALCIFEROL, .ALPHA.-TOCOPHEROL ACETATE, DL-, THIAMINE MONONITRATE, RIBOFLAVIN, NIACINAMIDE, PYRIDOXINE HYDROCHLORIDE, FOLIC ACID, CYANOCOBALAMIN, CALCIUM CARBONATE, IRON, ZINC OXIDE, AND CUPRIC OXIDE 4000; 120; 400; 22; 1.84; 3; 20; 10; 1; 12; 200; 29; 25; 2 [IU]/1; MG/1; [IU]/1; [IU]/1; MG/1; MG/1; MG/1; MG/1; MG/1; UG/1; MG/1; MG/1; MG/1; MG/1
1 TABLET ORAL DAILY
Status: DISCONTINUED | OUTPATIENT
Start: 2021-07-23 | End: 2021-07-24 | Stop reason: HOSPADM

## 2021-07-23 RX ORDER — ACETAMINOPHEN 325 MG/1
650 TABLET ORAL EVERY 4 HOURS PRN
Status: DISCONTINUED | OUTPATIENT
Start: 2021-07-23 | End: 2021-07-24 | Stop reason: HOSPADM

## 2021-07-23 RX ORDER — ONDANSETRON 2 MG/ML
4 INJECTION INTRAMUSCULAR; INTRAVENOUS EVERY 6 HOURS PRN
Status: DISCONTINUED | OUTPATIENT
Start: 2021-07-23 | End: 2021-07-24 | Stop reason: HOSPADM

## 2021-07-23 RX ORDER — BETAMETHASONE SODIUM PHOSPHATE AND BETAMETHASONE ACETATE 3; 3 MG/ML; MG/ML
12 INJECTION, SUSPENSION INTRA-ARTICULAR; INTRALESIONAL; INTRAMUSCULAR; SOFT TISSUE ONCE
Status: COMPLETED | OUTPATIENT
Start: 2021-07-24 | End: 2021-07-24

## 2021-07-23 RX ORDER — BETAMETHASONE SODIUM PHOSPHATE AND BETAMETHASONE ACETATE 3; 3 MG/ML; MG/ML
12 INJECTION, SUSPENSION INTRA-ARTICULAR; INTRALESIONAL; INTRAMUSCULAR; SOFT TISSUE ONCE
Status: COMPLETED | OUTPATIENT
Start: 2021-07-23 | End: 2021-07-23

## 2021-07-23 RX ADMIN — BETAMETHASONE SODIUM PHOSPHATE AND BETAMETHASONE ACETATE 12 MG: 3; 3 INJECTION, SUSPENSION INTRA-ARTICULAR; INTRALESIONAL; INTRAMUSCULAR at 10:03

## 2021-07-23 RX ADMIN — Medication 1 TABLET: at 20:49

## 2021-07-23 NOTE — FLOWSHEET NOTE
Pt received to L&D per ambulatory; sent from [place] [address] office with short cervix. Placed in triage 1. Up to BR et gowned.

## 2021-07-23 NOTE — PROGRESS NOTES
Obstetric/Gynecology Maternal Fetal Medicine Resident Note    Sharri Clay is a 41 y/o [id number] with MFM US today notable for shortened cervical length 13-14mm with funneling, changed from prior US showing 19-22mm cervical length on 21. Patient seen and evaluated. Denies any leakage of fluid different from her normal discharge, vaginal bleeding, or decreased fetal movement. Patient reports light cramping. Denies s/s Pre-eclampsia. Given patient's Hx of sPTD x6, she was thoroughly counseled on s/s  labor. Patient to present to L&D for evaluation and possible course of [place]. Dr. Inge Cardona, [place] Residents and L&D team updated.      DO NOMAN Bermudez Resident, PGY2  [facility]  2021, 9:08 AM

## 2021-07-23 NOTE — H&P
[facility]    Date: 2021       Time: 8:07 PM   Patient Name: Angelika Hanna     Patient : 1980  Room/Bed: 0703/0703-01    Admission Date/Time: 2021  9:18 AM       CC: Shortened Cervix     HPI: Angelika Hanna is a 42 y.o. [id number] at 29w3d who presents from [facility] after finding shortened cervix of 13-14mm with funneling, changed from prior US showing 19-22mm cervical length on 21. Patient sent to L&D for evaluation and possible steroid course. Patient denies any headache, visual changes, difficulty breathing, RUQ pain, N/V, F/C, and pain/swelling in lower extremities. Denies any dysuria or vaginal discharge. The patient reports fetal movement is present, complains of abdominal cramping worsening over the past several weeks, denies loss of fluid, denies vaginal bleeding. DATING:  LMP: No LMP recorded (lmp unknown). Patient is pregnant.   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   Brisas 4258 multiparity    Hx Postpartum depression    Abscess, gluteal, right    Prepatellar bursitis of right knee    MRSA infection - buttock and prepatellar bursa     Carcinoid tumor of appendix and colon    History of MRSA infection    Somatic dysfunction of pubic bone    Low-lying placenta: RESOLVED 2021    Short cervix, antepartum    HRP (high risk pregnancy), third trimester    Celestone  & *    29 weeks gestation of pregnancy        Steroids Given In This Pregnancy:  Not prior to this admission    REVIEW OF SYSTEMS:   Constitutional: negative fever, negative chills, negative weight changes   HEENT: negative visual disturbances, negative headaches, negative dizziness  Breast: negative breast abnormalities, negative breast lumps, negative nipple discharge  Respiratory: negative dyspnea, negative cough, negative SOB  Cardiovascular: negative chest pain,  negative palpitations, negative arrhythmia, negative syncope   Gastrointestinal: positive abdominal cramping, negative RUQ pain, negative N/V, negative diarrhea, negative constipation, negative bowel changes, negative heartburn   Genitourinary: negative dysuria, negative hematuria, negative urinary incontinence, negative vaginal discharge  Dermatological: negative rash, negative pruritis, negative mole changes  Hematologic: negative bruising  Immunologic/Lymphatic: negative recent illness, negative recent sick contact  Musculoskeletal: negative back pain, negative myalgias, negative arthralgias  Neurological:  negative dizziness, negative migraines, negative seizures, negative weakness  Behavior/Psych: negative depression, negative anxiety, negative SI, negative HI    OBSTETRICAL HISTORY:   OB History    Para Term  AB Living   9 6 0 6 2 6   SAB TAB Ectopic Molar Multiple Live Births   2 0 0 0 0 6      # Outcome Date GA Lbr Kaden/2nd Weight Sex Delivery Anes PTL Lv   9 Current            8  10/22/16 34w2d  5 lb 7.1 oz (2.47 kg) M Vag-Spont   MALLIKA      Name: Danna Wallace: 8  Apgar5: 9   7  / 36w4d  5 lb 13 oz (2.637 kg) M    MALLIKA   6 SAB  6w0d    SAB      5   36w0d  6 lb 4 oz (2.835 kg) F Vag-Spont   MALLIKA   4   36w0d  6 lb 2 oz (2.778 kg) M Vag-Spont   MALLIKA      Birth Comments: Spont PTL/PTD   3 SAB  10w0d             Birth Comments: D&C   2   35w0d  5 lb 2 oz (2.325 kg) M Vag-Spont   MALLIKA      Birth Comments: Spont PTL/PTD   1   36w0d  6 lb 1 oz (2.75 kg) F Vag-Spont   MALLIKA      Birth Comments: Spont PTL/PTD      Obstetric Comments   Adjusted pregnancy's and dates today 16.  Went over each one with patient in detail. The original chart today said 14th pregnancy. Tenisha Lucas ... which is not true. 8 total- 2 sab's 5  deliveries. 3 boys and 2 girls. PAST MEDICAL HISTORY:   has a past medical history of Anemia, Family history of breast cancer, Family history of cervical cancer, Family history of uterine cancer, Herpes, MRSA (methicillin resistant staph aureus) culture positive, Postpartum depression, and Tobacco use. PAST SURGICAL HISTORY:   has a past surgical history that includes Dilation and curettage of uterus; laparoscopy; Dilation & curettage; pr lap,appendectomy (N/A, 3/5/2018); and pr part removal colon w anastomosis (N/A, 3/20/2018). ALLERGIES:  is allergic to codeine, fentanyl, and penicillins. MEDICATIONS:  Prior to Admission medications    Medication Sig Start Date End Date Taking? Authorizing Provider   HYDROXYprogesterone caproate 250 MG/ML OIL oil injection  21   Historical Provider, MD   progesterone (PROMETRIUM) 200 MG CAPS capsule  21   Historical Provider, MD   Prenatal Vit-Fe Fumarate-FA (PRENATAL VITAMIN PO) Take 1 tablet by mouth daily    Historical Provider, MD       FAMILY HISTORY:  family history includes Breast Cancer in her maternal grandmother; Cancer in her maternal grandmother; Cervical Cancer in her paternal grandmother; Diabetes in her paternal grandfather and paternal grandmother; Other in her daughter and son; Uterine Cancer in her paternal grandmother. SOCIAL HISTORY:   reports that she has quit smoking. She smoked 0.25 packs per day. She has never used smokeless tobacco. She reports previous drug use. Drug: Marijuana. She reports that she does not drink alcohol.     VITALS:  Vitals:    21 0938 21 1238   BP: 92/63 101/62   Pulse: 100 92   Resp: 20 20   Temp: 98.2 °F (36.8 °C)          PHYSICAL EXAM:  Fetal Heart Monitor:  Baseline Heart Rate 135, moderate variability, present accelerations, absent decelerations  [place]: contractions, none    General appearance:  no apparent distress, alert and cooperative  HEENT: head atraumatic, normocephalic, trachea midline, moist mucous membranes   Neurologic:  oriented, normal speech, no focal findings or movement disorder noted  Lungs:  no increased work of breathing, good air exchange  Heart:  regular rate and rhythm   Abdomen:  soft, gravid, non-tender on palpation, no right upper quadrant tenderness, no CVA tenderness bilaterally, uterus non-tender, no signs of abruption and no signs of chorioamnionitis  Extremities:  no calf tenderness bilaterally, non-edematous bilaterally, DTRs: normal    Musculoskeletal: no gross abnormalities, range of motion appropriate for age   Psychiatric: mood appropriate, normal affect     Examination chaperoned by Adan Perez RN    Pelvic Exam:  Sterile Speculum Exam   Vulva: normal appearing vulva, no masses, tenderness or lesions, normal clitoris    Vagina: normal appearing urethral meatus, normal appearing vaginal mucosa with normal color, white discharge present in vagnial vault, non malodorous, no lesions, no vaginal bleeding     Cervix: multiparous appearing cervix without pathologic appearing discharge or lesions, no cervical motion tenderness    Rectal Exam: not indicated     Cervix Check: 2 cm dilated, 50 % effaced, -3 station, posterior position, medium consistency, Fetal Position: Cephalic (confirmed by ultrasound at [facility]), Membranes intact      PRENATAL LAB RESULTS:   Blood Type/Rh: A pos  Antibody Screen: negative  Hemoglobin, Hematocrit, Platelets: 11.1 / 04.6 / 272  Rubella: immune  T.  Pallidum, IgG: non-reactive   Hepatitis B Surface Antigen: non-reactive   HIV: non-reactive   Sickle Cell Screen: not available  Gonorrhea: negative  Chlamydia: negative  UA: negative, date: 6/29/21    1 hour Glucose Tolerance Test: ordered not completed    Group B Strep: unknown  Cystic Fibrosis Screen: negative  First Trimester Screen: patient declined  MSAFP/Multiple Markers: patient declined  Non-Invasive Prenatal Testing: patient declined  Anatomy US: Anterior, female anatomy, 3VC     ASSESSMENT & PLAN:  Jose Rowley is a 42 y.o. female [id number] at 29w3d IUP   - GBS unknown / Rh positive / R immune   - GBS collected today   - Pen G for GBS prophylaxis if delivery imminent   - VSS, Afebrile     Shortened Cervix    - Presented from [facility] after finding shortened cervix of 13-14mm with funneling, changed from prior US showing 19-22mm cervical length on 7/8/21              - cEFM/TOCO: Cat I, no contractions              - SVE:  2/50/-3              - MFM US: Cephalic, anterior placenta, GUERA 13, BPP 8/8  - UA unremarkable  - Vaginitis Probe negative  - Gc/C collected   - Celestone for fetal lung maturation   - Tylenol for pain control    - PNVs, SCDs   - Her next Ob apt is 7/27/21 with Larisa Rothman CNP   - Patient counseled about risks and benefits of staying for observation vs going home and returning tomorrow for second dose of Celestone. Patient voiced concerns about her history of PTDx6 and worries about expectant management at home. Patient verbalized she would prefer to remain on L&D for observation.  Will plan to observe overnight and administer second dose of Celestone in 24 hours    Hx sPTD x6   - All deliveries occurred between the interval of 22g0j-21b6f   - Patient compliant with progesterone suppositories    - Following with MFM for cervical lengths     HSV2    - Has not had outbreak in several years   - Valtrex @ 36 weeks   - Denies prodromal Sx or current outbreak    Hx Hearing Loss (patient's child)   - Patient's daughter (G1) born with on-syndromic hearing loss    - Patient declined genetic testing    Hx Carcinoid Tumor (Appendix/Colon)   - S/p laparoscopic appendectomy/right sided hemicolectomy (2018)    [address] Multiparity    Hx SAB x2    - 2002, 2012     AMA   - Declined all genetic screening/tesing   - No NIPT    Hx PP Depression    - Patient not currently on medications   - Denies SI/HI    - Advised close follow up     FHx Breast CA/Uterine CA   - Maternal grandmother w/ breast CA in her 76s   - Paternal grandmother w/ uterine CA in her [de-identified]    BMI 24.37    Patient Active Problem List    Diagnosis Date Noted    Low-lying placenta: RESOLVED 2021     Priority: High    Short cervix, antepartum 2021     Priority: High     24-28mm on anatomy US 21  32-36mm on 21  Continue cervical length every 2 weeks until 32-34 weeks  Referred to [facility] for 17OHP injections - not taking   200 mg vaginal progesterone until 34 weeks      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     Priority: High     Daughter was born deaf   2016 declined opportunity for non-syndromic hearing loss carrier testing by MFM  Declined connexin diagnostic testing in her daughter, Dev Ray, and has declined testing for non-syndromic hearing loss      Grand multiparity 2016     Priority: High      36 weeks    35 weeks    SAB   36 weeks    37 weeks    37 weeks         Herpes      Priority: High     2016 History of genital herpes: last outbreak years ago  Call office with any new outbreaks      HRP (high risk pregnancy), third trimester 2021    Celestone  & * 2021     Shortened cervix from 19-22mm with funneling to 13-14mm with funneling       29 weeks gestation of pregnancy 2021    Somatic dysfunction of pubic bone 2021 Physical therapy       History of MRSA infection 2021     3/24/21 - Nares MRSA culture #1 completed  2021 MRSA swab #2 negative       Carcinoid tumor of appendix and colon 2018     S/p laparoscopic appendectomy and right sided hemicolectomy (2018)      MRSA infection - buttock and prepatellar bursa  2017 MRSA swab negative  3/25/2021 MRSA swab negative       Prepatellar bursitis of right knee     Abscess, gluteal, right 08/16/2017    Hx Postpartum depression 11/07/2016    Family history of breast cancer      Patient's maternal grandmother diagnosed in 78's      Family history of uterine cancer      Paternal grandmother diagnosed with uterine cancer in her [de-identified]         Plan discussed with Dr. Tonya Negro, who is agreeable. Steroids given this admission: Yes    Risks, benefits, alternatives and possible complications have been discussed in detail with the patient. Admission, and post admission procedures and expectations were discussed in detail. All questions were answered. Attending's Name: Dr. Emma Ken,   Ob/Gyn Resident  7/23/2021, 8:07 PM    Resident Physician Statement  I have discussed the case, including pertinent history and exam findings with the above resident. I have personally seen the patient. I agree with the assessment, plan and orders as documented. I have made changes to the above note as needed. I have discussed the case with above named attending.          Dickson Banegas,   OB/GYN Resident PGY4  7/23/2021  8:49 PM

## 2021-07-23 NOTE — PROGRESS NOTES
Advise inpatient admission/observation and evaluation for spontaneous  labor risk on labor and delivery.   Advise an initial course of betamethasone administration, acute tocolysis for betamethasone administration (if required), intravenous antibiotics for GBS prophylaxis per CDC/ACOG guidelines (if required), evaluation for cervicovaginal infections/urinary tract infections, continuous fetal monitoring/external toco, etc.

## 2021-07-23 NOTE — PROGRESS NOTES
OB/GYN Resident Interval Note      Patient seen and re-examined. SVE performed. Patient tolerated exam well. Vitals:    07/23/21 0938   BP: 92/63   Pulse: 100   Resp: 20   Temp: 98.2 °F (36.8 °C)         Cervix: 2/50/high    Continue cEFM/TOCO. Will monitor overnight until 2nd dose of celestone.        Vincent Parmar DO  OB/GYN Resident  [address] [facility], [address]  7/23/2021, 11:57 AM

## 2021-07-23 NOTE — LETTER
[facility] Labor [facility] Delivery  [address]  [address] [address]  Phone: [phone]        July 24, 2021     Patient: Jeanie Fang   YOB: 1980   Date of Visit: 7/23/2021       To Whom It May Concern: It is my medical opinion that Wilfredo Fears be excused from work through 7/27/21 for necessary medical reasons. If you have any questions or concerns, please don't hesitate to call.     Sincerely,          Leonora Shi DO  OB/GYN Resident Physician  [facility]  7/24/2021 6:06 AM

## 2021-07-24 VITALS
DIASTOLIC BLOOD PRESSURE: 64 MMHG | SYSTOLIC BLOOD PRESSURE: 99 MMHG | TEMPERATURE: 98 F | RESPIRATION RATE: 18 BRPM | HEART RATE: 90 BPM

## 2021-07-24 PROCEDURE — 99215 OFFICE O/P EST HI 40 MIN: CPT

## 2021-07-24 PROCEDURE — 96372 THER/PROPH/DIAG INJ SC/IM: CPT

## 2021-07-24 PROCEDURE — 6370000000 HC RX 637 (ALT 250 FOR IP): Performed by: STUDENT IN AN ORGANIZED HEALTH CARE EDUCATION/TRAINING PROGRAM

## 2021-07-24 PROCEDURE — 6360000002 HC RX W HCPCS: Performed by: STUDENT IN AN ORGANIZED HEALTH CARE EDUCATION/TRAINING PROGRAM

## 2021-07-24 PROCEDURE — G0378 HOSPITAL OBSERVATION PER HR: HCPCS

## 2021-07-24 RX ORDER — SENNA AND DOCUSATE SODIUM 50; 8.6 MG/1; MG/1
2 TABLET, FILM COATED ORAL DAILY PRN
Status: DISCONTINUED | OUTPATIENT
Start: 2021-07-24 | End: 2021-07-24 | Stop reason: HOSPADM

## 2021-07-24 RX ADMIN — BETAMETHASONE SODIUM PHOSPHATE AND BETAMETHASONE ACETATE 12 MG: 3; 3 INJECTION, SUSPENSION INTRA-ARTICULAR; INTRALESIONAL; INTRAMUSCULAR at 10:25

## 2021-07-24 RX ADMIN — Medication 1 TABLET: at 10:25

## 2021-07-24 RX ADMIN — DOCUSATE SODIUM 50MG AND SENNOSIDES 8.6MG 2 TABLET: 8.6; 5 TABLET, FILM COATED ORAL at 04:12

## 2021-07-24 ASSESSMENT — PAIN DESCRIPTION - DESCRIPTORS: DESCRIPTORS: CRAMPING

## 2021-07-24 NOTE — PROGRESS NOTES
OB/GYN PROGRESS NOTE    Priyanka Bustos is a 42 y.o. female [id number] at 29w3d, Hospital Day: 2    Subjective:   Patient has been seen and examined. Patient is resting comfortably. She has no complaints currently. Denies vaginal bleeding, leakage of fluid, decreased fetal movement. Patient denies any vaginal discharge and any urinary complaints. The patient reports fetal movement is present, denies contractions, denies loss of fluid, denies vaginal bleeding. Patient denies headache, vision changes, nausea, vomiting, fever, chills, shortness of breath, chest pain, RUQ pain, abdominal pain, diarrhea, change in color/amount/odor of vaginal discharge, dysuria or, hematuria.      Objective:   Vitals:  Vitals:    07/23/21 0938 07/23/21 1238 07/23/21 2048 07/24/21 0414   BP: 92/63 101/62 103/67 108/63   Pulse: 100 92 90 83   Resp: 20 20 16    Temp: 98.2 °F (36.8 °C)  98 °F (36.7 °C)          FHT: 125, moderate variability, accelerations present, decelerations absent  Contractions: none    Physical Exam:  General appearance:  no apparent distress, alert and cooperative  HEENT: head atraumatic, normocephalic, moist mucous membranes, trachea midline  Neurologic:  alert, oriented, normal speech, no focal findings or movement disorder noted  Lungs:  No increased work of breathing, good air exchange, clear to auscultation bilaterally, no crackles or wheezing  Heart:  regular rate and rhythm and no murmur    Abdomen:  soft, gravid, non-tender and no rebound, guarding, or rigidity  Extremities:  no calf tenderness, non edematous, DTR's: +2/4 bilateral lower extremities   Musculoskeletal: Gross strength equal and intact throughout, no gross abnormalities, range of motion normal in hips, knees, shoulders and spine, CVA tenderness: none  Psychiatric: Mood appropriate, normal affect   Rectal Exam: not indicated  Pelvic Exam: not indicated at this time      Assessment/Plan:  Priyanka Bustos is a 42 y.o. female [id number] at 29w3d IUP   -  positive/ Rubella immune/ GBS collected   - Pen G for GBS prophylaxis    - Rhogam: not indicated   - Continue PNV, SCDs daily   - VSS   - Cat 1 FHT, TOCO quiet     Shortened Cervix               - Presented from [facility] after finding shortened cervix of 13-14mm with funneling, changed from prior US showing 19-22mm cervical length on 7/8/21   - Admitted for overnight observation for monitoring and to receive her next dose of celestone, s/p celestone x1               - cEFM/TOCO: Cat I, no contractions              - SVE: patient deferring              - MFM US 8/81: Cephalic, anterior placenta, GUERA 13, BPP 8/8   - UA unremarkable   - Vaginitis Probe negative   - Gc/C collected and pending              - Tylenol for pain control               - PNVs, SCDs              - Her next Ob apt is 7/27/21 with [facility] CNP              - Anticipate discharge home today after second dose of celestone    Hx sPTD x6              - All deliveries occurred between the interval of [id number]-[id number]              - Patient compliant with progesterone suppositories               - Following with MFM for cervical lengths      HSV2               - Has not had outbreak in several years              - Valtrex @ 36 weeks              - Denies prodromal Sx or current outbreak    Hx Hearing Loss (patient's child)              - Patient's daughter (G1) born with on-syndromic hearing loss               - Patient declined genetic testing     Hx Carcinoid Tumor (Appendix/Colon)              - S/p laparoscopic appendectomy/right sided hemicolectomy (2018)     Grand Multiparity     Hx SAB x2               - 2002, 2012         AMA              - Declined all genetic screening/tesing              - No NIPT    Hx PP Depression               - Patient not currently on medications              - Denies SI/HI               - Advised close follow up      FHx Breast CA/Uterine CA              - Maternal grandmother w/ breast CA in her 76s              - Paternal grandmother w/ uterine CA in her [de-identified]     BMI 24.37    Patient Active Problem List    Diagnosis Date Noted    Low-lying placenta: RESOLVED 2021     Priority: High    Short cervix, antepartum 2021     Priority: High     Overview Note:     24-28mm on anatomy US 21  32-36mm on 21  Continue cervical length every 2 weeks until 32-34 weeks  Referred to [facility] for 17OHP injections - not taking   200 mg vaginal progesterone until 34 weeks      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     Priority: High     Overview Note:     Daughter was born deaf   2016 declined opportunity for non-syndromic hearing loss carrier testing by MFM  Declined connexin diagnostic testing in her daughter, Jose Lovelace, and has declined testing for non-syndromic hearing loss      Grand multiparity 2016     Priority: High     Overview Note:      36 weeks    35 weeks    SAB   36 weeks    37 weeks    37 weeks         Herpes      Priority: High     Overview Note:     2016 History of genital herpes: last outbreak years ago  Call office with any new outbreaks      HRP (high risk pregnancy), third trimester 2021    Celestone  & * 2021     Overview Note:     Shortened cervix from 19-22mm with funneling to 13-14mm with funneling       29 weeks gestation of pregnancy 2021    Somatic dysfunction of pubic bone 2021     Overview Note:     2021 Physical therapy       History of MRSA infection 2021     Overview Note:     3/24/21 - Nares MRSA culture #1 completed  2021 MRSA swab #2 negative       Carcinoid tumor of appendix and colon 2018     Overview Note:     S/p laparoscopic appendectomy and right sided hemicolectomy (2018)      MRSA infection - buttock and prepatellar bursa  2017     Overview Note:     21 MRSA swab negative  3/25/2021 MRSA swab negative  Prepatellar bursitis of right knee     Abscess, gluteal, right 08/16/2017    Hx Postpartum depression 11/07/2016    Family history of breast cancer      Overview Note:     Patient's maternal grandmother diagnosed in 78's      Family history of uterine cancer      Overview Note:     Paternal grandmother diagnosed with uterine cancer in her [de-identified]         Will update Dr. Meghan Cazares.      Jose Fang DO  Ob/Gyn Resident  7/24/2021, 6:04 AM

## 2021-07-24 NOTE — PROGRESS NOTES
Patient doing well  Getting second dose of BTMZ this am - due at 1000  Patient states increased cramping overnight but denies contractions  SVE per patient request - states she would feel better about going home if she had recheck since she was cramping last night    SVE: 1-2/50/-3, no discharge or bloody show    Will discharge home after second dose BTMZ

## 2021-07-24 NOTE — DISCHARGE SUMMARY
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 female [id number] at 29w3d admitted for shortened cervix with abdominal cramping. She was admitted overnight for observation and received celestone.      Course of patient: uncomplicated    Discharge to: Home    Readmission planned: yes at time of delivery    Recommendations on Discharge:     Medications:      Medication List      ASK your doctor about these medications    HYDROXYprogesterone caproate 250 MG/ML Oil oil injection     PRENATAL VITAMIN PO     progesterone 200 MG Caps capsule  Commonly known as: PROMETRIUM              Diet: regular diet  Follow up: at [facility] appointment 21    Condition on discharge: stable    Discharge date: 21      Hammad Alanis DO  Ob/Gyn Resident

## 2021-07-24 NOTE — CARE COORDINATION
Antepartum Note      42 y.o. [id number] at 29w3d who presents from [facility] after finding shortened cervix of 13-14mm with funneling, changed from prior US showing 19-22mm cervical length on 7/8/21. Patient sent to L&D for evaluation and possible steroid course. Case management will meet after delivery or should need arise prior.

## 2021-07-26 LAB
C TRACH DNA GENITAL QL NAA+PROBE: NEGATIVE
CULTURE: NORMAL
Lab: NORMAL
N. GONORRHOEAE DNA: NEGATIVE
SPECIMEN DESCRIPTION: NORMAL
SPECIMEN DESCRIPTION: NORMAL

## 2021-07-27 ENCOUNTER — ROUTINE PRENATAL (OUTPATIENT)
Dept: OBGYN CLINIC | Age: 41
End: 2021-07-27
Payer: MEDICARE

## 2021-07-27 VITALS
BODY MASS INDEX: 24.37 KG/M2 | DIASTOLIC BLOOD PRESSURE: 68 MMHG | SYSTOLIC BLOOD PRESSURE: 107 MMHG | HEART RATE: 86 BPM | WEIGHT: 151 LBS

## 2021-07-27 DIAGNOSIS — O26.879 SHORT CERVIX, ANTEPARTUM: ICD-10-CM

## 2021-07-27 DIAGNOSIS — B00.9 HERPES: ICD-10-CM

## 2021-07-27 DIAGNOSIS — O44.40 LOW-LYING PLACENTA: Primary | ICD-10-CM

## 2021-07-27 DIAGNOSIS — Z3A.30 30 WEEKS GESTATION OF PREGNANCY: ICD-10-CM

## 2021-07-27 DIAGNOSIS — Z86.14 HISTORY OF MRSA INFECTION: ICD-10-CM

## 2021-07-27 DIAGNOSIS — Q16.9: ICD-10-CM

## 2021-07-27 DIAGNOSIS — M99.05 SOMATIC DYSFUNCTION OF PUBIC BONE: ICD-10-CM

## 2021-07-27 DIAGNOSIS — Z64.1 GRAND MULTIPARITY: ICD-10-CM

## 2021-07-27 PROCEDURE — 1036F TOBACCO NON-USER: CPT | Performed by: NURSE PRACTITIONER

## 2021-07-27 PROCEDURE — G8427 DOCREV CUR MEDS BY ELIG CLIN: HCPCS | Performed by: NURSE PRACTITIONER

## 2021-07-27 PROCEDURE — 1111F DSCHRG MED/CURRENT MED MERGE: CPT | Performed by: NURSE PRACTITIONER

## 2021-07-27 PROCEDURE — 99213 OFFICE O/P EST LOW 20 MIN: CPT | Performed by: NURSE PRACTITIONER

## 2021-07-27 PROCEDURE — G8420 CALC BMI NORM PARAMETERS: HCPCS | Performed by: NURSE PRACTITIONER

## 2021-07-27 NOTE — PROGRESS NOTES
Key Hebert is a 42 y.o. female 30w0d    [id number]    OB History    Para Term  AB Living   9 6 0 6 2 6   SAB TAB Ectopic Molar Multiple Live Births   2 0 0     6      # Outcome Date GA Lbr Kaden/2nd Weight Sex Delivery Anes PTL Lv   9 Current            8  10/22/16 34w2d  5 lb 7.1 oz (2.47 kg) M Vag-Spont   MALLIKA   7  13 36w4d  5 lb 13 oz (2.637 kg) M    MALLIKA   6 SAB  6w0d    SAB      5   36w0d  6 lb 4 oz (2.835 kg) F Vag-Spont   MALLIKA   4   36w0d  6 lb 2 oz (2.778 kg) M Vag-Spont   MALLIKA      Birth Comments: Spont PTL/PTD   3 SAB  10w0d             Birth Comments: D&C   2   35w0d  5 lb 2 oz (2.325 kg) M Vag-Spont   MALLIKA      Birth Comments: Spont PTL/PTD   1   36w0d  6 lb 1 oz (2.75 kg) F Vag-Spont   MALLIKA      Birth Comments: Spont PTL/PTD      Obstetric Comments   Adjusted pregnancy's and dates today 16. Went over each one with patient in detail. The original chart today said 14th pregnancy. [facility] ... which is not true. 8 total- 2 sab's 5  deliveries. 3 boys and 2 girls. Vitals  BP: 107/68  Weight: 151 lb (68.5 kg)  Pulse: 86  Patient Position: Sitting      The patient was seen and evaluated. There was positive fetal movements. No contractions or leakage of fluid. Signs and symptoms of  labor as well as labor were reviewed. The S/S of Pre-Eclampsia were reviewed with the patient in detail. She is to report any of these if they occur. She currently denies any of these. The patient had her 28 week labs ordered. Admission on 2021, Discharged on 2021   Component Date Value Ref Range Status    Specimen Description 2021 . VAGINA   Final    Special Requests 2021 NOT REPORTED   Final    Direct Exam 2021 NEGATIVE for Gardnerella vaginalis   Final    Direct Exam 2021 NEGATIVE for Trichomonas vaginalis   Final    Direct Exam 2021 NEGATIVE for Candida sp.    Final    Direct Exam 07/23/2021 Method of testing is a DNA probe intended for detection and identification of Candida species, Gardnerella vaginalis, and Trichomonas vaginalis nucleic acid in vaginal fluid specimens from patients with symptoms of vaginitis/vaginosis. Final    Specimen Description 07/23/2021 . CERVIX   Final    C. trachomatis DNA 07/23/2021 NEGATIVE  NEGATIVE Final    Comment: CHLAMYDIA TRACHOMATIS DNA not detected by nucleic acid amplification. This test is intended for medical purposes only and is not valid for the evaluation of   suspected sexual abuse or for other forensic purposes. In certain contexts, culture may be required to meet applicable laws and regulations for   diagnosis of C. trachomatis and N. gonorrhoeae infections. Per 2014  CDC recommendations, this test does not include confirmation of positive results   by an alternative nucleic acid target.  N. gonorrhoeae DNA 07/23/2021 NEGATIVE  NEGATIVE Final    Comment: NEISSERIA GONORRHOEAE DNA not detected by nucleic acid amplification. This test is intended for medical purposes only and is not valid for the evaluation of   suspected sexual abuse or for other forensic purposes. In certain contexts, culture may be required to meet applicable laws and regulations for   diagnosis of C. trachomatis and N. gonorrhoeae infections. Per 2014  CDC recommendations, this test does not include confirmation of positive results   by an alternative nucleic acid target.       Color, UA 07/23/2021 YELLOW  YELLOW Final    Turbidity UA 07/23/2021 CLEAR  CLEAR Final    Glucose, Ur 07/23/2021 NEGATIVE  NEGATIVE Final    Bilirubin Urine 07/23/2021 NEGATIVE  NEGATIVE Final    Ketones, Urine 07/23/2021 NEGATIVE  NEGATIVE Final    Specific Gravity, UA 07/23/2021 1.008  1.005 - 1.030 Final    Urine Hgb 07/23/2021 NEGATIVE  NEGATIVE Final    pH, UA 07/23/2021 7.5  5.0 - 8.0 Final    Protein, UA 07/23/2021 NEGATIVE  NEGATIVE Final    Urobilinogen, Urine 07/23/2021 Normal  Normal Final    Nitrite, Urine 07/23/2021 NEGATIVE  NEGATIVE Final    Leukocyte Esterase, Urine 07/23/2021 NEGATIVE  NEGATIVE Final    Urinalysis Comments 07/23/2021 Microscopic exam not performed based on chemical results unless requested in original order. Final    Specimen Description 07/23/2021 . VAGINA   Final    Special Requests 07/23/2021 NOT REPORTED   Final    Culture 07/23/2021 NEGATIVE FOR GROUP B STREPTOCOCCI   Final   Admission on 06/29/2021, Discharged on 06/29/2021   Component Date Value Ref Range Status    Color, UA 06/29/2021 YELLOW  YELLOW Final    Turbidity UA 06/29/2021 CLEAR  CLEAR Final    Glucose, Ur 06/29/2021 NEGATIVE  NEGATIVE Final    Bilirubin Urine 06/29/2021 NEGATIVE  NEGATIVE Final    Ketones, Urine 06/29/2021 NEGATIVE  NEGATIVE Final    Specific [place], UA 06/29/2021 1.010  1.005 - 1.030 Final    Urine Hgb 06/29/2021 NEGATIVE  NEGATIVE Final    pH, UA 06/29/2021 7.0  5.0 - 8.0 Final    Protein, UA 06/29/2021 NEGATIVE  NEGATIVE Final    Urobilinogen, Urine 06/29/2021 Normal  Normal Final    Nitrite, Urine 06/29/2021 NEGATIVE  NEGATIVE Final    Leukocyte Esterase, Urine 06/29/2021 NEGATIVE  NEGATIVE Final    Urinalysis Comments 06/29/2021 Microscopic exam not performed based on chemical results unless requested in original order. Final    Specimen Description 06/29/2021 . CERVIX   Final    C. trachomatis DNA 06/29/2021 NEGATIVE  NEGATIVE Final    Comment: CHLAMYDIA TRACHOMATIS DNA not detected by nucleic acid amplification. This test is intended for medical purposes only and is not valid for the evaluation of   suspected sexual abuse or for other forensic purposes. In certain contexts, culture may be required to meet applicable laws and regulations for   diagnosis of C. trachomatis and N. gonorrhoeae infections.   Per 2014  CDC recommendations, this test does not include confirmation of positive results   by an alternative nucleic acid target.  N. gonorrhoeae DNA 06/29/2021 NEGATIVE  NEGATIVE Final    Comment: NEISSERIA GONORRHOEAE DNA not detected by nucleic acid amplification. This test is intended for medical purposes only and is not valid for the evaluation of   suspected sexual abuse or for other forensic purposes. In certain contexts, culture may be required to meet applicable laws and regulations for   diagnosis of C. trachomatis and N. gonorrhoeae infections. Per 2014  CDC recommendations, this test does not include confirmation of positive results   by an alternative nucleic acid target.  Specimen Description 06/29/2021 . VAGINA   Final    Special Requests 06/29/2021 NOT REPORTED   Final    Direct Exam 06/29/2021 NEGATIVE for Trichomonas vaginalis   Final    Direct Exam 06/29/2021 NEGATIVE for Gardnerella vaginalis   Final    Direct Exam 06/29/2021 NEGATIVE for Candida sp. Final    Direct Exam 06/29/2021 Method of testing is a DNA probe intended for detection and identification of Candida species, Gardnerella vaginalis, and Trichomonas vaginalis nucleic acid in vaginal fluid specimens from patients with symptoms of vaginitis/vaginosis. Final   ]    7/6/21 pt declined Tdap  4/22/21 MRSA swab negative  3/24/21 - Declined first trimester screening  3/24/21 - First MRSA nasal swab collected  Pt wishes depo provera in hospital prior to discharge      T-Dap Vaccine (27-36 weeks): declines    The patient was instructed on fetal kick counts and was given a kick sheet to complete every 8 hours. She was instructed that the baby should move at a minimum of ten times within one hour after a meal. The patient was instructed to lay down on her left side twenty minutes after eating and count movements for up to one hour with a target value of ten movements. She was instructed to notify the office if she did not make that target after two attempts or if after any attempt there was less than four movements.     The patient reports that the targets have been made Yes.  Testing:  Not indicated    Assessment:  1Dar Bustos is a 42 y.o. female  2.  [id number]  3. 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     Priority: High     Daughter was born deaf   2016 declined opportunity for non-syndromic hearing loss carrier testing by MFM  Declined connexin diagnostic testing in her daughter, Carlielivadim Tello, and has declined testing for non-syndromic hearing loss      Grand multiparity 2016     Priority: High      36 weeks   2001 35 weeks   2002 SAB  2004 36 weeks   2006 37 weeks   2013 37 weeks         Herpes      Priority: High     2016 History of genital herpes: last outbreak years ago  Call office with any new outbreaks      HRP (high risk pregnancy), third trimester 2021    [place]  & * 2021     Shortened cervix from 19-22mm with funneling to 13-14mm with funneling       29 weeks gestation of pregnancy 2021    Somatic dysfunction of pubic bone 2021 Physical therapy       Carcinoid tumor of appendix and colon 2018     S/p laparoscopic appendectomy and right sided hemicolectomy (2018)      MRSA infection - buttock and prepatellar bursa  2017 MRSA swab negative  3/25/2021 MRSA swab negative       Prepatellar bursitis of right knee     Abscess, gluteal, right 2017    Family history of breast cancer      Patient's maternal grandmother diagnosed in 78's      Family history of uterine cancer      Paternal grandmother diagnosed with uterine cancer in her [de-identified]          Diagnosis Orders   1. Low-lying placenta     2. [address] multiparity     3. 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 Herpes     5. Somatic dysfunction of pubic bone     6. History of MRSA infection     7. Postpartum depression     8. Short cervix, antepartum     9. 30 weeks gestation of pregnancy               Plan:  The patient will return to the office for her next visit in 2 weeks. See antepartum flow sheet. Denies S/S of labor. Instructed to call office or go to ER with S/S of labor. 28 week labs reprinted. Note given to be off work for remainder of pregnancy starting today due to pregnancy complications- short cervix. [facility] 2021     Testing Indicated: No  Scheduled with Nursing-Pt notified: No      Patient was seen with total face to face time of 20 minutes. More than 50% of this visit was on counseling and education regarding her    Diagnosis Orders   1. Low-lying placenta     2. [address] multiparity     3. 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 Herpes     5. Somatic dysfunction of pubic bone     6. History of MRSA infection     7. Postpartum depression     8. Short cervix, antepartum     9. 30 weeks gestation of pregnancy      and her options. She was also counseled on her preventative health maintenance recommendations and follow-up.

## 2021-08-06 ENCOUNTER — ROUTINE PRENATAL (OUTPATIENT)
Dept: PERINATAL CARE | Age: 41
End: 2021-08-06
Payer: MEDICARE

## 2021-08-06 VITALS
TEMPERATURE: 97.2 F | WEIGHT: 152 LBS | DIASTOLIC BLOOD PRESSURE: 59 MMHG | HEART RATE: 88 BPM | RESPIRATION RATE: 16 BRPM | BODY MASS INDEX: 24.53 KG/M2 | SYSTOLIC BLOOD PRESSURE: 96 MMHG

## 2021-08-06 DIAGNOSIS — O09.213 CURRENT PREGNANCY WITH HISTORY OF PRE-TERM LABOR IN THIRD TRIMESTER: ICD-10-CM

## 2021-08-06 DIAGNOSIS — O35.2XX0 HEREDITARY FAMILIAL DISEASE AFFECTING MANAGEMENT OF MOTHER AND POSSIBLY AFFECTING FETUS, ANTEPARTUM, SINGLE OR UNSPECIFIED FETUS: ICD-10-CM

## 2021-08-06 DIAGNOSIS — O43.103 PLACENTAL ABNORMALITY IN THIRD TRIMESTER: ICD-10-CM

## 2021-08-06 DIAGNOSIS — Z13.89 ENCOUNTER FOR ROUTINE SCREENING FOR MALFORMATION USING ULTRASONICS: ICD-10-CM

## 2021-08-06 DIAGNOSIS — O26.879 CERVICAL SHORTENING, ANTEPARTUM CONDITION OR COMPLICATION: ICD-10-CM

## 2021-08-06 DIAGNOSIS — O09.523 MULTIGRAVIDA OF ADVANCED MATERNAL AGE IN THIRD TRIMESTER: Primary | ICD-10-CM

## 2021-08-06 DIAGNOSIS — O09.43 GRAND MULTIPARITY WITH ANTENATAL PROBLEM IN THIRD TRIMESTER: ICD-10-CM

## 2021-08-06 DIAGNOSIS — Z3A.31 31 WEEKS GESTATION OF PREGNANCY: ICD-10-CM

## 2021-08-06 PROCEDURE — 76817 TRANSVAGINAL US OBSTETRIC: CPT | Performed by: OBSTETRICS & GYNECOLOGY

## 2021-08-06 PROCEDURE — 76819 FETAL BIOPHYS PROFIL W/O NST: CPT | Performed by: OBSTETRICS & GYNECOLOGY

## 2021-08-06 PROCEDURE — 76805 OB US >/= 14 WKS SNGL FETUS: CPT | Performed by: OBSTETRICS & GYNECOLOGY

## 2021-08-10 ENCOUNTER — ROUTINE PRENATAL (OUTPATIENT)
Dept: OBGYN CLINIC | Age: 41
End: 2021-08-10
Payer: MEDICARE

## 2021-08-10 VITALS
BODY MASS INDEX: 24.86 KG/M2 | WEIGHT: 154 LBS | DIASTOLIC BLOOD PRESSURE: 56 MMHG | HEART RATE: 80 BPM | SYSTOLIC BLOOD PRESSURE: 92 MMHG

## 2021-08-10 DIAGNOSIS — Z86.14 HISTORY OF MRSA INFECTION: ICD-10-CM

## 2021-08-10 DIAGNOSIS — O44.40 LOW-LYING PLACENTA: ICD-10-CM

## 2021-08-10 DIAGNOSIS — M99.05 SOMATIC DYSFUNCTION OF PUBIC BONE: ICD-10-CM

## 2021-08-10 DIAGNOSIS — B00.9 HERPES: ICD-10-CM

## 2021-08-10 DIAGNOSIS — Z3A.32 32 WEEKS GESTATION OF PREGNANCY: Primary | ICD-10-CM

## 2021-08-10 DIAGNOSIS — O26.879 SHORT CERVIX, ANTEPARTUM: ICD-10-CM

## 2021-08-10 DIAGNOSIS — Z64.1 GRAND MULTIPARITY: ICD-10-CM

## 2021-08-10 DIAGNOSIS — Q16.9: ICD-10-CM

## 2021-08-10 PROCEDURE — 99213 OFFICE O/P EST LOW 20 MIN: CPT | Performed by: OBSTETRICS & GYNECOLOGY

## 2021-08-10 PROCEDURE — G8420 CALC BMI NORM PARAMETERS: HCPCS | Performed by: OBSTETRICS & GYNECOLOGY

## 2021-08-10 PROCEDURE — 1036F TOBACCO NON-USER: CPT | Performed by: OBSTETRICS & GYNECOLOGY

## 2021-08-10 PROCEDURE — G8427 DOCREV CUR MEDS BY ELIG CLIN: HCPCS | Performed by: OBSTETRICS & GYNECOLOGY

## 2021-08-10 PROCEDURE — 1111F DSCHRG MED/CURRENT MED MERGE: CPT | Performed by: OBSTETRICS & GYNECOLOGY

## 2021-08-10 NOTE — PROGRESS NOTES
Larry Constantino is a 42 y.o. female 32w0d    [id number]    OB History    Para Term  AB Living   9 6 0 6 2 6   SAB TAB Ectopic Molar Multiple Live Births   2 0 0     6      # Outcome Date GA Lbr Kaden/2nd Weight Sex Delivery Anes PTL Lv   9 Current            8  10/22/16 34w2d  5 lb 7.1 oz (2.47 kg) M Vag-Spont   MALLIKA   7  13 36w4d  5 lb 13 oz (2.637 kg) M    MALLIKA   6 SAB  6w0d    SAB      5   36w0d  6 lb 4 oz (2.835 kg) F Vag-Spont   MALLIKA   4   36w0d  6 lb 2 oz (2.778 kg) M Vag-Spont   MALLIKA      Birth Comments: Spont PTL/PTD   3 SAB  10w0d             Birth Comments: D&C   2   35w0d  5 lb 2 oz (2.325 kg) M Vag-Spont   MALLIKA      Birth Comments: Spont PTL/PTD   1   36w0d  6 lb 1 oz (2.75 kg) F Vag-Spont   MALLIKA      Birth Comments: Spont PTL/PTD      Obstetric Comments   Adjusted pregnancy's and dates today 16. Went over each one with patient in detail. The original chart today said 14th pregnancy. Miesha Nataliya ... which is not true. 8 total- 2 sab's 5  deliveries. 3 boys and 2 girls. Vitals  BP: (!) 92/56  Weight: 154 lb (69.9 kg)  Pulse: 80  Patient Position: Sitting  Albumin: Negative  Glucose: Negative      The patient was seen and evaluated. There was positive fetal movements. No contractions or leakage of fluid. Signs and symptoms of  labor as well as labor were reviewed. The S/S of Pre-Eclampsia were reviewed with the patient in detail. She is to report any of these if they occur. She currently denies any of these. The patient had her 28 week labs ordered. Admission on 2021, Discharged on 2021   Component Date Value Ref Range Status    Specimen Description 2021 . VAGINA   Final    Special Requests 2021 NOT REPORTED   Final    Direct Exam 2021 NEGATIVE for Gardnerella vaginalis   Final    Direct Exam 2021 NEGATIVE for Trichomonas vaginalis   Final    Direct Exam 2021 NEGATIVE for Candida sp. Final    Direct Exam 07/23/2021 Method of testing is a DNA probe intended for detection and identification of Candida species, Gardnerella vaginalis, and Trichomonas vaginalis nucleic acid in vaginal fluid specimens from patients with symptoms of vaginitis/vaginosis. Final    Specimen Description 07/23/2021 . CERVIX   Final    C. trachomatis DNA 07/23/2021 NEGATIVE  NEGATIVE Final    Comment: CHLAMYDIA TRACHOMATIS DNA not detected by nucleic acid amplification. This test is intended for medical purposes only and is not valid for the evaluation of   suspected sexual abuse or for other forensic purposes. In certain contexts, culture may be required to meet applicable laws and regulations for   diagnosis of C. trachomatis and N. gonorrhoeae infections. Per 2014  CDC recommendations, this test does not include confirmation of positive results   by an alternative nucleic acid target.  N. gonorrhoeae DNA 07/23/2021 NEGATIVE  NEGATIVE Final    Comment: NEISSERIA GONORRHOEAE DNA not detected by nucleic acid amplification. This test is intended for medical purposes only and is not valid for the evaluation of   suspected sexual abuse or for other forensic purposes. In certain contexts, culture may be required to meet applicable laws and regulations for   diagnosis of C. trachomatis and N. gonorrhoeae infections. Per 2014  CDC recommendations, this test does not include confirmation of positive results   by an alternative nucleic acid target.       Color, UA 07/23/2021 YELLOW  YELLOW Final    Turbidity UA 07/23/2021 CLEAR  CLEAR Final    Glucose, Ur 07/23/2021 NEGATIVE  NEGATIVE Final    Bilirubin Urine 07/23/2021 NEGATIVE  NEGATIVE Final    Ketones, Urine 07/23/2021 NEGATIVE  NEGATIVE Final    Specific Gravity, UA 07/23/2021 1.008  1.005 - 1.030 Final    Urine Hgb 07/23/2021 NEGATIVE  NEGATIVE Final    pH, UA 07/23/2021 7.5  5.0 - 8.0 Final    Protein, UA 4. 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 Herpes     6. Somatic dysfunction of pubic bone     7. History of MRSA infection     8. Postpartum depression     9. Short cervix, antepartum               Plan:  The patient will return to the office for her next visit in 2 weeks. See antepartum flow sheet.  Testing Indicated: No  Scheduled with Nursing-Pt notified: No  Pt received 1st course of steroids if PTL to get 2nd course    Patient was seen with total face to face time of 20 minutes. More than 50% of this visit was on counseling and education regarding her    Diagnosis Orders   1. 32 weeks gestation of pregnancy     2. Low-lying placenta     3. [address] multiparity     4. 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 Herpes     6. Somatic dysfunction of pubic bone     7. History of MRSA infection     8. Postpartum depression     9. Short cervix, antepartum      and her options. She was also counseled on her preventative health maintenance recommendations and follow-up.

## 2021-08-19 ENCOUNTER — ROUTINE PRENATAL (OUTPATIENT)
Dept: PERINATAL CARE | Age: 41
End: 2021-08-19
Payer: MEDICARE

## 2021-08-19 VITALS
TEMPERATURE: 97.2 F | DIASTOLIC BLOOD PRESSURE: 62 MMHG | HEART RATE: 95 BPM | RESPIRATION RATE: 16 BRPM | HEIGHT: 66 IN | BODY MASS INDEX: 25.26 KG/M2 | WEIGHT: 157.19 LBS | SYSTOLIC BLOOD PRESSURE: 92 MMHG

## 2021-08-19 DIAGNOSIS — O09.523 MULTIGRAVIDA OF ADVANCED MATERNAL AGE IN THIRD TRIMESTER: ICD-10-CM

## 2021-08-19 DIAGNOSIS — O43.103 PLACENTAL ABNORMALITY IN THIRD TRIMESTER: ICD-10-CM

## 2021-08-19 DIAGNOSIS — O26.879 CERVICAL SHORTENING, ANTEPARTUM CONDITION OR COMPLICATION: ICD-10-CM

## 2021-08-19 DIAGNOSIS — O09.213 CURRENT PREGNANCY WITH HISTORY OF PRE-TERM LABOR IN THIRD TRIMESTER: Primary | ICD-10-CM

## 2021-08-19 DIAGNOSIS — O09.43 GRAND MULTIPARITY WITH ANTENATAL PROBLEM IN THIRD TRIMESTER: ICD-10-CM

## 2021-08-19 DIAGNOSIS — Z3A.33 33 WEEKS GESTATION OF PREGNANCY: ICD-10-CM

## 2021-08-19 PROCEDURE — 76817 TRANSVAGINAL US OBSTETRIC: CPT | Performed by: OBSTETRICS & GYNECOLOGY

## 2021-08-19 PROCEDURE — 76819 FETAL BIOPHYS PROFIL W/O NST: CPT | Performed by: OBSTETRICS & GYNECOLOGY

## 2021-08-19 PROCEDURE — 76815 OB US LIMITED FETUS(S): CPT | Performed by: OBSTETRICS & GYNECOLOGY

## 2021-08-24 ENCOUNTER — ROUTINE PRENATAL (OUTPATIENT)
Dept: OBGYN CLINIC | Age: 41
End: 2021-08-24
Payer: MEDICARE

## 2021-08-24 VITALS
DIASTOLIC BLOOD PRESSURE: 56 MMHG | HEART RATE: 93 BPM | BODY MASS INDEX: 25.18 KG/M2 | SYSTOLIC BLOOD PRESSURE: 96 MMHG | WEIGHT: 156 LBS

## 2021-08-24 DIAGNOSIS — Z64.1 GRAND MULTIPARITY: ICD-10-CM

## 2021-08-24 DIAGNOSIS — Z3A.34 34 WEEKS GESTATION OF PREGNANCY: ICD-10-CM

## 2021-08-24 DIAGNOSIS — B00.9 HERPES: ICD-10-CM

## 2021-08-24 DIAGNOSIS — Q16.9: ICD-10-CM

## 2021-08-24 DIAGNOSIS — O09.93 HIGH-RISK PREGNANCY IN THIRD TRIMESTER: Primary | ICD-10-CM

## 2021-08-24 DIAGNOSIS — Z86.14 HISTORY OF MRSA INFECTION: ICD-10-CM

## 2021-08-24 DIAGNOSIS — M99.05 SOMATIC DYSFUNCTION OF PUBIC BONE: ICD-10-CM

## 2021-08-24 DIAGNOSIS — O26.879 SHORT CERVIX, ANTEPARTUM: ICD-10-CM

## 2021-08-24 DIAGNOSIS — O44.40 LOW-LYING PLACENTA: ICD-10-CM

## 2021-08-24 PROCEDURE — 99213 OFFICE O/P EST LOW 20 MIN: CPT | Performed by: OBSTETRICS & GYNECOLOGY

## 2021-08-24 PROCEDURE — 1036F TOBACCO NON-USER: CPT | Performed by: OBSTETRICS & GYNECOLOGY

## 2021-08-24 PROCEDURE — G8419 CALC BMI OUT NRM PARAM NOF/U: HCPCS | Performed by: OBSTETRICS & GYNECOLOGY

## 2021-08-24 PROCEDURE — G8427 DOCREV CUR MEDS BY ELIG CLIN: HCPCS | Performed by: OBSTETRICS & GYNECOLOGY

## 2021-08-24 NOTE — PROGRESS NOTES
Larry Constantino is a 42 y.o. female 34w0d    [id number]    OB History    Para Term  AB Living   9 6 0 6 2 6   SAB TAB Ectopic Molar Multiple Live Births   2 0 0     6      # Outcome Date GA Lbr Kaden/2nd Weight Sex Delivery Anes PTL Lv   9 Current            8  10/22/16 34w2d  5 lb 7.1 oz (2.47 kg) M Vag-Spont   MALLIKA   7  13 36w4d  5 lb 13 oz (2.637 kg) M    MALLIKA   6 SAB  6w0d    SAB      5   36w0d  6 lb 4 oz (2.835 kg) F Vag-Spont   MALLIKA   4   36w0d  6 lb 2 oz (2.778 kg) M Vag-Spont   MALLIKA      Birth Comments: Spont PTL/PTD   3 SAB  10w0d             Birth Comments: D&C   2   35w0d  5 lb 2 oz (2.325 kg) M Vag-Spont   MALLIKA      Birth Comments: Spont PTL/PTD   1   36w0d  6 lb 1 oz (2.75 kg) F Vag-Spont   MALLIKA      Birth Comments: Spont PTL/PTD      Obstetric Comments   Adjusted pregnancy's and dates today 16. Went over each one with patient in detail. The original chart today said 14th pregnancy. Miesha Nataliya ... which is not true. 8 total- 2 sab's 5  deliveries. 3 boys and 2 girls. Vitals  BP: (!) 96/56  Weight: 156 lb (70.8 kg)  Pulse: 93  Patient Position: Sitting  Albumin: Negative  Glucose: Negative      The patient was seen and evaluated. There was positive fetal movements. No contractions or leakage of fluid. Signs and symptoms of  labor as well as labor were reviewed. The S/S of Pre-Eclampsia were reviewed with the patient in detail. She is to report any of these if they occur. She currently denies any of these. The patient had her 28 week labs completed. Admission on 2021, Discharged on 2021   Component Date Value Ref Range Status    Specimen Description 2021 . VAGINA   Final    Special Requests 2021 NOT REPORTED   Final    Direct Exam 2021 NEGATIVE for Gardnerella vaginalis   Final    Direct Exam 2021 NEGATIVE for Trichomonas vaginalis   Final    Direct Exam 2021 NEGATIVE for Candida sp. Final    Direct Exam 07/23/2021 Method of testing is a DNA probe intended for detection and identification of Candida species, Gardnerella vaginalis, and Trichomonas vaginalis nucleic acid in vaginal fluid specimens from patients with symptoms of vaginitis/vaginosis. Final    Specimen Description 07/23/2021 . CERVIX   Final    C. trachomatis DNA 07/23/2021 NEGATIVE  NEGATIVE Final    Comment: CHLAMYDIA TRACHOMATIS DNA not detected by nucleic acid amplification. This test is intended for medical purposes only and is not valid for the evaluation of   suspected sexual abuse or for other forensic purposes. In certain contexts, culture may be required to meet applicable laws and regulations for   diagnosis of C. trachomatis and N. gonorrhoeae infections. Per 2014  CDC recommendations, this test does not include confirmation of positive results   by an alternative nucleic acid target.  N. gonorrhoeae DNA 07/23/2021 NEGATIVE  NEGATIVE Final    Comment: NEISSERIA GONORRHOEAE DNA not detected by nucleic acid amplification. This test is intended for medical purposes only and is not valid for the evaluation of   suspected sexual abuse or for other forensic purposes. In certain contexts, culture may be required to meet applicable laws and regulations for   diagnosis of C. trachomatis and N. gonorrhoeae infections. Per 2014  CDC recommendations, this test does not include confirmation of positive results   by an alternative nucleic acid target.       Color, UA 07/23/2021 YELLOW  YELLOW Final    Turbidity UA 07/23/2021 CLEAR  CLEAR Final    Glucose, Ur 07/23/2021 NEGATIVE  NEGATIVE Final    Bilirubin Urine 07/23/2021 NEGATIVE  NEGATIVE Final    Ketones, Urine 07/23/2021 NEGATIVE  NEGATIVE Final    Specific Gravity, UA 07/23/2021 1.008  1.005 - 1.030 Final    Urine Hgb 07/23/2021 NEGATIVE  NEGATIVE Final    pH, UA 07/23/2021 7.5  5.0 - 8.0 Final    Protein, UA 2021 NEGATIVE  NEGATIVE Final    Urobilinogen, Urine 2021 Normal  Normal Final    Nitrite, Urine 2021 NEGATIVE  NEGATIVE Final    Leukocyte Esterase, Urine 2021 NEGATIVE  NEGATIVE Final    Urinalysis Comments 2021 Microscopic exam not performed based on chemical results unless requested in original order. Final    Specimen Description 2021 . VAGINA   Final    Special Requests 2021 NOT REPORTED   Final    Culture 2021 NEGATIVE FOR GROUP B STREPTOCOCCI   Final   ]    21 pt declined Tdap  21 MRSA swab negative  3/24/21 - Declined first trimester screening  3/24/21 - First MRSA nasal swab collected  Pt wishes depo provera in hospital prior to discharge      T-Dap Vaccine (27-36 weeks): declined    The patient was instructed on fetal kick counts and was given a kick sheet to complete every 8 hours. She was instructed that the baby should move at a minimum of ten times within one hour after a meal. The patient was instructed to lay down on her left side twenty minutes after eating and count movements for up to one hour with a target value of ten movements. She was instructed to notify the office if she did not make that target after two attempts or if after any attempt there was less than four movements. The patient reports that the targets have been made Yes.  Testing:  Not indicated  The recommendation to proceed to fetal kick counts every 8 hours daily instead of Non stress testing, (as per Aparna RC, JOVANNA Maki guidance for Covid-19 testing, American Journal of Obstetrics & Gynecology, 8493)    Assessment:  1. Jeevan Yusuf is a 42 y.o. female  2.  [id number]  3. 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     Priority: High     Daughter was born deaf   2016 declined opportunity for non-syndromic hearing loss carrier testing by MFM  Declined connexin diagnostic testing in her daughter, Jossy Ramsey, and has declined testing for non-syndromic hearing loss      Grand multiparity 2016     Priority: High      36 weeks    35 weeks    SAB   36 weeks   2006 37 weeks   2013 37 weeks         Herpes      Priority: High     2016 History of genital herpes: last outbreak years ago  Call office with any new outbreaks      HRP (high risk pregnancy), third trimester 2021    Cel[place]  & * 2021     Shortened cervix from 19-22mm with funneling to 13-14mm with funneling       Somatic dysfunction of pubic bone 2021 Physical therapy       History of MRSA infection 2021     3/24/21 - Nares MRSA culture #1 completed  2021 MRSA swab #2 negative       Carcinoid tumor of appendix and colon 2018     S/p laparoscopic appendectomy and right sided hemicolectomy ()      MRSA infection - buttock and prepatellar bursa  2017 MRSA swab negative  3/25/2021 MRSA swab negative       Prepatellar bursitis of right knee     Abscess, gluteal, right 2017    Hx Postpartum depression 2016    Family history of breast cancer      Patient's maternal grandmother diagnosed in 78's      Family history of uterine cancer      Paternal grandmother diagnosed with uterine cancer in her [de-identified]          Diagnosis Orders   1. High-risk pregnancy in third trimester     2. Low-lying placenta     3. [address] multiparity     4. 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  Herpes 6. Somatic dysfunction of pubic bone     7. History of MRSA infection     8. Postpartum depression     9. Short cervix, antepartum     10. 34 weeks gestation of pregnancy               Plan:  The patient will return to the office for her next visit in 2 weeks. See antepartum flow sheet.  Testing Indicated: No  Scheduled with Nursing-Pt notified: No  PTL precautions reviewed with pt. To L&D if occur    Patient was seen with total face to face time of 20 minutes. More than 50% of this visit was on counseling and education regarding her    Diagnosis Orders   1. High-risk pregnancy in third trimester     2. Low-lying placenta     3. [address] multiparity     4. 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 Herpes     6. Somatic dysfunction of pubic bone     7. History of MRSA infection     8. Postpartum depression     9. Short cervix, antepartum     10. 34 weeks gestation of pregnancy      and her options. She was also counseled on her preventative health maintenance recommendations and follow-up.

## 2021-09-08 ENCOUNTER — ROUTINE PRENATAL (OUTPATIENT)
Dept: OBGYN CLINIC | Age: 41
End: 2021-09-08
Payer: MEDICARE

## 2021-09-08 ENCOUNTER — HOSPITAL ENCOUNTER (OUTPATIENT)
Age: 41
Setting detail: SPECIMEN
Discharge: HOME | End: 2021-09-08
Payer: MEDICARE

## 2021-09-08 VITALS
HEART RATE: 112 BPM | WEIGHT: 159 LBS | DIASTOLIC BLOOD PRESSURE: 48 MMHG | SYSTOLIC BLOOD PRESSURE: 90 MMHG | BODY MASS INDEX: 25.66 KG/M2

## 2021-09-08 DIAGNOSIS — Z3A.36 36 WEEKS GESTATION OF PREGNANCY: Primary | ICD-10-CM

## 2021-09-08 DIAGNOSIS — Z86.14 HISTORY OF MRSA INFECTION: ICD-10-CM

## 2021-09-08 DIAGNOSIS — M99.05 SOMATIC DYSFUNCTION OF PUBIC BONE: ICD-10-CM

## 2021-09-08 DIAGNOSIS — Z3A.36 36 WEEKS GESTATION OF PREGNANCY: ICD-10-CM

## 2021-09-08 DIAGNOSIS — Q16.9: ICD-10-CM

## 2021-09-08 DIAGNOSIS — Z64.1 GRAND MULTIPARITY: ICD-10-CM

## 2021-09-08 DIAGNOSIS — B00.9 HERPES: ICD-10-CM

## 2021-09-08 DIAGNOSIS — O26.879 SHORT CERVIX, ANTEPARTUM: ICD-10-CM

## 2021-09-08 PROCEDURE — 1036F TOBACCO NON-USER: CPT | Performed by: NURSE PRACTITIONER

## 2021-09-08 PROCEDURE — G8419 CALC BMI OUT NRM PARAM NOF/U: HCPCS | Performed by: NURSE PRACTITIONER

## 2021-09-08 PROCEDURE — G8427 DOCREV CUR MEDS BY ELIG CLIN: HCPCS | Performed by: NURSE PRACTITIONER

## 2021-09-08 PROCEDURE — 99213 OFFICE O/P EST LOW 20 MIN: CPT | Performed by: NURSE PRACTITIONER

## 2021-09-08 RX ORDER — VALACYCLOVIR HYDROCHLORIDE 500 MG/1
500 TABLET, FILM COATED ORAL 2 TIMES DAILY
Qty: 60 TABLET | Refills: 1 | Status: SHIPPED | OUTPATIENT
Start: 2021-09-08 | End: 2021-10-08

## 2021-09-08 NOTE — PROGRESS NOTES
Low Jett is a 42 y.o. female 36w1d    [id number]    OB History    Para Term  AB Living   9 6 0 6 2 6   SAB TAB Ectopic Molar Multiple Live Births   2 0 0     6      # Outcome Date GA Lbr Kaden/2nd Weight Sex Delivery Anes PTL Lv   9 Current            8  10/22/16 34w2d  5 lb 7.1 oz (2.47 kg) M Vag-Spont   MALLIKA   7  13 36w4d  5 lb 13 oz (2.637 kg) M    MALLIKA   6 SAB  6w0d    SAB      5   36w0d  6 lb 4 oz (2.835 kg) F Vag-Spont   MALLIKA   4   36w0d  6 lb 2 oz (2.778 kg) M Vag-Spont   MALLIKA      Birth Comments: Spont PTL/PTD   3 SAB  10w0d             Birth Comments: D&C   2   35w0d  5 lb 2 oz (2.325 kg) M Vag-Spont   MALLIKA      Birth Comments: Spont PTL/PTD   1   36w0d  6 lb 1 oz (2.75 kg) F Vag-Spont   MALLIKA      Birth Comments: Spont PTL/PTD      Obstetric Comments   Adjusted pregnancy's and dates today 16. Went over each one with patient in detail. The original chart today said 14th pregnancy. Shaaron Money ... which is not true. 8 total- 2 sab's 5  deliveries. 3 boys and 2 girls. Vitals  BP: (!) 90/48  Weight: 159 lb (72.1 kg)  Pulse: 112  Patient Position: Sitting      The patient was seen and evaluated. There was positive fetal movements. No contractions or leakage of fluid. Signs and symptoms of labor were reviewed. The S/S of Pre-Eclampsia were reviewed with the patient in detail. She is to report any of these if they occur. She currently denies any of these. The patient was instructed on fetal kick counts and was given a kick sheet to complete every 8 hours. She was instructed that the baby should move at a minimum of ten times within one hour after a meal. The patient was instructed to lay down on her left side twenty minutes after eating and count movements for up to one hour with a target value of ten movements.   She was instructed to notify the office if she did not make that target after two attempts or if after any attempt there was less than four movements. The patient reports that the targets have been made Yes. 7/6/21 pt declined Tdap  4/22/21 MRSA swab negative  3/24/21 - Declined first trimester screening  3/24/21 - First MRSA nasal swab collected  Pt wishes depo provera in hospital prior to discharge      T-Dap Vaccine (27-36 weeks) Completed: No    Allergies: Allergies as of 09/08/2021 - Fully Reviewed 09/08/2021   Allergen Reaction Noted    Codeine  05/04/2016    Fentanyl Other (See Comments) 08/19/2017    Penicillins Hives 05/04/2016       Group Beta Strep collection was completed. Yes   GBS Results:   No visits with results within 1 Week(s) from this visit. Latest known visit with results is:   Admission on 07/23/2021, Discharged on 07/24/2021   Component Date Value Ref Range Status    Specimen Description 07/23/2021 . VAGINA   Final    Special Requests 07/23/2021 NOT REPORTED   Final    Direct Exam 07/23/2021 NEGATIVE for Gardnerella vaginalis   Final    Direct Exam 07/23/2021 NEGATIVE for Trichomonas vaginalis   Final    Direct Exam 07/23/2021 NEGATIVE for Candida sp. Final    Direct Exam 07/23/2021 Method of testing is a DNA probe intended for detection and identification of Candida species, Gardnerella vaginalis, and Trichomonas vaginalis nucleic acid in vaginal fluid specimens from patients with symptoms of vaginitis/vaginosis. Final    Specimen Description 07/23/2021 . CERVIX   Final    C. trachomatis DNA 07/23/2021 NEGATIVE  NEGATIVE Final    Comment: CHLAMYDIA TRACHOMATIS DNA not detected by nucleic acid amplification. This test is intended for medical purposes only and is not valid for the evaluation of   suspected sexual abuse or for other forensic purposes. In certain contexts, culture may be required to meet applicable laws and regulations for   diagnosis of C. trachomatis and N. gonorrhoeae infections.   Per 2014  CDC recommendations, this test does not include confirmation of positive results   by an alternative nucleic acid target.  N. gonorrhoeae DNA 07/23/2021 NEGATIVE  NEGATIVE Final    Comment: NEISSERIA GONORRHOEAE DNA not detected by nucleic acid amplification. This test is intended for medical purposes only and is not valid for the evaluation of   suspected sexual abuse or for other forensic purposes. In certain contexts, culture may be required to meet applicable laws and regulations for   diagnosis of C. trachomatis and N. gonorrhoeae infections. Per 2014  CDC recommendations, this test does not include confirmation of positive results   by an alternative nucleic acid target.  Color, UA 07/23/2021 YELLOW  YELLOW Final    Turbidity UA 07/23/2021 CLEAR  CLEAR Final    Glucose, Ur 07/23/2021 NEGATIVE  NEGATIVE Final    Bilirubin Urine 07/23/2021 NEGATIVE  NEGATIVE Final    Ketones, Urine 07/23/2021 NEGATIVE  NEGATIVE Final    Specific Gravity, UA 07/23/2021 1.008  1.005 - 1.030 Final    Urine Hgb 07/23/2021 NEGATIVE  NEGATIVE Final    pH, UA 07/23/2021 7.5  5.0 - 8.0 Final    Protein, UA 07/23/2021 NEGATIVE  NEGATIVE Final    Urobilinogen, Urine 07/23/2021 Normal  Normal Final    Nitrite, Urine 07/23/2021 NEGATIVE  NEGATIVE Final    Leukocyte Esterase, Urine 07/23/2021 NEGATIVE  NEGATIVE Final    Urinalysis Comments 07/23/2021 Microscopic exam not performed based on chemical results unless requested in original order. Final    Specimen Description 07/23/2021 . VAGINA   Final    Special Requests 07/23/2021 NOT REPORTED   Final    Culture 07/23/2021 NEGATIVE FOR GROUP B STREPTOCOCCI   Final   ]  Sensitivities for clindamycin and erythromycin were ordered. Yes      The patient was counseled on the mandatory call ahead policy. She has been instructed to call the office at anytime prior to going into the hospital so the on-call physician may direct her to the appropriate facility for care.  Exceptions were reviewed including but not limited to: Decreased fetal movement, vaginal Bleeding or hemorrhage, trauma, readily expectant delivery, or any instance where she feels 911 should be utilized. The patient was counseled on Labor & Delivery. yes  Route of delivery and counseling on vaginal, operative vaginal, and  sections were completed with the risks of each to both the patient as well as her baby. The possibility of a blood transfusion was discussed as well. The patient was not opposed to receiving a transfusion if needed. The patient was counseled on types of analgesia during labor and is considering either Regional or IV medication the risks, benefits and alternatives were discussed.  Testing:  Not indicated      Assessment:  Nagi Tompkins is a 42 y.o. female  2.  [id number]  3. 36w1d      Patient Active Problem List    Diagnosis Date Noted    Low-lying placenta: RESOLVED 2021     Priority: High    Short cervix, antepartum 2021     Priority: High     Overview Note:     24-28mm on anatomy US 21  32-36mm on 21  Continue cervical length every 2 weeks until 32-34 weeks  Referred to 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 The patient was instructed to lay down on her left side twenty minutes after eating and count movements for up to one hour with a target value of ten movements. She was instructed to notify the office if she did not make that target after two attempts or if after any attempt there was less than four movements. The patient reports that the targets have been made. Patient was seen with total face to face time of 20 minutes. More than 50% of this visit was on counseling and education regarding her    Diagnosis Orders   1. 36 weeks gestation of pregnancy  Strep B screen   2. Low-lying placenta     3. [address] multiparity     4. 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 Herpes     6. Somatic dysfunction of pubic bone     7. History of MRSA infection     8. Postpartum depression     9. Short cervix, antepartum      and her options. She was also counseled on her preventative health maintenance recommendations and follow-up.

## 2021-09-10 ENCOUNTER — HOSPITAL ENCOUNTER (OUTPATIENT)
Age: 41
Discharge: HOME | End: 2021-09-11
Attending: OBSTETRICS & GYNECOLOGY | Admitting: OBSTETRICS & GYNECOLOGY
Payer: MEDICARE

## 2021-09-10 LAB
ABO/RH: NORMAL
ABSOLUTE EOS #: 0.09 K/UL (ref 0–0.4)
ABSOLUTE IMMATURE GRANULOCYTE: ABNORMAL K/UL (ref 0–0.3)
ABSOLUTE LYMPH #: 1.87 K/UL (ref 1–4.8)
ABSOLUTE MONO #: 0.8 K/UL (ref 0.1–1.3)
AMPHETAMINE SCREEN URINE: NEGATIVE
ANTIBODY SCREEN: NEGATIVE
ARM BAND NUMBER: NORMAL
BARBITURATE SCREEN URINE: NEGATIVE
BASOPHILS # BLD: 0 % (ref 0–2)
BASOPHILS ABSOLUTE: 0 K/UL (ref 0–0.2)
BENZODIAZEPINE SCREEN, URINE: NEGATIVE
BUPRENORPHINE URINE: NORMAL
CANNABINOID SCREEN URINE: NEGATIVE
COCAINE METABOLITE, URINE: NEGATIVE
DIFFERENTIAL TYPE: ABNORMAL
EOSINOPHILS RELATIVE PERCENT: 1 % (ref 0–4)
EXPIRATION DATE: NORMAL
HCT VFR BLD CALC: 27.7 % (ref 36–46)
HEMOGLOBIN: 8.7 G/DL (ref 12–16)
IMMATURE GRANULOCYTES: ABNORMAL %
LYMPHOCYTES # BLD: 21 % (ref 24–44)
MCH RBC QN AUTO: 25.6 PG (ref 26–34)
MCHC RBC AUTO-ENTMCNC: 31.3 G/DL (ref 31–37)
MCV RBC AUTO: 81.7 FL (ref 80–100)
MDMA URINE: NORMAL
METHADONE SCREEN, URINE: NEGATIVE
METHAMPHETAMINE, URINE: NORMAL
MONOCYTES # BLD: 9 % (ref 1–7)
MORPHOLOGY: ABNORMAL
NRBC AUTOMATED: ABNORMAL PER 100 WBC
OPIATES, URINE: NEGATIVE
OXYCODONE SCREEN URINE: NEGATIVE
PDW BLD-RTO: 16.1 % (ref 11.5–14.9)
PHENCYCLIDINE, URINE: NEGATIVE
PLATELET # BLD: 308 K/UL (ref 150–450)
PLATELET ESTIMATE: ABNORMAL
PMV BLD AUTO: 8.7 FL (ref 6–12)
PROPOXYPHENE, URINE: NORMAL
RBC # BLD: 3.39 M/UL (ref 4–5.2)
RBC # BLD: ABNORMAL 10*6/UL
SARS-COV-2, RAPID: NOT DETECTED
SEG NEUTROPHILS: 69 % (ref 36–66)
SEGMENTED NEUTROPHILS ABSOLUTE COUNT: 6.14 K/UL (ref 1.3–9.1)
SPECIMEN DESCRIPTION: NORMAL
T. PALLIDUM, IGG: NONREACTIVE
TEST INFORMATION: NORMAL
TRICYCLIC ANTIDEPRESSANTS, UR: NORMAL
WBC # BLD: 8.9 K/UL (ref 3.5–11)
WBC # BLD: ABNORMAL 10*3/UL

## 2021-09-10 PROCEDURE — 86901 BLOOD TYPING SEROLOGIC RH(D): CPT

## 2021-09-10 PROCEDURE — 2580000003 HC RX 258: Performed by: OBSTETRICS & GYNECOLOGY

## 2021-09-10 PROCEDURE — 36415 COLL VENOUS BLD VENIPUNCTURE: CPT

## 2021-09-10 PROCEDURE — 80307 DRUG TEST PRSMV CHEM ANLYZR: CPT

## 2021-09-10 PROCEDURE — 85025 COMPLETE CBC W/AUTO DIFF WBC: CPT

## 2021-09-10 PROCEDURE — 87635 SARS-COV-2 COVID-19 AMP PRB: CPT

## 2021-09-10 PROCEDURE — 86850 RBC ANTIBODY SCREEN: CPT

## 2021-09-10 PROCEDURE — 86900 BLOOD TYPING SEROLOGIC ABO: CPT

## 2021-09-10 PROCEDURE — 86780 TREPONEMA PALLIDUM: CPT

## 2021-09-10 PROCEDURE — 6360000002 HC RX W HCPCS: Performed by: OBSTETRICS & GYNECOLOGY

## 2021-09-10 RX ORDER — SODIUM CHLORIDE, SODIUM LACTATE, POTASSIUM CHLORIDE, CALCIUM CHLORIDE 600; 310; 30; 20 MG/100ML; MG/100ML; MG/100ML; MG/100ML
INJECTION, SOLUTION INTRAVENOUS CONTINUOUS
Status: DISCONTINUED | OUTPATIENT
Start: 2021-09-10 | End: 2021-09-11 | Stop reason: HOSPADM

## 2021-09-10 RX ORDER — SODIUM CHLORIDE, SODIUM LACTATE, POTASSIUM CHLORIDE, AND CALCIUM CHLORIDE .6; .31; .03; .02 G/100ML; G/100ML; G/100ML; G/100ML
1000 INJECTION, SOLUTION INTRAVENOUS PRN
Status: DISCONTINUED | OUTPATIENT
Start: 2021-09-10 | End: 2021-09-11 | Stop reason: HOSPADM

## 2021-09-10 RX ORDER — SODIUM CHLORIDE 0.9 % (FLUSH) 0.9 %
5-40 SYRINGE (ML) INJECTION EVERY 12 HOURS SCHEDULED
Status: DISCONTINUED | OUTPATIENT
Start: 2021-09-10 | End: 2021-09-11 | Stop reason: HOSPADM

## 2021-09-10 RX ORDER — SODIUM CHLORIDE, SODIUM LACTATE, POTASSIUM CHLORIDE, AND CALCIUM CHLORIDE .6; .31; .03; .02 G/100ML; G/100ML; G/100ML; G/100ML
500 INJECTION, SOLUTION INTRAVENOUS PRN
Status: DISCONTINUED | OUTPATIENT
Start: 2021-09-10 | End: 2021-09-11 | Stop reason: HOSPADM

## 2021-09-10 RX ORDER — SODIUM CHLORIDE 9 MG/ML
25 INJECTION, SOLUTION INTRAVENOUS PRN
Status: DISCONTINUED | OUTPATIENT
Start: 2021-09-10 | End: 2021-09-11 | Stop reason: HOSPADM

## 2021-09-10 RX ORDER — SODIUM CHLORIDE 0.9 % (FLUSH) 0.9 %
5-40 SYRINGE (ML) INJECTION PRN
Status: DISCONTINUED | OUTPATIENT
Start: 2021-09-10 | End: 2021-09-11 | Stop reason: HOSPADM

## 2021-09-10 RX ADMIN — VANCOMYCIN HYDROCHLORIDE 1000 MG: 1 INJECTION, POWDER, LYOPHILIZED, FOR SOLUTION INTRAVENOUS at 20:51

## 2021-09-10 RX ADMIN — SODIUM CHLORIDE, POTASSIUM CHLORIDE, SODIUM LACTATE AND CALCIUM CHLORIDE 125 ML/HR: 600; 310; 30; 20 INJECTION, SOLUTION INTRAVENOUS at 19:43

## 2021-09-10 ASSESSMENT — PAIN SCALES - GENERAL: PAINLEVEL_OUTOF10: 2

## 2021-09-11 VITALS
WEIGHT: 158.95 LBS | HEIGHT: 66 IN | OXYGEN SATURATION: 98 % | RESPIRATION RATE: 16 BRPM | HEART RATE: 93 BPM | SYSTOLIC BLOOD PRESSURE: 92 MMHG | TEMPERATURE: 98.5 F | BODY MASS INDEX: 25.55 KG/M2 | DIASTOLIC BLOOD PRESSURE: 58 MMHG

## 2021-09-11 LAB
CREAT SERPL-MCNC: 0.46 MG/DL (ref 0.5–0.9)
CULTURE: NORMAL
GFR AFRICAN AMERICAN: >60 ML/MIN
GFR NON-AFRICAN AMERICAN: >60 ML/MIN
GFR SERPL CREATININE-BSD FRML MDRD: ABNORMAL ML/MIN/{1.73_M2}
GFR SERPL CREATININE-BSD FRML MDRD: ABNORMAL ML/MIN/{1.73_M2}
Lab: NORMAL
SPECIMEN DESCRIPTION: NORMAL

## 2021-09-11 PROCEDURE — 96361 HYDRATE IV INFUSION ADD-ON: CPT

## 2021-09-11 PROCEDURE — 2580000003 HC RX 258: Performed by: OBSTETRICS & GYNECOLOGY

## 2021-09-11 PROCEDURE — 96366 THER/PROPH/DIAG IV INF ADDON: CPT

## 2021-09-11 PROCEDURE — 96360 HYDRATION IV INFUSION INIT: CPT

## 2021-09-11 PROCEDURE — 96365 THER/PROPH/DIAG IV INF INIT: CPT

## 2021-09-11 PROCEDURE — 36415 COLL VENOUS BLD VENIPUNCTURE: CPT

## 2021-09-11 PROCEDURE — 82565 ASSAY OF CREATININE: CPT

## 2021-09-11 PROCEDURE — 99213 OFFICE O/P EST LOW 20 MIN: CPT

## 2021-09-11 PROCEDURE — 6360000002 HC RX W HCPCS: Performed by: OBSTETRICS & GYNECOLOGY

## 2021-09-11 RX ORDER — ONDANSETRON 2 MG/ML
4 INJECTION INTRAMUSCULAR; INTRAVENOUS EVERY 6 HOURS PRN
Status: DISCONTINUED | OUTPATIENT
Start: 2021-09-11 | End: 2021-09-11 | Stop reason: HOSPADM

## 2021-09-11 RX ADMIN — SODIUM CHLORIDE, POTASSIUM CHLORIDE, SODIUM LACTATE AND CALCIUM CHLORIDE: 600; 310; 30; 20 INJECTION, SOLUTION INTRAVENOUS at 00:01

## 2021-09-11 RX ADMIN — ONDANSETRON 4 MG: 2 INJECTION, SOLUTION INTRAMUSCULAR; INTRAVENOUS at 02:56

## 2021-09-11 RX ADMIN — VANCOMYCIN HYDROCHLORIDE 1000 MG: 1 INJECTION, POWDER, LYOPHILIZED, FOR SOLUTION INTRAVENOUS at 08:17

## 2021-09-11 NOTE — PLAN OF CARE
Problem: Anxiety:  Goal: Level of anxiety will decrease  Description: Level of anxiety will decrease  Outcome: Completed     Problem: Breathing Pattern - Ineffective:  Goal: Able to breathe comfortably  Description: Able to breathe comfortably  Outcome: Completed     Problem: Fluid Volume - Imbalance:  Goal: Absence of imbalanced fluid volume signs and symptoms  Description: Absence of imbalanced fluid volume signs and symptoms  Outcome: Completed     Problem: Fluid Volume - Imbalance:  Goal: Absence of intrapartum hemorrhage signs and symptoms  Description: Absence of intrapartum hemorrhage signs and symptoms  Outcome: Completed     Problem: Infection - Intrapartum Infection:  Goal: Will show no infection signs and symptoms  Description: Will show no infection signs and symptoms  Outcome: Completed     Problem: Labor Process - Prolonged:  Goal: Labor progression, first stage, within specified pattern  Description: Labor progression, first stage, within specified pattern  Outcome: Completed     Problem: Labor Process - Prolonged:  Goal: Labor progession, second stage, within specified pattern  Description: Labor progession, second stage, within specified pattern  Outcome: Completed     Problem: Labor Process - Prolonged:  Goal: Uterine contractions within specified parameters  Description: Uterine contractions within specified parameters  Outcome: Completed     Problem:  Screening:  Goal: Ability to make informed decisions regarding treatment has improved  Description: Ability to make informed decisions regarding treatment has improved  Outcome: Completed     Problem: Pain - Acute:  Goal: Pain level will decrease  Description: Pain level will decrease  Outcome: Completed     Problem: Pain - Acute:  Goal: Able to cope with pain  Description: Able to cope with pain  Outcome: Completed     Problem: Tissue Perfusion - Uteroplacental, Altered:  Goal: Absence of abnormal fetal heart rate pattern  Description: Absence of abnormal fetal heart rate pattern  Outcome: Completed     Problem: Urinary Retention:  Goal: Experiences of bladder distention will decrease  Description: Experiences of bladder distention will decrease  Outcome: Completed     Problem: Urinary Retention:  Goal: Urinary elimination within specified parameters  Description: Urinary elimination within specified parameters  Outcome: Completed

## 2021-09-11 NOTE — FLOWSHEET NOTE
Dr Joanie Canavan notified of pt's decision to d/c to home, no cervical changes made per Dr Joanie Canavan,   Per Dr Joanie Canavan ok to d/c pt to home, discharge order placed

## 2021-09-11 NOTE — H&P
OBSTETRICAL HISTORY AND PHYSICAL    Provider:  Norberto Paget, MD      Date: 2021  Time: 12:44 AM    Patient Name: Leti Ortiz  Patient : 1980  Room/Bed: 5178/9638-08  Admission Date/Time: 9/10/2021  5:35 PM  MRN #: [id number]  [facility] #: [id number]    Primary Care Physician: Robb Bolanos MD          [facility] AT [place] Pimentel is a 42 y.o. female [id number]      Chief Complaint:  contractions    HPI: Leti Ortiz is a 42 y.o. female who presents with contractions. Symptom onset has been gradual for a time period of several hour(s). Severity is described as moderate. She will be admitted due to early latent labor. OB History:   OB History    Para Term  AB Living   9 6 0 6 2 6   SAB TAB Ectopic Molar Multiple Live Births   2 0 0 0 0 6      # Outcome Date GA Lbr Kaden/2nd Weight Sex Delivery Anes PTL Lv   9 Current            8  10/22/16 34w2d  5 lb 7.1 oz (2.47 kg) M Vag-Spont   MALLIKA      Name: Katey Ates: 8  Apgar5: 9   7  13 36w4d  5 lb 13 oz (2.637 kg) M    MALLIKA   6 SAB  6w0d    SAB      5   36w0d  6 lb 4 oz (2.835 kg) F Vag-Spont   MALLIKA   4   36w0d  6 lb 2 oz (2.778 kg) M Vag-Spont   MALLIKA      Birth Comments: Spont PTL/PTD   3 SAB  10w0d             Birth Comments: D&C   2   35w0d  5 lb 2 oz (2.325 kg) M Vag-Spont   MALLIKA      Birth Comments: Spont PTL/PTD   1   36w0d  6 lb 1 oz (2.75 kg) F Vag-Spont   MALLIKA      Birth Comments: Spont PTL/PTD      Obstetric Comments   Adjusted pregnancy's and dates today 16. Went over each one with patient in detail. The original chart today said 14th pregnancy. Heddy  ... which is not true. 8 total- 2 sab's 5  deliveries. 3 boys and 2 girls. Contractions: Yes  Rupture Of Membranes: No  Bleeding: No    Estimated Gestational Age:  No LMP recorded (lmp unknown). Patient is pregnant.    Gestational Age: 37w2d    Estimated Date of Delivery: 10/5/21    Pregnancy Risk factors:  Patient Active Problem List    Diagnosis Date Noted    Low-lying placenta: RESOLVED 2021     Priority: High    Short cervix, antepartum 2021     Priority: High     Overview Note:     24-28mm on anatomy US 21  32-36mm on 21  Continue cervical length every 2 weeks until 32-34 weeks  Referred to 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     Priority: High     Overview Note:     Daughter was born deaf   2016 declined opportunity for non-syndromic hearing loss carrier testing by MFM  Declined connexin diagnostic testing in her daughter, Jordi Fleming, and has declined testing for non-syndromic hearing loss      Grand multiparity 2016     Priority: High     Overview Note:      36 weeks    35 weeks    SAB   36 weeks    37 weeks    37 weeks         Herpes      Priority: High     Overview Note:     2016 History of genital herpes: last outbreak years ago  Call office with any new outbreaks      HRP (high risk pregnancy), third trimester 2021    Celestone  & * 2021     Overview Note:     Shortened cervix from 19-22mm with funneling to 13-14mm with funneling       Somatic dysfunction of pubic bone 2021     Overview Note:     2021 Physical therapy       History of MRSA infection 2021     Overview Note:     3/24/21 - Nares MRSA culture #1 completed  2021 MRSA swab #2 negative       Carcinoid tumor of appendix and colon 2018     Overview Note:     S/p laparoscopic appendectomy and right sided hemicolectomy (2018)      MRSA infection - buttock and prepatellar bursa  2017     Overview Note:     21 MRSA swab negative  3/25/2021 MRSA swab negative       Prepatellar bursitis of right knee     Abscess, gluteal, right 2017    Hx Postpartum depression 2016    Family history of breast cancer      Overview Note:     Patient's maternal grandmother diagnosed in 78's      Family history of uterine cancer      Overview Note:     Paternal grandmother diagnosed with uterine cancer in her [de-identified]             Allergies: Allergies as of 09/10/2021 - Fully Reviewed 09/10/2021   Allergen Reaction Noted    Codeine  2016    Fentanyl Other (See Comments) 2017    Penicillins Hives 2016       Medications:  No current facility-administered medications on file prior to encounter.      Current Outpatient Medications on File Prior to Encounter   Medication Sig Dispense Refill    valACYclovir (VALTREX) 500 MG tablet Take 1 tablet by mouth 2 times daily 60 tablet 1    progesterone (PROMETRIUM) 200 MG CAPS capsule       Prenatal Vit-Fe Fumarate-FA (PRENATAL VITAMIN PO) Take 1 tablet by mouth daily      HYDROXYprogesterone caproate 250 MG/ML OIL oil injection  (Patient not taking: Reported on 8/10/2021)            Steroids Given In This Pregnancy:  no, yes     Past Medical History:   Diagnosis Date    Anemia     Family history of breast cancer     Family history of cervical cancer     Family history of uterine cancer     Herpes     MRSA (methicillin resistant staph aureus) culture positive 2017    abscess    Postpartum depression 2016    Tobacco use 2017       Past Surgical History:   Procedure Laterality Date    DILATION AND CURETTAGE      DILATION AND CURETTAGE OF UTERUS      Miscarriage    LAPAROSCOPY      for pain    DE LAP,APPENDECTOMY N/A 3/5/2018    APPENDECTOMY LAPAROSCOPIC performed by Shahrzad Duran MD at [address] N/A 3/20/2018    BOWEL RESECTION HEMICOLECTOMY - Right performed by Shahrzad Duran MD at [address]       OB History    Para Term  AB Living   9 6 0 6 2 6   SAB TAB Ectopic Molar Multiple Live Births   2 0 0 0 0 6      # Outcome Date GA Lbr Kaden/2nd Weight Sex Delivery Anes PTL Lv   9 Current            8  10/22/16 34w2d  5 lb 7.1 oz (2.47 kg) M Vag-Spont   MALLIKA      Name: Jose Manuel Friends: 8  Apgar5: 9   7  13 36w4d  5 lb 13 oz (2.637 kg) M    MALLIKA   6 SAB  6w0d    SAB      5   36w0d  6 lb 4 oz (2.835 kg) F Vag-Spont   MALLIKA   4   36w0d  6 lb 2 oz (2.778 kg) M Vag-Spont   MALLIKA      Birth Comments: Spont PTL/PTD   3 SAB  10w0d             Birth Comments: D&C   2   35w0d  5 lb 2 oz (2.325 kg) M Vag-Spont   MALLIKA      Birth Comments: Spont PTL/PTD   1   36w0d  6 lb 1 oz (2.75 kg) F Vag-Spont   MALLIKA      Birth Comments: Spont PTL/PTD      Obstetric Comments   Adjusted pregnancy's and dates today 16. Went over each one with patient in detail. The original chart today said 14th pregnancy. Ciera Graven ... which is not true. 8 total- 2 sab's 5  deliveries. 3 boys and 2 girls.        Family History   Problem Relation Age of Onset    Diabetes Paternal Grandfather     Diabetes Paternal Grandmother     Uterine Cancer Paternal Grandmother     Cervical Cancer Paternal Grandmother     Cancer Maternal Grandmother     Breast Cancer Maternal Grandmother     Other Daughter         deaf    Other Son         Bowel surgery at 2year old         Social History     Socioeconomic History    Marital status:      Spouse name: Not on file    Number of children: Not on file    Years of education: Not on file    Highest education level: Not on file   Occupational History    Not on file   Tobacco Use    Smoking status: Former Smoker     Packs/day: 0.25    Smokeless tobacco: Never Used   Vaping Use    Vaping Use: Former    Substances: Occasionally   Substance and Sexual Activity    Alcohol use: No     Alcohol/week: 0.0 standard drinks    Drug use: Not Currently     Types: Marijuana    Sexual activity: Yes     Partners: Male Other Topics Concern    Not on file   Social History Narrative    ** Merged History Encounter **          Social Determinants of Health     Financial Resource Strain:     Difficulty of Paying Living Expenses:    Food Insecurity:     Worried About Running Out of Food in the Last Year:     Ran Out of Food in the Last Year:    Transportation Needs:     Lack of Transportation (Medical):  Lack of Transportation (Non-Medical):    Physical Activity:     Days of Exercise per Week:     Minutes of Exercise per Session:    Stress:     Feeling of Stress :    Social Connections:     Frequency of Communication with Friends and Family:     Frequency of Social Gatherings with Friends and Family:     Attends Islam Services:     Active Member of Clubs or Organizations:     Attends Club or Organization Meetings:     Marital Status:    Intimate Partner Violence:     Fear of Current or Ex-Partner:     Emotionally Abused:     Physically Abused:     Sexually Abused:        Review Of Systems:  A minimum of an eleven point review of systems was completed. Review Of Systems (11 point):  Constitutional: No fever, chills or malaise; No weight change or fatigue  Head and Eyes: No vision, Headache, Dizziness or trauma in last 12 months  ENT ROS: No hearing, Tinnitis, sinus or taste problems  Hematological and Lymphatic ROS:No Lymphoma, Von Willebrand's, Hemophillia or Bleeding History  Psych ROS: No Depression, Homicidal thoughts,suicidal thoughts, or anxiety  Breast ROS: No prior breast abnormalities or lumps  Respiratory ROS: No SOB, Pneumoniae,Cough, or Pulmonary Embolism History  Cardiovascular ROS: No Chest Pain with Exertion, Palpitations, Syncope, Edema, Arrhythmia  Gastrointestinal ROS: No Indigestion, Heartburn, Nausea, vomiting, Diarrhea, Constipation,or Bowel Changes; No Bloody Stools or melena  Genito-Urinary ROS: No Dysuria, Hematuria or Nocturia.  No Urinary Incontinence or Vaginal Discharge  Musculoskeletal ROS: No Arthralgia, Arthritis,Gout,Osteoporosis or Rheumatism  Neurological ROS: No CVA, Migraines, Epilepsy, Seizure Hx, or Limb Weakness  Dermatological ROS: No Rash, Itching, Hives, Mole Changes or Cancer   Obstetrical:     Physical Exam:  Vitals:    09/10/21 1745 09/10/21 1945   Temp: 97.9 °F (36.6 °C)    Weight:  158 lb 15.2 oz (72.1 kg)   Height:  5' 6\" (1.676 m)       Fetal heart rate:  Baseline Heart Rate 130 bpm.  Category I Tracing with mod variability    General appearance:  awake, alert, cooperative, no apparent distress, and appears stated age  Neurologic:  DTR's are 2/4 bilaterally without clonus. There is not Edema. Lungs: CTAB No RRW   Heart:  RRR without Murmur   Abdomen:  Gravid not tender with a Fundal height of n/a cm. No GRR No CVAT BL  Pelvic Exam:  Cervix Check:   DILATION:  4-5   EFFACEMENT:   50%   STATION:  -1 cm   CONSISTENCY:  soft   POSITION:  mid    FETAL POSITION: Cephalic, occiput anterior    Membranes Ruptured: No   Fern: NA   Nitrazine: NA   Valsalva/Pooling: NA   Vaginal Bleeding: NA    Contraction frequency:  3-5 minutes irregular      Limited ultrasound:      Lab Results:   Admission on 09/10/2021   Component Date Value Ref Range Status    Expiration Date 09/10/2021 09/13/2021,2359   Final    Arm Band Number 09/10/2021 [id number]   Final    ABO/Rh 09/10/2021 A POSITIVE   Final    Antibody Screen 09/10/2021 NEGATIVE   Final    T. pallidum, IgG 09/10/2021 NONREACTIVE  NONREACTIVE Final    Comment:       T. pallidum antibodies are not detected. There is no serological evidence of infection with T. pallidum (early primary syphilis   cannot be excluded). Retest in 2-4 weeks if syphilis is clinically suspect.             Amphetamine Screen, Ur 09/10/2021 NEGATIVE  NEGATIVE Final    Comment:       (Positive cutoff 1000 ng/mL)                  Barbiturate Screen, Ur 09/10/2021 NEGATIVE  NEGATIVE Final    Comment:       (Positive cutoff 200 ng/mL)  Benzodiazepine Screen, Urine 09/10/2021 NEGATIVE  NEGATIVE Final    Comment:       (Positive cutoff 200 ng/mL)                  Cocaine Metabolite, Urine 09/10/2021 NEGATIVE  NEGATIVE Final    Comment:       (Positive cutoff 300 ng/mL)                  Methadone Screen, Urine 09/10/2021 NEGATIVE  NEGATIVE Final    Comment:       (Positive cutoff 300 ng/mL)                  Opiates, Urine 09/10/2021 NEGATIVE  NEGATIVE Final    Comment:       (Positive cutoff 300 ng/mL)                  Phencyclidine, Urine 09/10/2021 NEGATIVE  NEGATIVE Final    Comment:       (Positive cutoff 25 ng/mL)                  Propoxyphene, Urine 09/10/2021 NOT REPORTED  NEGATIVE Final    Cannabinoid Scrn, Ur 09/10/2021 NEGATIVE  NEGATIVE Final    Comment:       (Positive cutoff 50 ng/mL)                  Oxycodone Screen, Ur 09/10/2021 NEGATIVE  NEGATIVE Final    Comment:       (Positive cutoff 100 ng/mL)                  Methamphetamine, Urine 09/10/2021 NOT REPORTED  NEGATIVE Final    Tricyclic Antidepressants, Urine 09/10/2021 NOT REPORTED  NEGATIVE Final    MDMA, Urine 09/10/2021 NOT REPORTED  NEGATIVE Final    Buprenorphine Urine 09/10/2021 NOT REPORTED  NEGATIVE Final    Test Information 09/10/2021 Assay provides medical screening only. The absence of expected drug(s) and/or metabolite(s) may indicate diluted or adulterated urine, limitations of testing or timing of collection. Final    Comment: Testing for legal purposes should be confirmed by another method. To request confirmation   of test result, please call the lab within 7 days of sample submission.       WBC 09/10/2021 8.9  3.5 - 11.0 k/uL Final    RBC 09/10/2021 3.39* 4.0 - 5.2 m/uL Final    Hemoglobin 09/10/2021 8.7* 12.0 - 16.0 g/dL Final    Hematocrit 09/10/2021 27.7* 36 - 46 % Final    MCV 09/10/2021 81.7  80 - 100 fL Final    MCH 09/10/2021 25.6* 26 - 34 pg Final    MCHC 09/10/2021 31.3  31 - 37 g/dL Final    RDW 09/10/2021 16.1* 11.5 - 14.9 % Final    Platelets 27/74/8570 308  150 - 450 k/uL Final    MPV 09/10/2021 8.7  6.0 - 12.0 fL Final    NRBC Automated 09/10/2021 NOT REPORTED  per 100 WBC Final    Differential Type 09/10/2021 NOT REPORTED   Final    Immature Granulocytes 09/10/2021 NOT REPORTED  0 % Final    Absolute Immature Granulocyte 09/10/2021 NOT REPORTED  0.00 - 0.30 k/uL Final    WBC Morphology 09/10/2021 NOT REPORTED   Final    RBC Morphology 09/10/2021 NOT REPORTED   Final    Platelet Estimate 21/59/3017 NOT REPORTED   Final    Seg Neutrophils 09/10/2021 69* 36 - 66 % Final    Lymphocytes 09/10/2021 21* 24 - 44 % Final    Monocytes 09/10/2021 9* 1 - 7 % Final    Eosinophils % 09/10/2021 1  0 - 4 % Final    Basophils 09/10/2021 0  0 - 2 % Final    Segs Absolute 09/10/2021 6.14  1.3 - 9.1 k/uL Final    Absolute Lymph # 09/10/2021 1.87  1.0 - 4.8 k/uL Final    Absolute Mono # 09/10/2021 0.80  0.1 - 1.3 k/uL Final    Absolute Eos # 09/10/2021 0.09  0.0 - 0.4 k/uL Final    Basophils Absolute 09/10/2021 0.00  0.0 - 0.2 k/uL Final    Morphology 09/10/2021 1+ POLYCHROMASIA   Final    Morphology 09/10/2021 ANISOCYTOSIS PRESENT   Final    Morphology 09/10/2021 FEW GIANT PLATELETS   Final    Specimen Description 09/10/2021 . NASOPHARYNGEAL SWAB   Final    SARS-CoV-2, Rapid 09/10/2021 Not Detected  Not Detected Final    Comment:       Rapid NAAT:  The specimen is NEGATIVE for SARS-CoV-2, the novel coronavirus associated with   COVID-19. The ID NOW COVID-19 assay is designed to detect the virus that causes COVID-19 in patients   with signs and symptoms of infection who are suspected of COVID-19. An individual without symptoms of COVID-19 and who is not shedding SARS-CoV-2 virus would   expect to have a negative (not detected) result in this assay.   Negative results should be treated as presumptive and, if inconsistent with clinical signs   and symptoms or necessary for patient management,  should be tested with an alternative molecular assay. Negative results do not preclude   SARS-CoV-2 infection and   should not be used as the sole basis for patient management decisions. Fact sheet for Healthcare Providers: Tali  Fact sheet for Patients: Tali          Methodology: Isothermal Nucleic Acid Amplification     Hospital Outpatient Visit on 09/08/2021   Component Date Value Ref Range Status    Specimen Description 09/08/2021 . VAGINAL SPECIMEN   Preliminary    Special Requests 09/08/2021 NOT REPORTED   Preliminary    Culture 09/08/2021 CULTURE IN PROGRESS   Preliminary   Admission on 07/23/2021, Discharged on 07/24/2021   Component Date Value Ref Range Status    Specimen Description 07/23/2021 . VAGINA   Final    Special Requests 07/23/2021 NOT REPORTED   Final    Direct Exam 07/23/2021 NEGATIVE for Gardnerella vaginalis   Final    Direct Exam 07/23/2021 NEGATIVE for Trichomonas vaginalis   Final    Direct Exam 07/23/2021 NEGATIVE for Candida sp. Final    Direct Exam 07/23/2021 Method of testing is a DNA probe intended for detection and identification of Candida species, Gardnerella vaginalis, and Trichomonas vaginalis nucleic acid in vaginal fluid specimens from patients with symptoms of vaginitis/vaginosis. Final    Specimen Description 07/23/2021 . CERVIX   Final    C. trachomatis DNA 07/23/2021 NEGATIVE  NEGATIVE Final    Comment: CHLAMYDIA TRACHOMATIS DNA not detected by nucleic acid amplification. This test is intended for medical purposes only and is not valid for the evaluation of   suspected sexual abuse or for other forensic purposes. In certain contexts, culture may be required to meet applicable laws and regulations for   diagnosis of C. trachomatis and N. gonorrhoeae infections. Per 2014  CDC recommendations, this test does not include confirmation of positive results   by an alternative nucleic acid target.       N. gonorrhoeae DNA 07/23/2021 NEGATIVE  NEGATIVE Final    Comment: NEISSERIA GONORRHOEAE DNA not detected by nucleic acid amplification. This test is intended for medical purposes only and is not valid for the evaluation of   suspected sexual abuse or for other forensic purposes. In certain contexts, culture may be required to meet applicable laws and regulations for   diagnosis of C. trachomatis and N. gonorrhoeae infections. Per 2014  CDC recommendations, this test does not include confirmation of positive results   by an alternative nucleic acid target.  Color, UA 07/23/2021 YELLOW  YELLOW Final    Turbidity UA 07/23/2021 CLEAR  CLEAR Final    Glucose, Ur 07/23/2021 NEGATIVE  NEGATIVE Final    Bilirubin Urine 07/23/2021 NEGATIVE  NEGATIVE Final    Ketones, Urine 07/23/2021 NEGATIVE  NEGATIVE Final    Specific Gravity, UA 07/23/2021 1.008  1.005 - 1.030 Final    Urine Hgb 07/23/2021 NEGATIVE  NEGATIVE Final    pH, UA 07/23/2021 7.5  5.0 - 8.0 Final    Protein, UA 07/23/2021 NEGATIVE  NEGATIVE Final    Urobilinogen, Urine 07/23/2021 Normal  Normal Final    Nitrite, Urine 07/23/2021 NEGATIVE  NEGATIVE Final    Leukocyte Esterase, Urine 07/23/2021 NEGATIVE  NEGATIVE Final    Urinalysis Comments 07/23/2021 Microscopic exam not performed based on chemical results unless requested in original order. Final    Specimen Description 07/23/2021 . VAGINA   Final    Special Requests 07/23/2021 NOT REPORTED   Final    Culture 07/23/2021 NEGATIVE FOR GROUP B STREPTOCOCCI   Final   Admission on 06/29/2021, Discharged on 06/29/2021   Component Date Value Ref Range Status    Color, UA 06/29/2021 YELLOW  YELLOW Final    Turbidity UA 06/29/2021 CLEAR  CLEAR Final    Glucose, Ur 06/29/2021 NEGATIVE  NEGATIVE Final    Bilirubin Urine 06/29/2021 NEGATIVE  NEGATIVE Final    Ketones, Urine 06/29/2021 NEGATIVE  NEGATIVE Final    Specific [place], UA 06/29/2021 1.010  1.005 - 1.030 Final    Urine Hgb 06/29/2021 NEGATIVE  NEGATIVE Final    pH, UA 06/29/2021 7.0  5.0 - 8.0 Final    Protein, UA 06/29/2021 NEGATIVE  NEGATIVE Final    Urobilinogen, Urine 06/29/2021 Normal  Normal Final    Nitrite, Urine 06/29/2021 NEGATIVE  NEGATIVE Final    Leukocyte Esterase, Urine 06/29/2021 NEGATIVE  NEGATIVE Final    Urinalysis Comments 06/29/2021 Microscopic exam not performed based on chemical results unless requested in original order. Final    Specimen Description 06/29/2021 . CERVIX   Final    C. trachomatis DNA 06/29/2021 NEGATIVE  NEGATIVE Final    Comment: CHLAMYDIA TRACHOMATIS DNA not detected by nucleic acid amplification. This test is intended for medical purposes only and is not valid for the evaluation of   suspected sexual abuse or for other forensic purposes. In certain contexts, culture may be required to meet applicable laws and regulations for   diagnosis of C. trachomatis and N. gonorrhoeae infections. Per 2014  CDC recommendations, this test does not include confirmation of positive results   by an alternative nucleic acid target.  N. gonorrhoeae DNA 06/29/2021 NEGATIVE  NEGATIVE Final    Comment: NEISSERIA GONORRHOEAE DNA not detected by nucleic acid amplification. This test is intended for medical purposes only and is not valid for the evaluation of   suspected sexual abuse or for other forensic purposes. In certain contexts, culture may be required to meet applicable laws and regulations for   diagnosis of C. trachomatis and N. gonorrhoeae infections. Per 2014  CDC recommendations, this test does not include confirmation of positive results   by an alternative nucleic acid target.  Specimen Description 06/29/2021 . VAGINA   Final    Special Requests 06/29/2021 NOT REPORTED   Final    Direct Exam 06/29/2021 NEGATIVE for Trichomonas vaginalis   Final    Direct Exam 06/29/2021 NEGATIVE for Gardnerella vaginalis   Final    Direct Exam 06/29/2021 NEGATIVE for Candida sp. Final    Direct Exam 06/29/2021 Method of testing is a DNA probe intended for detection and identification of Candida species, Gardnerella vaginalis, and Trichomonas vaginalis nucleic acid in vaginal fluid specimens from patients with symptoms of vaginitis/vaginosis. Final   Admission on 05/10/2021, Discharged on 05/10/2021   Component Date Value Ref Range Status    Color, UA 05/10/2021 YELLOW  YELLOW Final    Turbidity UA 05/10/2021 CLEAR  CLEAR Final    Glucose, Ur 05/10/2021 NEGATIVE  NEGATIVE Final    Bilirubin Urine 05/10/2021 NEGATIVE  NEGATIVE Final    Ketones, Urine 05/10/2021 NEGATIVE  NEGATIVE Final    Specific Gravity, UA 05/10/2021 1.003  1.000 - 1.030 Final    Urine Hgb 05/10/2021 NEGATIVE  NEGATIVE Final    pH, UA 05/10/2021 7.0  5.0 - 8.0 Final    Protein, UA 05/10/2021 NEGATIVE  NEGATIVE Final    Urobilinogen, Urine 05/10/2021 Normal  Normal Final    Nitrite, Urine 05/10/2021 NEGATIVE  NEGATIVE Final    Leukocyte Esterase, Urine 05/10/2021 NEGATIVE  NEGATIVE Final    Urinalysis Comments 05/10/2021 Microscopic exam not performed based on chemical results unless requested in original order.    Final    WBC 05/10/2021 9.5  3.5 - 11.0 k/uL Final    RBC 05/10/2021 3.75* 4.0 - 5.2 m/uL Final    Hemoglobin 05/10/2021 11.2* 12.0 - 16.0 g/dL Final    Hematocrit 05/10/2021 34.2* 36 - 46 % Final    MCV 05/10/2021 91.1  80 - 100 fL Final    MCH 05/10/2021 29.9  26 - 34 pg Final    MCHC 05/10/2021 32.8  31 - 37 g/dL Final    RDW 05/10/2021 14.2  11.5 - 14.9 % Final    Platelets 34/36/7584 272  150 - 450 k/uL Final    MPV 05/10/2021 8.6  6.0 - 12.0 fL Final    NRBC Automated 05/10/2021 NOT REPORTED  per 100 WBC Final    Differential Type 05/10/2021 NOT REPORTED   Final    Seg Neutrophils 05/10/2021 69* 36 - 66 % Final    Lymphocytes 05/10/2021 23* 24 - 44 % Final    Monocytes 05/10/2021 7  1 - 7 % Final    Eosinophils % 05/10/2021 1  0 - 4 % Final    Basophils 05/10/2021 0  0 - 2 % Final    Immature Granulocytes 05/10/2021 NOT REPORTED  0 % Final    Segs Absolute 05/10/2021 6.60  1.3 - 9.1 k/uL Final    Absolute Lymph # 05/10/2021 2.20  1.0 - 4.8 k/uL Final    Absolute Mono # 05/10/2021 0.60  0.1 - 1.3 k/uL Final    Absolute Eos # 05/10/2021 0.10  0.0 - 0.4 k/uL Final    Basophils Absolute 05/10/2021 0.00  0.0 - 0.2 k/uL Final    Absolute Immature Granulocyte 05/10/2021 NOT REPORTED  0.00 - 0.30 k/uL Final    WBC Morphology 05/10/2021 NOT REPORTED   Final    RBC Morphology 05/10/2021 NOT REPORTED   Final    Platelet Estimate 68/69/1576 NOT REPORTED   Final    Glucose 05/10/2021 85  70 - 99 mg/dL Final    BUN 05/10/2021 7  6 - 20 mg/dL Final    CREATININE 05/10/2021 0.46* 0.50 - 0.90 mg/dL Final    Bun/Cre Ratio 05/10/2021 NOT REPORTED  9 - 20 Final    Calcium 05/10/2021 9.0  8.6 - 10.4 mg/dL Final    Sodium 05/10/2021 139  135 - 144 mmol/L Final    Potassium 05/10/2021 4.3  3.7 - 5.3 mmol/L Final    Chloride 05/10/2021 106  98 - 107 mmol/L Final    CO2 05/10/2021 24  20 - 31 mmol/L Final    Anion Gap 05/10/2021 9  9 - 17 mmol/L Final    Alkaline Phosphatase 05/10/2021 53  35 - 104 U/L Final    ALT 05/10/2021 7  5 - 33 U/L Final    AST 05/10/2021 13  <32 U/L Final    Total Bilirubin 05/10/2021 0.17* 0.3 - 1.2 mg/dL Final    Total Protein 05/10/2021 6.5  6.4 - 8.3 g/dL Final    Albumin 05/10/2021 3.6  3.5 - 5.2 g/dL Final    Albumin/Globulin Ratio 05/10/2021 NOT REPORTED  1.0 - 2.5 Final    GFR Non- 05/10/2021 >60  >60 mL/min Final    GFR  05/10/2021 >60  >60 mL/min Final    GFR Comment 05/10/2021        Final    Comment: Average GFR for 38-51 years old:   80 mL/min/1.73sq m  Chronic Kidney Disease:   <60 mL/min/1.73sq m  Kidney failure:   <15 mL/min/1.73sq m              eGFR calculated using average adult body mass.  Additional eGFR calculator available at:        [email or web address]            GFR Staging 05/10/2021 NOT REPORTED   Final    Lipase 05/10/2021 21  13 - 60 U/L Final    Specimen Description 05/10/2021 . VAGINA   Final    Special Requests 05/10/2021 NOT REPORTED   Final    Direct Exam 05/10/2021 NEGATIVE for Candida sp. Final    Direct Exam 05/10/2021 NEGATIVE for Gardnerella vaginalis   Final    Direct Exam 05/10/2021 NEGATIVE for Trichomonas vaginalis   Final    Direct Exam 05/10/2021 Method of testing is a DNA probe intended for detection and identification of Candida species, Gardnerella vaginalis, and Trichomonas vaginalis nucleic acid in vaginal fluid specimens from patients with symptoms of vaginitis/vaginosis. Final    Specimen Description 05/10/2021 . CERVIX   Final    C. trachomatis DNA 05/10/2021 NEGATIVE  NEGATIVE Final    Comment: CHLAMYDIA TRACHOMATIS DNA not detected by nucleic acid amplification. This test is intended for medical purposes only and is not valid for the evaluation of   suspected sexual abuse or for other forensic purposes. In certain contexts, culture may be required to meet applicable laws and regulations for   diagnosis of C. trachomatis and N. gonorrhoeae infections. Per 2014  CDC recommendations, this test does not include confirmation of positive results   by an alternative nucleic acid target.  N. gonorrhoeae DNA 05/10/2021 NEGATIVE  NEGATIVE Final    Comment: NEISSERIA GONORRHOEAE DNA not detected by nucleic acid amplification. This test is intended for medical purposes only and is not valid for the evaluation of   suspected sexual abuse or for other forensic purposes. In certain contexts, culture may be required to meet applicable laws and regulations for   diagnosis of C. trachomatis and N. gonorrhoeae infections. Per 2014  CDC recommendations, this test does not include confirmation of positive results   by an alternative nucleic acid target.       ABO/Rh 05/10/2021 A POSITIVE   Final   Hospital Outpatient Visit on of 14 high-risk HPV types associated with cervical   cancer and its precursor lesions (HPV types 16,18, 31, 33, 35, 39, 45, 51, 52, 56, 58, 59,   66, and 68). Sensitivity may be affected by specimen collection methods, stage of infection, and the   presence of interfering substances. Results should be interpreted in conjunction with other available laboratory and clinical   data. A negative high-risk HPV result does not exclude the possibility of future cytologic HSIL or   underlying CIN2-3 or cancer. This test is intended for medical purposes only and is not valid for the evaluation of   suspected sexual abuse or for other forensic purposes.  Cytology Report 03/24/2021    Final-Edited                    Value:INTERPRETATION    Cervical material, (ThinPrep vial, Imaging-assisted review):  Specimen Adequacy:       Satisfactory for evaluation.       -Endocervical/transformation zone component is absent. Descriptive Diagnosis:       Negative for intraepithelial lesion or malignancy. Comments:       High Risk HPV testing was ordered.       Cytotechnologist:   SZ S. Merlinda Gosling, CT(ASCP)  **Electronically Signed Out**  tony/3/30/2021          Procedure/Addendum  HPV Procedure Report     Date Ordered:     3/25/2021     Status:  Signed Out       Date Complete:     3/25/2021     By: BERENICE Mosley(ASCP)       Date Reported:     3/31/2021       INTERPRETATION  Roche HPV DNA High Risk                                  HPV Sample               Thin Prep                    (Ref Range)  HPV Type 16               Not Detected                    (Not  Detected)  HPV Type 18               Not Detected                    (Not  Detected)  Other High Risk HPV     Not Detected                    (Not Detected)       This                           test amplifies and detects DNA of 14 high-risk HPV types  associated with cervical cancer and its precursor lesions (HPV types  16, 18, 31, 33, 35, 39, 45, 51, 52, 56, 58, 59, 66, and 68). Sensitivity may be affected by specimen collection methods, stage of  infection, and the presence of interfering substances. Results should  be interpreted in conjunction with other available laboratory and  clinical data. A negative high-risk HPV result does not exclude the  possibility of future cytologic HSIL or underlying CIN2-3 or cancer. This test is intended for medical purposes only and is not valid for  the evaluation of suspected sexual abuse or for other forensic  purposes. Source:  1: Cervical material, (ThinPrep vial, Imaging-assisted review)    Clinical History  Pregnant  Co-Test:  ThinPrep Pap with high risk HPV testing    GYNECOLOGIC CYTOLOGY REPORT    Patient Name: Hoang Mendoza [facility] Rec: [id number]  Path Number: [id number]  [facility]  ANATOMIC PATHOLOGY  [address] [place] [address], [facility] [address]. [facility], [address]  [phone]  Fax: [phone]     Hospital Outpatient Visit on 03/24/2021   Component Date Value Ref Range Status    HIV Ag/Ab 03/24/2021 NONREACTIVE  NONREACTIVE Final    Comment: No laboratory evidence of HIV infection. If acute HIV infection is suspected, consider   testing for HIV-1 RNA.  Hepatitis B Surface Ag 03/24/2021 NONREACTIVE  NONREACTIVE Final    TSH 03/24/2021 1.12  0.30 - 5.00 mIU/L Final    Glucose 03/24/2021 85  70 - 99 mg/dL Final    ABO/Rh 03/24/2021 A POSITIVE   Final    Antibody Screen 03/24/2021 NEGATIVE   Final    Rubella Antibody, IGG 03/24/2021 44.4  IU/mL Final    Comment:             REFERENCE RANGE:  <5.0       NON-REACTIVE (non-immune)  5.0 TO 9.9 EQUIVOCAL  >=10.0     REACTIVE     (immune)            T. pallidum, IgG 03/24/2021 NONREACTIVE  NONREACTIVE Final    Comment:       T. pallidum antibodies are not detected. There is no serological evidence of infection with T. pallidum (early primary syphilis   cannot be excluded).   Retest in 2-4 weeks if syphilis is clinically suspect.  Toxoplasma IgG 03/24/2021 80.5  IU/mL Final    Comment:             REFERENCE RANGE:  <6.3             NON-REACTIVE  6.4 TO 9.9       EQUIVOCAL  >=10.0           REACTIVE        THE PRESENCE OF TOXOPLASMA GONDII IgG ANTIBODIES IS INDICATIVE OF EXPOSURE TO THE PROTOZOAN. THE ABSENCE OF TOXOPLASMA IgG ANTIBODIES SUGGESTS THAT THE PATIENT HAS NOT BEEN EXPOSED TO   THIS ORGANISM AND IS SUSCEPTIBLE TO PRIMARY INFECTION. DETERMINATION OF PRIMARY OR RECENT   INFECTION REQUIRES DEMONSTRATING SEROCONVERSION BETWEEN ACUTE AND CONVALESCENT SERA.  Toxoplasm IgM 03/24/2021 0.64  Index Final    Comment:             REFERENCE RANGE:  <0.90            NON-REACTIVE  0.90 TO  0.99    INDETERMINANT  >=1.00           REACTIVE            Color, UA 03/24/2021 YELLOW  YELLOW Final    Turbidity UA 03/24/2021 CLEAR  CLEAR Final    Glucose, Ur 03/24/2021 NEGATIVE  NEGATIVE Final    Bilirubin Urine 03/24/2021 NEGATIVE  NEGATIVE Final    Ketones, Urine 03/24/2021 NEGATIVE  NEGATIVE Final    Specific Gravity, UA 03/24/2021 1.012  1.000 - 1.030 Final    Urine Hgb 03/24/2021 NEGATIVE  NEGATIVE Final    pH, UA 03/24/2021 7.0  5.0 - 8.0 Final    Protein, UA 03/24/2021 NEGATIVE  NEGATIVE Final    Urobilinogen, Urine 03/24/2021 Normal  Normal Final    Nitrite, Urine 03/24/2021 NEGATIVE  NEGATIVE Final    Leukocyte Esterase, Urine 03/24/2021 NEGATIVE  NEGATIVE Final    Urinalysis Comments 03/24/2021 Microscopic exam not performed based on chemical results unless requested in original order.    Final   Admission on 03/10/2021, Discharged on 03/10/2021   Component Date Value Ref Range Status    Color, UA 03/10/2021 YELLOW  YELLOW Final    Turbidity UA 03/10/2021 CLOUDY* CLEAR Final    Glucose, Ur 03/10/2021 NEGATIVE  NEGATIVE Final    Bilirubin Urine 03/10/2021 NEGATIVE  NEGATIVE Final    Ketones, Urine 03/10/2021 TRACE* NEGATIVE Final    Specific Fowler, UA 03/10/2021 1.019  1.000 - 1.030 Final    Urine Hgb 03/10/2021 NEGATIVE  NEGATIVE Final    pH, UA 03/10/2021 6.5  5.0 - 8.0 Final    Protein, UA 03/10/2021 NEGATIVE  NEGATIVE Final    Urobilinogen, Urine 03/10/2021 Normal  Normal Final    Nitrite, Urine 03/10/2021 NEGATIVE  NEGATIVE Final    Leukocyte Esterase, Urine 03/10/2021 SMALL* NEGATIVE Final    Urinalysis Comments 03/10/2021 NOT REPORTED   Final    WBC 03/10/2021 10.9  3.5 - 11.0 k/uL Final    RBC 03/10/2021 4.28  4.0 - 5.2 m/uL Final    Hemoglobin 03/10/2021 12.6  12.0 - 16.0 g/dL Final    Hematocrit 03/10/2021 38.6  36 - 46 % Final    MCV 03/10/2021 90.2  80 - 100 fL Final    MCH 03/10/2021 29.5  26 - 34 pg Final    MCHC 03/10/2021 32.7  31 - 37 g/dL Final    RDW 03/10/2021 15.6* 11.5 - 14.9 % Final    Platelets 58/11/8060 311  150 - 450 k/uL Final    MPV 03/10/2021 8.3  6.0 - 12.0 fL Final    NRBC Automated 03/10/2021 NOT REPORTED  per 100 WBC Final    Differential Type 03/10/2021 NOT REPORTED   Final    Seg Neutrophils 03/10/2021 71* 36 - 66 % Final    Lymphocytes 03/10/2021 22* 24 - 44 % Final    Monocytes 03/10/2021 6  1 - 7 % Final    Eosinophils % 03/10/2021 0  0 - 4 % Final    Basophils 03/10/2021 1  0 - 2 % Final    Immature Granulocytes 03/10/2021 NOT REPORTED  0 % Final    Segs Absolute 03/10/2021 7.70  1.3 - 9.1 k/uL Final    Absolute Lymph # 03/10/2021 2.40  1.0 - 4.8 k/uL Final    Absolute Mono # 03/10/2021 0.70  0.1 - 1.3 k/uL Final    Absolute Eos # 03/10/2021 0.00  0.0 - 0.4 k/uL Final    Basophils Absolute 03/10/2021 0.10  0.0 - 0.2 k/uL Final    Absolute Immature Granulocyte 03/10/2021 NOT REPORTED  0.00 - 0.30 k/uL Final    WBC Morphology 03/10/2021 NOT REPORTED   Final    RBC Morphology 03/10/2021 NOT REPORTED   Final    Platelet Estimate 77/67/7783 NOT REPORTED   Final    Glucose 03/10/2021 93  70 - 99 mg/dL Final    BUN 03/10/2021 12  6 - 20 mg/dL Final    CREATININE 03/10/2021 0.54  0.50 - 0.90 mg/dL Final    Bun/Cre Ratio 03/10/2021 NOT REPORTED  9 - 20 Final    Calcium 03/10/2021 9.4  8.6 - 10.4 mg/dL Final    Sodium 03/10/2021 137  135 - 144 mmol/L Final    Potassium 03/10/2021 5.3  3.7 - 5.3 mmol/L Final    Chloride 03/10/2021 102  98 - 107 mmol/L Final    CO2 03/10/2021 25  20 - 31 mmol/L Final    Anion Gap 03/10/2021 10  9 - 17 mmol/L Final    Alkaline Phosphatase 03/10/2021 44  35 - 104 U/L Final    ALT 03/10/2021 13  5 - 33 U/L Final    AST 03/10/2021 14  <32 U/L Final    Total Bilirubin 03/10/2021 0.43  0.3 - 1.2 mg/dL Final    Total Protein 03/10/2021 7.1  6.4 - 8.3 g/dL Final    Albumin 03/10/2021 4.2  3.5 - 5.2 g/dL Final    Albumin/Globulin Ratio 03/10/2021 NOT REPORTED  1.0 - 2.5 Final    GFR Non- 03/10/2021 >60  >60 mL/min Final    GFR  03/10/2021 >60  >60 mL/min Final    GFR Comment 03/10/2021        Final    Comment: Average GFR for 38-51 years old:   80 mL/min/1.73sq m  Chronic Kidney Disease:   <60 mL/min/1.73sq m  Kidney failure:   <15 mL/min/1.73sq m              eGFR calculated using average adult body mass. Additional eGFR calculator available at:        [employer]            GFR Staging 03/10/2021 NOT REPORTED   Final    ABO/Rh 03/10/2021 A POSITIVE   Final    hCG Quant 03/10/2021 82,022* <5 IU/L Final    Comment:    Non-preg premeno   <=5  Postmeno           <=8  Male               <=3  If HCG results do not concur with clinical observations, additional testing to confirm   results is recommended. Elevated results not associated with pregnancy may be found in patients with other diseases   such as tumors of the germ cells (testis, ovaries, etc.), bladder, pancreas, stomach, lungs,   and liver.  Specimen Description 03/10/2021 . VAGINA   Final    Special Requests 03/10/2021 NOT REPORTED   Final    Direct Exam 03/10/2021 POSITIVE for Gardnerella vaginalis. *  Final    Direct Exam 03/10/2021 NEGATIVE for Candida sp. Final    Direct Exam 03/10/2021 NEGATIVE for Trichomonas vaginalis   Final    Direct Exam 03/10/2021 Method of testing is a DNA probe intended for detection and identification of Candida species, Gardnerella vaginalis, and Trichomonas vaginalis nucleic acid in vaginal fluid specimens from patients with symptoms of vaginitis/vaginosis. Final    Specimen Description 03/10/2021 . CERVIX   Final    C. trachomatis DNA 03/10/2021 NEGATIVE  NEGATIVE Final    Comment: CHLAMYDIA TRACHOMATIS DNA not detected by nucleic acid amplification. This test is intended for medical purposes only and is not valid for the evaluation of   suspected sexual abuse or for other forensic purposes. In certain contexts, culture may be required to meet applicable laws and regulations for   diagnosis of C. trachomatis and N. gonorrhoeae infections. Per 2014  CDC recommendations, this test does not include confirmation of positive results   by an alternative nucleic acid target.  N. gonorrhoeae DNA 03/10/2021 NEGATIVE  NEGATIVE Final    Comment: NEISSERIA GONORRHOEAE DNA not detected by nucleic acid amplification. This test is intended for medical purposes only and is not valid for the evaluation of   suspected sexual abuse or for other forensic purposes. In certain contexts, culture may be required to meet applicable laws and regulations for   diagnosis of C. trachomatis and N. gonorrhoeae infections. Per 2014  CDC recommendations, this test does not include confirmation of positive results   by an alternative nucleic acid target.       - 03/10/2021        Final    WBC, UA 03/10/2021 5 TO 10  /HPF Final    RBC, UA 03/10/2021 0 TO 2  /HPF Final    Casts UA 03/10/2021 NOT REPORTED  /LPF Final    Crystals, UA 03/10/2021 NOT REPORTED  None /HPF Final    Epithelial Cells UA 03/10/2021 5 TO 10  /HPF Final    Renal Epithelial, UA 03/10/2021 NOT REPORTED  0 /HPF Final    Bacteria, UA 03/10/2021 FEW* None Final    Mucus, UA 03/10/2021 NOT REPORTED  None Final    Trichomonas, UA 03/10/2021 NOT REPORTED  None Final    Amorphous, UA 03/10/2021 1+* None Final    Other Observations UA 03/10/2021 NOT REPORTED  NOT REQ. Final    Yeast, UA 03/10/2021 NOT REPORTED  None Final   Hospital Outpatient Visit on 2021   Component Date Value Ref Range Status    hCG Quant 2021 80,530* <5 IU/L Final    Comment:    Non-preg premeno   <=5  Postmeno           <=8  Male               <=3  If HCG results do not concur with clinical observations, additional testing to confirm   results is recommended. Elevated results not associated with pregnancy may be found in patients with other diseases   such as tumors of the germ cells (testis, ovaries, etc.), bladder, pancreas, stomach, lungs,   and liver. There may be more visits with results that are not included.   }  See Result console    RHIG Screen: No results found for: RHIGS  Group B Strep:  No results found for: GBSCX      Assessment:  1. Angelika Hanna is a 42 y.o. female  [id number] At 36w4d    2.   OB History        9    Para   6    Term   0       6    AB   2    Living   6       SAB   2    TAB   0    Ectopic   0    Molar        Multiple        Live Births   6          Obstetric Comments   Adjusted pregnancy's and dates today 16. Went over each one with patient in detail. The original chart today said 14th pregnancy. Tenisha Lucas ... which is not true. 8 total- 2 sab's 5  deliveries. 3 boys and 2 girls. 3.   Chief Complaint   Patient presents with    Contractions          4. Past Medical History:   Diagnosis Date    Anemia     Family history of breast cancer     Family history of cervical cancer     Family history of uterine cancer     Herpes     MRSA (methicillin resistant staph aureus) culture positive 2017    abscess    Postpartum depression 2016    Tobacco use 2017           5.    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     Priority: High     Daughter was born deaf   2016 declined opportunity for non-syndromic hearing loss carrier testing by MFM  Declined connexin diagnostic testing in her daughter, Jose Lovelace, and has declined testing for non-syndromic hearing loss      Grand multiparity 2016     Priority: High     2000 36 weeks    35 weeks    SAB   36 weeks   2006 37 weeks   2013 37 weeks         Herpes      Priority: High     2016 History of genital herpes: last outbreak years ago  Call office with any new outbreaks      HRP (high risk pregnancy), third trimester 2021    Celestone  & * 2021     Shortened cervix from 19-22mm with funneling to 13-14mm with funneling       Somatic dysfunction of pubic bone 2021 Physical therapy       History of MRSA infection 2021     3/24/21 - Nares MRSA culture #1 completed  2021 MRSA swab #2 negative       Carcinoid tumor of appendix and colon 2018     S/p laparoscopic appendectomy and right sided hemicolectomy ()      MRSA infection - buttock and prepatellar bursa  2017 MRSA swab negative  3/25/2021 MRSA swab negative       Prepatellar bursitis of right knee     Abscess, gluteal, right 2017    Hx Postpartum depression 2016    Family history of breast cancer      Patient's maternal grandmother diagnosed in 78's      Family history of uterine cancer      Paternal grandmother diagnosed with uterine cancer in her [de-identified] 6. GBS pending        Plan:  Admit  Iv access  Fetal monitoring-continuous  Notify on call attending of any category 2 tracing  See labs and orders  GBS prohylaxis - Vancomycin(Pen allergic.)    Risks, benefits, alternatives and possible complications have been discussed in detail with the patient. Admission, and post admission procedures and expectations were discussed in detail. All questions were answered. Discussion of both the risks benefits and alternative options were discussed as well as the potential maternal and fetal morbidity risks. The literature regarding a questionable link to pitocin augmentation and induction of labor, the assistance of labor contractions and the initiation of contractions to help delivery, have been reviewed with the patient regarding the increased potential of having a  with Attention Deficit Hyperactivity Disorder and or Autism. These two disorders and the ramifications of their impact on a child and the family caring for that child has been reviewed with the patient in detail. She was given the risks, benefits and alternatives of the use of this medication. She has agreed to its use in the delivery of her unborn child if needed at the time of delivery, Yes.     See orders              Attending's Name: Burke Owsuu MD          Electronically signed by Burke Owusu MD on 2021 at 12:44 AM

## 2021-09-11 NOTE — PROGRESS NOTES
Pt seen and examined. This am. Resting comfortably(awakened.)  No c/o currently. Ctx are there but vaibhav. Denies CP/SOB/LOF.    ROS: as above. SVE: 4-5/2cm/-2    FHR: 120, Cat I, reactive. TOCO: irreg pattern. A/P:  42yo IUP at 36w4d  Ctx but does not appear to be in labor. Offered ambulating with subsequent exam or d/c if she desires.

## 2021-09-11 NOTE — FLOWSHEET NOTE
Patient admitted to room 167 from ED per wheelchair. Here with c/o contractions every 10 minutes. .  Denies ROM or vaginal bleeding. Denies N/V/D. Denies fever/chills. Relates of + fetal movement. Assisted into bed, Siderails up x2. Call light in reach. Bed in low position. Oriented to room, surroundings, call system and plan of care. Patient verbalizes understanding. EFM applied and monitor test completed/passed.

## 2021-09-13 ENCOUNTER — ROUTINE PRENATAL (OUTPATIENT)
Dept: OBGYN CLINIC | Age: 41
End: 2021-09-13
Payer: MEDICARE

## 2021-09-13 VITALS
SYSTOLIC BLOOD PRESSURE: 100 MMHG | DIASTOLIC BLOOD PRESSURE: 60 MMHG | BODY MASS INDEX: 25.99 KG/M2 | HEART RATE: 100 BPM | WEIGHT: 161 LBS

## 2021-09-13 DIAGNOSIS — O09.93 HIGH-RISK PREGNANCY IN THIRD TRIMESTER: Primary | ICD-10-CM

## 2021-09-13 DIAGNOSIS — Z64.1 GRAND MULTIPARITY: ICD-10-CM

## 2021-09-13 DIAGNOSIS — Q16.9: ICD-10-CM

## 2021-09-13 DIAGNOSIS — B00.9 HERPES: ICD-10-CM

## 2021-09-13 DIAGNOSIS — O44.40 LOW-LYING PLACENTA: ICD-10-CM

## 2021-09-13 DIAGNOSIS — Z86.14 HISTORY OF MRSA INFECTION: ICD-10-CM

## 2021-09-13 DIAGNOSIS — Z3A.36 36 WEEKS GESTATION OF PREGNANCY: ICD-10-CM

## 2021-09-13 DIAGNOSIS — M99.05 SOMATIC DYSFUNCTION OF PUBIC BONE: ICD-10-CM

## 2021-09-13 DIAGNOSIS — O26.879 SHORT CERVIX, ANTEPARTUM: ICD-10-CM

## 2021-09-13 PROCEDURE — G8427 DOCREV CUR MEDS BY ELIG CLIN: HCPCS | Performed by: OBSTETRICS & GYNECOLOGY

## 2021-09-13 PROCEDURE — 99213 OFFICE O/P EST LOW 20 MIN: CPT | Performed by: OBSTETRICS & GYNECOLOGY

## 2021-09-13 PROCEDURE — G8419 CALC BMI OUT NRM PARAM NOF/U: HCPCS | Performed by: OBSTETRICS & GYNECOLOGY

## 2021-09-13 PROCEDURE — 1036F TOBACCO NON-USER: CPT | Performed by: OBSTETRICS & GYNECOLOGY

## 2021-09-13 NOTE — PROGRESS NOTES
Cat Umana is a 42 y.o. female 36w6d    [id number]    OB History    Para Term  AB Living   9 6 0 6 2 6   SAB TAB Ectopic Molar Multiple Live Births   2 0 0     6      # Outcome Date GA Lbr Kaden/2nd Weight Sex Delivery Anes PTL Lv   9 Current            8  10/22/16 34w2d  5 lb 7.1 oz (2.47 kg) M Vag-Spont   MALLIKA   7  13 36w4d  5 lb 13 oz (2.637 kg) M    MALLIKA   6 SAB  6w0d    SAB      5   36w0d  6 lb 4 oz (2.835 kg) F Vag-Spont   MALLIKA   4   36w0d  6 lb 2 oz (2.778 kg) M Vag-Spont   MALLIKA      Birth Comments: Spont PTL/PTD   3 SAB  10w0d             Birth Comments: D&C   2   35w0d  5 lb 2 oz (2.325 kg) M Vag-Spont   MALLIKA      Birth Comments: Spont PTL/PTD   1   36w0d  6 lb 1 oz (2.75 kg) F Vag-Spont   MALLIKA      Birth Comments: Spont PTL/PTD      Obstetric Comments   Adjusted pregnancy's and dates today 16. Went over each one with patient in detail. The original chart today said 14th pregnancy. [facility] [place] ... which is not true. 8 total- 2 sab's 5  deliveries. 3 boys and 2 girls. Vitals  BP: 100/60  Weight: 161 lb (73 kg)  Pulse: 100  Patient Position: Sitting  Albumin: Trace  Glucose: Negative      The patient was seen and evaluated. There was positive fetal movements. No contractions or leakage of fluid. Signs and symptoms of labor were reviewed. The S/S of Pre-Eclampsia were reviewed with the patient in detail. She is to report any of these if they occur. She currently denies any of these. The patient was instructed on fetal kick counts and was given a kick sheet to complete every 8 hours. She was instructed that the baby should move at a minimum of ten times within one hour after a meal. The patient was instructed to lay down on her left side twenty minutes after eating and count movements for up to one hour with a target value of ten movements.   She was instructed to notify the office if she did not make that target after two attempts or if after any attempt there was less than four movements. The patient reports that the targets have been made Yes. 7/6/21 pt declined Tdap  4/22/21 MRSA swab negative  3/24/21 - Declined first trimester screening  3/24/21 - First MRSA nasal swab collected  Pt wishes depo provera in hospital prior to discharge      T-Dap Vaccine (27-36 weeks) Completed: No    Allergies: Allergies as of 09/13/2021 - Fully Reviewed 09/13/2021   Allergen Reaction Noted    Codeine  05/04/2016    Fentanyl Other (See Comments) 08/19/2017    Penicillins Hives 05/04/2016       Group Beta Strep collection was completed. Yes   GBS Results:   Admission on 09/10/2021, Discharged on 09/11/2021   Component Date Value Ref Range Status    Expiration Date 09/10/2021 09/13/2021,2359   Final    Arm Band Number 09/10/2021 [id number]   Final    ABO/Rh 09/10/2021 A POSITIVE   Final    Antibody Screen 09/10/2021 NEGATIVE   Final    T. pallidum, IgG 09/10/2021 NONREACTIVE  NONREACTIVE Final    Comment:       T. pallidum antibodies are not detected. There is no serological evidence of infection with T. pallidum (early primary syphilis   cannot be excluded). Retest in 2-4 weeks if syphilis is clinically suspect.             Amphetamine Screen, Ur 09/10/2021 NEGATIVE  NEGATIVE Final    Comment:       (Positive cutoff 1000 ng/mL)                  Barbiturate Screen, Ur 09/10/2021 NEGATIVE  NEGATIVE Final    Comment:       (Positive cutoff 200 ng/mL)                  Benzodiazepine Screen, Urine 09/10/2021 NEGATIVE  NEGATIVE Final    Comment:       (Positive cutoff 200 ng/mL)                  Cocaine Metabolite, Urine 09/10/2021 NEGATIVE  NEGATIVE Final    Comment:       (Positive cutoff 300 ng/mL)                  Methadone Screen, Urine 09/10/2021 NEGATIVE  NEGATIVE Final    Comment:       (Positive cutoff 300 ng/mL)                  Opiates, Urine 09/10/2021 NEGATIVE  NEGATIVE Final    Comment:       (Positive cutoff 300 ng/mL)                  Phencyclidine, Urine 09/10/2021 NEGATIVE  NEGATIVE Final    Comment:       (Positive cutoff 25 ng/mL)                  Propoxyphene, Urine 09/10/2021 NOT REPORTED  NEGATIVE Final    Cannabinoid Scrn, Ur 09/10/2021 NEGATIVE  NEGATIVE Final    Comment:       (Positive cutoff 50 ng/mL)                  Oxycodone Screen, Ur 09/10/2021 NEGATIVE  NEGATIVE Final    Comment:       (Positive cutoff 100 ng/mL)                  Methamphetamine, Urine 09/10/2021 NOT REPORTED  NEGATIVE Final    Tricyclic Antidepressants, Urine 09/10/2021 NOT REPORTED  NEGATIVE Final    MDMA, Urine 09/10/2021 NOT REPORTED  NEGATIVE Final    Buprenorphine Urine 09/10/2021 NOT REPORTED  NEGATIVE Final    Test Information 09/10/2021 Assay provides medical screening only. The absence of expected drug(s) and/or metabolite(s) may indicate diluted or adulterated urine, limitations of testing or timing of collection. Final    Comment: Testing for legal purposes should be confirmed by another method. To request confirmation   of test result, please call the lab within 7 days of sample submission.       WBC 09/10/2021 8.9  3.5 - 11.0 k/uL Final    RBC 09/10/2021 3.39* 4.0 - 5.2 m/uL Final    Hemoglobin 09/10/2021 8.7* 12.0 - 16.0 g/dL Final    Hematocrit 09/10/2021 27.7* 36 - 46 % Final    MCV 09/10/2021 81.7  80 - 100 fL Final    MCH 09/10/2021 25.6* 26 - 34 pg Final    MCHC 09/10/2021 31.3  31 - 37 g/dL Final    RDW 09/10/2021 16.1* 11.5 - 14.9 % Final    Platelets 35/68/8829 308  150 - 450 k/uL Final    MPV 09/10/2021 8.7  6.0 - 12.0 fL Final    NRBC Automated 09/10/2021 NOT REPORTED  per 100 WBC Final    Differential Type 09/10/2021 NOT REPORTED   Final    Immature Granulocytes 09/10/2021 NOT REPORTED  0 % Final    Absolute Immature Granulocyte 09/10/2021 NOT REPORTED  0.00 - 0.30 k/uL Final    WBC Morphology 09/10/2021 NOT REPORTED   Final    RBC Morphology 09/10/2021 NOT REPORTED   Final    Platelet Estimate 05/40/0147 NOT REPORTED   Final    Seg Neutrophils 09/10/2021 69* 36 - 66 % Final    Lymphocytes 09/10/2021 21* 24 - 44 % Final    Monocytes 09/10/2021 9* 1 - 7 % Final    Eosinophils % 09/10/2021 1  0 - 4 % Final    Basophils 09/10/2021 0  0 - 2 % Final    Segs Absolute 09/10/2021 6.14  1.3 - 9.1 k/uL Final    Absolute Lymph # 09/10/2021 1.87  1.0 - 4.8 k/uL Final    Absolute Mono # 09/10/2021 0.80  0.1 - 1.3 k/uL Final    Absolute Eos # 09/10/2021 0.09  0.0 - 0.4 k/uL Final    Basophils Absolute 09/10/2021 0.00  0.0 - 0.2 k/uL Final    Morphology 09/10/2021 1+ POLYCHROMASIA   Final    Morphology 09/10/2021 ANISOCYTOSIS PRESENT   Final    Morphology 09/10/2021 FEW GIANT PLATELETS   Final    Specimen Description 09/10/2021 . NASOPHARYNGEAL SWAB   Final    SARS-CoV-2, Rapid 09/10/2021 Not Detected  Not Detected Final    Comment:       Rapid NAAT:  The specimen is NEGATIVE for SARS-CoV-2, the novel coronavirus associated with   COVID-19. The ID NOW COVID-19 assay is designed to detect the virus that causes COVID-19 in patients   with signs and symptoms of infection who are suspected of COVID-19. An individual without symptoms of COVID-19 and who is not shedding SARS-CoV-2 virus would   expect to have a negative (not detected) result in this assay. Negative results should be treated as presumptive and, if inconsistent with clinical signs   and symptoms or necessary for patient management,  should be tested with an alternative molecular assay. Negative results do not preclude   SARS-CoV-2 infection and   should not be used as the sole basis for patient management decisions.          Fact sheet for Healthcare Providers: Partick.chad  Fact sheet for Patients: Patrick.chad          Methodology: Isothermal Nucleic Acid Amplification      CREATININE 09/11/2021 0.46* 0.50 - 0.90 mg/dL Final    GFR Non-African American 2021 >60  >60 mL/min Final    GFR  2021 >60  >60 mL/min Final    GFR Comment 2021        Final    Comment: Average GFR for 38-51 years old:   80 mL/min/1.73sq m  Chronic Kidney Disease:   <60 mL/min/1.73sq m  Kidney failure:   <15 mL/min/1.73sq m              eGFR calculated using average adult body mass. Additional eGFR calculator available at:        [email or web address]            GFR Staging 2021 NOT REPORTED   Final   Hospital Outpatient Visit on 2021   Component Date Value Ref Range Status    Specimen Description 2021 . VAGINAL SPECIMEN   Final    Special Requests 2021 NOT REPORTED   Final    Culture 2021 NEGATIVE FOR GROUP B STREPTOCOCCI   Final   ]  Sensitivities for clindamycin and erythromycin were ordered. No      The patient was counseled on the mandatory call ahead policy. She has been instructed to call the office at anytime prior to going into the hospital so the on-call physician may direct her to the appropriate facility for care. Exceptions were reviewed including but not limited to: Decreased fetal movement, vaginal Bleeding or hemorrhage, trauma, readily expectant delivery, or any instance where she feels 911 should be utilized. The patient was counseled on Labor & Delivery. Route of delivery and counseling on vaginal, operative vaginal, and  sections were completed with the risks of each to both the patient as well as her baby. The possibility of a blood transfusion was discussed as well. The patient was opposed to receiving a transfusion if needed. The patient was counseled on types of analgesia during labor and is considering either Regional or IV medication the risks, benefits and alternatives were discussed.  Testing:  Not indicated      Assessment:  1Dar Murray is a 42 y.o. female  2.  [id number]  3. 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     Priority: High     Overview Note:     Daughter was born deaf   2016 declined opportunity for non-syndromic hearing loss carrier testing by MFM  Declined connexin diagnostic testing in her daughter, Claudean Bair, and has declined testing for non-syndromic hearing loss      Grand multiparity 2016     Priority: High     Overview Note:      36 weeks    35 weeks    SAB   36 weeks    37 weeks    37 weeks         Herpes      Priority: High     Overview Note:     2016 History of genital herpes: last outbreak years ago  Call office with any new outbreaks      HRP (high risk pregnancy), third trimester 2021    Celestone  & * 2021     Overview Note:     Shortened cervix from 19-22mm with funneling to 13-14mm with funneling       Somatic dysfunction of pubic bone 2021     Overview Note:     2021 Physical therapy       History of MRSA infection 2021     Overview Note:     3/24/21 - Nares MRSA culture #1 completed  2021 MRSA swab #2 negative       Carcinoid tumor of appendix and colon 2018     Overview Note:     S/p laparoscopic appendectomy and right sided hemicolectomy ()      MRSA infection - buttock and prepatellar bursa  2017     Overview Note:     21 MRSA swab negative  3/25/2021 MRSA swab negative       Prepatellar bursitis of right knee     Abscess, gluteal, right 2017    Hx Postpartum depression 2016    Family history of breast cancer Overview Note:     Patient's maternal grandmother diagnosed in 66's      Family history of uterine cancer      Overview Note:     Paternal grandmother diagnosed with uterine cancer in her [de-identified]          Diagnosis Orders   1. High-risk pregnancy in third trimester     2. 36 weeks gestation of pregnancy     3. Low-lying placenta     4. Short cervix, antepartum     5. [address] multiparity     6. 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 Herpes     8. Somatic dysfunction of pubic bone     9. History of MRSA infection     10. Postpartum depression               Plan:  The patient will return to the office for her next visit in 1 weeks. See antepartum flow sheet. Patient was seen with total face to face time of 20 minutes. More than 50% of this visit was on counseling and education regarding her    Diagnosis Orders   1. High-risk pregnancy in third trimester     2. 36 weeks gestation of pregnancy     3. Low-lying placenta     4. Short cervix, antepartum     5. [address] multiparity     6. 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 Herpes     8. Somatic dysfunction of pubic bone     9. History of MRSA infection     10. Postpartum depression      and her options. She was also counseled on her preventative health maintenance recommendations and follow-up.

## 2021-09-15 ENCOUNTER — TELEPHONE (OUTPATIENT)
Dept: OBGYN CLINIC | Age: 41
End: 2021-09-15

## 2021-09-15 NOTE — TELEPHONE ENCOUNTER
Patient called to the office today stating she is 37 weeks pregnant and one of her adult children that still lives at home with her tested positive for covid on 9/14/21. Patient states the whole family is now under quarantine for 10 days. Patient had general questions about what will happen if she is to go into labor in the next 10 days, will her  be able to come to the hospital with her to deliver due to him being exposed as well? Sp[facility] with Eliza at [facility] and she stated they would need to wear masks, and proper PPE would need to be used. Patients  would be asked to stay in her room and not leave. Patient was encouraged to get a covid test done and quarantine away from her son as much as possible.

## 2021-09-21 ENCOUNTER — TELEPHONE (OUTPATIENT)
Dept: OBGYN CLINIC | Age: 41
End: 2021-09-21

## 2021-09-21 ENCOUNTER — HOSPITAL ENCOUNTER (OUTPATIENT)
Age: 41
Discharge: HOME | End: 2021-09-21
Attending: OBSTETRICS & GYNECOLOGY | Admitting: OBSTETRICS & GYNECOLOGY
Payer: MEDICARE

## 2021-09-21 VITALS
DIASTOLIC BLOOD PRESSURE: 69 MMHG | OXYGEN SATURATION: 100 % | RESPIRATION RATE: 20 BRPM | HEART RATE: 112 BPM | TEMPERATURE: 98.7 F | HEIGHT: 66 IN | BODY MASS INDEX: 25.88 KG/M2 | WEIGHT: 161 LBS | SYSTOLIC BLOOD PRESSURE: 92 MMHG

## 2021-09-21 PROBLEM — Z3A.38 38 WEEKS GESTATION OF PREGNANCY: Status: ACTIVE | Noted: 2021-09-21

## 2021-09-21 LAB
BILIRUBIN URINE: NEGATIVE
COLOR: YELLOW
COMMENT UA: ABNORMAL
GLUCOSE URINE: NEGATIVE
KETONES, URINE: ABNORMAL
LEUKOCYTE ESTERASE, URINE: NEGATIVE
NITRITE, URINE: NEGATIVE
PH UA: 6 (ref 5–8)
PROTEIN UA: NEGATIVE
SARS-COV-2, RAPID: DETECTED
SPECIFIC GRAVITY UA: 1.01 (ref 1–1.03)
SPECIMEN DESCRIPTION: ABNORMAL
TURBIDITY: CLEAR
URINE HGB: NEGATIVE
UROBILINOGEN, URINE: NORMAL

## 2021-09-21 PROCEDURE — 99213 OFFICE O/P EST LOW 20 MIN: CPT

## 2021-09-21 PROCEDURE — 81003 URINALYSIS AUTO W/O SCOPE: CPT

## 2021-09-21 PROCEDURE — 87635 SARS-COV-2 COVID-19 AMP PRB: CPT

## 2021-09-21 RX ORDER — ACETAMINOPHEN 325 MG/1
650 TABLET ORAL EVERY 4 HOURS PRN
Status: DISCONTINUED | OUTPATIENT
Start: 2021-09-21 | End: 2021-09-21 | Stop reason: HOSPADM

## 2021-09-21 RX ORDER — ONDANSETRON 2 MG/ML
4 INJECTION INTRAMUSCULAR; INTRAVENOUS EVERY 6 HOURS PRN
Status: DISCONTINUED | OUTPATIENT
Start: 2021-09-21 | End: 2021-09-21 | Stop reason: HOSPADM

## 2021-09-21 RX ORDER — ONDANSETRON 4 MG/1
4 TABLET, ORALLY DISINTEGRATING ORAL EVERY 8 HOURS PRN
Status: DISCONTINUED | OUTPATIENT
Start: 2021-09-21 | End: 2021-09-21 | Stop reason: HOSPADM

## 2021-09-21 NOTE — FLOWSHEET NOTE
Pt presents to L and D, accompanied by her sister, via w/c. Pt here for c/o exposure to Covid 1 week ago, and she now has a cough, muscle and back pain, chills and low grade fever for the past 3 days. She denies ctx's, SROM, vag bleeding. She says baby has not been as active as before. Pt gowned and in bed, oriented to room and call light. EFM explained and applied. T's 137. Dr. Brody Byers notified of admission.

## 2021-09-21 NOTE — TELEPHONE ENCOUNTER
Patient called in, 38 weeks pregnant with severe back and belly pain. Woke up in the middle of the night and coughed, states had a lot of pain in her belly felt like she ripped something. Baby moves on and off, feels baby not as much as normal, but when baby does move, its very powerful. Was directly exposed to NuConomy last week, has not been tested yet, but states she does not feel well.  Per Redd Keyes to be evlauated in L&D at [facility]

## 2021-09-21 NOTE — H&P
OBSTETRICAL HISTORY [facility]    Date: 2021       Time: 4:13 PM   Patient Name: Mayito Pina     Patient : 1980  Room/Bed: 0705/0705-01    Admission Date/Time: 2021 10:39 AM      CC: Concern for COVID     HPI: Mayito Pina is a 42 y.o. [id number] at 38w0d who presents with concerns she may have COVID. Her son was recently diagnosed and now her whole family is starting to get sick. She reports it began as a headache 3 days ago and progressed to body aches and generally not feeling well. She denies chest pain or shortness of breath. She reports she is feeling fetal movement but it has become less as the pregnancy becomes farther along. She reports this is the longest she has ever been pregnant and has always delivered early, she figures the baby is just getting ready to deliver. She has been checked several times during this pregnancy and has been told she is 4-5 cm but has never made cervical change past that. The patient reports fetal movement is present, denies contractions, denies loss of fluid, denies vaginal bleeding. DATING:  LMP: No LMP recorded (lmp unknown). Patient is pregnant.   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   Brisas 4258 multiparity    Hx Postpartum depression    Abscess, gluteal, right    Prepatellar bursitis of right knee    MRSA infection - buttock and prepatellar bursa     Carcinoid tumor of appendix and colon    History of MRSA infection    Somatic dysfunction of pubic bone    Low-lying placenta: RESOLVED 2021    Short cervix, antepartum    HRP (high risk pregnancy), third trimester    Celestone  & *    38 weeks gestation of pregnancy  Steroids Given In This Pregnancy:  no     REVIEW OF SYSTEMS:   Constitutional: negative fever, negative chills, negative weight changes   HEENT: negative visual disturbances, negative headaches, negative dizziness, negative hearing loss  Breast: Negative breast abnormalities, negative breast lumps, negative nipple discharge  Respiratory: negative dyspnea, negative cough, negative SOB  Cardiovascular: negative chest pain,  negative palpitations, negative arrhythmia, negative syncope   Gastrointestinal: negative abdominal pain, negative RUQ pain, negative N/V, negative diarrhea, negative constipation, negative bowel changes, negative heartburn   Genitourinary: negative dysuria, negative hematuria, negative urinary incontinence, negative vaginal discharge, negative vaginal bleeding or spotting  Dermatological: negative rash, negative pruritis, negative mole or other skin changes  Hematologic: negative bruising  Immunologic/Lymphatic: negative recent illness, negative recent sick contact  Musculoskeletal: + back pain, + myalgias, negative arthralgias  Neurological:  negative dizziness, negative migraines, negative seizures, negative weakness  Behavior/Psych: negative depression, negative anxiety, negative SI, negative HI    OBSTETRICAL HISTORY:   OB History    Para Term  AB Living   9 6 0 6 2 6   SAB TAB Ectopic Molar Multiple Live Births   2 0 0 0 0 6      # Outcome Date GA Lbr Kaden/2nd Weight Sex Delivery Anes PTL Lv   9 Current            8  10/22/ 34w2d  5 lb 7.1 oz (2.47 kg) M Vag-Spont   MALLIKA      Name: Monet Lechuga: 8  Apgar5: 9   7  13 36w4d  5 lb 13 oz (2.637 kg) M    MALLIKA   6 SAB  6w0d    SAB      5   36w0d  6 lb 4 oz (2.835 kg) F Vag-Spont   MALLIKA   4   36w0d  6 lb 2 oz (2.778 kg) M Vag-Spont   MALLIKA      Birth Comments: Spont PTL/PTD   3 SAB  10w0d             Birth Comments: D&C   2   35w0d  5 lb 2 oz (2.325 kg) M Vag-Spont   MALLIKA      Birth Comments: Spont PTL/PTD   1   36w0d  6 lb 1 oz (2.75 kg) F Vag-Spont   MALLIKA      Birth Comments: Spont PTL/PTD      Obstetric Comments   Adjusted pregnancy's and dates today 16. Went over each one with patient in detail. The original chart today said 14th pregnancy. Magdy Founds ... which is not true. 8 total- 2 sab's 5  deliveries. 3 boys and 2 girls. PAST MEDICAL HISTORY:   has a past medical history of Anemia, Family history of breast cancer, Family history of cervical cancer, Family history of uterine cancer, Herpes, MRSA (methicillin resistant staph aureus) culture positive, Postpartum depression, and Tobacco use. PAST SURGICAL HISTORY:   has a past surgical history that includes Dilation and curettage of uterus; laparoscopy; Dilation & curettage; pr lap,appendectomy (N/A, 3/5/2018); and pr part removal colon w anastomosis (N/A, 3/20/2018). ALLERGIES:  is allergic to codeine, fentanyl, and penicillins. MEDICATIONS:  Prior to Admission medications    Medication Sig Start Date End Date Taking? Authorizing Provider   valACYclovir (VALTREX) 500 MG tablet Take 1 tablet by mouth 2 times daily 9/8/21 10/8/21 Yes EVETTE Modi CNP   HYDROXYprogesterone caproate 250 MG/ML OIL oil injection  21  Yes Historical Provider, MD   progesterone (PROMETRIUM) 200 MG CAPS capsule  21  Yes Historical Provider, MD   Prenatal Vit-Fe Fumarate-FA (PRENATAL VITAMIN PO) Take 1 tablet by mouth daily   Yes Historical Provider, MD       FAMILY HISTORY:  family history includes Breast Cancer in her maternal grandmother; Cancer in her maternal grandmother; Cervical Cancer in her paternal grandmother; Diabetes in her paternal grandfather and paternal grandmother; Other in her daughter and son; Uterine Cancer in her paternal grandmother. SOCIAL HISTORY:   reports that she has quit smoking. She smoked 0.25 packs per day.  She has never used smokeless tobacco. She reports previous drug use. Drug: Marijuana. She reports that she does not drink alcohol. VITALS:   09/21/21 1054  98.7 (37.1)  20  112  92/69  --         PHYSICAL EXAM:  Fetal Heart Monitor:  Baseline Heart Rate 130, moderate variability, present accelerations, absent decelerations  [place]: contractions, none    General appearance:  no apparent distress, alert and cooperative  HEENT: head atraumatic, normocephalic, moist mucous membranes, trachea midline  Neurologic:  alert, oriented, normal speech, no focal findings or movement disorder noted  Lungs:  No increased work of breathing, good air exchange, clear to auscultation bilaterally, no crackles or wheezing  Heart:  regular rate and rhythm and no murmur, rubs, gallops  Abdomen:  soft, gravid, non-tender, no rebound, guarding, or rigidity, no RUQ or epigastric tenderness, no signs or symptoms of abruption, no signs or symptoms of chorioamnionitis  Extremities:  no calf tenderness, non edematous, no varicosities, full range of motion in all four extremities  Musculoskeletal: Gross strength equal and intact throughout, no gross abnormalities, range of motion normal in hips, knees, shoulders and spine  Psychiatric: Mood appropriate, normal affect   Rectal Exam: not indicated  Pelvic Exam:   Chaperone for Intimate Exam: Chaperone was present for entire exam, Chaperone Name: Mandeep Nolan RN  Sterile Vaginal Exam:  Cervix: No cervical motion tenderness   Uterus: Is gravid, Normal size, shape, consistency and non-tender   Adnexa: Non-tender, no palpable masses  Cervix:  4/70/-1 unchanged over 3 hours    Biophysical Profile:   MVP 4.4 cm   Tone: Present  Movement: Present  Breathing: Present    Biophysical Score: 8 / 8    Fetal Position: Cephalic      PRENATAL LAB RESULTS:   Blood Type/Rh: A pos  Antibody Screen: negative  Hemoglobin, Hematocrit, Platelets: 15.9 / 90.0 / 272  Rubella: immune  T.  Pallidum, IgG: non-reactive   Hepatitis B Surface Antigen: non-reactive   HIV: non-reactive   Sickle Cell Screen: not available  Gonorrhea: negative  Chlamydia: negative  UA: negative, date: 6/29/21     1 hour Glucose Tolerance Test: ordered not completed     Group B Strep: negative  Cystic Fibrosis Screen: negative  First Trimester Screen: patient declined  MSAFP/Multiple Markers: patient declined  Non-Invasive Prenatal Testing: patient declined  Anatomy US: Anterior, female anatomy, 3VC     ASSESSMENT & PLAN:  Caitlyn Velasco is a 42 y.o. female [id number] at 38w0d    - GBS negative / Rh positive / R immune   - No indication for GBS prophylaxis     COVID-19   - VSS   - Patient reports son tested positive for COVID, she began having symptoms 3 days ago, first a headache which is now resolved, now body aches. She has never had chest pain or shortness of breath   - SVE 4/60/-1, unchanged over 3 hours. Same check as [facility] and at Dr. Carlos Rojas office on different occasions   - Patient denies contractions   - BPP 8/8, MVP 4.4 cm. Reassuring FHT w/ no contractions. - Patient reports feeling fetal movement   - Patient desires discharge home. Reports she feels well enough to be home and does not have chest pain or SOB. Discussed that she should present to [facility]'s if she goes in to labor in next 10 days, patient vocalized understanding   - All questions answered and concerns addressed, patient vocalized understanding. Return precautions given.      Hx sPTD x 6    - This is the first time she has carried a pregnancy to term    - All pregnancies delivered between 34w2d and 36w4d     Hx HSV   - No lesions seen today     Hx child w/ congenital hearing loss   - G1 daughter   - Declined genetic testing     AMA   - Declined genetics     Grand multiparity     Hx Low-lying placenta (resolved)    Short cervix    Celstone given 7/23 and 7/24      Family history of breast cancer and uterine cancer    - MGM w/ breast cancer in her 76s   - PGM w/ uterine cancer in her [de-identified]      Hx Postpartum depression   - Denies SI/HI   - Encourage close outpatient f/u      Hx MRSA     Hx SAB x2   -  and      Hx Carcinoid tumor of appendix and colon    - S/P Lap appy and right hemicolectomy in 2018     BMI 25.99  Patient Active Problem List    Diagnosis Date Noted    Low-lying placenta: RESOLVED 2021     Priority: High    Short cervix, antepartum 2021     Priority: High     24-28mm on anatomy US 21  32-36mm on 21  Continue cervical length every 2 weeks until 32-34 weeks  Referred to [facility] for 17OHP injections - not taking   200 mg vaginal progesterone until 34 weeks      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     Priority: High     Daughter was born deaf   2016 declined opportunity for non-syndromic hearing loss carrier testing by MFM  Declined connexin diagnostic testing in her daughter, Phylliss Range, and has declined testing for non-syndromic hearing loss      Grand multiparity 2016     Priority: High     2000 36 weeks   2001 35 weeks   2002 SAB  2004 36 weeks   2006 37 weeks   2013 37 weeks         Herpes      Priority: High     2016 History of genital herpes: last outbreak years ago  Call office with any new outbreaks      38 weeks gestation of pregnancy 2021    HRP (high risk pregnancy), third trimester 2021    Celestone  & * 2021     Shortened cervix from 19-22mm with funneling to 13-14mm with funneling       Somatic dysfunction of pubic bone 2021 Physical therapy       History of MRSA infection 2021     3/24/21 - Nares MRSA culture #1 completed  2021 MRSA swab #2 negative       Carcinoid tumor of appendix and colon 2018     S/p laparoscopic appendectomy and right sided hemicolectomy (2018)      MRSA infection - buttock and prepatellar bursa  2017 MRSA swab negative  3/25/2021 MRSA swab negative       Prepatellar bursitis of right knee     Abscess, gluteal, right 08/16/2017    Hx Postpartum depression 11/07/2016    Family history of breast cancer      Patient's maternal grandmother diagnosed in 78's      Family history of uterine cancer      Paternal grandmother diagnosed with uterine cancer in her [de-identified]         Plan discussed with Dr. Linda Dalton, who is agreeable. Steroids given this admission: No    Risks, benefits, alternatives and possible complications have been discussed in detail with the patient. Admission, and post admission procedures and expectations were discussed in detail. All questions were answered.     Attending's Name: Dr. Satya Vasquez DO  Ob/Gyn Resident  9/21/2021, 4:13 PM

## 2021-09-25 ENCOUNTER — HOSPITAL ENCOUNTER (INPATIENT)
Age: 41
LOS: 1 days | Discharge: HOME | DRG: 560 | End: 2021-09-26
Attending: OBSTETRICS & GYNECOLOGY | Admitting: OBSTETRICS & GYNECOLOGY
Payer: MEDICARE

## 2021-09-25 PROBLEM — O09.90 HRP (HIGH RISK PREGNANCY), UNSPECIFIED TRIMESTER: Status: ACTIVE | Noted: 2021-09-25

## 2021-09-25 LAB
-: ABNORMAL
ABO/RH: NORMAL
ABSOLUTE EOS #: <0.03 K/UL (ref 0–0.44)
ABSOLUTE EOS #: <0.03 K/UL (ref 0–0.44)
ABSOLUTE IMMATURE GRANULOCYTE: 0.05 K/UL (ref 0–0.3)
ABSOLUTE IMMATURE GRANULOCYTE: 0.09 K/UL (ref 0–0.3)
ABSOLUTE LYMPH #: 1.14 K/UL (ref 1.1–3.7)
ABSOLUTE LYMPH #: 1.16 K/UL (ref 1.1–3.7)
ABSOLUTE MONO #: 0.42 K/UL (ref 0.1–1.2)
ABSOLUTE MONO #: 0.53 K/UL (ref 0.1–1.2)
AMORPHOUS: ABNORMAL
AMPHETAMINE SCREEN URINE: NEGATIVE
ANTIBODY SCREEN: NEGATIVE
ARM BAND NUMBER: NORMAL
BACTERIA: ABNORMAL
BARBITURATE SCREEN URINE: NEGATIVE
BASOPHILS # BLD: 0 % (ref 0–2)
BASOPHILS # BLD: 0 % (ref 0–2)
BASOPHILS ABSOLUTE: <0.03 K/UL (ref 0–0.2)
BASOPHILS ABSOLUTE: <0.03 K/UL (ref 0–0.2)
BENZODIAZEPINE SCREEN, URINE: NEGATIVE
BILIRUBIN URINE: NEGATIVE
BUPRENORPHINE URINE: NORMAL
CANNABINOID SCREEN URINE: NEGATIVE
CASTS UA: ABNORMAL /LPF (ref 0–8)
COCAINE METABOLITE, URINE: NEGATIVE
COLOR: ABNORMAL
CRYSTALS, UA: ABNORMAL /HPF
DIFFERENTIAL TYPE: ABNORMAL
DIFFERENTIAL TYPE: ABNORMAL
EOSINOPHILS RELATIVE PERCENT: 0 % (ref 1–4)
EOSINOPHILS RELATIVE PERCENT: 0 % (ref 1–4)
EPITHELIAL CELLS UA: ABNORMAL /HPF (ref 0–5)
EXPIRATION DATE: NORMAL
GLUCOSE URINE: NEGATIVE
HCT VFR BLD CALC: 28.8 % (ref 36.3–47.1)
HCT VFR BLD CALC: 31 % (ref 36.3–47.1)
HEMOGLOBIN: 8.6 G/DL (ref 11.9–15.1)
HEMOGLOBIN: 8.8 G/DL (ref 11.9–15.1)
IMMATURE GRANULOCYTES: 1 %
IMMATURE GRANULOCYTES: 1 %
KETONES, URINE: ABNORMAL
LEUKOCYTE ESTERASE, URINE: ABNORMAL
LYMPHOCYTES # BLD: 13 % (ref 24–43)
LYMPHOCYTES # BLD: 9 % (ref 24–43)
MCH RBC QN AUTO: 24.7 PG (ref 25.2–33.5)
MCH RBC QN AUTO: 25 PG (ref 25.2–33.5)
MCHC RBC AUTO-ENTMCNC: 28.4 G/DL (ref 28.4–34.8)
MCHC RBC AUTO-ENTMCNC: 29.9 G/DL (ref 28.4–34.8)
MCV RBC AUTO: 83.7 FL (ref 82.6–102.9)
MCV RBC AUTO: 87.1 FL (ref 82.6–102.9)
MDMA URINE: NORMAL
METHADONE SCREEN, URINE: NEGATIVE
METHAMPHETAMINE, URINE: NORMAL
MONOCYTES # BLD: 4 % (ref 3–12)
MONOCYTES # BLD: 5 % (ref 3–12)
MUCUS: ABNORMAL
NITRITE, URINE: NEGATIVE
NRBC AUTOMATED: 0 PER 100 WBC
NRBC AUTOMATED: 0 PER 100 WBC
OPIATES, URINE: NEGATIVE
OTHER OBSERVATIONS UA: ABNORMAL
OXYCODONE SCREEN URINE: NEGATIVE
PDW BLD-RTO: 16 % (ref 11.8–14.4)
PDW BLD-RTO: 16.3 % (ref 11.8–14.4)
PH UA: 6.5 (ref 5–8)
PHENCYCLIDINE, URINE: NEGATIVE
PLATELET # BLD: 219 K/UL (ref 138–453)
PLATELET # BLD: 252 K/UL (ref 138–453)
PLATELET ESTIMATE: ABNORMAL
PLATELET ESTIMATE: ABNORMAL
PMV BLD AUTO: 11.1 FL (ref 8.1–13.5)
PMV BLD AUTO: 11.1 FL (ref 8.1–13.5)
PROPOXYPHENE, URINE: NORMAL
PROTEIN UA: ABNORMAL
RBC # BLD: 3.44 M/UL (ref 3.95–5.11)
RBC # BLD: 3.56 M/UL (ref 3.95–5.11)
RBC # BLD: ABNORMAL 10*6/UL
RBC # BLD: ABNORMAL 10*6/UL
RBC UA: ABNORMAL /HPF (ref 0–4)
RENAL EPITHELIAL, UA: ABNORMAL /HPF
SEG NEUTROPHILS: 81 % (ref 36–65)
SEG NEUTROPHILS: 86 % (ref 36–65)
SEGMENTED NEUTROPHILS ABSOLUTE COUNT: 11.13 K/UL (ref 1.5–8.1)
SEGMENTED NEUTROPHILS ABSOLUTE COUNT: 7.59 K/UL (ref 1.5–8.1)
SPECIFIC GRAVITY UA: 1.01 (ref 1–1.03)
T. PALLIDUM, IGG: NONREACTIVE
TEST INFORMATION: NORMAL
TRICHOMONAS: ABNORMAL
TRICYCLIC ANTIDEPRESSANTS, UR: NORMAL
TURBIDITY: CLEAR
URINE HGB: ABNORMAL
UROBILINOGEN, URINE: NORMAL
WBC # BLD: 12.9 K/UL (ref 3.5–11.3)
WBC # BLD: 9.2 K/UL (ref 3.5–11.3)
WBC # BLD: ABNORMAL 10*3/UL
WBC # BLD: ABNORMAL 10*3/UL
WBC UA: ABNORMAL /HPF (ref 0–5)
YEAST: ABNORMAL

## 2021-09-25 PROCEDURE — 81001 URINALYSIS AUTO W/SCOPE: CPT

## 2021-09-25 PROCEDURE — 86850 RBC ANTIBODY SCREEN: CPT

## 2021-09-25 PROCEDURE — 2500000003 HC RX 250 WO HCPCS

## 2021-09-25 PROCEDURE — 99213 OFFICE O/P EST LOW 20 MIN: CPT

## 2021-09-25 PROCEDURE — 86901 BLOOD TYPING SEROLOGIC RH(D): CPT

## 2021-09-25 PROCEDURE — 2580000003 HC RX 258: Performed by: STUDENT IN AN ORGANIZED HEALTH CARE EDUCATION/TRAINING PROGRAM

## 2021-09-25 PROCEDURE — 88307 TISSUE EXAM BY PATHOLOGIST: CPT

## 2021-09-25 PROCEDURE — 36415 COLL VENOUS BLD VENIPUNCTURE: CPT

## 2021-09-25 PROCEDURE — 86780 TREPONEMA PALLIDUM: CPT

## 2021-09-25 PROCEDURE — 80307 DRUG TEST PRSMV CHEM ANLYZR: CPT

## 2021-09-25 PROCEDURE — 6370000000 HC RX 637 (ALT 250 FOR IP): Performed by: STUDENT IN AN ORGANIZED HEALTH CARE EDUCATION/TRAINING PROGRAM

## 2021-09-25 PROCEDURE — 85025 COMPLETE CBC W/AUTO DIFF WBC: CPT

## 2021-09-25 PROCEDURE — 7200000001 HC VAGINAL DELIVERY

## 2021-09-25 PROCEDURE — 86900 BLOOD TYPING SEROLOGIC ABO: CPT

## 2021-09-25 PROCEDURE — 0HQ9XZZ REPAIR PERINEUM SKIN, EXTERNAL APPROACH: ICD-10-PCS | Performed by: OBSTETRICS & GYNECOLOGY

## 2021-09-25 PROCEDURE — 1220000000 HC SEMI PRIVATE OB R&B

## 2021-09-25 RX ORDER — ONDANSETRON 2 MG/ML
4 INJECTION INTRAMUSCULAR; INTRAVENOUS EVERY 6 HOURS PRN
Status: DISCONTINUED | OUTPATIENT
Start: 2021-09-25 | End: 2021-09-25

## 2021-09-25 RX ORDER — LANOLIN 72 %
OINTMENT (GRAM) TOPICAL PRN
Status: DISCONTINUED | OUTPATIENT
Start: 2021-09-25 | End: 2021-09-26 | Stop reason: HOSPADM

## 2021-09-25 RX ORDER — DOCUSATE SODIUM 100 MG/1
100 CAPSULE, LIQUID FILLED ORAL 2 TIMES DAILY
Status: DISCONTINUED | OUTPATIENT
Start: 2021-09-25 | End: 2021-09-26 | Stop reason: HOSPADM

## 2021-09-25 RX ORDER — SODIUM CHLORIDE, SODIUM LACTATE, POTASSIUM CHLORIDE, AND CALCIUM CHLORIDE .6; .31; .03; .02 G/100ML; G/100ML; G/100ML; G/100ML
500 INJECTION, SOLUTION INTRAVENOUS PRN
Status: DISCONTINUED | OUTPATIENT
Start: 2021-09-25 | End: 2021-09-25

## 2021-09-25 RX ORDER — SODIUM CHLORIDE 0.9 % (FLUSH) 0.9 %
5-40 SYRINGE (ML) INJECTION PRN
Status: DISCONTINUED | OUTPATIENT
Start: 2021-09-25 | End: 2021-09-25

## 2021-09-25 RX ORDER — ACETAMINOPHEN 500 MG
1000 TABLET ORAL EVERY 6 HOURS PRN
Status: DISCONTINUED | OUTPATIENT
Start: 2021-09-25 | End: 2021-09-26 | Stop reason: HOSPADM

## 2021-09-25 RX ORDER — DOCUSATE SODIUM 100 MG/1
100 CAPSULE, LIQUID FILLED ORAL 2 TIMES DAILY
Qty: 60 CAPSULE | Refills: 0 | Status: SHIPPED | OUTPATIENT
Start: 2021-09-25 | End: 2021-10-25

## 2021-09-25 RX ORDER — HYDROCORTISONE 25 MG/G
CREAM TOPICAL
Status: DISCONTINUED | OUTPATIENT
Start: 2021-09-25 | End: 2021-09-26 | Stop reason: HOSPADM

## 2021-09-25 RX ORDER — SIMETHICONE 80 MG
80 TABLET,CHEWABLE ORAL EVERY 6 HOURS PRN
Status: DISCONTINUED | OUTPATIENT
Start: 2021-09-25 | End: 2021-09-26 | Stop reason: HOSPADM

## 2021-09-25 RX ORDER — SODIUM CHLORIDE, SODIUM LACTATE, POTASSIUM CHLORIDE, AND CALCIUM CHLORIDE .6; .31; .03; .02 G/100ML; G/100ML; G/100ML; G/100ML
1000 INJECTION, SOLUTION INTRAVENOUS PRN
Status: DISCONTINUED | OUTPATIENT
Start: 2021-09-25 | End: 2021-09-25

## 2021-09-25 RX ORDER — SODIUM CHLORIDE, SODIUM LACTATE, POTASSIUM CHLORIDE, CALCIUM CHLORIDE 600; 310; 30; 20 MG/100ML; MG/100ML; MG/100ML; MG/100ML
INJECTION, SOLUTION INTRAVENOUS CONTINUOUS
Status: DISCONTINUED | OUTPATIENT
Start: 2021-09-25 | End: 2021-09-25

## 2021-09-25 RX ORDER — DIPHENHYDRAMINE HCL 25 MG
25 TABLET ORAL EVERY 4 HOURS PRN
Status: DISCONTINUED | OUTPATIENT
Start: 2021-09-25 | End: 2021-09-25

## 2021-09-25 RX ORDER — IBUPROFEN 600 MG/1
600 TABLET ORAL EVERY 6 HOURS PRN
Qty: 60 TABLET | Refills: 1 | Status: SHIPPED | OUTPATIENT
Start: 2021-09-25 | End: 2022-08-23

## 2021-09-25 RX ORDER — SODIUM CHLORIDE 0.9 % (FLUSH) 0.9 %
5-40 SYRINGE (ML) INJECTION EVERY 12 HOURS SCHEDULED
Status: DISCONTINUED | OUTPATIENT
Start: 2021-09-25 | End: 2021-09-25

## 2021-09-25 RX ORDER — ACETAMINOPHEN 500 MG
1000 TABLET ORAL EVERY 6 HOURS PRN
Status: DISCONTINUED | OUTPATIENT
Start: 2021-09-25 | End: 2021-09-25

## 2021-09-25 RX ORDER — LIDOCAINE HYDROCHLORIDE 10 MG/ML
INJECTION, SOLUTION INFILTRATION; PERINEURAL
Status: COMPLETED
Start: 2021-09-25 | End: 2021-09-25

## 2021-09-25 RX ORDER — LANOLIN ALCOHOL/MO/W.PET/CERES
325 CREAM (GRAM) TOPICAL 2 TIMES DAILY
Qty: 90 TABLET | Refills: 3 | Status: SHIPPED | OUTPATIENT
Start: 2021-09-25 | End: 2021-10-11

## 2021-09-25 RX ORDER — IBUPROFEN 600 MG/1
600 TABLET ORAL EVERY 6 HOURS
Status: DISCONTINUED | OUTPATIENT
Start: 2021-09-25 | End: 2021-09-26 | Stop reason: HOSPADM

## 2021-09-25 RX ORDER — SODIUM CHLORIDE 9 MG/ML
25 INJECTION, SOLUTION INTRAVENOUS PRN
Status: DISCONTINUED | OUTPATIENT
Start: 2021-09-25 | End: 2021-09-25

## 2021-09-25 RX ORDER — ONDANSETRON 4 MG/1
4 TABLET, ORALLY DISINTEGRATING ORAL EVERY 4 HOURS PRN
Status: DISCONTINUED | OUTPATIENT
Start: 2021-09-25 | End: 2021-09-26 | Stop reason: HOSPADM

## 2021-09-25 RX ADMIN — LIDOCAINE HYDROCHLORIDE: 10 INJECTION, SOLUTION INFILTRATION; PERINEURAL at 12:35

## 2021-09-25 RX ADMIN — Medication 166 ML: at 12:30

## 2021-09-25 RX ADMIN — IBUPROFEN 600 MG: 600 TABLET, FILM COATED ORAL at 20:22

## 2021-09-25 RX ADMIN — SODIUM CHLORIDE, POTASSIUM CHLORIDE, SODIUM LACTATE AND CALCIUM CHLORIDE: 600; 310; 30; 20 INJECTION, SOLUTION INTRAVENOUS at 11:45

## 2021-09-25 RX ADMIN — DOCUSATE SODIUM 100 MG: 100 CAPSULE ORAL at 20:22

## 2021-09-25 RX ADMIN — ACETAMINOPHEN 1000 MG: 500 TABLET ORAL at 14:57

## 2021-09-25 RX ADMIN — ACETAMINOPHEN 1000 MG: 500 TABLET ORAL at 21:37

## 2021-09-25 RX ADMIN — IBUPROFEN 600 MG: 600 TABLET, FILM COATED ORAL at 14:06

## 2021-09-25 ASSESSMENT — PAIN SCALES - GENERAL
PAINLEVEL_OUTOF10: 3
PAINLEVEL_OUTOF10: 2
PAINLEVEL_OUTOF10: 6

## 2021-09-25 NOTE — FLOWSHEET NOTE
Pt presents to L and D, accompanied by hsb/FOB, however, he dropped pt off and left, as pt is known + Covid and symptomatic. Pt presents via w/c. Pt here for labor/ ctx's that started paulette;und 0700. Denies SROM, vag bleeding, or decreased fetal movement. Pt gowned and in bed, oriented to room and call light. EFM explained and applied. FHT's 135. Dr. Janessa Arambula aware of admission.

## 2021-09-25 NOTE — L&D DELIVERY NOTE
Vaginal Delivery Note  Department of Obstetrics and Gynecology  [address] [facility]       Patient: Heather Ocampo   : 1980  MRN: [id number]   Date of delivery: 21     Pre-operative Diagnosis: Heather Ocampo [id number] at 38w4d, IUP   Spontaneous Labor   Anemia   HSV   Short Cervix   AMA   Low lying placenta (resolved)   Hx Spontaneous PTD x6   Hx of MRSA infection    Hx of Child with hearing impairment   Hx Postpartum depression   S/p Appendectomy and partial colectomy   Hx of D&C x2    Post-operative Diagnosis:  Living  infant, Female and same as above    Delivering Obstetrician & Assistant(s): DO Sondra Gonzalez DO, PGY3; Geraldo Mclain DO PGY1    Infant Information:   Information for the patient's :  Pasha Green [[id number]]        Apgar scores: 8 at 1 minute and 9 at 5 minutes. Anesthesia:  none    Application and Delivery:    She was known to be GBS negative. The patient presented in spontaneous labor at 6cm and progressed quickly, became complete and felt the urge to push. After pushing with one contraction the fetal head delivered Cephalic, right occiput anterior over an intact perineum, nuchal cord was not present. The anterior, then posterior shoulder delivered easily and atraumatically followed by the rest of the infant. Nose and mouth suctioned with bulb suction, infant was stimulated and dried. Cord was clamped and cut after one minute delayed cord clamping and infant was attended by RN for evaluation and placed in the warmer. The delivery of the placenta was spontaneous and appeared intact, whole and that the umbilical cord had three vessels noted. Pitocin was started. The vagina was swept of all clots and debris. The perineum and vagina were evaluated and a left vaginal and perineal laceration was found and repaired in standard fashion with 2-0 vicryl. Mother and baby tolerated procedure well.      Dr. Markel Cannon was present for the entire delivery.     Delivery Summary:       Specimen: placenta sent to pathology, cord blood and cord gases  Quantitative blood loss:  600ml immediately following delivery  Condition:  infant stable to general nursery and mother stable  Counts: instrument and sponge counts correct  Blood Type and Rh: A POSITIVE    Rubella Immunity Status: immune     DO Bruce  Ob/Gyn Resident  2021, 12:50 PM    Mother's Information    Labor Events    Rupture type: Spontaneous=SROM, Intact  Fluid color: Clear  Fluid odor: None     Mother Delivery Information    Surgical or Additional Est. Blood Loss (mL): 0 (View Only): Edit in Flowsheets   Combined Est. Blood Loss (mL): 0        Kristen Stallion Girl Norma [[id number]]    Labor Events    Cervical ripening date/time:     Rupture date/time: 21 11:58:00   Rupture type: Spontaneous=SROM, Intact  Fluid color: Clear  Fluid odor: None  Induction: None           Information    Head delivery date/time: 2021 12:23:00   Changing the 's delivery date/time could affect patient care.:    Delivery date/time:  21 1223     Details:        Delivery Providers    Delivering clinician:      [place] Measurements    Weight: 2995 g       Delivery Information    Surgical or additional est. blood loss (mL): 0 (View Only): Edit in Flowsheets   Combined est. blood loss (mL): 0

## 2021-09-25 NOTE — DISCHARGE SUMMARY
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   Brisas 4258 multiparity     10/22 M Apg 8/9 Wt 5#7    Hx Postpartum depression    Abscess, gluteal, right    Prepatellar bursitis of right knee    MRSA infection - buttock and prepatellar bursa     Carcinoid tumor of appendix and colon    History of MRSA infection    Low-lying placenta: RESOLVED 2021    Short cervix, antepartum    Celestone  & *    HRP (high risk pregnancy), unspecified trimester     21 F Apg 8/9 Wt 6#9    Normal labor       Infection Present?: Yes, COVID-19 infection; tested positive  prior to admission  Hospital Acquired: No    Surgical Operations & Procedures:  Analgesia: none  Delivery Type: Spontaneous Vaginal Delivery: See Labor and Delivery Summary   Laceration(s): Left labial laceration, repaired with 2-0 Vicryl    Consultations: N/A    Pertinent Findings & Procedures:   Dulce Sr is a 42 y.o. female [id number] at 38w4d admitted for spontaneous labor; SROM (clear). She delivered by spontaneous vaginal a Live Born infant on 21. Information for the patient's :  Ricky Jaffe [[id number]]   female   Birth Weight: 6 lb 9.6 oz (2.995 kg)       Apgars: 8 at 1 minute and 9 at 5 minutes. Postpartum course: normal.      PPD#0: Hbg 8.8. Iron printed. Repeat Hgb 8.2 and stable.     Course of patient: uncomplicated    Discharge to: Home    Readmission planned: no     Recommendations on Discharge:     Medications:      Medication List START taking these medications    docusate sodium 100 MG capsule  Commonly known as: COLACE  Take 1 capsule by mouth 2 times daily     ferrous sulfate 325 (65 Fe) MG EC tablet  Commonly known as: Fe Tabs  Take 1 tablet by mouth 2 times daily     ibuprofen 600 MG tablet  Commonly known as: ADVIL;MOTRIN  Take 1 tablet by mouth every 6 hours as needed for Pain        CONTINUE taking these medications    PRENATAL VITAMIN PO     valACYclovir 500 MG tablet  Commonly known as: Valtrex  Take 1 tablet by mouth 2 times daily        STOP taking these medications    HYDROXYprogesterone caproate 250 MG/ML Oil oil injection     progesterone 200 MG Caps capsule  Commonly known as: PROMETRIUM           Where to Get Your Medications      You can get these medications from any pharmacy    Bring a paper prescription for each of these medications  · docusate sodium 100 MG capsule  · ferrous sulfate 325 (65 Fe) MG EC tablet  · ibuprofen 600 MG tablet         Activity: pelvic rest x 6 weeks, no lifting greater than 15 lbs  Diet: regular diet  Follow up: 2 weeks     Condition on discharge: stable    Discharge date: 9/26/21    Junaid Romo DO  Ob/Gyn Resident    Comments:  Home care and follow-up care were reviewed. Pelvic rest, and birth control were reviewed. Signs and symptoms of mastitis and post partum depression were reviewed. The patient is to notify her physician if any of these occur. The patient was counseled on secondary smoke risks and the increased risk of sudden infant death syndrome and respiratory problems to her baby with exposure. She was counseled on various alternate recommendations to decrease the exposure to secondary smoke to her children.

## 2021-09-25 NOTE — H&P
OBSTETRICAL HISTORY [facility]    Date: 2021       Time: 11:34 AM   Patient Name: Lucho Pierre     Patient : 1980  Room/Bed: [address] [place] [address]    Admission Date/Time: 2021 11:28 AM      CC: spontaneous labor     HPI: Lucho Pierre is a 42 y.o. [id number] at 38w4d who presents in spontaneous labor. She states contractions started this contractions with worsening frequency and intensity. She tested positive for COVID on 21 and complains of a cough and runny nose. The patient reports fetal movement is present, complains of contractions, denies loss of fluid, denies vaginal bleeding. She is having a girl. OB history significant for sPTD x6, HSV, anemia, celestone  and , low lying placenta resolved, AMA, Hx PPD. Patient denies any headache, visual changes, difficulty breathing, RUQ pain, N/V, F/C, and pain/swelling in lower extremities. Denies any dysuria or vaginal discharge. DATING:  LMP: No LMP recorded (lmp unknown). Patient is pregnant.   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   Brisas 4258 multiparity    Hx Postpartum depression    Abscess, gluteal, right    Prepatellar bursitis of right knee    MRSA infection - buttock and prepatellar bursa     Carcinoid tumor of appendix and colon    History of MRSA infection    Somatic dysfunction of pubic bone    Low-lying placenta: RESOLVED 2021    Short cervix, antepartum    HRP (high risk pregnancy), third trimester    Celestone  & *    38 weeks gestation of pregnancy        Steroids Given In This Pregnancy:  yes     REVIEW OF SYSTEMS:   Constitutional: negative fever, negative chills, negative weight changes   HEENT: negative visual disturbances, negative headaches, negative dizziness, negative hearing loss  Breast: Negative breast abnormalities, negative breast lumps, negative nipple discharge  Respiratory: negative dyspnea, negative cough, negative SOB  Cardiovascular: negative chest pain,  negative palpitations, negative arrhythmia, negative syncope   Gastrointestinal: +abdominal paincontractions, negative RUQ pain, negative N/V, negative diarrhea, negative constipation, negative bowel changes, negative heartburn   Genitourinary: negative dysuria, negative hematuria, negative urinary incontinence, negative vaginal discharge, negative vaginal bleeding or spotting  Dermatological: negative rash, negative pruritis, negative mole or other skin changes  Hematologic: negative bruising  Immunologic/Lymphatic: negative recent illness, negative recent sick contact  Musculoskeletal: negative back pain, negative myalgias, negative arthralgias  Neurological:  negative dizziness, negative migraines, negative seizures, negative weakness  Behavior/Psych: negative depression, negative anxiety, negative SI, negative HI    OBSTETRICAL HISTORY:   OB History    Para Term  AB Living   9 6 0 6 2 6   SAB TAB Ectopic Molar Multiple Live Births   2 0 0 0 0 6      # Outcome Date GA Lbr Kaden/2nd Weight Sex Delivery Anes PTL Lv   9 Current            8  10/22/ 34w2d  5 lb 7.1 oz (2.47 kg) M Vag-Spont   MALLIKA      Name: Amber Stairs: 8  Apgar5: 9   7  13 36w4d  5 lb 13 oz (2.637 kg) M    MALLIKA   6 SAB  6w0d    SAB      5   36w0d  6 lb 4 oz (2.835 kg) F Vag-Spont   MALLIKA   4   36w0d  6 lb 2 oz (2.778 kg) M Vag-Spont   MALLIKA      Birth Comments: Spont PTL/PTD   3 SAB  10w0d             Birth Comments: D&C   2   35w0d  5 lb 2 oz (2.325 kg) M Vag-Spont   MALLIKA      Birth Comments: Spont PTL/PTD   1   36w0d  6 lb 1 oz (2.75 kg) F Vag-Spont   MALLIKA Birth Comments: Spont PTL/PTD      Obstetric Comments   Adjusted pregnancy's and dates today 16. Went over each one with patient in detail. The original chart today said 14th pregnancy. Jluis Lim ... which is not true. 8 total- 2 sab's 5  deliveries. 3 boys and 2 girls. PAST MEDICAL HISTORY:   has a past medical history of Anemia, Family history of breast cancer, Family history of cervical cancer, Family history of uterine cancer, Herpes, MRSA (methicillin resistant staph aureus) culture positive, Postpartum depression, and Tobacco use. PAST SURGICAL HISTORY:   has a past surgical history that includes Dilation and curettage of uterus; laparoscopy; Dilation & curettage; pr lap,appendectomy (N/A, 3/5/2018); and pr part removal colon w anastomosis (N/A, 3/20/2018). ALLERGIES:  is allergic to codeine, fentanyl, and penicillins. MEDICATIONS:  Prior to Admission medications    Medication Sig Start Date End Date Taking? Authorizing Provider   valACYclovir (VALTREX) 500 MG tablet Take 1 tablet by mouth 2 times daily 9/8/21 10/8/21  EVETTE Mccollum - EDDY   HYDROXYprogesterone caproate 250 MG/ML OIL oil injection  21   Historical Provider, MD   progesterone (PROMETRIUM) 200 MG CAPS capsule  21   Historical Provider, MD   Prenatal Vit-Fe Fumarate-FA (PRENATAL VITAMIN PO) Take 1 tablet by mouth daily    Historical Provider, MD       FAMILY HISTORY:  family history includes Breast Cancer in her maternal grandmother; Cancer in her maternal grandmother; Cervical Cancer in her paternal grandmother; Diabetes in her paternal grandfather and paternal grandmother; Other in her daughter and son; Uterine Cancer in her paternal grandmother. SOCIAL HISTORY:   reports that she has quit smoking. She smoked 0.25 packs per day. She has never used smokeless tobacco. She reports previous drug use. Drug: Marijuana. She reports that she does not drink alcohol.     VITALS:  Vitals:    21 1315 21 1330 09/25/21 1345 09/25/21 1400   BP: 111/69 102/67 108/73 112/79   Pulse: 89 89 92 86   Resp: 17 19 18 19   Temp:       SpO2:       Weight:       Height:             PHYSICAL EXAM:  Fetal Heart Monitor:  Baseline Heart Rate 125, moderate variability, present accelerations, absent decelerations  [place]: contractions, regular, every 2-3 minutes    General appearance:  in distress due to painful contractions, alert and cooperative  HEENT: head atraumatic, normocephalic, moist mucous membranes, trachea midline  Neurologic:  alert, oriented, normal speech, no focal findings or movement disorder noted  Lungs:  No increased work of breathing, good air exchange, clear to auscultation bilaterally, no crackles or wheezing  Heart:  regular rate and rhythm and no murmur, rubs, gallops  Abdomen:  soft, gravid, non-tender, no rebound, guarding, or rigidity, no RUQ or epigastric tenderness, no signs or symptoms of abruption, no signs or symptoms of chorioamnionitis  Extremities:  no calf tenderness, non edematous, no varicosities, full range of motion in all four extremities  Musculoskeletal: Gross strength equal and intact throughout, no gross abnormalities, range of motion normal in hips, knees, shoulders and spine  Psychiatric: Mood appropriate, normal affect   Rectal Exam: not indicated  Pelvic Exam:   Chaperone for Intimate Exam: Chaperone was present for entire exam, Chaperone Name: Kelly Ramirez RN  Sterile Speculum Exam:   Urethral meatus: normal appearing   Vulva: Normal hair distribution, normal appearing vulva, no masses, tenderness or lesions, normal clitoris   Vagina: Normal appearing vaginal mucosa without lesions, vaginal discharge noted in the posterior vault, no lacerations, no HSV lesions noted   Cervix: Normal appearing cervix without lesions, no lacerations or abnormal lesions visualized, no HSV lesions noted  Sterile Vaginal Exam:  Cervix: No cervical motion tenderness   Uterus: Is gravid, Normal size, shape, consistency and non-tender   Adnexa: Non-tender, no palpable masses  Cervix: 7 cm dilated, 90 % effaced, 0 station, anterior position (out of 3 station), soft consistency, FETAL POSITION: Cephalic (confirmed by ultrasound), Membranes intact    LIMITED BEDSIDE US:  Position: Cephalic  Placental Location: posterior  Fetal Heart Tones: Present  Fetal Movement: Present  Amniotic Fluid Index/Volume: adequate 2x2 cm fluid pocket    PRENATAL LAB RESULTS:   Blood Type/Rh: A pos  Antibody Screen: negative  Hemoglobin, Hematocrit, Platelets: Hgb 26.5/PED 38.6/Plt 311  Rubella: immune  T. Pallidum, IgG: non-reactive  Hepatitis B Surface Antigen: non-reactive   Hepatitis C Antibody: non-reactive   HIV: non-reactive   Sickle Cell Screen: not available  Gonorrhea: negative  Chlamydia: negative  Urine culture: not done    1 hour Glucose Tolerance Test: not done    Group B Strep: negative RV culture on 21  Cystic Fibrosis Screen: negative  First Trimester Screen: not available  MSAFP/Multiple Markers: not available  Non-Invasive Prenatal Testing: not available  Anatomy US: posterior placenta, 3VC normal insertion, female gender, normal anatomy    ASSESSMENT & PLAN:  Hamzah Dallas is a 42 y.o. female [id number] at 38w4d IUP   - GBS negative / Rh positive / R immune   - No indication for GBS prophylaxis   - COVID positive 21    Spontaneous Labor  - Admit to labor and delivery under the service of Dr. Shea Sabillon   - VSS, aferbile   - cEFM and TOCO   - Cat 1 FHT and TOCO showing regular contractions   - CBC, T&S, T.Pal ordered   - UDS ordered.  R/B/A discussed with patient and patient agreeable   - IVF:  mL/hr   - SVE on admission: /0   - AROM when able   - Attending updated and anticipate  soon    Hx Spontaneous  Delivery x6   - 34w2d to 36w4d   - Current pregnancy first full term delivery     Anemia   - Hgb 8.6 on admission   - Denies any s/s of anemia   - Rx iron on discharge     HSV   - Compliant with Valtrex suppression therapy   - SSE negative for lesions    Celestone ,     - For short cervix (19-22mm) seen at M on 21    Hx Short Cervix   - S/p Celestone  and    - On vaginal progesterone during pregnancy    Low Lying placenta (resolved)    Carcinoid Tumor of Appendix and Colon   - S/p appendectomy and partial colectomy 2018    Hx MRSA infection    - On buttock and prepatellar bursa   - VSS, afebrile    Hx of Child with Hearing Impairment   - G1    - Declined NIPT     Hx PPD   - Denies SI/HI   - Monitor closely for risk of PPD     Hx D&C x2   -  and     AMA   - Declined NIPT    BMI 25      Patient Active Problem List    Diagnosis Date Noted    Low-lying placenta: RESOLVED 2021     Priority: High    Short cervix, antepartum 2021     Priority: High     24-28mm on anatomy US 21  32-36mm on 21  Continue cervical length every 2 weeks until 32-34 weeks  Referred to [facility] for 17OHP injections - not taking   200 mg vaginal progesterone until 34 weeks      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     Priority: High     Daughter was born deaf   2016 declined opportunity for non-syndromic hearing loss carrier testing by [facility]  Declined connexin diagnostic testing in her daughter, Felipe Mcdonald, and has declined testing for non-syndromic hearing loss      Grand multiparity 2016     Priority: High     2000 36 weeks   2001 35 weeks   2002 SAB  2004 36 weeks   2006 37 weeks   2013 37 weeks         Herpes      Priority: High     2016 History of genital herpes: last outbreak years ago  Call office with any new outbreaks      38 weeks gestation of pregnancy 2021    HRP (high risk pregnancy), third trimester 2021    Celestone  & * 2021     Shortened cervix from 19-22mm with funneling to 13-14mm with funneling       Somatic dysfunction of pubic bone 2021 Physical therapy       History of MRSA infection 03/24/2021     3/24/21 - Nares MRSA culture #1 completed  4/22/2021 MRSA swab #2 negative       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 Inge Cardona, who is agreeable. Steroids given this admission: No    Risks, benefits, alternatives and possible complications have been discussed in detail with the patient. Admission, and post admission procedures and expectations were discussed in detail. All questions were answered.     Attending's Name: Dr. Jaki Veras DO  Ob/Gyn Resident  9/25/2021, 11:34 AM

## 2021-09-26 VITALS
BODY MASS INDEX: 25.88 KG/M2 | TEMPERATURE: 97.6 F | HEART RATE: 68 BPM | SYSTOLIC BLOOD PRESSURE: 89 MMHG | DIASTOLIC BLOOD PRESSURE: 63 MMHG | OXYGEN SATURATION: 98 % | RESPIRATION RATE: 16 BRPM | HEIGHT: 66 IN | WEIGHT: 161 LBS

## 2021-09-26 LAB
HCT VFR BLD CALC: 28.9 % (ref 36.3–47.1)
HEMOGLOBIN: 8.2 G/DL (ref 11.9–15.1)

## 2021-09-26 PROCEDURE — 85014 HEMATOCRIT: CPT

## 2021-09-26 PROCEDURE — 85018 HEMOGLOBIN: CPT

## 2021-09-26 PROCEDURE — 6370000000 HC RX 637 (ALT 250 FOR IP): Performed by: STUDENT IN AN ORGANIZED HEALTH CARE EDUCATION/TRAINING PROGRAM

## 2021-09-26 PROCEDURE — 59409 OBSTETRICAL CARE: CPT | Performed by: OBSTETRICS & GYNECOLOGY

## 2021-09-26 PROCEDURE — 36415 COLL VENOUS BLD VENIPUNCTURE: CPT

## 2021-09-26 RX ADMIN — IBUPROFEN 600 MG: 600 TABLET, FILM COATED ORAL at 02:36

## 2021-09-26 RX ADMIN — IBUPROFEN 600 MG: 600 TABLET, FILM COATED ORAL at 08:39

## 2021-09-26 ASSESSMENT — PAIN SCALES - GENERAL
PAINLEVEL_OUTOF10: 4
PAINLEVEL_OUTOF10: 2

## 2021-09-26 NOTE — CARE COORDINATION
POST-PARTUM/WIN TRANSITIONAL CARE PLAN    HRP (high risk pregnancy), unspecified trimester [O09.90]    Writer contacted Norma via phone ( + Covid)  to discuss DCP. Norma  is S/P  on 21    Infant name on BC: Tessie Garcia      Infant to [facility]. Infant PCP Gloria Yan CNP. FOB: Kristian Gross     Writer verified name/address/phone number correct on facesheet    [employer]  insurance correct. Writer notified Jason Soriano she has  30 days from date of birth to add  to insurance policy. Norma verbalized understanding. Norma  verbalized she has all necessary items for infant    No previous home care or dme. No Home Care or DME anticipated. Anticipate DC of couplet 21    CM will continue to follow for any DC needs.

## 2021-09-26 NOTE — PLAN OF CARE
Problem: Airway Clearance - Ineffective  Goal: Achieve or maintain patent airway  9/25/2021 2100 by Kelly Harris RN  Outcome: Ongoing  9/25/2021 1438 by Anay Quezada RN  Outcome: Ongoing     Problem: Gas Exchange - Impaired  Goal: Absence of hypoxia  9/25/2021 2100 by Kelly Harris RN  Outcome: Ongoing  9/25/2021 1438 by Anay Quezada RN  Outcome: Ongoing  Goal: Promote optimal lung function  9/25/2021 2100 by Kelly Harris RN  Outcome: Ongoing  9/25/2021 1438 by Anay Quezada RN  Outcome: Ongoing     Problem: Breathing Pattern - Ineffective  Goal: Ability to achieve and maintain a regular respiratory rate  9/25/2021 2100 by Kelly Harris RN  Outcome: Ongoing  9/25/2021 1438 by Anay Quezada RN  Outcome: Ongoing     Problem:  Body Temperature -  Risk of, Imbalanced  Goal: Ability to maintain a body temperature within defined limits  9/25/2021 2100 by Kelly Harris RN  Outcome: Ongoing  9/25/2021 1438 by Anay Quezada RN  Outcome: Ongoing  Goal: Will regain or maintain usual level of consciousness  9/25/2021 2100 by Kelly Harris RN  Outcome: Ongoing  9/25/2021 1438 by Anay Quezada RN  Outcome: Ongoing  Goal: Complications related to the disease process, condition or treatment will be avoided or minimized  9/25/2021 2100 by Kelly Harris RN  Outcome: Ongoing  9/25/2021 1438 by Anay Quezada RN  Outcome: Ongoing     Problem: Isolation Precautions - Risk of Spread of Infection  Goal: Prevent transmission of infection  9/25/2021 2100 by Kelly Harris RN  Outcome: Ongoing  9/25/2021 1438 by Anay Quezada RN  Outcome: Ongoing     Problem: Nutrition Deficits  Goal: Optimize nutritional status  9/25/2021 2100 by Kelly Harris RN  Outcome: Ongoing  9/25/2021 1438 by Anay Quezada RN  Outcome: Ongoing     Problem: Risk for Fluid Volume Deficit  Goal: Maintain normal heart rhythm  9/25/2021 2100 by Kelly Harris RN  Outcome: Ongoing  9/25/2021 1438 by Chirag Beck RN  Outcome: Ongoing  Goal: Maintain absence of muscle cramping  9/25/2021 2100 by Ramirez Underwood RN  Outcome: Ongoing  9/25/2021 1438 by Chirag Beck RN  Outcome: Ongoing  Goal: Maintain normal serum potassium, sodium, calcium, phosphorus, and pH  9/25/2021 2100 by Ramirez Underwood RN  Outcome: Ongoing  9/25/2021 1438 by Chirag Beck RN  Outcome: Ongoing     Problem: Loneliness or Risk for Loneliness  Goal: Demonstrate positive use of time alone when socialization is not possible  9/25/2021 2100 by Ramirez Underwood RN  Outcome: Ongoing  9/25/2021 1438 by Chirag Beck RN  Outcome: Ongoing     Problem: Fatigue  Goal: Verbalize increase energy and improved vitality  9/25/2021 2100 by Ramirez Underwood RN  Outcome: Ongoing  9/25/2021 1438 by Chirag Beck RN  Outcome: Ongoing     Problem: Patient Education: Go to Patient Education Activity  Goal: Patient/Family Education  9/25/2021 2100 by Ramirez Underwood RN  Outcome: Ongoing  9/25/2021 1438 by Chirag Beck RN  Outcome: Ongoing     Problem: Fluid Volume - Imbalance:  Goal: Absence of imbalanced fluid volume signs and symptoms  Description: Absence of imbalanced fluid volume signs and symptoms  9/25/2021 2100 by Ramirez Underwood RN  Outcome: Ongoing  9/25/2021 1438 by Chirag Beck RN  Outcome: Ongoing  Goal: Absence of postpartum hemorrhage signs and symptoms  Description: Absence of postpartum hemorrhage signs and symptoms  9/25/2021 2100 by Ramirez Underwood RN  Outcome: Ongoing  9/25/2021 1438 by Chirag Beck RN  Outcome: Ongoing     Problem: Pain - Acute:  Goal: Pain level will decrease  Description: Pain level will decrease  9/25/2021 2100 by Ramirez Underwood RN  Outcome: Ongoing  9/25/2021 1438 by Chirag Beck RN  Outcome: Ongoing     Problem: Discharge Planning:  Goal: Discharged to appropriate level of care  Description: Discharged to appropriate level of care  9/25/2021 2100 by Mulu Vega RN  Outcome: Ongoing  9/25/2021 1438 by Harry Perea RN  Outcome: Ongoing     Problem: Constipation:  Goal: Bowel elimination is within specified parameters  Description: Bowel elimination is within specified parameters  9/25/2021 2100 by Mulu Vega RN  Outcome: Ongoing  9/25/2021 1438 by Harry Perea RN  Outcome: Ongoing     Problem: Infection - Risk of, Puerperal Infection:  Goal: Will show no infection signs and symptoms  Description: Will show no infection signs and symptoms  9/25/2021 2100 by Mulu Vega RN  Outcome: Ongoing  9/25/2021 1438 by Harry Perea RN  Outcome: Ongoing     Problem: Mood - Altered:  Goal: Mood stable  Description: Mood stable  9/25/2021 2100 by Mulu Vega RN  Outcome: Ongoing  9/25/2021 1438 by Harry Perea RN  Outcome: Ongoing     Problem: Anxiety:  Goal: Level of anxiety will decrease  Description: Level of anxiety will decrease  9/25/2021 1438 by Harry Perea RN  Outcome: Completed     Problem: Breathing Pattern - Ineffective:  Goal: Able to breathe comfortably  Description: Able to breathe comfortably  9/25/2021 1438 by Harry Perea RN  Outcome: Completed     Problem: Fluid Volume - Imbalance:  Goal: Absence of imbalanced fluid volume signs and symptoms  Description: Absence of imbalanced fluid volume signs and symptoms  9/25/2021 1438 by Harry Perea RN  Outcome: Completed  Goal: Absence of intrapartum hemorrhage signs and symptoms  Description: Absence of intrapartum hemorrhage signs and symptoms  9/25/2021 1438 by Harry Perea RN  Outcome: Completed     Problem: Infection - Intrapartum Infection:  Goal: Will show no infection signs and symptoms  Description: Will show no infection signs and symptoms  9/25/2021 1438 by Harry Perea RN  Outcome: Completed     Problem: Labor Process - Prolonged:  Goal: Labor progression, first stage, within specified Problem: Pain:  Description: Pain management should include both nonpharmacologic and pharmacologic interventions.   Goal: Pain level will decrease  Description: Pain level will decrease  9/25/2021 1438 by Je Santillan RN  Outcome: Completed  Goal: Control of acute pain  Description: Control of acute pain  9/25/2021 1438 by Je Santillan RN  Outcome: Completed  Goal: Control of chronic pain  Description: Control of chronic pain  9/25/2021 1438 by Je Santillan RN  Outcome: Completed

## 2021-09-26 NOTE — PROGRESS NOTES
POST PARTUM DAY # 1     Norma Brambila is a 42 y.o. female  This patient was seen & examined today.  on 21   Today she is doing well without any chief complaint. Her lochia is light. She denies chest pain, shortness of breath, headache, lightheadedness and blurred vision. She is breast feeding and she denies any breast tenderness. She is ambulating well. Her voiding pattern is normal. I reviewed signs and symptoms of post partum depression with the patient, she currently denies any of these symptoms. She is tolerating solids.      Vital Signs:  BP: 89/63  HR: 68  Resp: 16  Temp: 97.6    Physical Exam:  General:  no apparent distress, alert and cooperative  Abdomen: abdomen soft, non-distended, non-tender  Fundus: non-tender, firm, below umbilicus  Extremities:  no calf tenderness, non edematous      Assessment/Plan:  1Dar Uraisman is a [id number] PPD # 1 s/p               - Doing well, VSS              - Female infant in [address]               - routine postpartum care    - D/C home today

## 2021-09-26 NOTE — PROGRESS NOTES
POST PARTUM DAY # 1    Angelika Hanna is a 42 y.o. female  This patient was seen & examined today.  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 Her lochia is light. She denies chest pain, shortness of breath, headache, lightheadedness and blurred vision. She is breast feeding and she denies any breast tenderness. She is ambulating well. Her voiding pattern is normal. I reviewed signs and symptoms of post partum depression with the patient, she currently denies any of these symptoms. She is tolerating solids.      Vital Signs:  Vitals:    21 1430 21 1445 21 1510 21   BP: 104/69 112/64 106/71 127/83   Pulse: 86 80 71 88   Resp: 17 19 16 16   Temp:  98.6 °F (37 °C) 98.4 °F (36.9 °C) 98.3 °F (36.8 °C)   SpO2:   99% 98%   Weight:       Height:             Physical Exam:  General:  no apparent distress, alert and cooperative  Neurologic:  alert, oriented, normal speech, no focal findings or movement disorder noted  Lungs:  No increased work of breathing, good air exchange, clear to auscultation bilaterally, no crackles or wheezing  Heart:  Normal apical impulse, regular rate and rhythm, normal S1 and S2, no S3 or S4, and no murmur noted    Abdomen: abdomen soft, non-distended, non-tender  Fundus: non-tender, firm, below umbilicus  Extremities:  no calf tenderness, non edematous    Lab:  Lab Results   Component Value Date    HGB 8.8 (L) 2021     Lab Results   Component Value Date    HCT 31.0 (L) 2021       Assessment/Plan:  1Dar Pierre is a [id number] PPD # 1 s/p    - Doing well, VSS   - Female infant in [address]   - Encourage ambulation   - Labs if Sx  2. Rh positive/Rubella immune  3. Breast feeding  4. Anemia (8.6)   - QBL 805ML   - Patient asymptomatic, bleeding stable   - Repeat Hgb 8.8   - Iron on DC   - No further labs unless symptomatic  5. HSV2    - Last outbreak 16 years ago  - Denies current lesions or prodromal Sx   6. Hx Appendectomy/Partial Colectomy   - 2/2 carcinoid tumor of appendix and colon  - F/u as indicated  7. Hx PP Depression   - Denies SI/HI  - Stable on no medications  8. Continue post partum care    Counseling Completed:  Secondary Smoke risks and Sudden Infant Death Syndrome were reviewed with recommendations. Infant sleeping, \"back to sleep\" and avoidance of co-sleeping recommendations were reviewed. Signs and Symptoms of Post Partum Depression were reviewed. The patient is to call if any occur. Signs and symptoms of Mastitis were reviewed. The patient is to call if any occur for follow up.   Discharge instructions including pelvic rest, no driving with pain medicine and office follow-up were reviewed with patient     Attending Physician: Dr. Nicole Moeller, [place]  Ob/Gyn Resident   2021, 2:34 AM

## 2021-09-28 LAB — SURGICAL PATHOLOGY REPORT: NORMAL

## 2021-10-11 ENCOUNTER — OFFICE VISIT (OUTPATIENT)
Dept: OBGYN CLINIC | Age: 41
End: 2021-10-11
Payer: MEDICARE

## 2021-10-11 VITALS
DIASTOLIC BLOOD PRESSURE: 68 MMHG | SYSTOLIC BLOOD PRESSURE: 114 MMHG | HEIGHT: 66 IN | BODY MASS INDEX: 22.66 KG/M2 | WEIGHT: 141 LBS

## 2021-10-11 DIAGNOSIS — D64.9 ANEMIA, UNSPECIFIED TYPE: ICD-10-CM

## 2021-10-11 PROBLEM — O44.40 LOW-LYING PLACENTA: Status: RESOLVED | Noted: 2021-05-20 | Resolved: 2021-10-11

## 2021-10-11 PROBLEM — O47.00 THREATENED PRETERM LABOR: Status: RESOLVED | Noted: 2021-07-23 | Resolved: 2021-10-11

## 2021-10-11 PROBLEM — O26.879 SHORT CERVIX, ANTEPARTUM: Status: RESOLVED | Noted: 2021-05-20 | Resolved: 2021-10-11

## 2021-10-11 PROBLEM — O09.90 HRP (HIGH RISK PREGNANCY), UNSPECIFIED TRIMESTER: Status: RESOLVED | Noted: 2021-09-25 | Resolved: 2021-10-11

## 2021-10-11 PROCEDURE — 99213 OFFICE O/P EST LOW 20 MIN: CPT | Performed by: NURSE PRACTITIONER

## 2021-10-11 NOTE — PROGRESS NOTES
Norma Spaulding  10/11/2021  1:37 PM        Rossy Hicks is a 42 y.o. female [id number]      The patient was seen. She has no chief complaints today. She delivered vaginally on 2021. She is  breast feeding and there is not any signs or symptoms of mastitis. The patient completed the E.P.D.S. Evaluation form and scored 4. She does not have any signs or symptoms of post partum depression. She denies any suicidal thoughts with a plan, intent to harm others and delusional ideas. Today her lochia is light she denies any dizziness or shortness of breath.       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     Priority: High     Overview Note:     Daughter was born deaf   2016 declined opportunity for non-syndromic hearing loss carrier testing by [facility]  Declined connexin diagnostic testing in her daughter, Isaiah Acosta, and has declined testing for non-syndromic hearing loss      Grand multiparity 2016     Priority: High     Overview Note:      36 weeks    35 weeks   2002 SAB  2004 36 weeks   2006 37 weeks   2013 37 weeks         Herpes      Priority: High     Overview Note:     2016 History of genital herpes: last outbreak years ago  Call office with any new outbreaks      Normal labor      21 F Apg 8/ Wt 6#9 2021    Carcinoid tumor of appendix and colon 2018     Overview Note:     S/p laparoscopic appendectomy and right sided hemicolectomy (2018)      MRSA infection - buttock and prepatellar bursa  2017     Overview Note:     21 MRSA swab negative  3/25/2021 MRSA swab negative       Prepatellar bursitis of right knee     Abscess, gluteal, right 2017     10/ M Apg 8/9 Wt 5#7 10/22/2016    Family history of breast cancer      Overview Note:     Patient's maternal grandmother diagnosed in 78's      Family history of uterine cancer      Overview Note:     Paternal grandmother diagnosed with uterine cancer in her [de-identified]           She does admit to having good home support. OB History    Para Term  AB Living   9 7 1 6 2 7   SAB TAB Ectopic Molar Multiple Live Births   2 0 0 0 0 7      # Outcome Date GA Lbr Kaden/2nd Weight Sex Delivery Anes PTL Lv   9 Term 21 38w4d  6 lb 9.6 oz (2.995 kg) F Vag-Spont Local N MALLIKA      Name: Aj Briggs: 8  Apgar5: 9   8  10/22/16 34w2d  5 lb 7.1 oz (2.47 kg) M Vag-Spont   MALLIKA      Name: Pallavi Espinal: 8  Apgar5: 9   7  13 36w4d  5 lb 13 oz (2.637 kg) M    MALLIKA   6 SAB  6w0d    SAB      5   36w0d  6 lb 4 oz (2.835 kg) F Vag-Spont   MALLIKA   4   36w0d  6 lb 2 oz (2.778 kg) M Vag-Spont   MALLIKA      Birth Comments: Spont PTL/PTD   3 SAB  10w0d             Birth Comments: D&C   2   35w0d  5 lb 2 oz (2.325 kg) M Vag-Spont   MALLIKA      Birth Comments: Spont PTL/PTD   1   36w0d  6 lb 1 oz (2.75 kg) F Vag-Spont   MALLIKA      Birth Comments: Spont PTL/PTD      Obstetric Comments   Adjusted pregnancy's and dates today 16. Went over each one with patient in detail. The original chart today said 14th pregnancy. Irma Birch ... which is not true. 8 total- 2 sab's 5  deliveries. 3 boys and 2 girls.        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 Abdomen: Soft and non-tender; good bowel sounds; no guarding, rebound or rigidity; no CVA tenderness bilaterally. Extremities: No calf tenderness bilaterally. DTR 2/4 bilaterally. No edema. Perineum: intact    Assessment:   Diagnosis Orders   1. Routine postpartum follow-up     2.  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 multiparity 2016     Priority: High      36 weeks    35 weeks    SAB  2004 36 weeks   2006 37 weeks   2013 37 weeks         Herpes      Priority: High     2016 History of genital herpes: last outbreak years ago  Call office with any new outbreaks      Normal labor      21 F Apg 8/9 Wt 6#9 2021    Carcinoid tumor of appendix and colon 2018     S/p laparoscopic appendectomy and right sided hemicolectomy (2018)      MRSA infection - buttock and prepatellar bursa  2017 MRSA swab negative  3/25/2021 MRSA swab negative       Prepatellar bursitis of right knee     Abscess, gluteal, right 2017     10/22 M Apg  Wt 5#7 10/22/2016    Family history of breast cancer      Patient's maternal grandmother diagnosed in 78's      Family history of uterine cancer      Paternal grandmother diagnosed with uterine cancer in her [de-identified]           EPDS Score of 4        Plan:  1. Return to the office in  3-4 weeks  2. Signs & Symptoms of mastitis reviewed; notify if occurs  3. Secondary smoke risks reviewed. Increased risks of respiratory problems, Sudden     infant death syndrome, and potential malignancies. 4. Abstinence  5. Family planning counseling and STD counseling completed  6. Return in about 4 weeks (around 2021) for 6 week PP visit. Has tubal ligation scheduled with DR Jesus Manuel Quigley 2021. Lab slip given for repeat H/H. Is not currently taking iron- makes her constipated. Patient was seen with total face to face time of 20 minutes. More than 50% of this visit was on counseling and education regarding her    Diagnosis Orders   1. Routine postpartum follow-up     2. Anemia, unspecified type  Hemoglobin and Hematocrit, Blood    and her options. She was also counseled on her preventative health maintenance recommendations and follow-up.

## 2021-11-09 ENCOUNTER — OFFICE VISIT (OUTPATIENT)
Dept: OBGYN CLINIC | Age: 41
End: 2021-11-09
Payer: MEDICARE

## 2021-11-09 VITALS
HEART RATE: 83 BPM | HEIGHT: 66 IN | DIASTOLIC BLOOD PRESSURE: 60 MMHG | WEIGHT: 140 LBS | SYSTOLIC BLOOD PRESSURE: 90 MMHG | BODY MASS INDEX: 22.5 KG/M2

## 2021-11-09 PROCEDURE — 99213 OFFICE O/P EST LOW 20 MIN: CPT | Performed by: NURSE PRACTITIONER

## 2021-11-09 RX ORDER — CITALOPRAM 10 MG/1
10 TABLET ORAL DAILY
Qty: 30 TABLET | Refills: 1 | Status: SHIPPED | OUTPATIENT
Start: 2021-11-09 | End: 2021-11-24 | Stop reason: SDUPTHER

## 2021-11-09 NOTE — PROGRESS NOTES
Hector Alcantar is a 42 y.o. female    Delivery Information:  Delivery Date: 2021    Sex of Baby: female  Type of Delivery: Vaginal  Delivering Physician: Dr Leyla Robertson Questions:  Yes Do you Smoke? If yes PPD is patient VAPES- does not smoke cigs  Yes Do you intake caffeine? No Do you use street drugs? No Do you consume alcohol? Yes Are breast feeding? Yes Are you bottle feeding? No Are you having any problems with your breasts? (lumps, discharge, asymmetry, or redness)  No Are you having any problems with bowel movements or urination? No Are you having any vaginal discharge/itching/ or burning? Yes Are you getting help and support with the baby? No Have you had intercourse since your delivery? NA If you had intercourse did you use condoms? No Have you had a menstrual cycle since delivery? Date: NA  Yes Do you have any questions that need to be addressed today? Patient reports having separation anxiety about going back to work. Will have to work 80+ hours a week at [employer]. Hx of PP depression with last baby. Denies suicidal ideation or harmful thoughts to herself or others. Desires to restart on Celexa before symptoms get worse. EPDS score today 7. How long did you bleed after your delivery? 1 1/2 Weeks  What are your plans besides condoms for family planning? Tubal ligation  Who lives at home with you and your baby? , baby and 6 of her children    The patient was counseled on the risks of tobacco abuse and the harm it may cause to the patient and her  baby as well as others in the household from secondary smoke exposure. The patient was counseled on the risks of potential respiratory problems, cancers, and sudden infant death syndrome as well as other co-morbidities. These were discussed in detail. I reviewed cessation options and plans. The patient was counseled on smoking outdoors with appropriate covers of clothing and hair.   She was counseled on hand washing prior to holding or picking up the baby. The patient had her questions answered in regards to the baby and was instructed to follow up with her pediatrician. She had reviewed with her safe sleep recommendations. Vitals:    11/09/21 0920   BP: 90/60   Site: Left Upper Arm   Position: Sitting   Cuff Size: Medium Adult   Pulse: 83   Weight: 140 lb (63.5 kg)   Height: 5' 6\" (1.676 m)        Physical Exam:  Chaperone for Intimate Exam   Chaperone was offered and accepted as part of the rooming process.  Chaperone: Melissa       General Appearance: This  is a well Developed, well Nourished, well groomed female. Her BMI was reviewed. Nutritional decision making was discussed. Skin:  There was a Normal Inspection of the skin without rashes or lesions. There were no rashes. (Papular, Maculopapular, Hives, Pustular, Macular)     There were no lesions (Ulcers, Erythema, Abn. Appearing Nevi)            Lymphatic:  No Lymph Nodes were Palpable in the neck , axilla or groin.  0 # Of Lymph Nodes; Location ; Character [Normal]  [Shotty] [Tender] [Enlarged]     Neck and EENT:  The neck was supple. There were no masses   The thyroid was not enlarged and had no masses. Perrla, EOMI B/L, TMI B/L No Abnormalities. Throat inspected-No exudates or Masses, Nares Patent No Masses        Respiratory: The lungs were auscultated and found to be clear. There were no rales, rhonchi or wheezes. There was a good respiratory effort. Cardiovascular: The heart was in a regular rate and rhythm. . No S3 or S4. There was no murmur appreciated. Location, grade, and radiation are not applicable. Extremities: The patients extremities were without calf tenderness, edema, or varicosities. There was full range of motion in all four extremities. Pulses in all four extremities were appreciated and are 2/4. Abdomen: The abdomen was soft and non-tender.  There were good bowel sounds in all quadrants and there was no

## 2021-11-24 ENCOUNTER — OFFICE VISIT (OUTPATIENT)
Dept: INTERNAL MEDICINE CLINIC | Age: 41
End: 2021-11-24
Payer: MEDICARE

## 2021-11-24 VITALS — BODY MASS INDEX: 22.6 KG/M2 | WEIGHT: 140 LBS

## 2021-11-24 DIAGNOSIS — Z98.51 S/P TUBAL LIGATION: ICD-10-CM

## 2021-11-24 DIAGNOSIS — A49.02 MRSA INFECTION: ICD-10-CM

## 2021-11-24 DIAGNOSIS — Z13.220 SCREENING FOR HYPERLIPIDEMIA: Primary | ICD-10-CM

## 2021-11-24 DIAGNOSIS — D3A.020 BENIGN CARCINOID TUMOR OF APPENDIX: Chronic | ICD-10-CM

## 2021-11-24 DIAGNOSIS — D18.03 HEPATIC HEMANGIOMA: ICD-10-CM

## 2021-11-24 PROCEDURE — G8420 CALC BMI NORM PARAMETERS: HCPCS | Performed by: INTERNAL MEDICINE

## 2021-11-24 PROCEDURE — G8484 FLU IMMUNIZE NO ADMIN: HCPCS | Performed by: INTERNAL MEDICINE

## 2021-11-24 PROCEDURE — G8427 DOCREV CUR MEDS BY ELIG CLIN: HCPCS | Performed by: INTERNAL MEDICINE

## 2021-11-24 PROCEDURE — 1036F TOBACCO NON-USER: CPT | Performed by: INTERNAL MEDICINE

## 2021-11-24 PROCEDURE — 99214 OFFICE O/P EST MOD 30 MIN: CPT | Performed by: INTERNAL MEDICINE

## 2021-11-24 RX ORDER — CITALOPRAM 20 MG/1
20 TABLET ORAL DAILY
Qty: 60 TABLET | Refills: 2 | Status: SHIPPED | OUTPATIENT
Start: 2021-11-24 | End: 2022-07-25

## 2021-11-24 RX ORDER — KETOROLAC TROMETHAMINE 10 MG/1
TABLET, FILM COATED ORAL
COMMUNITY
Start: 2021-11-19 | End: 2021-11-24 | Stop reason: ALTCHOICE

## 2021-11-24 SDOH — ECONOMIC STABILITY: FOOD INSECURITY: WITHIN THE PAST 12 MONTHS, THE FOOD YOU BOUGHT JUST DIDN'T LAST AND YOU DIDN'T HAVE MONEY TO GET MORE.: NEVER TRUE

## 2021-11-24 SDOH — ECONOMIC STABILITY: TRANSPORTATION INSECURITY
IN THE PAST 12 MONTHS, HAS THE LACK OF TRANSPORTATION KEPT YOU FROM MEDICAL APPOINTMENTS OR FROM GETTING MEDICATIONS?: NO

## 2021-11-24 SDOH — ECONOMIC STABILITY: FOOD INSECURITY: WITHIN THE PAST 12 MONTHS, YOU WORRIED THAT YOUR FOOD WOULD RUN OUT BEFORE YOU GOT MONEY TO BUY MORE.: NEVER TRUE

## 2021-11-24 SDOH — ECONOMIC STABILITY: TRANSPORTATION INSECURITY
IN THE PAST 12 MONTHS, HAS LACK OF TRANSPORTATION KEPT YOU FROM MEETINGS, WORK, OR FROM GETTING THINGS NEEDED FOR DAILY LIVING?: NO

## 2021-11-24 ASSESSMENT — SOCIAL DETERMINANTS OF HEALTH (SDOH): HOW HARD IS IT FOR YOU TO PAY FOR THE VERY BASICS LIKE FOOD, HOUSING, MEDICAL CARE, AND HEATING?: NOT VERY HARD

## 2021-11-24 NOTE — PROGRESS NOTES
Subjective:      Patient ID: Zhou Arriola is a 42 y.o. female. The patient had a tubal ligation done. [facility].  During laparoscopic examination was incidental finding of hepatic hemangioma 2 to 3 cm in size. She appears to be anxious but has no other cardiorespiratory symptoms or abdominal complaints.       Review of Systems   Positive and Negative as described in HPI    Constitutional:  negative for  fevers, chills, sweats, fatigue, and weight loss  HEENT:  negative for vision or hearing changes,   Respiratory:  negative for shortness of breath, cough, or congestion  Cardiovascular:  negative for  chest pain, palpitations, edema  Gastrointestinal:  negative for nausea, vomiting, diarrhea, constipation, abdominal pain  Genitourinary:  negative for frequency, dysuria  Integument/Breast:  negative for rash, skin lesions,   Musculoskeletal:  negative for muscle aches or joint pain  Neurological:  negative for headaches, dizziness, numbness, pain and tingling extremities  Behavior/Psych:  negative for depression and anxiety  Family History   Problem Relation Age of Onset    Diabetes Paternal Grandfather     Diabetes Paternal Grandmother     Uterine Cancer Paternal Grandmother     Cervical Cancer Paternal Grandmother     Cancer Maternal Grandmother     Breast Cancer Maternal Grandmother     Other Daughter         deaf    Other Son         Bowel surgery at 2year old     Social History     Socioeconomic History    Marital status:      Spouse name: None    Number of children: None    Years of education: None    Highest education level: None   Occupational History    None   Tobacco Use    Smoking status: Former Smoker     Packs/day: 0.25     Types: Cigarettes    Smokeless tobacco: Never Used   Vaping Use    Vaping Use: Former    Substances: Occasionally   Substance and Sexual Activity    Alcohol use: No     Alcohol/week: 0.0 standard drinks    Drug use: Not Currently     Types: Marijuana Jo Mora    Sexual activity: Yes     Partners: Male   Other Topics Concern    None   Social History Narrative    ** Merged History Encounter **          Social Determinants of Health     Financial Resource Strain: Low Risk     Difficulty of Paying Living Expenses: Not very hard   Food Insecurity: No Food Insecurity    Worried About Running Out of Food in the Last Year: Never true    Jaimie of Food in the Last Year: Never true   Transportation Needs: No Transportation Needs    Lack of Transportation (Medical): No    Lack of Transportation (Non-Medical):  No   Physical Activity:     Days of Exercise per Week: Not on file    Minutes of Exercise per Session: Not on file   Stress:     Feeling of Stress : Not on file   Social Connections:     Frequency of Communication with Friends and Family: Not on file    Frequency of Social Gatherings with Friends and Family: Not on file    Attends [employer] Services: Not on file    Active Member of [address] [place] [address] or Organizations: Not on file    Attends Club or Organization Meetings: Not on file    Marital Status: Not on file   Intimate Partner Violence:     Fear of Current or Ex-Partner: Not on file    Emotionally Abused: Not on file    Physically Abused: Not on file    Sexually Abused: Not on file   Housing Stability:     Unable to Pay for Housing in the Last Year: Not on file    Number of [place] in the Last Year: Not on file    Unstable Housing in the Last Year: Not on file       Objective:   Physical Exam CONSTITUTIONAL: Alert and oriented   HEENT: Atraumatic, conjunctiva normal colored, no jaundice  NECK: No thyroid enlargement, no lymphadenopathy, no JVD   CHEST: Breath sounds normal, no wheezing, no rales   CVS: S1 S2 normal, no murmur, no gallop, no carotid bruit  ABDOMEN: Soft, non tender, no mass, no hepatosplenomegaly -s/p tubal ligation and incisions for laparoscopy was noted, there is no evidence of infection or tenderness  NEUROLOGICAL: Alert, CN 2-12 intact, no motor deficits   EXTREMITIES: No pedal edema, no calf tenderness  VASCULAR: Aorta not palpable,   SKIN: No obvious lesions or rash     Assessment / Plan:      Diagnosis Orders   1. Screening for hyperlipidemia     2. 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 MRSA infection - buttock and prepatellar bursa      4. Hepatic hemangioma = she has no liver enlargement and no symptoms she was advised that it was incidental finding. Her liver function is normal she was advised that she would be a candidate for ultrasound in the near future    5. S/P tubal ligation =  doing well after the procedure      Return if symptoms worsen or fail to improve, for Follow Up. Orders Placed This Encounter   Procedures    Lipid Panel     Standing Status:   Future     Standing Expiration Date:   11/24/2022     Order Specific Question:   Is Patient Fasting?/# of Hours     Answer:   0     Orders Placed This Encounter   Medications    citalopram (CELEXA) 20 MG tablet     Sig: Take 1 tablet by mouth daily     Dispense:  60 tablet     Refill:  2     Visit Information  Anxious and her regular citalopram was increased to 20 mg daily  Have you changed or started any medications since your last visit including any over-the-counter medicines, vitamins, or herbal medicines? no   Are you having any side effects from any of your medications? -  no  Have you stopped taking any of your medications? Is so, why? -  no    Have you seen any other physician or provider since your last visit? Yes - Records Obtained  Have you had any other diagnostic tests since your last visit? No  Have you been seen in the emergency room and/or had an admission to a hospital since we last saw you? Yes - Records Obtained  Have you had your routine dental cleaning in the past 6 months? yes -     Have you activated your [employer] account? If not, what are your barriers?  Yes     Patient Care Team:  Maura Gomez MD as PCP - General (Internal Medicine)  Tereza Palm MD as PCP - [facility] Empaneled Provider  Kamran Bob MD as Obstetrician (Perinatology)    Medical History Review  Past Medical, Family, and Social History reviewed and does not contribute to the patient presenting condition    Health Maintenance   Topic Date Due    Hepatitis C screen  Never done    COVID-19 Vaccine (1) Never done    DTaP/Tdap/Td vaccine (1 - Tdap) Never done    Lipid screen  Never done    Flu vaccine (1) Never done    Cervical cancer screen  03/24/2026    HIV screen  Completed    Hepatitis A vaccine  Aged Out    Hepatitis B vaccine  Aged Out    Hib vaccine  Aged Out    Meningococcal (ACWY) vaccine  Aged Out    Pneumococcal 0-64 years Vaccine  Aged Out

## 2022-03-30 ENCOUNTER — HOSPITAL ENCOUNTER (EMERGENCY)
Age: 42
Discharge: HOME | End: 2022-03-30
Attending: EMERGENCY MEDICINE
Payer: MEDICARE

## 2022-03-30 ENCOUNTER — APPOINTMENT (OUTPATIENT)
Dept: ULTRASOUND IMAGING | Age: 42
End: 2022-03-30
Payer: MEDICARE

## 2022-03-30 VITALS
HEART RATE: 94 BPM | TEMPERATURE: 98 F | WEIGHT: 140 LBS | DIASTOLIC BLOOD PRESSURE: 70 MMHG | RESPIRATION RATE: 18 BRPM | OXYGEN SATURATION: 100 % | SYSTOLIC BLOOD PRESSURE: 96 MMHG | BODY MASS INDEX: 22.6 KG/M2

## 2022-03-30 DIAGNOSIS — N93.8 DUB (DYSFUNCTIONAL UTERINE BLEEDING): Primary | ICD-10-CM

## 2022-03-30 LAB
ABO/RH: NORMAL
ABSOLUTE EOS #: 0.1 K/UL (ref 0–0.4)
ABSOLUTE LYMPH #: 2 K/UL (ref 1–4.8)
ABSOLUTE MONO #: 0.4 K/UL (ref 0.1–1.3)
ALBUMIN SERPL-MCNC: 4.3 G/DL (ref 3.5–5.2)
ALP BLD-CCNC: 53 U/L (ref 35–104)
ALT SERPL-CCNC: 12 U/L (ref 5–33)
ANION GAP SERPL CALCULATED.3IONS-SCNC: 12 MMOL/L (ref 9–17)
ANTIBODY SCREEN: NEGATIVE
ARM BAND NUMBER: NORMAL
AST SERPL-CCNC: 12 U/L
BACTERIA: NORMAL
BASOPHILS # BLD: 1 % (ref 0–2)
BASOPHILS ABSOLUTE: 0 K/UL (ref 0–0.2)
BILIRUB SERPL-MCNC: 0.32 MG/DL (ref 0.3–1.2)
BILIRUBIN URINE: NEGATIVE
BUN BLDV-MCNC: 12 MG/DL (ref 6–20)
CALCIUM SERPL-MCNC: 9.1 MG/DL (ref 8.6–10.4)
CASTS UA: NORMAL /LPF
CHLORIDE BLD-SCNC: 108 MMOL/L (ref 98–107)
CO2: 23 MMOL/L (ref 20–31)
COLOR: ABNORMAL
CREAT SERPL-MCNC: 0.81 MG/DL (ref 0.5–0.9)
EOSINOPHILS RELATIVE PERCENT: 1 % (ref 0–4)
EPITHELIAL CELLS UA: NORMAL /HPF
EXPIRATION DATE: NORMAL
GFR AFRICAN AMERICAN: >60 ML/MIN
GFR NON-AFRICAN AMERICAN: >60 ML/MIN
GFR SERPL CREATININE-BSD FRML MDRD: ABNORMAL ML/MIN/{1.73_M2}
GLUCOSE BLD-MCNC: 94 MG/DL (ref 70–99)
GLUCOSE URINE: NEGATIVE
HCG(URINE) PREGNANCY TEST: NEGATIVE
HCT VFR BLD CALC: 39.1 % (ref 36–46)
HEMOGLOBIN: 12.7 G/DL (ref 12–16)
INR BLD: 1
KETONES, URINE: NEGATIVE
LEUKOCYTE ESTERASE, URINE: ABNORMAL
LYMPHOCYTES # BLD: 39 % (ref 24–44)
MCH RBC QN AUTO: 26.8 PG (ref 26–34)
MCHC RBC AUTO-ENTMCNC: 32.4 G/DL (ref 31–37)
MCV RBC AUTO: 82.8 FL (ref 80–100)
MONOCYTES # BLD: 8 % (ref 1–7)
NITRITE, URINE: NEGATIVE
PARTIAL THROMBOPLASTIN TIME: 37.2 SEC (ref 24–36)
PDW BLD-RTO: 17.4 % (ref 11.5–14.9)
PH UA: 5.5 (ref 5–8)
PLATELET # BLD: 320 K/UL (ref 150–450)
PMV BLD AUTO: 7.9 FL (ref 6–12)
POTASSIUM SERPL-SCNC: 4 MMOL/L (ref 3.7–5.3)
PROTEIN UA: ABNORMAL
PROTHROMBIN TIME: 13.1 SEC (ref 11.8–14.6)
RBC # BLD: 4.73 M/UL (ref 4–5.2)
RBC UA: NORMAL /HPF
SEG NEUTROPHILS: 51 % (ref 36–66)
SEGMENTED NEUTROPHILS ABSOLUTE COUNT: 2.6 K/UL (ref 1.3–9.1)
SODIUM BLD-SCNC: 143 MMOL/L (ref 135–144)
SPECIFIC GRAVITY UA: 1.02 (ref 1–1.03)
TOTAL PROTEIN: 7 G/DL (ref 6.4–8.3)
TURBIDITY: ABNORMAL
URINE HGB: ABNORMAL
UROBILINOGEN, URINE: NORMAL
WBC # BLD: 5.2 K/UL (ref 3.5–11)
WBC UA: NORMAL /HPF

## 2022-03-30 PROCEDURE — 86901 BLOOD TYPING SEROLOGIC RH(D): CPT

## 2022-03-30 PROCEDURE — 85025 COMPLETE CBC W/AUTO DIFF WBC: CPT

## 2022-03-30 PROCEDURE — 76856 US EXAM PELVIC COMPLETE: CPT

## 2022-03-30 PROCEDURE — 87086 URINE CULTURE/COLONY COUNT: CPT

## 2022-03-30 PROCEDURE — 81025 URINE PREGNANCY TEST: CPT

## 2022-03-30 PROCEDURE — 86850 RBC ANTIBODY SCREEN: CPT

## 2022-03-30 PROCEDURE — 99284 EMERGENCY DEPT VISIT MOD MDM: CPT

## 2022-03-30 PROCEDURE — 85610 PROTHROMBIN TIME: CPT

## 2022-03-30 PROCEDURE — 86900 BLOOD TYPING SEROLOGIC ABO: CPT

## 2022-03-30 PROCEDURE — 36415 COLL VENOUS BLD VENIPUNCTURE: CPT

## 2022-03-30 PROCEDURE — 81001 URINALYSIS AUTO W/SCOPE: CPT

## 2022-03-30 PROCEDURE — 93976 VASCULAR STUDY: CPT

## 2022-03-30 PROCEDURE — 80053 COMPREHEN METABOLIC PANEL: CPT

## 2022-03-30 PROCEDURE — 85730 THROMBOPLASTIN TIME PARTIAL: CPT

## 2022-03-30 ASSESSMENT — ENCOUNTER SYMPTOMS
COUGH: 0
SHORTNESS OF BREATH: 0
VOMITING: 0
SORE THROAT: 0
COLOR CHANGE: 0
CONSTIPATION: 0
ABDOMINAL PAIN: 1
DIARRHEA: 0
BACK PAIN: 0
NAUSEA: 0
BLOOD IN STOOL: 0
TROUBLE SWALLOWING: 0

## 2022-03-30 ASSESSMENT — PAIN DESCRIPTION - LOCATION: LOCATION: ABDOMEN

## 2022-03-30 ASSESSMENT — PAIN DESCRIPTION - PAIN TYPE: TYPE: ACUTE PAIN

## 2022-03-30 ASSESSMENT — PAIN DESCRIPTION - DESCRIPTORS: DESCRIPTORS: CRAMPING

## 2022-03-30 ASSESSMENT — PAIN SCALES - GENERAL: PAINLEVEL_OUTOF10: 2

## 2022-03-30 ASSESSMENT — PAIN - FUNCTIONAL ASSESSMENT: PAIN_FUNCTIONAL_ASSESSMENT: 0-10

## 2022-03-30 ASSESSMENT — PAIN DESCRIPTION - FREQUENCY: FREQUENCY: CONTINUOUS

## 2022-03-30 NOTE — ED NOTES
Patient states she has bled through 2 pads since arrival in ED. Patient is complaining of lightheaded and dizziness. Patient states she hasn't had regular periods since her ligation. Patient states she had vaginal bleeding in Nov. And in January, but nothing consistent.       Mildred Dumont RN  03/30/22 3615

## 2022-03-30 NOTE — ED PROVIDER NOTES
[address]  EMERGENCY DEPARTMENT ENCOUNTER    Pt Name: Paul Levy  MRN: [id number]  YOB: 1980  Date of evaluation:3/30/22  PCP: Zari Blanco MD    [address] [place] [address]       Chief Complaint   Patient presents with    Vaginal Bleeding       Nel Andrade is a 42 y.o. female who presents with a chief complaint of vaginal bleeding. Patient states that she started having heavy vaginal bleeding this morning. States she has had 7 children and has not had bleeding this bad after any of them. She had a recent tubal ligation a few months ago. She is not passing clots. She states she has been having irregular periods. Her last menstrual cycle was about 30 days ago. She has some lower abdominal cramping. No dizziness or lightheadedness. No chest pain or difficulty breathing. She called her OB/GYN and was told to come to the emergency department. Symptoms are acute. Symptoms are moderate. Nothing make symptoms better or worse. She states she is gone for about 8 pads since 6 AM this morning. Patient has no other complaints at this time. REVIEW OF SYSTEMS       Review of Systems   Constitutional: Negative for chills, fatigue and fever. HENT: Negative for congestion, ear pain, sore throat and trouble swallowing. Eyes: Negative for visual disturbance. Respiratory: Negative for cough and shortness of breath. Cardiovascular: Negative for chest pain, palpitations and leg swelling. Gastrointestinal: Positive for abdominal pain. Negative for blood in stool, constipation, diarrhea, nausea and vomiting. Genitourinary: Positive for vaginal bleeding. Negative for dysuria, flank pain and vaginal discharge. Musculoskeletal: Negative for arthralgias, back pain, myalgias and neck pain. Skin: Negative for color change, rash and wound. Neurological: Negative for dizziness, weakness, light-headedness, numbness and headaches.    Psychiatric/Behavioral: Negative for confusion. All other systems reviewed and are negative. Negative in 10 essential Systems except as mentioned above and in the HPI. PAST MEDICAL HISTORY     Past Medical History:   Diagnosis Date    Anemia     Family history of breast cancer     Family history of cervical cancer     Family history of uterine cancer     Hepatic hemangioma 2021    Herpes     MRSA (methicillin resistant staph aureus) culture positive 2017    abscess    Postpartum depression 2016    Tobacco use 2017         SURGICAL HISTORY      has a past surgical history that includes Dilation and curettage of uterus; laparoscopy; Dilation & curettage; pr lap,appendectomy (N/A, 3/5/2018); pr part removal colon w anastomosis (N/A, 3/20/2018); and Tubal ligation. CURRENT MEDICATIONS       Current Discharge Medication List      CONTINUE these medications which have NOT CHANGED    Details   citalopram (CELEXA) 20 MG tablet Take 1 tablet by mouth daily  Qty: 60 tablet, Refills: 2    Associated Diagnoses: Postpartum depression      ibuprofen (ADVIL;MOTRIN) 600 MG tablet Take 1 tablet by mouth every 6 hours as needed for Pain  Qty: 60 tablet, Refills: 1             ALLERGIES     is allergic to codeine, fentanyl, and penicillins. FAMILY HISTORY     She indicated that her mother is alive. She indicated that her father is alive. She indicated that her maternal grandmother is alive. She indicated that her maternal grandfather is . She indicated that her paternal grandmother is alive. She indicated that her paternal grandfather is . She indicated that the status of her daughter is unknown. She indicated that the status of her son is unknown.     family history includes Breast Cancer in her maternal grandmother; Cancer in her maternal grandmother; Cervical Cancer in her paternal grandmother; Diabetes in her paternal grandfather and paternal grandmother;  Other in her daughter and son; Uterine Cancer in her paternal grandmother. SOCIAL HISTORY      reports that she has quit smoking. Her smoking use included cigarettes. She smoked 0.25 packs per day. She has never used smokeless tobacco. She reports previous drug use. Drug: Marijuana Lovena Mems). She reports that she does not drink alcohol. PHYSICAL EXAM     INITIAL VITALS:  weight is 140 lb (63.5 kg). Her temporal temperature is 98 °F (36.7 °C). Her blood pressure is 96/70 and her pulse is 94. Her respiration is 18 and oxygen saturation is 100%. Physical Exam  Vitals and nursing note reviewed. Constitutional:       General: She is not in acute distress. HENT:      Head: Normocephalic and atraumatic. Eyes:      Conjunctiva/sclera: Conjunctivae normal.      Pupils: Pupils are equal, round, and reactive to light. Cardiovascular:      Rate and Rhythm: Normal rate and regular rhythm. Heart sounds: Normal heart sounds. No murmur heard. No friction rub. No gallop. Pulmonary:      Effort: Pulmonary effort is normal. No respiratory distress. Breath sounds: Normal breath sounds. Abdominal:      General: Bowel sounds are normal. There is no distension. Palpations: Abdomen is soft. Tenderness: There is abdominal tenderness (Lower abdominal). Musculoskeletal:         General: No tenderness. Cervical back: Neck supple. Lymphadenopathy:      Cervical: No cervical adenopathy. Skin:     General: Skin is warm and dry. Findings: No rash. Neurological:      Mental Status: She is alert and oriented to person, place, and time. Psychiatric:         Judgment: Judgment normal.           DIFFERENTIAL DIAGNOSIS/MDM:   58-year-old female presents with about 6 hours of heavy vaginal bleeding. She is afebrile, nontoxic, normal vital signs. No acute distress. She called her OB/GYN and was told to come to the emergency department. We will get lab work, transvaginal ultrasound.     DIAGNOSTIC RESULTS     EKG: All EKG's are interpreted by the Emergency Department Physician who either signs or Co-signs this chart in the absence of a cardiologist.        RADIOLOGY:   I directly visualized the following  images and reviewed the radiologist interpretations:  US PELVIS COMPLETE   Final Result   Negative transabdominal pelvic ultrasound. Normal Doppler flow within the ovaries. US DUP ABD PEL RETRO SCROT LIMITED   Final Result   Negative transabdominal pelvic ultrasound. Normal Doppler flow within the ovaries. ED BEDSIDE ULTRASOUND:      LABS:  Labs Reviewed   URINALYSIS WITH REFLEX TO CULTURE - Abnormal; Notable for the following components:       Result Value    Color, UA Orange (*)     Turbidity UA Cloudy (*)     Urine Hgb LARGE (*)     Protein, UA 1+ (*)     Leukocyte Esterase, Urine SMALL (*)     All other components within normal limits   CBC WITH AUTO DIFFERENTIAL - Abnormal; Notable for the following components:    RDW 17.4 (*)     Monocytes 8 (*)     All other components within normal limits   COMPREHENSIVE METABOLIC PANEL - Abnormal; Notable for the following components:    Chloride 108 (*)     All other components within normal limits   APTT - Abnormal; Notable for the following components:    PTT 37.2 (*)     All other components within normal limits   CULTURE, URINE   PREGNANCY, URINE   PROTIME-INR   MICROSCOPIC URINALYSIS   TYPE AND SCREEN         EMERGENCY DEPARTMENT COURSE:   Vitals:    Vitals:    03/30/22 1120   BP: 96/70   Pulse: 94   Resp: 18   Temp: 98 °F (36.7 °C)   TempSrc: Temporal   SpO2: 100%   Weight: 140 lb (63.5 kg)     Lab work is unremarkable. Hemoglobin is normal.  Electrolytes normal.    Ultrasound is unremarkable. I spoke with on-call OB/GYN Dr. Angel Bueno.  He recommended outpatient follow-up with her OB/GYN. Spoke with patient about results. She is comfortable with this plan. Advised to return if any symptoms worsen.   She is agreeable with this plan will be discharged home today.         Carloz Rojas:      CONSULTS:  IP CONSULT TO OB GYN      PROCEDURES:      FINAL IMPRESSION      1. DUB (dysfunctional uterine bleeding)            DISPOSITION/PLAN   DISPOSITION Decision To Discharge 03/30/2022 01:59:00 PM          PATIENT REFERRED TO:  Cortez Mendez MD  [address]  [phone]    Schedule an appointment as soon as possible for a visit       [address] [place] [address]  [phone]    Schedule an appointment as soon as possible for a visit       [facility] ED  [facility] [address] [phone]    As needed, If symptoms worsen      DISCHARGE MEDICATIONS:  Current Discharge Medication List          The care is provided during an unprecedented national emergency due to the novel coronavirus, COVID-19.     (Please note that portions ofthis note were completed with a voice recognition program.  Efforts were made to edit the dictations but occasionally words are mis-transcribed.)    Melia Cook DO  Attending Emergency Physician          Melia Cook DO  03/30/22 1400

## 2022-03-30 NOTE — ED TRIAGE NOTES
Pt reports cramping since yesterday stating vaginal bleeding starting 6AM. Pt reports she has changed 6 overnight pads since 6AM. Pt reports passing clots. Pt reports dizziness and weakness.

## 2022-03-31 LAB
CULTURE: NORMAL
SPECIMEN DESCRIPTION: NORMAL

## 2022-07-25 ENCOUNTER — OFFICE VISIT (OUTPATIENT)
Dept: OBGYN CLINIC | Age: 42
End: 2022-07-25
Payer: MEDICARE

## 2022-07-25 VITALS
HEIGHT: 66 IN | DIASTOLIC BLOOD PRESSURE: 70 MMHG | SYSTOLIC BLOOD PRESSURE: 112 MMHG | WEIGHT: 133 LBS | BODY MASS INDEX: 21.38 KG/M2

## 2022-07-25 DIAGNOSIS — R53.83 FATIGUE, UNSPECIFIED TYPE: ICD-10-CM

## 2022-07-25 DIAGNOSIS — N92.0 MENORRHAGIA WITH REGULAR CYCLE: Primary | ICD-10-CM

## 2022-07-25 PROCEDURE — 99213 OFFICE O/P EST LOW 20 MIN: CPT | Performed by: NURSE PRACTITIONER

## 2022-07-25 RX ORDER — BIOTIN 10 MG
TABLET ORAL
COMMUNITY
End: 2022-08-01 | Stop reason: ALTCHOICE

## 2022-07-25 NOTE — PROGRESS NOTES
today said 14th pregnancy. Ron Gutter ... which is not true. 8 total- 2 sab's 5  deliveries. 3 boys and 2 girls.        Past Medical History:   Diagnosis Date    Anemia     Family history of breast cancer     Family history of cervical cancer     Family history of uterine cancer     Hepatic hemangioma 2021    Herpes     MRSA (methicillin resistant staph aureus) culture positive 2017    abscess    Postpartum depression 2016    Tobacco use 2017       Past Surgical History:   Procedure Laterality Date    DILATION AND CURETTAGE      DILATION AND CURETTAGE OF UTERUS      Miscarriage    LAPAROSCOPY      for pain    IN LAP,APPENDECTOMY N/A 2018    APPENDECTOMY LAPAROSCOPIC performed by Isidra Cassa MD at [address] N/A 2018    BOWEL RESECTION HEMICOLECTOMY - Right performed by Isidra Casas MD at [address]  2021       Family History   Problem Relation Age of Onset    Diabetes Paternal Grandfather     Diabetes Paternal Grandmother     Uterine Cancer Paternal Grandmother     Cervical Cancer Paternal Grandmother     Cancer Maternal Grandmother     Breast Cancer Maternal Grandmother     Other Daughter         deaf    Other Son         Bowel surgery at 2year old       Social History     Socioeconomic History    Marital status:      Spouse name: Not on file    Number of children: Not on file    Years of education: Not on file    Highest education level: Not on file   Occupational History    Not on file   Tobacco Use    Smoking status: Former     Packs/day: 0.25     Types: Cigarettes    Smokeless tobacco: Never   Vaping Use    Vaping Use: Former    Substances: Occasionally   Substance and Sexual Activity    Alcohol use: No     Alcohol/week: 0.0 standard drinks    Drug use: Not Currently     Types: Marijuana Lucianne Bars)    Sexual activity: Yes     Partners: Male   Other Topics Concern    Not on file   Social History Narrative    ** Merged History Encounter **          Social Determinants of Health     Financial Resource Strain: Low Risk     Difficulty of Paying Living Expenses: Not very hard   Food Insecurity: No Food Insecurity    Worried About Running Out of Food in the Last Year: Never true    Ran Out of Food in the Last Year: Never true   Transportation Needs: No Transportation Needs    Lack of Transportation (Medical): No    Lack of Transportation (Non-Medical): No   Physical Activity: Not on file   Stress: Not on file   Social Connections: Not on file   Intimate Partner Violence: Not on file   Housing Stability: Not on file         MEDICATIONS:  Current Outpatient Medications   Medication Sig Dispense Refill    Ferrous Sulfate Dried (FERROUS SULFATE IRON) 200 (65 Fe) MG TABS Take by mouth      ibuprofen (ADVIL;MOTRIN) 600 MG tablet Take 1 tablet by mouth every 6 hours as needed for Pain 60 tablet 1     No current facility-administered medications for this visit. ALLERGIES:  Allergies as of 07/25/2022 - Fully Reviewed 07/25/2022   Allergen Reaction Noted    Codeine  05/04/2016    Fentanyl Other (See Comments) 08/19/2017    Penicillins Hives 05/04/2016         REVIEW OF SYSTEMS:    yes   A minimum of an eleven point review of systems was completed. Review Of Systems (11 point):  Constitutional: No fever, chills or malaise;  No weight change or fatigue  Head and Eyes: No vision, Headache, Dizziness or trauma in last 12 months  ENT ROS: No hearing, Tinnitis, sinus or taste problems  Hematological and Lymphatic ROS:No Lymphoma, Von Willebrand's, Hemophillia or Bleeding History  Psych ROS: No Depression, Homicidal thoughts,suicidal thoughts, or anxiety  Breast ROS: No prior breast abnormalities or lumps  Respiratory ROS: No SOB, Pneumoniae,Cough, or Pulmonary Embolism History  Cardiovascular ROS: No Chest Pain with Exertion, Palpitations, Syncope, Edema, Arrhythmia  Gastrointestinal ROS: No Indigestion, Heartburn, Nausea, vomiting, Diarrhea, Constipation,or Bowel Changes; No Bloody Stools or melena  Genito-Urinary ROS: No Dysuria, Hematuria or Nocturia. No Urinary Incontinence or Vaginal Discharge  Musculoskeletal ROS: No Arthralgia, Arthritis,Gout,Osteoporosis or Rheumatism  Neurological ROS: No CVA, Migraines, Epilepsy, Seizure Hx, or Limb Weakness  Dermatological ROS: No Rash, Itching, Hives, Mole Changes or Cancer          Blood pressure 112/70, height 5' 6\" (1.676 m), weight 133 lb (60.3 kg), last menstrual period 07/15/2022, not currently breastfeeding. Abdomen: Soft non-tender; good bowel sounds. No guarding, rebound or rigidity. No CVA tenderness bilaterally. Extremities: No calf tenderness, DTR 2/4, and No edema bilaterally    Pelvic: Exam deferred. Diagnostics:  No results found. Lab Results:  Results for orders placed or performed during the hospital encounter of 03/30/22   Culture, Urine    Specimen: Urine, clean catch   Result Value Ref Range    Specimen Description . CLEAN CATCH URINE     Culture NO SIGNIFICANT GROWTH    HCG, Pregnancy,Urine   Result Value Ref Range    HCG(Urine) Pregnancy Test NEGATIVE NEGATIVE   Urinalysis with Reflex to Culture    Specimen: Urine, clean catch   Result Value Ref Range    Color, UA Orange (A) Yellow    Turbidity UA Cloudy (A) Clear    Glucose, Ur NEGATIVE NEGATIVE    Bilirubin Urine NEGATIVE NEGATIVE    Ketones, Urine NEGATIVE NEGATIVE    Specific [place], UA 1.016 1.000 - 1.030    Urine Hgb LARGE (A) NEGATIVE    pH, UA 5.5 5.0 - 8.0    Protein, UA 1+ (A) NEGATIVE    Urobilinogen, Urine Normal Normal    Nitrite, Urine NEGATIVE NEGATIVE    Leukocyte Esterase, Urine SMALL (A) NEGATIVE   CBC with Auto Differential   Result Value Ref Range    WBC 5.2 3.5 - 11.0 k/uL    RBC 4.73 4.0 - 5.2 m/uL    Hemoglobin 12.7 12.0 - 16.0 g/dL    Hematocrit 39.1 36 - 46 %    MCV 82.8 80 - 100 fL    MCH 26.8 26 - 34 pg    MCHC 32.4 31 - 37 g/dL    RDW 17.4 (H) 11.5 - 14.9 %    Platelets 856 543 - 721 k/uL    MPV 7.9 6.0 - 12.0 fL    Seg Neutrophils 51 36 - 66 %    Lymphocytes 39 24 - 44 %    Monocytes 8 (H) 1 - 7 %    Eosinophils % 1 0 - 4 %    Basophils 1 0 - 2 %    Segs Absolute 2.60 1.3 - 9.1 k/uL    Absolute Lymph # 2.00 1.0 - 4.8 k/uL    Absolute Mono # 0.40 0.1 - 1.3 k/uL    Absolute Eos # 0.10 0.0 - 0.4 k/uL    Basophils Absolute 0.00 0.0 - 0.2 k/uL   Comprehensive Metabolic Panel   Result Value Ref Range    Glucose 94 70 - 99 mg/dL    BUN 12 6 - 20 mg/dL    Creatinine 0.81 0.50 - 0.90 mg/dL    Calcium 9.1 8.6 - 10.4 mg/dL    Sodium 143 135 - 144 mmol/L    Potassium 4.0 3.7 - 5.3 mmol/L    Chloride 108 (H) 98 - 107 mmol/L    CO2 23 20 - 31 mmol/L    Anion Gap 12 9 - 17 mmol/L    Alkaline Phosphatase 53 35 - 104 U/L    ALT 12 5 - 33 U/L    AST 12 <32 U/L    Total Bilirubin 0.32 0.3 - 1.2 mg/dL    Total Protein 7.0 6.4 - 8.3 g/dL    Albumin 4.3 3.5 - 5.2 g/dL    GFR Non-African American >60 >60 mL/min    GFR African American >60 >60 mL/min    GFR Comment         Protime-INR   Result Value Ref Range    Protime 13.1 11.8 - 14.6 sec    INR 1.0    APTT   Result Value Ref Range    PTT 37.2 (H) 24.0 - 36.0 sec   Microscopic Urinalysis   Result Value Ref Range    WBC, UA 10 TO 20 /HPF    RBC, UA TOO NUMEROUS TO COUNT /HPF    Casts UA 0 TO 2 /LPF    Epithelial Cells UA 0 TO 2 /HPF    Bacteria, UA None None   TYPE AND SCREEN   Result Value Ref Range    Expiration Date 04/02/2022,6518     Arm Band Number [id number]     ABO/Rh A POSITIVE     Antibody Screen NEGATIVE          Assessment:   Diagnosis Orders   1. Menorrhagia with regular cycle  Estradiol    Follicle Stimulating Hormone    HCG, Quantitative, Pregnancy    TSH with Reflex    CBC with Auto Differential    Protime-INR    APTT    US PELVIS COMPLETE    US NON OB TRANSVAGINAL    Vitamin D 25 Hydroxy    Vitamin B12      2.  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     Priority: High     Daughter was born deaf   2016 declined opportunity for non-syndromic hearing loss carrier testing by MFM  Declined connexin diagnostic testing in her daughter, Zaria Mathur, and has declined testing for non-syndromic hearing loss      Grand multiparity 2016     Priority: High     2000 36 weeks   2001 35 weeks   2002 SAB  2004 36 weeks   2006 37 weeks   2013 37 weeks         Herpes      Priority: High     2016 History of genital herpes: last outbreak years ago  Call office with any new outbreaks      Hepatic hemangioma 2021    S/P tubal ligation 2021    Normal labor      20 F Apg 8/9 Wt 6#9 2021    Carcinoid tumor of appendix and colon 2018     S/p laparoscopic appendectomy and right sided hemicolectomy ()      MRSA infection - buttock and prepatellar bursa  2017 MRSA swab negative  3/25/2021 MRSA swab negative       Prepatellar bursitis of right knee     Abscess, gluteal, right 2017     10/22 M Apg 8/9 Wt 5#7 10/22/2016    Family history of breast cancer      Patient's maternal grandmother diagnosed in 66's      Family history of uterine cancer      Paternal grandmother diagnosed with uterine cancer in her [de-identified]             PLAN:  Return in about 4 weeks (around 2022) for physician/ heavy menses. RTO in 1 week for Pap smear. Lab slips given. Pelvic ultrasound ordered. Repeat Annual every 1 year  Cervical Cytology Evaluation begins at 24years old. If Negative Cytology, Follow-up screening per current guidelines. Return to the office in 4 weeks. Counseled on preventative health maintenance follow-up.   Orders Placed This Encounter   Procedures    US PELVIS COMPLETE     Standing Status:   Future     Standing Expiration Date:   10/25/2022     Order Specific Question:   Reason for exam:     Answer:   irregular menses    US NON OB TRANSVAGINAL     Standing Status:   Future     Standing Expiration Date:   1/25/2023     Order Specific Question:   Reason for exam:     Answer:   heavy menses    Estradiol     Standing Status:   Future     Standing Expiration Date:   7/62/1582    Follicle Stimulating Hormone     Standing Status:   Future     Standing Expiration Date:   7/25/2023    HCG, Quantitative, Pregnancy     Standing Status:   Future     Standing Expiration Date:   7/25/2023    TSH with Reflex     Standing Status:   Future     Standing Expiration Date:   7/25/2023    CBC with Auto Differential     Standing Status:   Future     Standing Expiration Date:   7/25/2023    Protime-INR     Standing Status:   Future     Standing Expiration Date:   7/25/2023     Order Specific Question:   Daily Coumadin Dose? Answer:   N    APTT     Standing Status:   Future     Standing Expiration Date:   7/25/2023     Order Specific Question:   Daily Heparin Dose? Answer:   N    Vitamin D 25 Hydroxy     Standing Status:   Future     Standing Expiration Date:   7/25/2023    Vitamin B12     Standing Status:   Future     Standing Expiration Date:   7/25/2023           The patient, Bright Tineo is a 36 y.o. female, was seen with a total time spent of 20 minutes for the visit on this date of service by the E/M provider. The time component had both face to face and non face to face time spent in determining the total time component. Counseling and education regarding her diagnosis listed below and her options regarding those diagnoses were also included in determining her time component. Diagnosis Orders   1.  Menorrhagia with regular cycle  Estradiol    Follicle Stimulating Hormone    HCG, Quantitative, Pregnancy    TSH with Reflex    CBC with Auto Differential    Protime-INR    APTT    US PELVIS COMPLETE    US NON OB TRANSVAGINAL    Vitamin D 25 Hydroxy    Vitamin B12      2. Fatigue, unspecified type  Vitamin D 25 Hydroxy    Vitamin B12           The patient had her preventative health maintenance recommendations and follow-up reviewed with her at the completion of her visit.

## 2022-07-26 ENCOUNTER — HOSPITAL ENCOUNTER (OUTPATIENT)
Age: 42
Discharge: HOME | End: 2022-07-26
Payer: MEDICARE

## 2022-07-26 DIAGNOSIS — N92.0 MENORRHAGIA WITH REGULAR CYCLE: ICD-10-CM

## 2022-07-26 DIAGNOSIS — R53.83 FATIGUE, UNSPECIFIED TYPE: ICD-10-CM

## 2022-07-26 LAB
ABSOLUTE EOS #: 0.1 K/UL (ref 0–0.4)
ABSOLUTE LYMPH #: 1.9 K/UL (ref 1–4.8)
ABSOLUTE MONO #: 0.3 K/UL (ref 0.1–1.3)
BASOPHILS # BLD: 1 % (ref 0–2)
BASOPHILS ABSOLUTE: 0 K/UL (ref 0–0.2)
EOSINOPHILS RELATIVE PERCENT: 2 % (ref 0–4)
ESTRADIOL LEVEL: 81.4 PG/ML (ref 27–314)
FOLLICLE STIMULATING HORMONE: 6.1 MIU/ML (ref 1.7–21.5)
HCG QUANTITATIVE: <1 MIU/ML
HCT VFR BLD CALC: 41.6 % (ref 36–46)
HEMOGLOBIN: 13.3 G/DL (ref 12–16)
INR BLD: 1
LYMPHOCYTES # BLD: 42 % (ref 24–44)
MCH RBC QN AUTO: 27.3 PG (ref 26–34)
MCHC RBC AUTO-ENTMCNC: 32.1 G/DL (ref 31–37)
MCV RBC AUTO: 85.1 FL (ref 80–100)
MONOCYTES # BLD: 7 % (ref 1–7)
PARTIAL THROMBOPLASTIN TIME: 37.5 SEC (ref 24–36)
PDW BLD-RTO: 17.6 % (ref 11.5–14.9)
PLATELET # BLD: 359 K/UL (ref 150–450)
PMV BLD AUTO: 8.3 FL (ref 6–12)
PROTHROMBIN TIME: 13.5 SEC (ref 11.8–14.6)
RBC # BLD: 4.88 M/UL (ref 4–5.2)
SEG NEUTROPHILS: 48 % (ref 36–66)
SEGMENTED NEUTROPHILS ABSOLUTE COUNT: 2.3 K/UL (ref 1.3–9.1)
TSH SERPL DL<=0.05 MIU/L-ACNC: 0.91 UIU/ML (ref 0.3–5)
WBC # BLD: 4.6 K/UL (ref 3.5–11)

## 2022-07-26 PROCEDURE — 83001 ASSAY OF GONADOTROPIN (FSH): CPT

## 2022-07-26 PROCEDURE — 85610 PROTHROMBIN TIME: CPT

## 2022-07-26 PROCEDURE — 36415 COLL VENOUS BLD VENIPUNCTURE: CPT

## 2022-07-26 PROCEDURE — 85025 COMPLETE CBC W/AUTO DIFF WBC: CPT

## 2022-07-26 PROCEDURE — 84443 ASSAY THYROID STIM HORMONE: CPT

## 2022-07-26 PROCEDURE — 82607 VITAMIN B-12: CPT

## 2022-07-26 PROCEDURE — 82670 ASSAY OF TOTAL ESTRADIOL: CPT

## 2022-07-26 PROCEDURE — 85730 THROMBOPLASTIN TIME PARTIAL: CPT

## 2022-07-26 PROCEDURE — 84702 CHORIONIC GONADOTROPIN TEST: CPT

## 2022-07-26 PROCEDURE — 82306 VITAMIN D 25 HYDROXY: CPT

## 2022-07-27 ENCOUNTER — TELEPHONE (OUTPATIENT)
Dept: OBGYN CLINIC | Age: 42
End: 2022-07-27

## 2022-07-27 DIAGNOSIS — E55.9 VITAMIN D DEFICIENCY: Primary | ICD-10-CM

## 2022-07-27 LAB
VITAMIN B-12: 614 PG/ML (ref 232–1245)
VITAMIN D 25-HYDROXY: 25.1 NG/ML

## 2022-07-27 NOTE — TELEPHONE ENCOUNTER
----- Message from EVETTE Tijerina - CNP sent at 7/27/2022  7:50 AM EDT -----  Vitamin D3 low  Vitamin D3 800 iu daily- send script if needed- 3 refills. Repeat vitamin D 25hydroxy level in 3 months.

## 2022-07-27 NOTE — TELEPHONE ENCOUNTER
----- Message from EVETTE Bran CNP sent at 7/26/2022  1:23 PM EDT -----  Slightly elevated PTT 37.5- reviewed with DR Nishi Carpenter. Patient to continue her follow up with physician as scheduled.

## 2022-07-27 NOTE — TELEPHONE ENCOUNTER
Per Nkechi Evans NP, pt notified of vitamin D3 being low; pt to start vitamin D3 800 IU daily. Pt verbalized understanding and will purchase OTC. Also, notified of PTT being slightly elevated; encouraged to keep f/u appt with Dr. Altagracia Massey previously scheduled. Repeat vitamin D3 level in 3 months; order in epic. Pt verbalized understanding.

## 2022-08-01 ENCOUNTER — HOSPITAL ENCOUNTER (OUTPATIENT)
Age: 42
Setting detail: SPECIMEN
Discharge: HOME | End: 2022-08-01

## 2022-08-01 ENCOUNTER — OFFICE VISIT (OUTPATIENT)
Dept: OBGYN CLINIC | Age: 42
End: 2022-08-01
Payer: MEDICARE

## 2022-08-01 VITALS
WEIGHT: 131 LBS | BODY MASS INDEX: 21.05 KG/M2 | HEIGHT: 66 IN | DIASTOLIC BLOOD PRESSURE: 60 MMHG | SYSTOLIC BLOOD PRESSURE: 100 MMHG

## 2022-08-01 DIAGNOSIS — Z11.51 SPECIAL SCREENING EXAMINATION FOR HUMAN PAPILLOMAVIRUS (HPV): ICD-10-CM

## 2022-08-01 DIAGNOSIS — Z12.31 ENCOUNTER FOR SCREENING MAMMOGRAM FOR MALIGNANT NEOPLASM OF BREAST: ICD-10-CM

## 2022-08-01 DIAGNOSIS — Z01.419 WELL FEMALE EXAM WITH ROUTINE GYNECOLOGICAL EXAM: Primary | ICD-10-CM

## 2022-08-01 PROCEDURE — 99396 PREV VISIT EST AGE 40-64: CPT | Performed by: NURSE PRACTITIONER

## 2022-08-01 ASSESSMENT — PATIENT HEALTH QUESTIONNAIRE - PHQ9
1. LITTLE INTEREST OR PLEASURE IN DOING THINGS: 0
4. FEELING TIRED OR HAVING LITTLE ENERGY: 0
SUM OF ALL RESPONSES TO PHQ QUESTIONS 1-9: 0
SUM OF ALL RESPONSES TO PHQ QUESTIONS 1-9: 0
3. TROUBLE FALLING OR STAYING ASLEEP: 0
8. MOVING OR SPEAKING SO SLOWLY THAT OTHER PEOPLE COULD HAVE NOTICED. OR THE OPPOSITE, BEING SO FIGETY OR RESTLESS THAT YOU HAVE BEEN MOVING AROUND A LOT MORE THAN USUAL: 0
7. TROUBLE CONCENTRATING ON THINGS, SUCH AS READING THE NEWSPAPER OR WATCHING TELEVISION: 0
SUM OF ALL RESPONSES TO PHQ9 QUESTIONS 1 & 2: 0
SUM OF ALL RESPONSES TO PHQ QUESTIONS 1-9: 0
10. IF YOU CHECKED OFF ANY PROBLEMS, HOW DIFFICULT HAVE THESE PROBLEMS MADE IT FOR YOU TO DO YOUR WORK, TAKE CARE OF THINGS AT HOME, OR GET ALONG WITH OTHER PEOPLE: 0
SUM OF ALL RESPONSES TO PHQ QUESTIONS 1-9: 0
9. THOUGHTS THAT YOU WOULD BE BETTER OFF DEAD, OR OF HURTING YOURSELF: 0
6. FEELING BAD ABOUT YOURSELF - OR THAT YOU ARE A FAILURE OR HAVE LET YOURSELF OR YOUR FAMILY DOWN: 0
5. POOR APPETITE OR OVEREATING: 0
2. FEELING DOWN, DEPRESSED OR HOPELESS: 0

## 2022-08-01 NOTE — PROGRESS NOTES
History and Physical  [address], [address] [facility], [address], [address] [phone]   Fax [phone]  Billie Hale  2022              36 y.o. Chief Complaint   Patient presents with    Annual Exam       Patient's last menstrual period was 2022 (approximate). Primary Care Physician: Mirna Lundberg MD    The patient was seen and examined. She has no chief complaint today and is here for her annual exam.  Her bowels are regular. There are no voiding complaints. She denies any bloating. She denies vaginal discharge and was counseled on STD's and the need for barrier contraception. HPI : Billie Hale is a 36 y.o. female [id number]    Annual exam  Previously had office visit for heavy menses and is returning to update pap smear. Heavy menses  ________________________________________________________________________  OB History    Para Term  AB Living   9 7 1 6 2 7   SAB IAB Ectopic Molar Multiple Live Births   2 0 0 0 0 7      # Outcome Date GA Lbr Kaden/2nd Weight Sex Delivery Anes PTL Lv   9 Term 21 38w4d  6 lb 9.6 oz (2.995 kg) F Vag-Spont Local N MALLIKA      Name: Linus Mccall: 8  Apgar5: 9   8  10/22/16 34w2d  5 lb 7.1 oz (2.47 kg) M Vag-Spont   MALLIKA      Name: Pino Orantes: 8  Apgar5: 9   7  13 36w4d  5 lb 13 oz (2.637 kg) M    MALLIKA   6 SAB  6w0d    SAB      5   36w0d  6 lb 4 oz (2.835 kg) F Vag-Spont   MALLIKA   4   36w0d  6 lb 2 oz (2.778 kg) M Vag-Spont   MALLIKA      Birth Comments: Spont PTL/PTD   3 2002 10w0d             Birth Comments: D&C   2   35w0d  5 lb 2 oz (2.325 kg) M Vag-Spont   MALLIKA      Birth Comments: Spont PTL/PTD   1   36w0d  6 lb 1 oz (2.75 kg) F Vag-Spont   MALLIKA      Birth Comments: Spont PTL/PTD      Obstetric Comments   Adjusted pregnancy's and dates today 16.  Went over each one with patient in detail. The original chart today said 14th pregnancy. Jeremy Rayo ... which is not true. 8 total- 2 sab's 5  deliveries. 3 boys and 2 girls.      Past Medical History:   Diagnosis Date    Anemia     Family history of breast cancer     Family history of cervical cancer     Family history of uterine cancer     Hepatic hemangioma 2021    Herpes     MRSA (methicillin resistant staph aureus) culture positive 2017    abscess    Postpartum depression 2016    Tobacco use 2017                                                                   Past Surgical History:   Procedure Laterality Date    DILATION AND CURETTAGE      DILATION AND CURETTAGE OF UTERUS      Miscarriage    LAPAROSCOPY      for pain    SD LAP,APPENDECTOMY N/A 2018    APPENDECTOMY LAPAROSCOPIC performed by Amira Ramsey MD at [address] [facility] N/A 2018    BOWEL RESECTION HEMICOLECTOMY - Right performed by Amira Ramsey MD at [facility]  2021     Family History   Problem Relation Age of Onset    Diabetes Paternal Grandfather     Diabetes Paternal Grandmother     Uterine Cancer Paternal Grandmother     Cervical Cancer Paternal Grandmother     Cancer Maternal Grandmother     Breast Cancer Maternal Grandmother     Other Daughter         deaf    Other Son         Bowel surgery at 2year old     Social History     Socioeconomic History    Marital status:      Spouse name: Not on file    Number of children: Not on file    Years of education: Not on file    Highest education level: Not on file   Occupational History    Not on file   Tobacco Use    Smoking status: Former     Packs/day: 0.25     Types: Cigarettes    Smokeless tobacco: Never   Vaping Use    Vaping Use: Former    Substances: Occasionally   Substance and Sexual Activity    Alcohol use: No     Alcohol/week: 0.0 standard drinks    Drug use: Not Currently     Types: Marijuana Tim Del Castillo)    Sexual activity: Yes     Partners: Male Other Topics Concern    Not on file   Social History Narrative    ** Merged History Encounter **          Social Determinants of Health     Financial Resource Strain: Low Risk     Difficulty of Paying Living Expenses: Not very hard   Food Insecurity: No Food Insecurity    Worried About Running Out of Food in the Last Year: Never true    Ran Out of Food in the Last Year: Never true   Transportation Needs: No Transportation Needs    Lack of Transportation (Medical): No    Lack of Transportation (Non-Medical): No   Physical Activity: Not on file   Stress: Not on file   Social Connections: Not on file   Intimate Partner Violence: Not on file   Housing Stability: Not on file       MEDICATIONS:  Current Outpatient Medications   Medication Sig Dispense Refill    ibuprofen (ADVIL;MOTRIN) 600 MG tablet Take 1 tablet by mouth every 6 hours as needed for Pain (Patient not taking: Reported on 8/1/2022) 60 tablet 1     No current facility-administered medications for this visit. ALLERGIES:  Allergies as of 08/01/2022 - Fully Reviewed 08/01/2022   Allergen Reaction Noted    Codeine  05/04/2016    Fentanyl Other (See Comments) 08/19/2017    Penicillins Hives 05/04/2016       Symptoms of decreased mood absent  Symptoms of anhedonia absent    **If either question is answered in a  positive fashion then complete the PHQ9 Scoring Evaluation and make the appropriate referral**      Immunization status: stated as current, but no records available. Gynecologic History:  Menarche: 6 yo  Menopause at [address] [address] [place] yo     Patient's last menstrual period was 07/13/2022 (approximate). Sexually Active: Yes    STD History: No     Permanent Sterilization: Yes TL with DR Keke Dozier in November   Reversible Birth Control: No        Hormone Replacement Exposure: No      Genetic Qualified Family History of Breast, Ovarian , Colon or Uterine Cancer: Yes (maternal grandmother with breast cancer- ?  Unsure of age, paternal grandmother with uterine and ovarian cancer- 80s)declines     If YES see scanned worksheet. Preventative Health Testing:    Health Maintenance:  Health Maintenance Due   Topic Date Due    COVID-19 Vaccine (1) Never done    Hepatitis C screen  Never done    DTaP/Tdap/Td vaccine (1 - Tdap) Never done    Lipids  Never done    Depression Monitoring  10/27/2021       Date of Last Pap Smear: 3/24/2021 neg/neg  Abnormal Pap Smear History: yes, but unsure of abnormality, in her late 25s. Denies surgical intervention. Colposcopy History:   Date of Last Mammogram: 7/2017 negative  Date of Last Colonoscopy: 2014? Date of Last Bone Density:      ________________________________________________________________________        REVIEW OF SYSTEMS:    yes   A minimum of an eleven point review of systems was completed. Review Of Systems (11 point):  Constitutional: No fever, chills or malaise; No weight change or fatigue  Head and Eyes: No vision changes, Headache, Dizziness or trauma in last 12 months  ENT ROS: No hearing, Tinnitis, sinus or taste problems  Hematological and Lymphatic ROS:No Lymphoma, Von Willebrand's, Hemophillia or Bleeding History  Psych ROS: No Depression, Homicidal thoughts,suicidal thoughts, or anxiety  Breast ROS: No breast abnormalities or lumps  Respiratory ROS: No SOB, Pneumoniae,Cough, or Pulmonary Embolism   Cardiovascular ROS: No Chest Pain with Exertion, Palpitations, Syncope, Edema, Arrhythmia  Gastrointestinal ROS: No Indigestion, Heartburn, Nausea, vomiting, Diarrhea, Constipation,or Bowel Changes; No Bloody Stools or melena. + appendectomy and tumor removed from intestine- benign. Genito-Urinary ROS: No Dysuria, Hematuria or Nocturia. No Urinary Incontinence or Vaginal Discharge.  +heavy menses  Musculoskeletal ROS: No Arthralgia, Arthritis,Gout,Osteoporosis or Rheumatism  Neurological ROS: No CVA, Migraines, Epilepsy, Seizure Hx, or Limb Weakness  Dermatological ROS: No Rash, Itching, Hives, Mole Changes or Cancer PHYSICAL Exam:     Constitutional:  Vitals:    08/01/22 1132   BP: 100/60   Site: Right Upper Arm   Position: Sitting   Cuff Size: Medium Adult   Weight: 131 lb (59.4 kg)   Height: 5' 6\" (1.676 m)       Chaperone for Intimate Exam  Chaperone was offered and accepted as part of the rooming process. Chaperone: Juvencio Orange Appearance: This  is a well Developed, well Nourished, well groomed female. Her BMI was reviewed. Nutritional decision making was discussed. Skin:  There was a Normal Inspection of the skin without rashes or lesions. There were no rashes. (Papular, Maculopapular, Hives, Pustular, Macular)     There were no lesions (Ulcers, Erythema, Abn. Appearing Nevi)            Lymphatic:  No Lymph Nodes were Palpable in the neck , axilla or groin.  0 # Of Lymph Nodes; Location ; Character [Normal]  [Shotty] [Tender] [Enlarged]     Neck and EENT:  The neck was supple. There were no masses   The thyroid was not enlarged and had no masses. Perrla, EOMI B/L, TMI B/L No Abnormalities. Throat inspected-No exudates or Masses, Nares Patent No Masses        Respiratory: The lungs were auscultated and found to be clear. There were no rales, rhonchi or wheezes. There was a good respiratory effort. Cardiovascular: The heart was in a regular rate and rhythm. . No S3 or S4. There was no murmur appreciated. Location, grade, and radiation are not applicable. Extremities: The patients extremities were without calf tenderness, edema, or varicosities. There was full range of motion in all four extremities. Pulses in all four extremities were appreciated and are 2/4. Abdomen: The abdomen was soft and non-tender.  There were good bowel sounds in all quadrants and there was no guarding, rebound or rigidity. On evaluation there was no evidence of hepatosplenomegaly and there was no costal vertebral heather tenderness bilaterally. No hernias were appreciated. Abdominal Scars: C/W previous surgeries    Psych: The patient had a normal Orientation to: Time, Place, Person, and Situation  There is no Mood / Affect changes    Breast:  (Chest)  normal appearance, no masses or tenderness, No nipple retraction or dimpling, No nipple discharge or bleeding, No axillary or supraclavicular adenopathy, Normal to palpation without dominant masses, Taught monthly breast self examination  Self breast exams were reviewed in detail. Literature was given. Pelvic Exam:  External genitalia: normal general appearance  Urinary system: urethral meatus normal  Vaginal: normal mucosa without prolapse or lesions  Cervix: normal appearance  Adnexa: normal bimanual exam  Uterus: normal single, nontender    Rectal Exam:  exam declined by patient          Musculosk:  Normal Gait and station was noted. Digits were evaluated without abnormal findings. Range of motion, stability and strength were evaluated and found to be appropriate for the patients age. ASSESSMENT:      36 y.o. Annual   Diagnosis Orders   1. Well female exam with routine gynecological exam  PAP SMEAR      2. Encounter for screening mammogram for malignant neoplasm of breast  EVERETTE DIGITAL SCREEN W OR WO CAD BILATERAL      3.  Special screening examination for human papillomavirus (HPV)  PAP SMEAR             Chief Complaint   Patient presents with    Annual Exam          Past Medical History:   Diagnosis Date    Anemia     Family history of breast cancer     Family history of cervical cancer     Family history of uterine cancer     Hepatic hemangioma 11/24/2021    Herpes     MRSA (methicillin resistant staph aureus) culture positive 08/16/2017    abscess    Postpartum depression 11/7/2016    Tobacco use 8/16/2017         Patient Active Problem List    Diagnosis Date Noted    History of MRSA infection 2021     Priority: High     3/24/21 - Nares MRSA culture #1 completed  2021 MRSA swab #2 negative       Hx Postpartum depression 2016     Priority: High    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     Priority: High     Daughter was born deaf   2016 declined opportunity for non-syndromic hearing loss carrier testing by MFM  Declined connexin diagnostic testing in her daughter, Jenny Perez, and has declined testing for non-syndromic hearing loss      Grand multiparity 2016     Priority: High     2000 36 weeks   2001 35 weeks   2002 SAB  2004 36 weeks   2006 37 weeks   2013 37 weeks         Herpes      Priority: High     2016 History of genital herpes: last outbreak years ago  Call office with any new outbreaks      Hepatic hemangioma 2021    S/P tubal ligation 2021     21 F Apg 8/9 Wt 6#9 2021    Carcinoid tumor of appendix and colon 2018     S/p laparoscopic appendectomy and right sided hemicolectomy (2018)      MRSA infection - buttock and prepatellar bursa  2017 MRSA swab negative  3/25/2021 MRSA swab negative       Prepatellar bursitis of right knee     Abscess, gluteal, right 2017     10/22 M Apg 8/9 Wt 5#7 10/22/2016    Family history of breast cancer      Patient's maternal grandmother diagnosed in 66's      Family history of uterine cancer      Paternal grandmother diagnosed with uterine cancer in her [de-identified]            Hereditary Breast, Ovarian, Colon and Uterine Cancer screening Done. Tobacco & Secondary smoke risks reviewed; instructed on cessation and avoidance      Counseling Completed:  Preventative Health Recommendations and Follow up. The patient was informed of the recommended preventative health recommendations.     1. Annuals every year; Cytology collections per prevailing guidelines. 2. Mammograms begin every year at 35 yo if no abnormalities are found and no family history. 3. Bone density studies every 2-3 years. Begin at 71 yo. If no fracture history or osteoporosis family history. (significant). 4. Colonoscopy begin at 38 yo. Repeat every ten years if negative and no family history. 5. Calcium of 9296-1552 mg/day in split dosing  6. Vitamin D 400-800 IU/day  7. All other preventative health recommendations will be managed by the patients Primary care physician. PLAN:  Return in about 22 days (around 8/23/2022) for follow up with DR Fabiana Valenzuela. Pap smear obtained. Screening mammogram ordered. Repeat Annual every 1 year  Cervical Cytology Evaluation begins at 24years old. If Negative Cytology, Follow-up screening per current guidelines. Mammograms every 1 year. If 35 yo and last mammogram was negative. Calcium and Vitamin D dosing reviewed. Colonoscopy screening reviewed as well as onset for bone density testing. Birth control and barrier recommendations discussed. STD counseling and prevention reviewed. Gardisil counseling completed for all patients 10-35 yo. Routine health maintenance per patients PCP. Orders Placed This Encounter   Procedures    EVERETTE DIGITAL SCREEN W OR WO CAD BILATERAL     Standing Status:   Future     Standing Expiration Date:   8/1/2023     Order Specific Question:   Reason for exam:     Answer:   screening for malignant neoplasm of breast    PAP SMEAR     Patient History:    Patient's last menstrual period was 07/15/2022.   OBGYN Status: Irregular periods  Past Surgical History:  No date: DILATION AND CURETTAGE  No date: DILATION AND CURETTAGE OF UTERUS      Comment:  Miscarriage  No date: LAPAROSCOPY      Comment:  for pain  03/05/2018: [place] LAP,APPENDECTOMY; N/A      Comment:  APPENDECTOMY LAPAROSCOPIC performed by Amy John MD               at [address]  03/20/2018: TX PART REMOVAL COLON W ANASTOMOSIS; N/A      Comment:

## 2022-08-02 ENCOUNTER — OFFICE VISIT (OUTPATIENT)
Dept: OBGYN CLINIC | Age: 42
End: 2022-08-02
Payer: MEDICARE

## 2022-08-02 DIAGNOSIS — N92.0 MENORRHAGIA WITH REGULAR CYCLE: ICD-10-CM

## 2022-08-02 PROCEDURE — 76856 US EXAM PELVIC COMPLETE: CPT | Performed by: OBSTETRICS & GYNECOLOGY

## 2022-08-02 PROCEDURE — 76830 TRANSVAGINAL US NON-OB: CPT | Performed by: OBSTETRICS & GYNECOLOGY

## 2022-08-03 ENCOUNTER — TELEPHONE (OUTPATIENT)
Dept: OBGYN CLINIC | Age: 42
End: 2022-08-03

## 2022-08-03 NOTE — TELEPHONE ENCOUNTER
Per Brad Fisher pt notified of Small cystic area in fundus. Otherwise normal pelvic ultrasound. Recommend gyn sampling and possible endometrial sampling with hysteroscopy ifirregular bleeding continues. Pt previously scheduled for follow up with Dr Geoff Larson to discuss results and recommendation. I will review the information with Dr Geoff Larson to proceed to procedure EmBx Hscope and contact patient with recommendations.

## 2022-08-08 LAB — CYTOLOGY REPORT: NORMAL

## 2022-08-23 ENCOUNTER — OFFICE VISIT (OUTPATIENT)
Dept: INTERNAL MEDICINE CLINIC | Age: 42
End: 2022-08-23
Payer: MEDICARE

## 2022-08-23 VITALS
HEART RATE: 93 BPM | OXYGEN SATURATION: 99 % | BODY MASS INDEX: 20.79 KG/M2 | SYSTOLIC BLOOD PRESSURE: 102 MMHG | DIASTOLIC BLOOD PRESSURE: 62 MMHG | HEIGHT: 66 IN | WEIGHT: 129.4 LBS

## 2022-08-23 DIAGNOSIS — R42 POSTURAL DIZZINESS WITH NEAR SYNCOPE: ICD-10-CM

## 2022-08-23 DIAGNOSIS — R00.2 INTERMITTENT PALPITATIONS: ICD-10-CM

## 2022-08-23 DIAGNOSIS — R55 POSTURAL DIZZINESS WITH NEAR SYNCOPE: ICD-10-CM

## 2022-08-23 DIAGNOSIS — R63.4 UNINTENDED WEIGHT LOSS: ICD-10-CM

## 2022-08-23 DIAGNOSIS — R19.4 CHANGE IN BOWEL HABIT: ICD-10-CM

## 2022-08-23 DIAGNOSIS — R53.83 FATIGUE, UNSPECIFIED TYPE: ICD-10-CM

## 2022-08-23 DIAGNOSIS — R42 DIZZINESS: Primary | ICD-10-CM

## 2022-08-23 DIAGNOSIS — R23.3 BRUISES EASILY: ICD-10-CM

## 2022-08-23 DIAGNOSIS — G89.29 CHRONIC ABDOMINAL PAIN: ICD-10-CM

## 2022-08-23 DIAGNOSIS — Z11.59 NEED FOR HEPATITIS C SCREENING TEST: ICD-10-CM

## 2022-08-23 DIAGNOSIS — R10.9 CHRONIC ABDOMINAL PAIN: ICD-10-CM

## 2022-08-23 PROCEDURE — G8427 DOCREV CUR MEDS BY ELIG CLIN: HCPCS | Performed by: NURSE PRACTITIONER

## 2022-08-23 PROCEDURE — 99214 OFFICE O/P EST MOD 30 MIN: CPT | Performed by: NURSE PRACTITIONER

## 2022-08-23 PROCEDURE — G8420 CALC BMI NORM PARAMETERS: HCPCS | Performed by: NURSE PRACTITIONER

## 2022-08-23 PROCEDURE — 1036F TOBACCO NON-USER: CPT | Performed by: NURSE PRACTITIONER

## 2022-08-23 RX ORDER — ACETAMINOPHEN 160 MG
TABLET,DISINTEGRATING ORAL
COMMUNITY

## 2022-08-23 RX ORDER — FERROUS SULFATE 325(65) MG
325 TABLET ORAL
COMMUNITY

## 2022-08-23 ASSESSMENT — ENCOUNTER SYMPTOMS
TROUBLE SWALLOWING: 0
NAUSEA: 1
VOMITING: 0
SHORTNESS OF BREATH: 0
BACK PAIN: 0
WHEEZING: 0
COUGH: 0
ABDOMINAL PAIN: 1
SORE THROAT: 0
CONSTIPATION: 1

## 2022-08-23 NOTE — PROGRESS NOTES
Visit Information    Have you changed or started any medications since your last visit including any over-the-counter medicines, vitamins, or herbal medicines? no   Are you having any side effects from any of your medications? -  no  Have you stopped taking any of your medications? Is so, why? -  no    Have you seen any other physician or provider since your last visit? Yes - Records Obtained  Have you had any other diagnostic tests since your last visit? Yes - Records Obtained  Have you been seen in the emergency room and/or had an admission to a hospital since we last saw you? Yes - Records Obtained  Have you had your routine dental cleaning in the past 6 months? no    Have you activated your [employer] account? If not, what are your barriers? Yes     Patient Care Team:  Winnie Closs, MD as PCP - General (Internal Medicine)  Winnie Closs, MD as PCP - [facility] Provider  Lorette Bloch, MD as Obstetrician (Perinatology)    Medical History Review  Past Medical, Family, and Social History reviewed and does contribute to the patient presenting condition    Health Maintenance   Topic Date Due    COVID-19 Vaccine (1) Never done    DTaP/Tdap/Td vaccine (1 - Tdap) Never done    Lipids  Never done    Flu vaccine (1) Never done    Depression Monitoring  2023    Cervical cancer screen  2027    Hepatitis C screen  Completed    HIV screen  Completed    Hepatitis A vaccine  Aged Out    Hepatitis B vaccine  Aged Out    Hib vaccine  Aged Out    Meningococcal (ACWY) vaccine  Aged Out    Pneumococcal 0-64 years Vaccine  Aged Out     Well Adult Note  Name: Nicci Wilkinson Date: 2022   MRN: [id number] Sex: Female   Age: 36 y.o. Ethnicity: Non- / Non    : 1980 Race: White (non-)      Magnolia Barekr is here initially for well adult exam but has multiple medical complaints.     HPI- complaints of fatigue, weight change, intermittent palpitations, abdominal pain, change in bowel habits, nausea, and dizziness. Symptoms began a few months ago and have been worsening. Dizziness is worse with standing or changing position. Drinks plenty of water. She had a baby 11 months ago, has followed up with OB/Gyne, is not breast feeding. Had pelvic US with gyne during to heavy flow. Review of Systems   Constitutional:  Positive for fatigue and unexpected weight change. HENT:  Negative for sore throat and trouble swallowing. Eyes:  Negative for visual disturbance. Respiratory:  Negative for cough, shortness of breath and wheezing. Cardiovascular:  Positive for palpitations. Negative for chest pain and leg swelling. Gastrointestinal:  Positive for abdominal pain (whole abdomen, all the time, since the baby), constipation and nausea (intermittent). Negative for vomiting. Change in stool habits - \"I either can't go at all, or I can't stop going. There is no in between\"   Genitourinary:  Positive for menstrual problem (heavy bleeding). Negative for difficulty urinating. 7-12 day periods   Musculoskeletal:  Negative for arthralgias, back pain, gait problem and neck pain. Gets pain in feet and calfs, constant   Skin:  Negative for rash. Neurological:  Positive for dizziness, syncope (face gets numb, gets tunnel vision, states she \"blacks out\" but does not lose consciousness) and headaches. For several months   Hematological:  Bruises/bleeds easily. Psychiatric/Behavioral:  Positive for dysphoric mood (\"i feel flat\") and sleep disturbance (due to 9 month old baby). The patient is not nervous/anxious. Allergies   Allergen Reactions    Codeine      hallucinations    Fentanyl Other (See Comments)     hallucinations    Penicillins Hives         Prior to Visit Medications    Medication Sig Taking?  Authorizing Provider   Cholecalciferol (VITAMIN D3) 50 MCG (2000 UT) CAPS Take by mouth Yes Historical Provider, MD   vitamin k 100 MCG tablet Take 100 mcg by mouth daily Yes Historical Provider, MD   ferrous sulfate (IRON 325) 325 (65 Fe) MG tablet Take 325 mg by mouth daily (with breakfast) Yes Historical Provider, MD         Past Medical History:   Diagnosis Date    Anemia     Family history of breast cancer     Family history of cervical cancer     Family history of uterine cancer     Hepatic hemangioma 2021    Herpes     genital, 2 outbreaks total, has been years    MRSA (methicillin resistant staph aureus) culture positive 2017    abscess    Postpartum depression 2016    Tobacco use 2017       Past Surgical History:   Procedure Laterality Date    DILATION AND CURETTAGE      DILATION AND CURETTAGE OF UTERUS      Miscarriage    LAPAROSCOPY      for pain    [place] LAP,APPENDECTOMY N/A 2018    APPENDECTOMY LAPAROSCOPIC performed by Venita Yoon MD at [address] [facility] N/A 2018    BOWEL RESECTION HEMICOLECTOMY - Right performed by Venita Yoon MD at [place] [address]  2021         Family History   Problem Relation Age of Onset    Diabetes Paternal Grandfather     Diabetes Paternal Grandmother     Uterine Cancer Paternal Grandmother     Cervical Cancer Paternal Grandmother     Cancer Maternal Grandmother     Breast Cancer Maternal Grandmother     Other Daughter         deaf    Other Son         Bowel surgery at 2year old       Social History     Tobacco Use    Smoking status: Former     Packs/day: 0.25     Types: Cigarettes     Start date:      Quit date:      Years since quittin.7    Smokeless tobacco: Never   Vaping Use    Vaping Use: Former    Substances: Occasionally   Substance Use Topics    Alcohol use: No     Alcohol/week: 0.0 standard drinks    Drug use: Not Currently     Types: Marijuana (Weed)       Objective   /62   Pulse 93   Ht 5' 6\" (1.676 m)   Wt 129 lb 6.4 oz (58.7 kg)   LMP 2022   SpO2 99%   BMI 20.89 kg/m²   Wt Readings from Last 3 Encounters:   09/06/22 127 lb 6.4 oz (57.8 kg)   08/23/22 129 lb 6.4 oz (58.7 kg)   08/01/22 131 lb (59.4 kg)     There were no vitals filed for this visit. Physical Exam  Constitutional:       General: She is not in acute distress. Appearance: Normal appearance. She is well-developed. HENT:      Head: Normocephalic and atraumatic. Right Ear: Tympanic membrane and external ear normal.      Left Ear: Tympanic membrane and external ear normal.      Nose: Nose normal.      Mouth/Throat:      Mouth: Mucous membranes are moist.      Pharynx: Oropharynx is clear. Eyes:      General:         Right eye: No discharge. Left eye: No discharge. Conjunctiva/sclera: Conjunctivae normal.      Pupils: Pupils are equal, round, and reactive to light. Neck:      Thyroid: No thyromegaly. Cardiovascular:      Rate and Rhythm: Normal rate and regular rhythm. Pulses: Normal pulses. Heart sounds: Normal heart sounds. No murmur heard. Pulmonary:      Effort: Pulmonary effort is normal.      Breath sounds: Normal breath sounds. No wheezing. Abdominal:      General: Bowel sounds are normal. There is no distension. Palpations: Abdomen is soft. Tenderness: There is abdominal tenderness. Musculoskeletal:      Cervical back: Normal range of motion and neck supple. No deformity or tenderness. Thoracic back: No deformity or tenderness. Lumbar back: No deformity or tenderness. Normal range of motion. Right lower leg: No edema. Left lower leg: No edema. Lymphadenopathy:      Cervical: No cervical adenopathy. Skin:     General: Skin is warm and dry. Findings: No rash. Neurological:      Mental Status: She is alert and oriented to person, place, and time. Cranial Nerves: No cranial nerve deficit. Gait: Gait normal.      Deep Tendon Reflexes: Reflexes normal.   Psychiatric:         Mood and Affect: Mood is depressed.          Speech: Speech normal.         Behavior: Behavior normal.         Assessment   Plan   1. Dizziness  -     CT HEAD WO CONTRAST; Future  2. Postural dizziness with near syncope  3. Fatigue, unspecified type  -     CBC with Auto Differential; Future  -     Path Review, Smear; Future  4. Unintended weight loss  5. Chronic abdominal pain  6. Change in bowel habit  7. Bruises easily  -     Hepatic Function Panel; Future  -     CBC with Auto Differential; Future  -     Path Review, Smear; Future  8. Intermittent palpitations  -     EKG 12 lead; Future  9. Need for hepatitis C screening test  -     Hepatitis C Antibody; Future     Will start with labs, EKG, CT head and go from there. Patient educated on signs and symptoms which require more emergent evaluation and treatment, instructed to present to emergency room should these develop, patient understands and agrees with plan. Personalized Preventive Plan   Current Health Maintenance Status  There is no immunization history for the selected administration types on file for this patient. Health Maintenance   Topic Date Due    COVID-19 Vaccine (1) Never done    DTaP/Tdap/Td vaccine (1 - Tdap) Never done    Lipids  Never done    Flu vaccine (1) Never done    Depression Monitoring  08/01/2023    Cervical cancer screen  08/01/2027    Hepatitis C screen  Completed    HIV screen  Completed    Hepatitis A vaccine  Aged Out    Hepatitis B vaccine  Aged Out    Hib vaccine  Aged Out    Meningococcal (ACWY) vaccine  Aged Out    Pneumococcal 0-64 years Vaccine  Aged Out     Recommendations for Keep Your Pharmacy Open Due: see orders and patient instructions/AVS.    Return in about 2 weeks (around 9/6/2022) for test result review.   Patient to be scheduled with MD.

## 2022-08-29 ENCOUNTER — HOSPITAL ENCOUNTER (OUTPATIENT)
Age: 42
Discharge: HOME | End: 2022-08-29
Payer: MEDICARE

## 2022-08-29 ENCOUNTER — HOSPITAL ENCOUNTER (OUTPATIENT)
Dept: WOMENS IMAGING | Age: 42
Discharge: HOME | End: 2022-08-31
Payer: MEDICARE

## 2022-08-29 DIAGNOSIS — Z11.59 NEED FOR HEPATITIS C SCREENING TEST: ICD-10-CM

## 2022-08-29 DIAGNOSIS — R00.2 INTERMITTENT PALPITATIONS: ICD-10-CM

## 2022-08-29 DIAGNOSIS — R53.83 FATIGUE, UNSPECIFIED TYPE: ICD-10-CM

## 2022-08-29 DIAGNOSIS — Z12.31 ENCOUNTER FOR SCREENING MAMMOGRAM FOR MALIGNANT NEOPLASM OF BREAST: ICD-10-CM

## 2022-08-29 DIAGNOSIS — R23.3 BRUISES EASILY: ICD-10-CM

## 2022-08-29 LAB
ABSOLUTE EOS #: 0.1 K/UL (ref 0–0.4)
ABSOLUTE LYMPH #: 1.9 K/UL (ref 1–4.8)
ABSOLUTE MONO #: 0.5 K/UL (ref 0.1–1.3)
ABSOLUTE RETIC #: 0.03 M/UL (ref 0.02–0.1)
ALBUMIN SERPL-MCNC: 4.4 G/DL (ref 3.5–5.2)
ALP BLD-CCNC: 44 U/L (ref 35–104)
ALT SERPL-CCNC: 9 U/L (ref 5–33)
AST SERPL-CCNC: 13 U/L
BASOPHILS # BLD: 1 % (ref 0–2)
BASOPHILS ABSOLUTE: 0.1 K/UL (ref 0–0.2)
BILIRUB SERPL-MCNC: 0.45 MG/DL (ref 0.3–1.2)
BILIRUBIN DIRECT: 0.12 MG/DL
BILIRUBIN, INDIRECT: 0.33 MG/DL (ref 0–1)
EKG ATRIAL RATE: 60 BPM
EKG P AXIS: 64 DEGREES
EKG P-R INTERVAL: 128 MS
EKG Q-T INTERVAL: 432 MS
EKG QRS DURATION: 90 MS
EKG QTC CALCULATION (BAZETT): 432 MS
EKG R AXIS: 70 DEGREES
EKG T AXIS: 60 DEGREES
EKG VENTRICULAR RATE: 60 BPM
EOSINOPHILS RELATIVE PERCENT: 1 % (ref 0–4)
HCT VFR BLD CALC: 38.2 % (ref 36–46)
HEMOGLOBIN: 12.2 G/DL (ref 12–16)
HEPATITIS C ANTIBODY: NONREACTIVE
LYMPHOCYTES # BLD: 31 % (ref 24–44)
MCH RBC QN AUTO: 27.1 PG (ref 26–34)
MCHC RBC AUTO-ENTMCNC: 32.1 G/DL (ref 31–37)
MCV RBC AUTO: 84.4 FL (ref 80–100)
MONOCYTES # BLD: 8 % (ref 1–7)
PDW BLD-RTO: 17.1 % (ref 11.5–14.9)
PLATELET # BLD: 316 K/UL (ref 150–450)
PMV BLD AUTO: 8.9 FL (ref 6–12)
RBC # BLD: 4.52 M/UL (ref 4–5.2)
RETIC %: 0.6 % (ref 0.5–2)
SEG NEUTROPHILS: 59 % (ref 36–66)
SEGMENTED NEUTROPHILS ABSOLUTE COUNT: 3.6 K/UL (ref 1.3–9.1)
TOTAL PROTEIN: 6.8 G/DL (ref 6.4–8.3)
WBC # BLD: 6.1 K/UL (ref 3.5–11)

## 2022-08-29 PROCEDURE — 86803 HEPATITIS C AB TEST: CPT

## 2022-08-29 PROCEDURE — 85025 COMPLETE CBC W/AUTO DIFF WBC: CPT

## 2022-08-29 PROCEDURE — 80076 HEPATIC FUNCTION PANEL: CPT

## 2022-08-29 PROCEDURE — 85045 AUTOMATED RETICULOCYTE COUNT: CPT

## 2022-08-29 PROCEDURE — 93005 ELECTROCARDIOGRAM TRACING: CPT

## 2022-08-29 PROCEDURE — 36415 COLL VENOUS BLD VENIPUNCTURE: CPT

## 2022-08-29 PROCEDURE — 93010 ELECTROCARDIOGRAM REPORT: CPT | Performed by: INTERNAL MEDICINE

## 2022-08-29 PROCEDURE — 77063 BREAST TOMOSYNTHESIS BI: CPT

## 2022-08-31 LAB — SURGICAL PATHOLOGY REPORT: NORMAL

## 2022-09-01 LAB — PATHOLOGIST REVIEW: NORMAL

## 2022-09-06 ENCOUNTER — OFFICE VISIT (OUTPATIENT)
Dept: INTERNAL MEDICINE CLINIC | Age: 42
End: 2022-09-06
Payer: MEDICARE

## 2022-09-06 VITALS
SYSTOLIC BLOOD PRESSURE: 110 MMHG | OXYGEN SATURATION: 99 % | DIASTOLIC BLOOD PRESSURE: 60 MMHG | BODY MASS INDEX: 20.56 KG/M2 | WEIGHT: 127.4 LBS | HEART RATE: 102 BPM

## 2022-09-06 DIAGNOSIS — R00.2 PALPITATIONS: ICD-10-CM

## 2022-09-06 DIAGNOSIS — R42 POSTURAL DIZZINESS WITH NEAR SYNCOPE: Primary | ICD-10-CM

## 2022-09-06 DIAGNOSIS — R55 POSTURAL DIZZINESS WITH NEAR SYNCOPE: Primary | ICD-10-CM

## 2022-09-06 DIAGNOSIS — R71.8 ANISOCYTOSIS: ICD-10-CM

## 2022-09-06 PROCEDURE — 1036F TOBACCO NON-USER: CPT | Performed by: INTERNAL MEDICINE

## 2022-09-06 PROCEDURE — 99213 OFFICE O/P EST LOW 20 MIN: CPT | Performed by: INTERNAL MEDICINE

## 2022-09-06 PROCEDURE — G8420 CALC BMI NORM PARAMETERS: HCPCS | Performed by: INTERNAL MEDICINE

## 2022-09-06 PROCEDURE — G8427 DOCREV CUR MEDS BY ELIG CLIN: HCPCS | Performed by: INTERNAL MEDICINE

## 2022-09-06 ASSESSMENT — ENCOUNTER SYMPTOMS
EYES NEGATIVE: 1
RESPIRATORY NEGATIVE: 1
GASTROINTESTINAL NEGATIVE: 1

## 2022-09-06 NOTE — PROGRESS NOTES
Visit Information    Have you changed or started any medications since your last visit including any over-the-counter medicines, vitamins, or herbal medicines? no   Are you having any side effects from any of your medications? -  no  Have you stopped taking any of your medications? Is so, why? -  no    Have you seen any other physician or provider since your last visit? No  Have you had any other diagnostic tests since your last visit? Yes - Records Obtained  Have you been seen in the emergency room and/or had an admission to a hospital since we last saw you? No  Have you had your routine dental cleaning in the past 6 months? no    Have you activated your [employer] account? If not, what are your barriers?  Yes     Patient Care Team:  Lachelle Cohen MD as PCP - General (Internal Medicine)  Lachelle Cohen MD as PCP - [facility]  Abelino Montero MD as Obstetrician (Perinatology)    Medical History Review  Past Medical, Family, and Social History reviewed and does contribute to the patient presenting condition    Health Maintenance   Topic Date Due    COVID-19 Vaccine (1) Never done    DTaP/Tdap/Td vaccine (1 - Tdap) Never done    Lipids  Never done    Flu vaccine (1) Never done    Depression Monitoring  08/01/2023    Cervical cancer screen  08/01/2027    Hepatitis C screen  Completed    HIV screen  Completed    Hepatitis A vaccine  Aged Out    Hepatitis B vaccine  Aged Out    Hib vaccine  Aged Out    Meningococcal (ACWY) vaccine  Aged Out    Pneumococcal 0-64 years Vaccine  Aged Out
Reflexes are normal and symmetric. Psychiatric:         Behavior: Behavior normal.     /60 (Site: Left Upper Arm, Position: Sitting)   Pulse (!) 102   Wt 127 lb 6.4 oz (57.8 kg)   LMP 08/13/2022   SpO2 99%   BMI 20.56 kg/m²       Assessment:       Diagnosis Orders   1. Postural dizziness with near syncope  Cardiac event monitor    GEOFFREY Robbins MD, Cardiology, [place]      2. Palpitations  Cardiac event monitor    GEOFFREY Robbins MD, Cardiology, [place]      3. Anisocytosis  Iron and TIBC          Plan:      No follow-ups on file. No orders of the defined types were placed in this encounter. Orders Placed This Encounter   Procedures    Iron and TIBC     Standing Status:   Future     Standing Expiration Date:   9/6/2023    GEOFFREY Robbins MD, Cardiology, [place]     Referral Priority:   Routine     Referral Type:   Eval and Treat     Referral Reason:   Specialty Services Required     Referred to Provider:   Abelino White MD     Requested Specialty:   Cardiology     Number of Visits Requested:   1    Cardiac event monitor     Standing Status:   Future     Standing Expiration Date:   11/5/2022            Patient given educational materials - see patient instructions. Discussed use, benefit, and side effects of prescribed medications. All patientquestions answered. Pt voiced understanding.     Electronically signed by Alexis Sandhu MD on 9/6/2022at 8:39 AM

## 2022-09-14 ENCOUNTER — HOSPITAL ENCOUNTER (OUTPATIENT)
Dept: NON INVASIVE DIAGNOSTICS | Age: 42
Discharge: HOME | End: 2022-09-14
Payer: MEDICARE

## 2022-09-14 ENCOUNTER — HOSPITAL ENCOUNTER (OUTPATIENT)
Dept: CT IMAGING | Age: 42
Discharge: HOME | End: 2022-09-16
Payer: MEDICARE

## 2022-09-14 DIAGNOSIS — R42 POSTURAL DIZZINESS WITH NEAR SYNCOPE: ICD-10-CM

## 2022-09-14 DIAGNOSIS — R55 POSTURAL DIZZINESS WITH NEAR SYNCOPE: ICD-10-CM

## 2022-09-14 DIAGNOSIS — R00.2 PALPITATIONS: ICD-10-CM

## 2022-09-14 DIAGNOSIS — R42 DIZZINESS: ICD-10-CM

## 2022-09-14 PROCEDURE — 93243 EXT ECG>48HR<7D SCAN A/R: CPT

## 2022-09-14 PROCEDURE — 93242 EXT ECG>48HR<7D RECORDING: CPT

## 2022-09-14 PROCEDURE — 70450 CT HEAD/BRAIN W/O DYE: CPT

## 2022-09-18 ENCOUNTER — APPOINTMENT (OUTPATIENT)
Dept: CT IMAGING | Age: 42
End: 2022-09-18
Payer: MEDICARE

## 2022-09-18 ENCOUNTER — HOSPITAL ENCOUNTER (EMERGENCY)
Age: 42
Discharge: HOME | End: 2022-09-18
Attending: EMERGENCY MEDICINE
Payer: MEDICARE

## 2022-09-18 ENCOUNTER — APPOINTMENT (OUTPATIENT)
Dept: GENERAL RADIOLOGY | Age: 42
End: 2022-09-18
Payer: MEDICARE

## 2022-09-18 VITALS
HEART RATE: 70 BPM | RESPIRATION RATE: 12 BRPM | WEIGHT: 125 LBS | DIASTOLIC BLOOD PRESSURE: 60 MMHG | TEMPERATURE: 97.9 F | OXYGEN SATURATION: 97 % | BODY MASS INDEX: 20.09 KG/M2 | HEIGHT: 66 IN | SYSTOLIC BLOOD PRESSURE: 87 MMHG

## 2022-09-18 DIAGNOSIS — R20.2 PARESTHESIA: ICD-10-CM

## 2022-09-18 DIAGNOSIS — G44.89 OTHER HEADACHE SYNDROME: Primary | ICD-10-CM

## 2022-09-18 LAB
ABSOLUTE EOS #: 0.1 K/UL (ref 0–0.4)
ABSOLUTE LYMPH #: 2.1 K/UL (ref 1–4.8)
ABSOLUTE MONO #: 0.4 K/UL (ref 0.1–1.3)
ANION GAP SERPL CALCULATED.3IONS-SCNC: 13 MMOL/L (ref 9–17)
BASOPHILS # BLD: 1 % (ref 0–2)
BASOPHILS ABSOLUTE: 0 K/UL (ref 0–0.2)
BUN BLDV-MCNC: 9 MG/DL (ref 6–20)
CALCIUM SERPL-MCNC: 9.7 MG/DL (ref 8.6–10.4)
CHLORIDE BLD-SCNC: 105 MMOL/L (ref 98–107)
CO2: 24 MMOL/L (ref 20–31)
CREAT SERPL-MCNC: 0.76 MG/DL (ref 0.5–0.9)
EOSINOPHILS RELATIVE PERCENT: 1 % (ref 0–4)
GFR AFRICAN AMERICAN: >60 ML/MIN
GFR NON-AFRICAN AMERICAN: >60 ML/MIN
GFR SERPL CREATININE-BSD FRML MDRD: NORMAL ML/MIN/{1.73_M2}
GLUCOSE BLD-MCNC: 92 MG/DL (ref 70–99)
HCG QUALITATIVE: NEGATIVE
HCT VFR BLD CALC: 34.7 % (ref 36–46)
HEMOGLOBIN: 12.3 G/DL (ref 12–16)
INFLUENZA A: NOT DETECTED
INFLUENZA B: NOT DETECTED
LYMPHOCYTES # BLD: 30 % (ref 24–44)
MAGNESIUM: 2.2 MG/DL (ref 1.6–2.6)
MCH RBC QN AUTO: 29.1 PG (ref 26–34)
MCHC RBC AUTO-ENTMCNC: 35.4 G/DL (ref 31–37)
MCV RBC AUTO: 82.4 FL (ref 80–100)
MONOCYTES # BLD: 6 % (ref 1–7)
PDW BLD-RTO: 16.2 % (ref 11.5–14.9)
PLATELET # BLD: 377 K/UL (ref 150–450)
PMV BLD AUTO: 8.3 FL (ref 6–12)
POTASSIUM SERPL-SCNC: 3.9 MMOL/L (ref 3.7–5.3)
RBC # BLD: 4.21 M/UL (ref 4–5.2)
SARS-COV-2 RNA, RT PCR: NOT DETECTED
SEG NEUTROPHILS: 62 % (ref 36–66)
SEGMENTED NEUTROPHILS ABSOLUTE COUNT: 4.5 K/UL (ref 1.3–9.1)
SODIUM BLD-SCNC: 142 MMOL/L (ref 135–144)
SOURCE: NORMAL
SPECIMEN DESCRIPTION: NORMAL
TROPONIN, HIGH SENSITIVITY: 7 NG/L (ref 0–14)
WBC # BLD: 7.1 K/UL (ref 3.5–11)

## 2022-09-18 PROCEDURE — 36415 COLL VENOUS BLD VENIPUNCTURE: CPT

## 2022-09-18 PROCEDURE — 71045 X-RAY EXAM CHEST 1 VIEW: CPT

## 2022-09-18 PROCEDURE — 83735 ASSAY OF MAGNESIUM: CPT

## 2022-09-18 PROCEDURE — 84703 CHORIONIC GONADOTROPIN ASSAY: CPT

## 2022-09-18 PROCEDURE — 6360000002 HC RX W HCPCS: Performed by: HEALTH CARE PROVIDER

## 2022-09-18 PROCEDURE — 84484 ASSAY OF TROPONIN QUANT: CPT

## 2022-09-18 PROCEDURE — 93005 ELECTROCARDIOGRAM TRACING: CPT | Performed by: HEALTH CARE PROVIDER

## 2022-09-18 PROCEDURE — 6360000004 HC RX CONTRAST MEDICATION: Performed by: HEALTH CARE PROVIDER

## 2022-09-18 PROCEDURE — 85025 COMPLETE CBC W/AUTO DIFF WBC: CPT

## 2022-09-18 PROCEDURE — 96375 TX/PRO/DX INJ NEW DRUG ADDON: CPT | Performed by: HEALTH CARE PROVIDER

## 2022-09-18 PROCEDURE — 87636 SARSCOV2 & INF A&B AMP PRB: CPT

## 2022-09-18 PROCEDURE — 70450 CT HEAD/BRAIN W/O DYE: CPT

## 2022-09-18 PROCEDURE — 80048 BASIC METABOLIC PNL TOTAL CA: CPT

## 2022-09-18 PROCEDURE — 99285 EMERGENCY DEPT VISIT HI MDM: CPT | Performed by: HEALTH CARE PROVIDER

## 2022-09-18 PROCEDURE — 70498 CT ANGIOGRAPHY NECK: CPT

## 2022-09-18 PROCEDURE — 96374 THER/PROPH/DIAG INJ IV PUSH: CPT | Performed by: HEALTH CARE PROVIDER

## 2022-09-18 PROCEDURE — 2580000003 HC RX 258: Performed by: HEALTH CARE PROVIDER

## 2022-09-18 RX ORDER — SODIUM CHLORIDE 0.9 % (FLUSH) 0.9 %
10 SYRINGE (ML) INJECTION 2 TIMES DAILY
Status: DISCONTINUED | OUTPATIENT
Start: 2022-09-18 | End: 2022-09-18 | Stop reason: HOSPADM

## 2022-09-18 RX ORDER — 0.9 % SODIUM CHLORIDE 0.9 %
80 INTRAVENOUS SOLUTION INTRAVENOUS ONCE
Status: COMPLETED | OUTPATIENT
Start: 2022-09-18 | End: 2022-09-18

## 2022-09-18 RX ORDER — METOCLOPRAMIDE HYDROCHLORIDE 5 MG/ML
10 INJECTION INTRAMUSCULAR; INTRAVENOUS ONCE
Status: COMPLETED | OUTPATIENT
Start: 2022-09-18 | End: 2022-09-18

## 2022-09-18 RX ORDER — DIPHENHYDRAMINE HYDROCHLORIDE 50 MG/ML
25 INJECTION INTRAMUSCULAR; INTRAVENOUS ONCE
Status: COMPLETED | OUTPATIENT
Start: 2022-09-18 | End: 2022-09-18

## 2022-09-18 RX ORDER — KETOROLAC TROMETHAMINE 30 MG/ML
30 INJECTION, SOLUTION INTRAMUSCULAR; INTRAVENOUS ONCE
Status: COMPLETED | OUTPATIENT
Start: 2022-09-18 | End: 2022-09-18

## 2022-09-18 RX ORDER — 0.9 % SODIUM CHLORIDE 0.9 %
1000 INTRAVENOUS SOLUTION INTRAVENOUS ONCE
Status: COMPLETED | OUTPATIENT
Start: 2022-09-18 | End: 2022-09-18

## 2022-09-18 RX ADMIN — SODIUM CHLORIDE 1000 ML: 9 INJECTION, SOLUTION INTRAVENOUS at 17:36

## 2022-09-18 RX ADMIN — METOCLOPRAMIDE 10 MG: 5 INJECTION, SOLUTION INTRAMUSCULAR; INTRAVENOUS at 17:37

## 2022-09-18 RX ADMIN — KETOROLAC TROMETHAMINE 30 MG: 30 INJECTION, SOLUTION INTRAMUSCULAR; INTRAVENOUS at 19:45

## 2022-09-18 RX ADMIN — SODIUM CHLORIDE 80 ML: 9 INJECTION, SOLUTION INTRAVENOUS at 18:54

## 2022-09-18 RX ADMIN — DIPHENHYDRAMINE HYDROCHLORIDE 25 MG: 50 INJECTION, SOLUTION INTRAMUSCULAR; INTRAVENOUS at 17:37

## 2022-09-18 RX ADMIN — IOPAMIDOL 75 ML: 755 INJECTION, SOLUTION INTRAVENOUS at 18:50

## 2022-09-18 ASSESSMENT — PAIN - FUNCTIONAL ASSESSMENT
PAIN_FUNCTIONAL_ASSESSMENT: NONE - DENIES PAIN

## 2022-09-18 ASSESSMENT — ENCOUNTER SYMPTOMS
SHORTNESS OF BREATH: 0
CONSTIPATION: 0
NAUSEA: 0
ABDOMINAL PAIN: 0
SORE THROAT: 0
VOMITING: 0
DIARRHEA: 0

## 2022-09-18 NOTE — ED TRIAGE NOTES
Headache- numbness to ears then to face/head and moving down into body. (-) CVA scale. (=) /push/pull/smile, steady gait when moving into wheelchair.

## 2022-09-18 NOTE — ED PROVIDER NOTES
[address] ED  eMERGENCY dEPARTMENT eNCOUnter   Attending Attestation     Pt Name: Yoel Patel  MRN: [id number]  [place] 1980  Date of evaluation: 9/18/22       Yoel Patel is a 36 y.o. female who presents with Headache and Numbness      History:   50-year-old female presenting with a headache as well as bilateral upper extremity and bilateral lower extremity numbness for a long time. Patient states she has been dizzy for years and has already had outpatient follow-up including CT imaging. Patient states she has not yet seen an otolaryngologist or a neurologist.    Exam: Vitals:   Vitals:    09/18/22 1640   BP: 107/75   Pulse: 72   Resp: 18   Temp: 97.9 °F (36.6 °C)   TempSrc: Oral   SpO2: 99%   Weight: 125 lb (56.7 kg)   Height: 5' 6\" (1.676 m)     50-year-old female with a headache as well as bilateral upper extremity and bilateral lower extremity numbness. Patient also feeling dizzy. Symptoms are not acute and are more chronic. Cardiac work-up in the ER did not display positive signs of acute cardiac ischemia. EKG was reviewed and interpreted by myself, ED physician. CT imaging including CT of the head and CTA head and neck did not display signs of acute abnormality. Patient instructed to follow-up with primary care physician within 2 days and to return to the ER immediately if symptoms worsen or change. The patient was instructed to have a discussion about seeing ENT and neurology with her PCP. Patient understands and agrees with the plan. I performed a history and physical examination of the patient and discussed management with the resident. I reviewed the residents note and agree with the documented findings and plan of care. Any areas of disagreement are noted on the chart. I was personally present for the key portions of any procedures. I have documented in the chart those procedures where I was not present during the key portions.  I have personally reviewed all images and agree with the resident's interpretation. I have reviewed the emergency nurses triage note. I agree with the chief complaint, past medical history, past surgical history, allergies, medications, social and family history as documented unless otherwise noted below. Documentation of the HPI, Physical Exam and Medical Decision Making performed by medical students or scribes is based on my personal performance of the HPI, PE and MDM. I personally evaluated and examined the patient in conjunction with the APC and agree with the assessment, treatment plan, and disposition of the patient as recorded by the APC. Additional findings are as noted.     Hi Cagle DO  Attending Emergency  Physician             Hi Cagle DO  09/18/22 6417

## 2022-09-18 NOTE — ED PROVIDER NOTES
[facility]  ED  Emergency Department Encounter  Emergency Medicine Resident     Pt Name: Priyank Hector  MRN: [id number]  [place] 1980  Date of evaluation: 9/18/22  PCP:  Josi Pandya MD    [address] [place] [address]       Chief Complaint   Patient presents with    Headache    Numbness       HISTORY OFPRESENT ILLNESS  (Location/Symptom, Timing/Onset, Context/Setting, Quality, Duration, Modifying Terris Ratel.)      Priyank Hector is a 36 y.o. female who presents with concerns for sudden onset headache and facial numbness. Patient states she had a sudden onset headache that began about 1 hour prior to arrival to the ER. Located both frontal and occipital region. Pain is achy and occasionally sharp, nonradiating. Denies this being the worst headache of her life. Patient reports some associated facial numbness/tingling sensation that began in the ears and worked its way down to her face, neck and into the chest.  Reports some associated chest tightness, denies any chest pain or shortness of breath. Patient states she does have a history of migraines in the past, does not have 1 recently. States this headache is different than previous migraine symptoms given sudden onset and associated with tingling sensation. Patient denies any other abdominal pain, nausea vomiting, upper or lower extremity numbness, weakness, paresthesias, dysarthria, vision changes. Of note, patient is currently undergoing workup for similar symptoms of intermittent episodes of postural dizziness, fatigue, weight loss, and palpitations. She had a negative CT head 2 days ago, and is currently undergoing cardiac workup with a holter monitor.     PAST MEDICAL / SURGICAL / SOCIAL / FAMILY HISTORY      has a past medical history of Anemia, Family history of breast cancer, Family history of cervical cancer, Family history of uterine cancer, Hepatic hemangioma, Herpes, MRSA (methicillin resistant staph aureus) culture positive, Postpartum depression, and Tobacco use.     has a past surgical history that includes Dilation and curettage of uterus; laparoscopy; Dilation & curettage; pr lap,appendectomy (N/A, 2018); pr part removal colon w anastomosis (N/A, 2018); Tubal ligation (2021); and Tonsillectomy and adenoidectomy (). Social History     Socioeconomic History    Marital status:      Spouse name: Not on file    Number of children: Not on file    Years of education: Not on file    Highest education level: Not on file   Occupational History    Not on file   Tobacco Use    Smoking status: Former     Packs/day: 0.25     Types: Cigarettes     Start date:      Quit date:      Years since quittin.7    Smokeless tobacco: Never   Vaping Use    Vaping Use: Former    Substances: Occasionally   Substance and Sexual Activity    Alcohol use: No     Alcohol/week: 0.0 standard drinks    Drug use: Not Currently     Types: Marijuana Jo Dexter)    Sexual activity: Yes     Partners: Male   Other Topics Concern    Not on file   Social History Narrative    ** Merged History Encounter **          Social Determinants of Health     Financial Resource Strain: Low Risk     Difficulty of Paying Living Expenses: Not very hard   Food Insecurity: No Food Insecurity    Worried About Running Out of Food in the Last Year: Never true    [address] in the Last Year: Never true   Transportation Needs: No Transportation Needs    Lack of Transportation (Medical): No    Lack of Transportation (Non-Medical):  No   Physical Activity: Not on file   Stress: Not on file   Social Connections: Not on file   Intimate Partner Violence: Not on file   Housing Stability: Not on file       Family History   Problem Relation Age of Onset    Diabetes Paternal Grandfather     Diabetes Paternal Grandmother     Uterine Cancer Paternal Grandmother     Cervical Cancer Paternal Grandmother     Cancer Maternal Grandmother     Breast Cancer Maternal Grandmother     Other Daughter         deaf    Other Son         Bowel surgery at 2year old       Allergies:  Codeine, Fentanyl, and Penicillins    Home Medications:  Prior to Admission medications    Medication Sig Start Date End Date Taking? Authorizing Provider   Cholecalciferol (VITAMIN D3) 50 MCG (2000 UT) CAPS Take by mouth    Historical Provider, MD   vitamin k 100 MCG tablet Take 100 mcg by mouth daily    Historical Provider, MD   ferrous sulfate (IRON 325) 325 (65 Fe) MG tablet Take 325 mg by mouth daily (with breakfast)    Historical Provider, MD       REVIEW OF SYSTEMS    (2-9 systems for level 4, 10 or more for level 5)      Review of Systems   Constitutional:  Negative for chills and fever. HENT:  Negative for ear pain, hearing loss and sore throat. Eyes:  Negative for visual disturbance. Respiratory:  Negative for shortness of breath. Cardiovascular:  Negative for chest pain. Gastrointestinal:  Negative for abdominal pain, constipation, diarrhea, nausea and vomiting. Genitourinary:  Negative for difficulty urinating and dysuria. Musculoskeletal:  Negative for arthralgias and myalgias. Neurological:  Positive for dizziness, light-headedness, numbness and headaches. Negative for tremors, seizures, syncope, facial asymmetry, speech difficulty and weakness. Psychiatric/Behavioral:  Negative for agitation and confusion. The patient is not nervous/anxious. PHYSICAL EXAM   (up to 7 for level 4, 8 or more for level 5)     INITIAL VITALS:    height is 5' 6\" (1.676 m) and weight is 125 lb (56.7 kg). Her oral temperature is 97.9 °F (36.6 °C). Her blood pressure is 87/60 and her pulse is 70. Her respiration is 12 and oxygen saturation is 97%. Physical Exam  Vitals and nursing note reviewed. Constitutional:       General: She is not in acute distress. Appearance: She is well-developed. She is not diaphoretic. HENT:      Head: Normocephalic and atraumatic.       Right Ear: External ear normal.      Left Ear: External ear normal.      Nose: Nose normal.   Eyes:      General: No visual field deficit. Conjunctiva/sclera: Conjunctivae normal.   Neck:      Trachea: No tracheal deviation. Cardiovascular:      Rate and Rhythm: Normal rate and regular rhythm. Heart sounds: Normal heart sounds. No murmur heard. No friction rub. No gallop. Pulmonary:      Effort: Pulmonary effort is normal. No respiratory distress. Breath sounds: Normal breath sounds. Abdominal:      General: Bowel sounds are normal.      Palpations: Abdomen is soft. Tenderness: There is no abdominal tenderness. Musculoskeletal:         General: No tenderness. Normal range of motion. Cervical back: Neck supple. Skin:     General: Skin is warm and dry. Capillary Refill: Capillary refill takes less than 2 seconds. Neurological:      General: No focal deficit present. Mental Status: She is alert and oriented to person, place, and time. GCS: GCS eye subscore is 4. GCS verbal subscore is 5. GCS motor subscore is 6. Cranial Nerves: Cranial nerves are intact. No cranial nerve deficit, dysarthria or facial asymmetry. Sensory: Sensation is intact. No sensory deficit. Motor: Motor function is intact. No weakness, tremor, atrophy, abnormal muscle tone, seizure activity or pronator drift. Coordination: Coordination is intact.  Finger-Nose-Finger Test and Heel to Shin Test normal.       DIFFERENTIAL  DIAGNOSIS     PLAN (LABS / IMAGING / EKG):  Orders Placed This Encounter   Procedures    COVID-19 & Influenza Combo    CT HEAD WO CONTRAST    CTA HEAD NECK W CONTRAST    XR CHEST PORTABLE    CBC with Auto Differential    Basic Metabolic Panel    Magnesium    Troponin    HCG Qualitative, Serum    EKG 12 Lead    Insert peripheral IV    Saline lock IV       MEDICATIONS ORDERED:  Orders Placed This Encounter   Medications    0.9 % sodium chloride bolus    metoclopramide (REGLAN) injection 10 mg    diphenhydrAMINE (BENADRYL) injection 25 mg    iopamidol (ISOVUE-370) 76 % injection 75 mL    0.9 % sodium chloride bolus    DISCONTD: sodium chloride flush 0.9 % injection 10 mL    ketorolac (TORADOL) injection 30 mg       DDX: ICH, complex migraine    Initial MDM/Plan: 36 y.o. female who presents with concern for headache, dizziness and bilateral facial numbness. At time initial examination patient was in no acute distress, vital signs stable. Initial examination with NIH of 0, no cerebellar symptoms at this time. No immediate concern for stroke. Will obtain CT of the head and CTA of the head and neck to rule out any ICH, SAH. Will obtain CBC, CMP, trop x1, EKG and CXR given chest tightness. Concern for potential complicated migraine, will begin treatment with some IVF, benadryl and reglan. Will hold off on toradol until CT completed. DIAGNOSTIC RESULTS / EMERGENCY DEPARTMENT COURSE / MDM     LABS:  Labs Reviewed   CBC WITH AUTO DIFFERENTIAL - Abnormal; Notable for the following components:       Result Value    Hematocrit 34.7 (*)     RDW 16.2 (*)     All other components within normal limits   COVID-19 & INFLUENZA COMBO   BASIC METABOLIC PANEL   MAGNESIUM   TROPONIN   HCG, SERUM, QUALITATIVE         RADIOLOGY:  CT HEAD WO CONTRAST    Result Date: 9/18/2022  EXAMINATION: CT OF THE HEAD WITHOUT CONTRAST  9/18/2022 6:22 pm TECHNIQUE: CT of the head was performed without the administration of intravenous contrast. Automated exposure control, iterative reconstruction, and/or weight based adjustment of the mA/kV was utilized to reduce the radiation dose to as low as reasonably achievable.  COMPARISON: September 14, 2022 HISTORY: ORDERING SYSTEM PROVIDED HISTORY: sudden onset headache, worsening dizziness TECHNOLOGIST PROVIDED HISTORY: sudden onset headache, worsening dizziness Decision Support Exception - unselect if not a suspected or confirmed emergency medical condition->Emergency Medical Condition (MA) Is the patient pregnant?->No Reason for Exam: sudden onset headache, worsening dizziness Additional signs and symptoms: Pt c/o headache with dizziness and numbness starting today FINDINGS: BRAIN/VENTRICLES: There is no acute intracranial hemorrhage, mass effect or midline shift. No abnormal extra-axial fluid collection. The gray-white differentiation is maintained without evidence of an acute infarct. There is no evidence of hydrocephalus. ORBITS: The visualized portion of the orbits demonstrate no acute abnormality. SINUSES: There is an air-fluid level in the left maxillary sinus. SOFT TISSUES/SKULL:  No acute abnormality of the visualized skull or soft tissues. No acute intracranial abnormality visualized. Air-fluid level within the left maxillary antrum, which could reflect acute sinusitis. XR CHEST PORTABLE    Result Date: 9/18/2022  EXAMINATION: ONE XRAY VIEW OF THE CHEST 9/18/2022 5:45 pm COMPARISON: November 4, 2020 HISTORY: ORDERING SYSTEM PROVIDED HISTORY: chest tightness TECHNOLOGIST PROVIDED HISTORY: chest tightness Reason for Exam: Pt. states chest tightness, headache, numbness down body FINDINGS: No infiltrate or consolidation or effusion is identified. The heart size is normal.     Radiographically clear lungs. CTA HEAD NECK W CONTRAST    Result Date: 9/18/2022  EXAMINATION: CTA OF THE HEAD AND NECK WITH CONTRAST 9/18/2022 6:21 pm: TECHNIQUE: CTA of the head and neck was performed with the administration of intravenous contrast. Multiplanar reformatted images are provided for review. MIP images are provided for review. Stenosis of the internal carotid arteries measured using NASCET criteria. Automated exposure control, iterative reconstruction, and/or weight based adjustment of the mA/kV was utilized to reduce the radiation dose to as low as reasonably achievable. COMPARISON: None.  HISTORY: ORDERING SYSTEM PROVIDED HISTORY: sudden onset headache, worsening dizziness TECHNOLOGIST PROVIDED HISTORY: sudden onset headache, worsening dizziness Decision Support Exception - unselect if not a suspected or confirmed emergency medical condition->Emergency Medical Condition (MA) Reason for Exam: sudden onset headache, worsening dizziness FINDINGS: CTA NECK: AORTIC ARCH/ARCH VESSELS: No dissection or arterial injury. No significant stenosis of the brachiocephalic or subclavian arteries. CAROTID ARTERIES: No dissection, arterial injury, or hemodynamically significant stenosis by NASCET criteria. VERTEBRAL ARTERIES: No dissection, arterial injury, or significant stenosis. SOFT TISSUES: The lung apices are clear. No cervical or superior mediastinal lymphadenopathy. The larynx and pharynx are unremarkable. No acute abnormality of the salivary and thyroid glands. BONES: No acute osseous abnormality. CTA HEAD: ANTERIOR CIRCULATION: No significant stenosis of the intracranial internal carotid, anterior cerebral, or middle cerebral arteries. There appears to be a small left-sided posterior communicating (PCOM) artery with a 2-3 mm vascular infundibulum versus, less likely, an aneurysm. POSTERIOR CIRCULATION: No significant stenosis of the vertebral, basilar, or posterior cerebral arteries. No aneurysm. OTHER: No dural venous sinus thrombosis on this non-dedicated study. BRAIN: See separately dictated noncontrast head CT report. No flow limiting stenosis or large vessel occlusion detected within the head or neck. Probable left PCOM vascular infundibulum versus, less likely, microaneurysm.       EKG  EKG Interpretation    Interpreted by me    Rhythm: normal sinus   Rate: normal  Axis: normal  Ectopy: none  Conduction: normal  ST Segments: no acute change  T Waves: no acute change  Q Waves: none    Clinical Impression: no acute changes and normal EKG    All EKG's are interpreted by the Emergency Department Physician who either signs or Co-signs this chart in the absence of a cardiologist.    EMERGENCY DEPARTMENT COURSE:  ED Course as of 09/19/22 0151   Sun Sep 18, 2022   1831 hCG Qual: NEGATIVE [JS]   1831 Magnesium: 2.2 [JS]   1831 WBC: 7.1 [JS]   1831 Hemoglobin Quant: 12.3 [JS]   1831 Creatinine: 0.76 [JS]   1832 SARS-CoV-2 RNA, RT PCR: Not Detected [JS]   0465 INFLUENZA A: Not Detected [JS]   9821 INFLUENZA B: Not Detected [JS]   1929 No acute intracranial abnormality visualized. Air-fluid level within the left maxillary antrum, which could reflect acute sinusitis. [JS]   2782 No flow limiting stenosis or large vessel occlusion detected within the head or neck. [JS]   2104 Patient updated on results. Has had some improvement in symptoms at this time. Follow up plans and ER return precautions discussed. Patient verbalized understanding and had no further questions at time of discharge. [JS]      ED Course User Index  [JS] Umesh Silva DO     PROCEDURES:  None    CONSULTS:  None    CRITICAL CARE:  Please see attending note    FINAL IMPRESSION      1. Other headache syndrome    2. Paresthesia        DISPOSITION / PLAN     DISPOSITION Decision To Discharge 09/18/2022 09:04:28 PM    PATIENTREFERRED TO:  Jolie Krause MD  [address]  [phone]    Call in 1 day  To discuss this emergency room visit and any further follow-up needed.     [facility] ED  [place] [address]  [phone]  Go to   As needed, If symptoms worsen    DISCHARGE MEDICATIONS:  Discharge Medication List as of 9/18/2022  9:07 PM          Arnulfo Herrera DO  EmergencyMedicine Resident    (Please note that portions of this note were completed with a voice recognition program.  Efforts were made to edit the dictations but occasionally words are mis-transcribed.)      Umesh Silva DO  Resident  09/19/22 [id number]

## 2022-09-19 LAB
EKG ATRIAL RATE: 79 BPM
EKG P AXIS: 47 DEGREES
EKG P-R INTERVAL: 130 MS
EKG Q-T INTERVAL: 416 MS
EKG QRS DURATION: 86 MS
EKG QTC CALCULATION (BAZETT): 477 MS
EKG R AXIS: 45 DEGREES
EKG T AXIS: 32 DEGREES
EKG VENTRICULAR RATE: 79 BPM

## 2022-09-19 PROCEDURE — 93010 ELECTROCARDIOGRAM REPORT: CPT | Performed by: INTERNAL MEDICINE

## 2022-09-19 NOTE — DISCHARGE INSTRUCTIONS
Thank you for visiting [facility] Emergency Department. You were seen for concerns of a headache and some tingling sensation. Your work-up at this time was negative, there were no concerns on your laboratory work-up for your CT scans at this time. You need to call Doni Flanagan MD to make an appointment as directed for follow up. Please call the next 1 to 2 days for a follow-up and for further management of your current symptoms. Should you have any questions regarding your care or further treatment, please call [facility] Emergency Department at [phone]. PLEASE RETURN TO THE ED IMMEDIATELY for worsening symptoms, or if you develop any concerning symptoms such as: high fever not relieved by tylenol and/or motrin, chills, shortness of breath, chest pain, persistent nausea and/or vomiting, numbness, weakness or tingling in the arms or legs or change in color of the extremities, changes in mental status, persistent headache, blurry vision, inability to urinate, unable to follow up with your physician, or other any other  Care or concern.

## 2022-09-27 ENCOUNTER — TELEPHONE (OUTPATIENT)
Dept: INTERNAL MEDICINE CLINIC | Age: 42
End: 2022-09-27

## 2022-09-27 DIAGNOSIS — R42 POSTURAL DIZZINESS WITH NEAR SYNCOPE: ICD-10-CM

## 2022-09-27 DIAGNOSIS — R00.2 PALPITATIONS: Primary | ICD-10-CM

## 2022-09-27 DIAGNOSIS — R55 POSTURAL DIZZINESS WITH NEAR SYNCOPE: ICD-10-CM

## 2022-09-27 NOTE — TELEPHONE ENCOUNTER
Patient is calling to request a new cardiology referral. Patient tried to schedule with Dr. Silvina Cooper. They will not see patient because she is not vaccinated for Covid.      Please advise- patient also scheduled for a follow up with Dr. Iris Chauhan

## 2022-09-27 NOTE — TELEPHONE ENCOUNTER
Patient is going to call back with the name and fax number of cardiologist that she would like to see, going to check with insurance and see who is in network

## 2022-09-27 NOTE — PROCEDURES
[address]                  [facility]                                 EVENT MONITOR    PATIENT NAME: Keiko Silvestre                      :        1980  MED REC NO:   [id number]                             ROOM:  ACCOUNT NO:   [de-identified]                           ADMIT DATE: 2022  PROVIDER:     Christian Grissom    TEST TYPE:  (7 DAYS)     DATE OF STUDY: 22-    AUTHORIZING PROVIDER:  Nohemi Coppola MD  PRIMARY CARE PROVIDER:  Laila Chau MD  INTERPRETING PHYSICIAN:  Christian Grissom MD    INDICATION FOR STUDY:  Dizziness with near syncope/palpitations    PHYSICIAN INTERPRETATION:  1. Baseline rhythm was sinus. 2.  Average heart rate 85 bpm.  3.  Minimum heart rate was 43 bpm.  Sinus bradycardia at 5:18 (sleeping  hrs) 9-15-22.  4.  Maximum heart rate 144 bpm.  Sinus tachycardia at 10:39 am on  22. 5.  Very rare PVC. 6.  Occasional PAC. A few episodes of nonsustained supraventricular  ectopy (3-7 beats,      heart rate 165 bpm.  7.  No pauses. 8.  No atrial fibrillation.     ()    BRADY Amador    D: 2022 11:13:54       T: 2022 11:14:58     AS/BEL  Job#: [id number]     Doc#: Unknown    CC:    ()

## 2022-10-03 ENCOUNTER — OFFICE VISIT (OUTPATIENT)
Dept: INTERNAL MEDICINE CLINIC | Age: 42
End: 2022-10-03
Payer: MEDICARE

## 2022-10-03 VITALS
SYSTOLIC BLOOD PRESSURE: 102 MMHG | WEIGHT: 128.8 LBS | BODY MASS INDEX: 20.79 KG/M2 | DIASTOLIC BLOOD PRESSURE: 66 MMHG | HEART RATE: 82 BPM | OXYGEN SATURATION: 98 %

## 2022-10-03 DIAGNOSIS — R20.2 TINGLING SENSATION: ICD-10-CM

## 2022-10-03 DIAGNOSIS — J01.00 ACUTE NON-RECURRENT MAXILLARY SINUSITIS: ICD-10-CM

## 2022-10-03 DIAGNOSIS — R42 VERTIGO: Primary | ICD-10-CM

## 2022-10-03 PROCEDURE — 99213 OFFICE O/P EST LOW 20 MIN: CPT | Performed by: INTERNAL MEDICINE

## 2022-10-03 PROCEDURE — G8420 CALC BMI NORM PARAMETERS: HCPCS | Performed by: INTERNAL MEDICINE

## 2022-10-03 PROCEDURE — G8427 DOCREV CUR MEDS BY ELIG CLIN: HCPCS | Performed by: INTERNAL MEDICINE

## 2022-10-03 PROCEDURE — G8484 FLU IMMUNIZE NO ADMIN: HCPCS | Performed by: INTERNAL MEDICINE

## 2022-10-03 PROCEDURE — 1036F TOBACCO NON-USER: CPT | Performed by: INTERNAL MEDICINE

## 2022-10-03 RX ORDER — CIPROFLOXACIN 250 MG/1
250 TABLET, FILM COATED ORAL 2 TIMES DAILY
Qty: 14 TABLET | Refills: 0 | Status: SHIPPED | OUTPATIENT
Start: 2022-10-03 | End: 2022-10-10

## 2022-10-03 ASSESSMENT — ENCOUNTER SYMPTOMS
RESPIRATORY NEGATIVE: 1
GASTROINTESTINAL NEGATIVE: 1
EYES NEGATIVE: 1

## 2022-10-03 NOTE — PROGRESS NOTES
Visit Information    Have you changed or started any medications since your last visit including any over-the-counter medicines, vitamins, or herbal medicines? no   Are you having any side effects from any of your medications? -  no  Have you stopped taking any of your medications? Is so, why? -  no    Have you seen any other physician or provider since your last visit? Yes - Records Obtained  Have you had any other diagnostic tests since your last visit? Yes - Records Obtained  Have you been seen in the emergency room and/or had an admission to a hospital since we last saw you? Yes - Records Obtained  Have you had your routine dental cleaning in the past 6 months? no    Have you activated your [employer] account? If not, what are your barriers?  Yes     Patient Care Team:  Dio Patterson MD as PCP - General (Internal Medicine)  Dio Patterson MD as PCP - [facility]  Hamida hCeung MD as Obstetrician (Perinatology)    Medical History Review  Past Medical, Family, and Social History reviewed and does contribute to the patient presenting condition    Health Maintenance   Topic Date Due    COVID-19 Vaccine (1) Never done    DTaP/Tdap/Td vaccine (1 - Tdap) Never done    Lipids  Never done    Flu vaccine (1) Never done    Depression Monitoring  08/01/2023    Cervical cancer screen  08/01/2027    Hepatitis C screen  Completed    HIV screen  Completed    Hepatitis A vaccine  Aged Out    Hepatitis B vaccine  Aged Out    Hib vaccine  Aged Out    Meningococcal (ACWY) vaccine  Aged Out    Pneumococcal 0-64 years Vaccine  Aged Out

## 2022-10-03 NOTE — PROGRESS NOTES
[address] [address]  Dept: [phone]  Dept Fax: [phone]    Wesley Bah is a 36 y.o. female who presents today for her medicalconditions/complaints as noted below. Wesley Bah is c/o of Results      HPI:     Dizziness  This is a chronic problem. Associated symptoms include numbness. She has tried drinking (eating more salt) for the symptoms. The treatment provided moderate (dropped frequency fro 15-20 times to 4-5\) relief. Neuropathy    The onset of symptoms is unknown (tingling started in ears went to denise then rest of body and extremities). Pain location: lasted a couple hours and gradually improved. The distribution is symmetrical. It lasts 3 hours. Sees cardiology end of November. Past Medical History:   Diagnosis Date    Anemia     Family history of breast cancer     Family history of cervical cancer     Family history of uterine cancer     Hepatic hemangioma 11/24/2021    Herpes     genital, 2 outbreaks total, has been years    MRSA (methicillin resistant staph aureus) culture positive 08/16/2017    abscess    Postpartum depression 11/07/2016    Tobacco use 08/16/2017        Current Outpatient Medications   Medication Sig Dispense Refill    ciprofloxacin (CIPRO) 250 MG tablet Take 1 tablet by mouth 2 times daily for 7 days 14 tablet 0    Cholecalciferol (VITAMIN D3) 50 MCG (2000 UT) CAPS Take by mouth      vitamin k 100 MCG tablet Take 100 mcg by mouth daily      ferrous sulfate (IRON 325) 325 (65 Fe) MG tablet Take 325 mg by mouth daily (with breakfast)       No current facility-administered medications for this visit.      Allergies   Allergen Reactions    Codeine      hallucinations    Fentanyl Other (See Comments)     hallucinations    Penicillins Hives       Health Maintenance   Topic Date Due    COVID-19 Vaccine (1) Never done    DTaP/Tdap/Td vaccine (1 - Tdap) Never done    Lipids  Never done    Flu vaccine (1) Never done Depression Monitoring  08/01/2023    Cervical cancer screen  08/01/2027    Hepatitis C screen  Completed    HIV screen  Completed    Hepatitis A vaccine  Aged Out    Hepatitis B vaccine  Aged Out    Hib vaccine  Aged Out    Meningococcal (ACWY) vaccine  Aged Out    Pneumococcal 0-64 years Vaccine  Aged Out       Subjective:      Review of Systems   Constitutional: Negative. HENT: Negative. Eyes: Negative. Respiratory: Negative. Cardiovascular: Negative. Gastrointestinal: Negative. Genitourinary: Negative. Musculoskeletal: Negative. Skin: Negative. Neurological:  Positive for dizziness and numbness. Psychiatric/Behavioral: Negative. All other systems reviewed and are negative. Objective:     Physical Exam  Vitals reviewed. Constitutional:       Appearance: Normal appearance. She is well-developed. HENT:      Head: Normocephalic and atraumatic. Eyes:      Conjunctiva/sclera: Conjunctivae normal.      Pupils: Pupils are equal, round, and reactive to light. Neck:      Thyroid: No thyromegaly. Vascular: No JVD. Cardiovascular:      Rate and Rhythm: Normal rate and regular rhythm. Heart sounds: S1 normal and S2 normal. No murmur heard. Pulmonary:      Effort: No respiratory distress. Breath sounds: Normal breath sounds. Abdominal:      General: Bowel sounds are normal.      Palpations: Abdomen is soft. There is no mass. Tenderness: There is no abdominal tenderness. Musculoskeletal:         General: No tenderness. Normal range of motion. Cervical back: Neck supple. Lymphadenopathy:      Cervical: No cervical adenopathy. Skin:     General: Skin is warm and dry. Neurological:      Mental Status: She is alert and oriented to person, place, and time. Cranial Nerves: No cranial nerve deficit. Deep Tendon Reflexes: Reflexes are normal and symmetric.    Psychiatric:         Behavior: Behavior normal.     /66 (Site: Right Upper Arm, Position: Sitting)   Pulse 82   Wt 128 lb 12.8 oz (58.4 kg)   LMP 09/11/2022 (Approximate)   SpO2 98%   BMI 20.79 kg/m²       Assessment:       Diagnosis Orders   1. Helena Walden MD, Neurology, [place]      2. Acute non-recurrent maxillary sinusitis  ciprofloxacin (CIPRO) 250 MG tablet      3. Tingling sensation  Lawrence Hauser MD, Neurology, [place]          Plan:      No follow-ups on file. Orders Placed This Encounter   Medications    ciprofloxacin (CIPRO) 250 MG tablet     Sig: Take 1 tablet by mouth 2 times daily for 7 days     Dispense:  14 tablet     Refill:  0     Orders Placed This Encounter   Procedures    Lawrence Hauser MD, Neurology, [place]     Referral Priority:   Routine     Referral Type:   Eval and Treat     Referral Reason:   Specialty Services Required     Referred to Provider:   Mariel Brown MD     Requested Specialty:   Neurology     Number of Visits Requested:   1            Patient given educational materials - see patient instructions. Discussed use, benefit, and side effects of prescribed medications. All patientquestions answered. Pt voiced understanding.     Electronically signed by Eryn Wright MD on 10/3/2022at 11:04 AM

## 2022-12-07 ENCOUNTER — TELEPHONE (OUTPATIENT)
Dept: OBGYN CLINIC | Age: 42
End: 2022-12-07

## 2022-12-07 NOTE — TELEPHONE ENCOUNTER
I called pt today to schedule EMB procedure to get her started for surgery scheduling , pt is declining at this time she stated her cycles are a little more regular now , and she just wants to wait .

## 2023-05-24 ENCOUNTER — TELEPHONE (OUTPATIENT)
Dept: NEUROLOGY | Age: 43
End: 2023-05-24

## 2023-05-24 NOTE — TELEPHONE ENCOUNTER
05 24 2023 I called times 2 to schedule new patient appointment ([phone] and 05 17 2023), LMOM both times, no response, I mailed a letter asking the patient to call the office to schedule this appointment.   KS

## 2023-08-01 ENCOUNTER — HOSPITAL ENCOUNTER (EMERGENCY)
Age: 43
Discharge: HOME | End: 2023-08-01
Attending: STUDENT IN AN ORGANIZED HEALTH CARE EDUCATION/TRAINING PROGRAM
Payer: MEDICAID

## 2023-08-01 ENCOUNTER — APPOINTMENT (OUTPATIENT)
Dept: CT IMAGING | Age: 43
End: 2023-08-01
Payer: MEDICAID

## 2023-08-01 VITALS
SYSTOLIC BLOOD PRESSURE: 84 MMHG | OXYGEN SATURATION: 100 % | HEIGHT: 67 IN | WEIGHT: 135 LBS | DIASTOLIC BLOOD PRESSURE: 61 MMHG | BODY MASS INDEX: 21.19 KG/M2 | TEMPERATURE: 98.2 F | HEART RATE: 103 BPM | RESPIRATION RATE: 16 BRPM

## 2023-08-01 DIAGNOSIS — N12 PYELONEPHRITIS: Primary | ICD-10-CM

## 2023-08-01 LAB
ALBUMIN SERPL-MCNC: 4.3 G/DL (ref 3.5–5.2)
ALP SERPL-CCNC: 43 U/L (ref 35–104)
ALT SERPL-CCNC: 10 U/L (ref 5–33)
ANION GAP SERPL CALCULATED.3IONS-SCNC: 8 MMOL/L (ref 9–17)
AST SERPL-CCNC: 18 U/L
BACTERIA URNS QL MICRO: ABNORMAL
BASOPHILS # BLD: 0.1 K/UL (ref 0–0.2)
BASOPHILS NFR BLD: 1 % (ref 0–2)
BILIRUB SERPL-MCNC: 0.3 MG/DL (ref 0.3–1.2)
BILIRUB UR QL STRIP: NEGATIVE
BUN SERPL-MCNC: 16 MG/DL (ref 6–20)
CALCIUM SERPL-MCNC: 9 MG/DL (ref 8.6–10.4)
CASTS #/AREA URNS LPF: ABNORMAL /LPF
CHLORIDE SERPL-SCNC: 107 MMOL/L (ref 98–107)
CLARITY UR: CLEAR
CO2 SERPL-SCNC: 24 MMOL/L (ref 20–31)
COLOR UR: YELLOW
CREAT SERPL-MCNC: 0.7 MG/DL (ref 0.5–0.9)
EOSINOPHIL # BLD: 0.1 K/UL (ref 0–0.4)
EOSINOPHILS RELATIVE PERCENT: 1 % (ref 0–4)
EPI CELLS #/AREA URNS HPF: ABNORMAL /HPF
ERYTHROCYTE [DISTWIDTH] IN BLOOD BY AUTOMATED COUNT: 16.4 % (ref 11.5–14.9)
GFR SERPL CREATININE-BSD FRML MDRD: >60 ML/MIN/1.73M2
GLUCOSE SERPL-MCNC: 93 MG/DL (ref 70–99)
GLUCOSE UR STRIP-MCNC: NEGATIVE MG/DL
HCG SERPL QL: NEGATIVE
HCT VFR BLD AUTO: 35.2 % (ref 36–46)
HGB BLD-MCNC: 10.9 G/DL (ref 12–16)
HGB UR QL STRIP.AUTO: NEGATIVE
KETONES UR STRIP-MCNC: ABNORMAL MG/DL
LACTATE BLDV-SCNC: 0.9 MMOL/L (ref 0.5–2.2)
LEUKOCYTE ESTERASE UR QL STRIP: ABNORMAL
LYMPHOCYTES NFR BLD: 2.2 K/UL (ref 1–4.8)
LYMPHOCYTES RELATIVE PERCENT: 33 % (ref 24–44)
MCH RBC QN AUTO: 24.2 PG (ref 26–34)
MCHC RBC AUTO-ENTMCNC: 31.1 G/DL (ref 31–37)
MCV RBC AUTO: 77.9 FL (ref 80–100)
MONOCYTES NFR BLD: 0.5 K/UL (ref 0.1–1.3)
MONOCYTES NFR BLD: 7 % (ref 1–7)
NEUTROPHILS NFR BLD: 58 % (ref 36–66)
NEUTS SEG NFR BLD: 3.8 K/UL (ref 1.3–9.1)
NITRITE UR QL STRIP: NEGATIVE
PH UR STRIP: 5.5 [PH] (ref 5–8)
PLATELET # BLD AUTO: 354 K/UL (ref 150–450)
PMV BLD AUTO: 8.2 FL (ref 6–12)
POTASSIUM SERPL-SCNC: 4.5 MMOL/L (ref 3.7–5.3)
PROT SERPL-MCNC: 7.1 G/DL (ref 6.4–8.3)
PROT UR STRIP-MCNC: ABNORMAL MG/DL
RBC # BLD AUTO: 4.52 M/UL (ref 4–5.2)
RBC #/AREA URNS HPF: ABNORMAL /HPF
SODIUM SERPL-SCNC: 139 MMOL/L (ref 135–144)
SP GR UR STRIP: 1.03 (ref 1–1.03)
UROBILINOGEN UR STRIP-ACNC: NORMAL EU/DL (ref 0–1)
WBC #/AREA URNS HPF: ABNORMAL /HPF
WBC OTHER # BLD: 6.7 K/UL (ref 3.5–11)

## 2023-08-01 PROCEDURE — 99285 EMERGENCY DEPT VISIT HI MDM: CPT

## 2023-08-01 PROCEDURE — 83605 ASSAY OF LACTIC ACID: CPT

## 2023-08-01 PROCEDURE — 72131 CT LUMBAR SPINE W/O DYE: CPT

## 2023-08-01 PROCEDURE — 80053 COMPREHEN METABOLIC PANEL: CPT

## 2023-08-01 PROCEDURE — 87086 URINE CULTURE/COLONY COUNT: CPT

## 2023-08-01 PROCEDURE — 6370000000 HC RX 637 (ALT 250 FOR IP)

## 2023-08-01 PROCEDURE — 84703 CHORIONIC GONADOTROPIN ASSAY: CPT

## 2023-08-01 PROCEDURE — 85025 COMPLETE CBC W/AUTO DIFF WBC: CPT

## 2023-08-01 PROCEDURE — 36415 COLL VENOUS BLD VENIPUNCTURE: CPT

## 2023-08-01 PROCEDURE — 96360 HYDRATION IV INFUSION INIT: CPT

## 2023-08-01 PROCEDURE — 96375 TX/PRO/DX INJ NEW DRUG ADDON: CPT

## 2023-08-01 PROCEDURE — 74177 CT ABD & PELVIS W/CONTRAST: CPT

## 2023-08-01 PROCEDURE — 81001 URINALYSIS AUTO W/SCOPE: CPT

## 2023-08-01 PROCEDURE — 6360000002 HC RX W HCPCS: Performed by: STUDENT IN AN ORGANIZED HEALTH CARE EDUCATION/TRAINING PROGRAM

## 2023-08-01 PROCEDURE — 96374 THER/PROPH/DIAG INJ IV PUSH: CPT

## 2023-08-01 PROCEDURE — 2580000003 HC RX 258: Performed by: STUDENT IN AN ORGANIZED HEALTH CARE EDUCATION/TRAINING PROGRAM

## 2023-08-01 PROCEDURE — 96361 HYDRATE IV INFUSION ADD-ON: CPT

## 2023-08-01 PROCEDURE — 6360000004 HC RX CONTRAST MEDICATION: Performed by: STUDENT IN AN ORGANIZED HEALTH CARE EDUCATION/TRAINING PROGRAM

## 2023-08-01 RX ORDER — OXYCODONE HYDROCHLORIDE 5 MG/1
5 TABLET ORAL EVERY 6 HOURS PRN
Qty: 3 TABLET | Refills: 0 | Status: SHIPPED | OUTPATIENT
Start: 2023-08-01 | End: 2023-08-04

## 2023-08-01 RX ORDER — SODIUM CHLORIDE 0.9 % (FLUSH) 0.9 %
10 SYRINGE (ML) INJECTION PRN
Status: DISCONTINUED | OUTPATIENT
Start: 2023-08-01 | End: 2023-08-02 | Stop reason: HOSPADM

## 2023-08-01 RX ORDER — SULFAMETHOXAZOLE AND TRIMETHOPRIM 800; 160 MG/1; MG/1
1 TABLET ORAL 2 TIMES DAILY
Qty: 14 TABLET | Refills: 0 | Status: SHIPPED | OUTPATIENT
Start: 2023-08-01 | End: 2023-08-08

## 2023-08-01 RX ORDER — ONDANSETRON 2 MG/ML
4 INJECTION INTRAMUSCULAR; INTRAVENOUS ONCE
Status: COMPLETED | OUTPATIENT
Start: 2023-08-01 | End: 2023-08-01

## 2023-08-01 RX ORDER — 0.9 % SODIUM CHLORIDE 0.9 %
100 INTRAVENOUS SOLUTION INTRAVENOUS ONCE
Status: COMPLETED | OUTPATIENT
Start: 2023-08-01 | End: 2023-08-01

## 2023-08-01 RX ORDER — SULFAMETHOXAZOLE AND TRIMETHOPRIM 800; 160 MG/1; MG/1
1 TABLET ORAL ONCE
Status: COMPLETED | OUTPATIENT
Start: 2023-08-01 | End: 2023-08-01

## 2023-08-01 RX ADMIN — SODIUM CHLORIDE 100 ML: 9 INJECTION, SOLUTION INTRAVENOUS at 20:48

## 2023-08-01 RX ADMIN — HYDROMORPHONE HYDROCHLORIDE 0.5 MG: 1 INJECTION, SOLUTION INTRAMUSCULAR; INTRAVENOUS; SUBCUTANEOUS at 20:07

## 2023-08-01 RX ADMIN — ONDANSETRON 4 MG: 2 INJECTION INTRAMUSCULAR; INTRAVENOUS at 20:07

## 2023-08-01 RX ADMIN — SODIUM CHLORIDE, PRESERVATIVE FREE 10 ML: 5 INJECTION INTRAVENOUS at 20:47

## 2023-08-01 RX ADMIN — SULFAMETHOXAZOLE AND TRIMETHOPRIM 1 TABLET: 800; 160 TABLET ORAL at 22:37

## 2023-08-01 RX ADMIN — IOPAMIDOL 75 ML: 755 INJECTION, SOLUTION INTRAVENOUS at 20:47

## 2023-08-01 ASSESSMENT — ENCOUNTER SYMPTOMS
CHEST TIGHTNESS: 0
SHORTNESS OF BREATH: 0
NAUSEA: 0
DIARRHEA: 0
VOMITING: 0
ABDOMINAL DISTENTION: 0
COUGH: 0
BACK PAIN: 1
RHINORRHEA: 0
CONSTIPATION: 0
ABDOMINAL PAIN: 0
CHOKING: 0

## 2023-08-01 NOTE — ED PROVIDER NOTES
[address] [facility] ED  Emergency Department Encounter  Emergency Medicine Resident     Pt Kendal Kwok  MRN: [id number]  [address] [facility] 1980  Date of evaluation: 23  PCP:  Elver Paiz MD  Note Started: 7:19 PM EDT      CHIEF COMPLAINT       Chief Complaint   Patient presents with    Back Pain    Hip Pain       HISTORY OF PRESENT ILLNESS  (Location/Symptom, Timing/Onset, Context/Setting, Quality, Duration, Modifying Factors, Severity.)      Junior Hawkins is a 39 y.o. female who presents with right-sided flank pain/hip pain. Patient rates her pain as a 10/10, states it radiates from her back to her thigh/hip region. Patient states that the symptoms started today. Denies any bowel or bladder incontinence, fever, chills, nausea, vomiting, dysuria, hematuria. Patient states that she has taken ibuprofen and Tylenol with little relief in her symptoms. PAST MEDICAL / SURGICAL / SOCIAL / FAMILY HISTORY      has a past medical history of Anemia, Family history of breast cancer, Family history of cervical cancer, Family history of uterine cancer, Hepatic hemangioma, Herpes, MRSA (methicillin resistant staph aureus) culture positive, Postpartum depression, and Tobacco use.       has a past surgical history that includes Dilation and curettage of uterus; laparoscopy; Dilation & curettage; pr laparoscopic appendectomy (N/A, 2018); pr colectomy partial w/anastomosis (N/A, 2018); Tubal ligation (2021); and Tonsillectomy and adenoidectomy ().       Social History     Socioeconomic History    Marital status:      Spouse name: Not on file    Number of children: Not on file    Years of education: Not on file    Highest education level: Not on file   Occupational History    Not on file   Tobacco Use    Smoking status: Former     Packs/day: 0.25     Types: Cigarettes     Start date:      Quit date:      Years since quittin.5    Smokeless tobacco: Never   Vaping Use    Vaping Use:

## 2023-08-01 NOTE — ED NOTES
Mode of arrival (squad #, walk in, police, etc) : walk-in        Chief complaint(s): back pain/ hip pain        Arrival Note (brief scenario, treatment PTA, etc). : Pt reports she has hip and back pain. Pt reports not urinating as frequently. She also denies any burning with urination and frequency. C= \"Have you ever felt that you should Cut down on your drinking? \"  No  A= \"Have people Annoyed you by criticizing your drinking? \"  No  G= \"Have you ever felt bad or Guilty about your drinking? \"  No  E= \"Have you ever had a drink as an Eye-opener first thing in the morning to steady your nerves or to help a hangover? \"  No      Deferred []      Reason for deferring: N/A    *If yes to two or more: probable alcohol abuse. *      Miriam Meadows RN  08/01/23 [id number]

## 2023-08-02 LAB
MICROORGANISM SPEC CULT: NORMAL
SPECIMEN DESCRIPTION: NORMAL

## 2023-12-27 ENCOUNTER — HOSPITAL ENCOUNTER (OUTPATIENT)
Age: 43
Discharge: HOME | End: 2023-12-27
Payer: MEDICAID

## 2023-12-27 ENCOUNTER — TELEPHONE (OUTPATIENT)
Dept: OBGYN CLINIC | Age: 43
End: 2023-12-27

## 2023-12-27 DIAGNOSIS — N92.1 MENORRHAGIA WITH IRREGULAR CYCLE: ICD-10-CM

## 2023-12-27 LAB
ABSOLUTE BANDS #: 0.04 K/UL (ref 0–1)
B-HCG SERPL EIA 3RD IS-ACNC: <1 MIU/ML
BANDS: 1 % (ref 0–10)
BASOPHILS # BLD: 0 K/UL (ref 0–0.2)
BASOPHILS NFR BLD: 0 % (ref 0–2)
EOSINOPHIL # BLD: 0.04 K/UL (ref 0–0.4)
EOSINOPHILS RELATIVE PERCENT: 1 % (ref 0–4)
ERYTHROCYTE [DISTWIDTH] IN BLOOD BY AUTOMATED COUNT: 16.9 % (ref 11.5–14.9)
HCT VFR BLD AUTO: 34.8 % (ref 36–46)
HGB BLD-MCNC: 10.7 G/DL (ref 12–16)
INR PPP: 0.9
LYMPHOCYTES NFR BLD: 1.97 K/UL (ref 1–4.8)
LYMPHOCYTES RELATIVE PERCENT: 47 % (ref 24–44)
MCH RBC QN AUTO: 23.7 PG (ref 26–34)
MCHC RBC AUTO-ENTMCNC: 30.7 G/DL (ref 31–37)
MCV RBC AUTO: 77.3 FL (ref 80–100)
MONOCYTES NFR BLD: 0.34 K/UL (ref 0.1–1.3)
MONOCYTES NFR BLD: 8 % (ref 1–7)
MORPHOLOGY: ABNORMAL
NEUTROPHILS NFR BLD: 43 % (ref 36–66)
NEUTS SEG NFR BLD: 1.81 K/UL (ref 1.3–9.1)
PARTIAL THROMBOPLASTIN TIME: 31.7 SEC (ref 24–36)
PLATELET # BLD AUTO: 388 K/UL (ref 150–450)
PMV BLD AUTO: 7.9 FL (ref 6–12)
PROTHROMBIN TIME: 12.8 SEC (ref 11.8–14.6)
RBC # BLD AUTO: 4.5 M/UL (ref 4–5.2)
TSH SERPL DL<=0.05 MIU/L-ACNC: 0.89 UIU/ML (ref 0.3–5)
WBC OTHER # BLD: 4.2 K/UL (ref 3.5–11)

## 2023-12-27 PROCEDURE — 85610 PROTHROMBIN TIME: CPT

## 2023-12-27 PROCEDURE — 85025 COMPLETE CBC W/AUTO DIFF WBC: CPT

## 2023-12-27 PROCEDURE — 36415 COLL VENOUS BLD VENIPUNCTURE: CPT

## 2023-12-27 PROCEDURE — 84702 CHORIONIC GONADOTROPIN TEST: CPT

## 2023-12-27 PROCEDURE — 84443 ASSAY THYROID STIM HORMONE: CPT

## 2023-12-27 PROCEDURE — 85730 THROMBOPLASTIN TIME PARTIAL: CPT

## 2023-12-27 RX ORDER — FERROUS SULFATE 325(65) MG
325 TABLET ORAL
Qty: 30 TABLET | Refills: 3 | Status: SHIPPED | OUTPATIENT
Start: 2023-12-27

## 2023-12-27 NOTE — TELEPHONE ENCOUNTER
Per Claude Manns pt notified of lab results showing Anemia noted and pt to get Ferrous sulfate 325mg PO QD - 3 refills sent to pt pharm. Pt voiced understanding to all results and recommendations given.

## 2023-12-27 NOTE — TELEPHONE ENCOUNTER
----- Message from EVETTE Rodriguez CNP sent at 12/27/2023 12:33 PM EST -----  Anemia noted  Ferrous sulfate 325mg PO QD - 3 refills.

## 2024-01-11 ENCOUNTER — OFFICE VISIT (OUTPATIENT)
Dept: OBGYN CLINIC | Age: 44
End: 2024-01-11
Payer: MEDICAID

## 2024-01-11 ENCOUNTER — HOSPITAL ENCOUNTER (OUTPATIENT)
Age: 44
Setting detail: SPECIMEN
Discharge: HOME | End: 2024-01-11

## 2024-01-11 VITALS
SYSTOLIC BLOOD PRESSURE: 100 MMHG | BODY MASS INDEX: 21.35 KG/M2 | HEIGHT: 67 IN | DIASTOLIC BLOOD PRESSURE: 64 MMHG | WEIGHT: 136 LBS

## 2024-01-11 DIAGNOSIS — Z12.31 ENCOUNTER FOR SCREENING MAMMOGRAM FOR MALIGNANT NEOPLASM OF BREAST: ICD-10-CM

## 2024-01-11 DIAGNOSIS — Z01.419 WELL FEMALE EXAM WITH ROUTINE GYNECOLOGICAL EXAM: Primary | ICD-10-CM

## 2024-01-11 DIAGNOSIS — Z11.51 SPECIAL SCREENING EXAMINATION FOR HUMAN PAPILLOMAVIRUS (HPV): ICD-10-CM

## 2024-01-11 PROCEDURE — 99396 PREV VISIT EST AGE 40-64: CPT | Performed by: NURSE PRACTITIONER

## 2024-01-11 NOTE — PROGRESS NOTES
History and Physical  [facility]  [facility] [address]  [place], [place]  [address] [phone]   Fax [phone]  Norma Gallo  2025              43 y.o.  Chief Complaint   Patient presents with    Annual Exam       Patient's last menstrual period was 2024 (exact date).             Primary Care Physician: Kyle Motley MD    The patient was seen and examined. She has no chief complaint today and is here for her annual exam.  Her bowels are regular. There are no voiding complaints. She denies any bloating.  She denies vaginal discharge and was counseled on STD's and the need for barrier contraception.     HPI : Norma Gallo is a 43 y.o. female     Annual exam   Currently awaiting [facility] for menorrhagia with irregular cycles   Had work up for ablation in  and got scared and canceled- see last note for further explanation     no Bloating  no Early Satiety  no Unexplained weight change of more than 15 lbs  no  PMB  no  PCB  ________________________________________________________________________  OB History    Para Term  AB Living   9 7 1 6 2 7   SAB IAB Ectopic Molar Multiple Live Births   2 0 0 0 0 7      # Outcome Date GA Lbr Kaden/2nd Weight Sex Delivery Anes PTL Lv   9 Term 21 38w4d  2.995 kg (6 lb 9.6 oz) F Vag-Spont Local N MALLIKA      Name: JACKY GALLO      Apgar1: 8  Apgar5: 9   8  10/22/16 34w2d  2.47 kg (5 lb 7.1 oz) M Vag-Spont   MALLIKA      Name: SANCHO GALLO      Apgar1: 8  Apgar5: 9   7  13 36w4d  2.637 kg (5 lb 13 oz) M    MALLIKA   6 SAB  6w0d    SAB      5   36w0d  2.835 kg (6 lb 4 oz) F Vag-Spont   MALLIKA   4   36w0d  2.778 kg (6 lb 2 oz) M Vag-Spont   MALLIKA      Birth Comments: Spont PTL/PTD   3 SAB  10w0d             Birth Comments: D&C   2   35w0d  2.325 kg (5 lb 2 oz) M Vag-Spont   MALLIKA      Birth Comments: Spont PTL/PTD   1   36w0d  2.75 kg (6 lb

## 2024-01-16 LAB
HPV I/H RISK 4 DNA CVX QL NAA+PROBE: NOT DETECTED
HPV SAMPLE: NORMAL
HPV, INTERPRETATION: NORMAL
HPV16 DNA CVX QL NAA+PROBE: NOT DETECTED
HPV18 DNA CVX QL NAA+PROBE: NOT DETECTED
SPECIMEN DESCRIPTION: NORMAL

## 2024-01-27 LAB — CYTOLOGY REPORT: NORMAL

## 2024-02-16 ENCOUNTER — HOSPITAL ENCOUNTER (OUTPATIENT)
Age: 44
Setting detail: SPECIMEN
Discharge: HOME | End: 2024-02-16

## 2024-02-16 ENCOUNTER — PROCEDURE VISIT (OUTPATIENT)
Dept: OBGYN CLINIC | Age: 44
End: 2024-02-16

## 2024-02-16 VITALS
BODY MASS INDEX: 21.53 KG/M2 | WEIGHT: 134 LBS | DIASTOLIC BLOOD PRESSURE: 60 MMHG | SYSTOLIC BLOOD PRESSURE: 98 MMHG | HEIGHT: 66 IN

## 2024-02-16 DIAGNOSIS — Z84.81 FAMILY HISTORY OF BREAST CANCER GENE MUTATION IN FIRST DEGREE RELATIVE: ICD-10-CM

## 2024-02-16 DIAGNOSIS — N92.0 MENORRHAGIA WITH REGULAR CYCLE: Primary | ICD-10-CM

## 2024-02-16 DIAGNOSIS — Z32.02 NEGATIVE PREGNANCY TEST: ICD-10-CM

## 2024-02-16 DIAGNOSIS — Z80.3 FAMILY HISTORY OF BREAST CANCER: Primary | ICD-10-CM

## 2024-02-16 NOTE — PROGRESS NOTES
[facility]  [place]  Hysteroscopy Procedure Note      Norma Gallo  2/16/2024  Patient's last menstrual period was 02/03/2024 (approximate).  Chief Complaint   Patient presents with    Procedure         Blood pressure 98/60, height 1.676 m (5' 6\"), weight 60.8 kg (134 lb), last menstrual period 02/03/2024, not currently breastfeeding.     [employer]ra[employer] Findings:   No results found.    LABS:  UPT: negative    Hospital Outpatient Visit on 01/11/2024   Component Date Value Ref Range Status    Cytology Report 01/11/2024    Final                    Value:Path Number: [id number]    DIAGNOSIS    Imaged ThinPrep Pap - Cervical (1 monolayer slide):  Specimen Adequacy:       Satisfactory for evaluation.       - Endocervical/transformation zone component present.  Descriptive Diagnosis:       Negative for intraepithelial lesion or malignancy.   Comments:       Specimen was screened at [facility], [address] [place] [id number]      Cyt[employer] Screener:  CS     **Electronically Signed Out**  BERENICE El(ASCP)  /1/27/2024    Procedure/Addendum  HPV Procedure Report       Date Ordered:     1/12/2024     Status: Signed Out       Date Complete:     1/16/2024     By: System Interface       Date Reported:     1/16/2024              Sample:  HPV Type 16      Result:   Not Detected      Ref Range:  (Not Detected)  Sample:  HPV Type 18      Result:   Not Detected      Ref Range: (Not  Detected)  Sample:  Other High Risk HPV      Result:   Not Detected      Ref  Range: (Not Detected)  Sample:  HPV Interp      Result:         Ref                           Range: (Not Detected)  This test amplifies and detects DNA of 14 high-risk HPV types  associated with cervical cancer and its precursor lesions   (HPV types 16,18, 31, 33, 35, 39, 45, 51, 52, 56, 58, 59, 66, and  68).        Sensitivity may be affected by specimen collection methods, stage of  infection, and the presence of interfering   substances. Results

## 2024-02-20 LAB — SURGICAL PATHOLOGY REPORT: NORMAL

## 2024-03-05 ENCOUNTER — INITIAL CONSULT (OUTPATIENT)
Dept: ONCOLOGY | Age: 44
End: 2024-03-05
Payer: MEDICAID

## 2024-03-05 DIAGNOSIS — Z80.3 FAMILY HISTORY OF BREAST CANCER: Primary | ICD-10-CM

## 2024-03-05 DIAGNOSIS — Z80.49 FAMILY HISTORY OF MALIGNANT NEOPLASM OF ENDOMETRIUM: ICD-10-CM

## 2024-03-05 PROCEDURE — 96040 PR MEDICAL GENETICS COUNSELING EACH 30 MINUTES: CPT | Performed by: GENETIC COUNSELOR, MS

## 2024-03-18 ENCOUNTER — TELEPHONE (OUTPATIENT)
Dept: ONCOLOGY | Age: 44
End: 2024-03-18

## 2024-03-18 NOTE — TELEPHONE ENCOUNTER
Left message for Norma notifying her that her genetic test results are available. Requested that she return my phone call at her earliest convenience to review results.

## 2024-03-20 NOTE — TELEPHONE ENCOUNTER
[facility] Genetic Counseling Program   Hereditary Cancer Risk Assessment     Name: Norma Gallo  YOB: 1980  Date of Results Disclosure: 03/20/24      HISTORY   Ms. Gallo was seen for genetic counseling at the request of Wendie Fuller DO due to her family history of cancer.  At that time, Ms. Gallo chose to pursue genetic testing via the CancerNext Expanded + RNA hereditary cancer gene panel. These results were discussed with Ms. Gallo via telephone. A summary of Ms. Gallo's results and recommendations are below.     RESULTS  [facility] CancerNext-Expanded Panel + RNAinsight: NEGATIVE - NO CLINICALLY SIGNIFICANT MUTATIONS DETECTED   This panel included the analysis of 71 genes associated with hereditary cancer including: AIP, ALK, APC, GIULIANO, BAP1, BARD1, BMPR1A, BRCA1, BRCA2, BRIP1, CDC73, CDH1, CDK4, CDKN1B, CDKN2A, CHEK2, CTNNA1, DICER1, EGFR, EGLN1, EPCAM, FH, FLCN, GREM1, HOXB13, KIF1B, KIT1, LZTR1, MAX, MEN1, MET, MITF, MLH1, MSH2, MSH3, MSH6, MUTYH, NF1, NF2, NTHL1, PALB2, PDGFRA, PHOX2B, PMS2, POLD1, POLE, POT1, HHKTJ7U, PTCH1, PTEN, RAD51C, RAD51D, RB1, RET, SDHA, SDHAF2, SDHB, SDHC, ,SDHD, SMAD4, SMARCA4, SMARCB1, SMARCE1, STK11, SUFU, ZWEY247, TP53, TSC1, TSC2, VHL. In addition, no clinically relevant aberrant RNA transcripts were detected in select genes. Please refer to genetic test report for technical details.    We discussed that Ms. Gallo's negative test result greatly reduces the likelihood that she carries a hereditary gene mutation. However, it is possible that her family history of cancer is due to a hereditary mutation which she did not inherit.  It is also possible that her family history of cancer may be due to a gene for which testing was not performed or which has yet to be discovered.     RECOMMENDATIONS  1)  While Ms. Gallo does not carry a known hereditary gene mutation, her risk for breast cancer may still be elevated. Based on Ms. Gallo's

## 2024-04-23 ENCOUNTER — HOSPITAL ENCOUNTER (OUTPATIENT)
Dept: PREADMISSION TESTING | Age: 44
Discharge: HOME | End: 2024-04-23
Attending: OBSTETRICS & GYNECOLOGY | Admitting: OBSTETRICS & GYNECOLOGY

## 2024-04-23 NOTE — DISCHARGE INSTRUCTIONS
Pre-op Instructions For Out-Patient Surgery    Medication Instructions:  Please stop herbs and any supplements now (includes vitamins and minerals).    Please contact your surgeon and prescribing physician for pre-op instructions for any blood thinners.    If you have inhalers/aerosol treatments at home, please use them the morning of your surgery and bring the inhalers with you to the hospital.    Please take the following medications the morning of your surgery with a sip of water:    None    Surgery Instructions:  After midnight before surgery:  Do not eat or drink anything, including water, mints, gum, and hard candy.  You may brush your teeth without swallowing.  No smoking, chewing tobacco, or street drugs.    Please shower or bathe before surgery.  If you were given Surgical Scrub Chlorhexidine Gluconate Liquid (CHG), please shower the night before and the morning of your surgery following the detailed instructions you received during your pre-admission visit.     Please do not wear any cologne, lotion, powder, deodorant, jewelry, piercings, perfume, makeup, nail polish, hair accessories, or hair spray on the day of surgery.  Wear loose comfortable clothing.    Leave your valuables at home but bring a payment source for any after-surgery prescriptions you plan to fill at [facility].  Bring a storage case for any glasses/contacts.    An adult who is responsible for you MUST drive you home and should be with you for the first 24 hours after surgery.     If having out-patient knee and foot surgeries, please arrange for planned crutches, walker, or wheelchair before arriving to the hospital.    The Day of Surgery:  Arrive at [facility] Surgery Entrance at the time directed by your surgeon and check in at the desk.     If you have a living will or healthcare power of , please bring a copy.    You will be taken to the pre-op holding area where you will be

## 2024-04-24 ENCOUNTER — OFFICE VISIT (OUTPATIENT)
Dept: OBGYN CLINIC | Age: 44
End: 2024-04-24
Payer: MEDICAID

## 2024-04-24 ENCOUNTER — PREP FOR PROCEDURE (OUTPATIENT)
Dept: OBGYN CLINIC | Age: 44
End: 2024-04-24

## 2024-04-24 VITALS
SYSTOLIC BLOOD PRESSURE: 98 MMHG | DIASTOLIC BLOOD PRESSURE: 60 MMHG | WEIGHT: 138 LBS | HEIGHT: 66 IN | BODY MASS INDEX: 22.18 KG/M2

## 2024-04-24 DIAGNOSIS — N94.6 DYSMENORRHEA, UNSPECIFIED: ICD-10-CM

## 2024-04-24 DIAGNOSIS — Z98.51 S/P TUBAL LIGATION: ICD-10-CM

## 2024-04-24 DIAGNOSIS — Z01.818 PREOP TESTING: ICD-10-CM

## 2024-04-24 DIAGNOSIS — N92.0 MENORRHAGIA WITH REGULAR CYCLE: Primary | ICD-10-CM

## 2024-04-24 PROCEDURE — 99213 OFFICE O/P EST LOW 20 MIN: CPT | Performed by: OBSTETRICS & GYNECOLOGY

## 2024-04-24 RX ORDER — SODIUM CHLORIDE, SODIUM LACTATE, POTASSIUM CHLORIDE, CALCIUM CHLORIDE 600; 310; 30; 20 MG/100ML; MG/100ML; MG/100ML; MG/100ML
INJECTION, SOLUTION INTRAVENOUS CONTINUOUS
Status: CANCELLED | OUTPATIENT
Start: 2024-04-24

## 2024-04-24 RX ORDER — SODIUM CHLORIDE 0.9 % (FLUSH) 0.9 %
5-40 SYRINGE (ML) INJECTION PRN
Status: CANCELLED | OUTPATIENT
Start: 2024-04-24

## 2024-04-24 RX ORDER — SODIUM CHLORIDE 0.9 % (FLUSH) 0.9 %
5-40 SYRINGE (ML) INJECTION EVERY 12 HOURS SCHEDULED
Status: CANCELLED | OUTPATIENT
Start: 2024-04-24

## 2024-04-24 RX ORDER — SODIUM CHLORIDE 9 MG/ML
INJECTION, SOLUTION INTRAVENOUS PRN
Status: CANCELLED | OUTPATIENT
Start: 2024-04-24

## 2024-04-24 NOTE — PROGRESS NOTES
[facility]  [address]; [address]  [place] [address]  [phone] mn [phone] Fax        Norma Gallo  1980  Primary Care Physician: Kyle Motley MD        HISTORY AND PHYSICAL      Hospital: [facility]      2024        HPI: Norma Gallo is a 43 y.o. female   she is being seen for the problems listed below and is planning surgical intervention. She was counseled on all conservative medical and surgical options as well as definitive procedures as well. Pt c/o heavy menses with clots and cramping. Pt states her symptoms are affecting her ADL. Pt s/p tubal occlusion. Pt met with genetic counselor and has no increased risk of gyn cancers    1. Menorrhagia with regular cycle    2. S/P tubal ligation    3. 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     Priority: High     Daughter was born deaf   2016 declined opportunity for non-syndromic hearing loss carrier testing by MFM  Declined connexin diagnostic testing in her daughter, Elier, and has declined testing for non-syndromic hearing loss      Grand multiparity 2016     Priority: High      36 weeks   2001 35 weeks   2002 SAB  2004 36 weeks   2006 37 weeks   2013 37 weeks         Herpes      Priority: High     2016 History of genital herpes: last outbreak years ago  Call office with any new outbreaks      Hepatic hemangioma 2021    S/P tubal ligation 2021     20 F Apg 8/9 Wt 6#9 2021    History of MRSA infection 2021     3/24/21 - Nares MRSA culture #1 completed  2021 MRSA swab #2 negative       Carcinoid tumor of appendix and colon 2018     S/p laparoscopic appendectomy and right sided hemicolectomy ()      MRSA infection - buttock and prepatellar bursa

## 2024-04-24 NOTE — H&P
[facility]  [address]; [address]  [place] [address]  [phone] mn [phone] Fax        Norma Gallo  1980  Primary Care Physician: Kyle Motley MD        HISTORY AND PHYSICAL      Hospital: [facility]      2024        HPI: Norma Gallo is a 43 y.o. female   she is being seen for the problems listed below and is planning surgical intervention. She was counseled on all conservative medical and surgical options as well as definitive procedures as well. Pt c/o heavy menses with clots and cramping. Pt states her symptoms are affecting her ADL. Pt s/p tubal occlusion. Pt met with genetic counselor and has no increased risk of gyn cancers    1. Menorrhagia with regular cycle    2. S/P tubal ligation    3. 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     Priority: High     Daughter was born deaf   2016 declined opportunity for non-syndromic hearing loss carrier testing by MFM  Declined connexin diagnostic testing in her daughter, Elier, and has declined testing for non-syndromic hearing loss      Grand multiparity 2016     Priority: High      36 weeks   2001 35 weeks   2002 SAB  2004 36 weeks   2006 37 weeks   2013 37 weeks         Herpes      Priority: High     2016 History of genital herpes: last outbreak years ago  Call office with any new outbreaks      Hepatic hemangioma 2021    S/P tubal ligation 2021     20 F Apg 8/9 Wt 6#9 2021    History of MRSA infection 2021     3/24/21 - Nares MRSA culture #1 completed  2021 MRSA swab #2 negative       Carcinoid tumor of appendix and colon 2018     S/p laparoscopic appendectomy and right sided hemicolectomy ()      MRSA infection - buttock and prepatellar bursa

## 2024-04-25 ENCOUNTER — HOSPITAL ENCOUNTER (OUTPATIENT)
Dept: PREADMISSION TESTING | Age: 44
Discharge: HOME | End: 2024-04-25
Attending: OBSTETRICS & GYNECOLOGY | Admitting: OBSTETRICS & GYNECOLOGY
Payer: MEDICAID

## 2024-04-25 VITALS
WEIGHT: 140 LBS | SYSTOLIC BLOOD PRESSURE: 105 MMHG | OXYGEN SATURATION: 99 % | TEMPERATURE: 98.2 F | HEART RATE: 70 BPM | RESPIRATION RATE: 20 BRPM | BODY MASS INDEX: 21.97 KG/M2 | HEIGHT: 67 IN | DIASTOLIC BLOOD PRESSURE: 68 MMHG

## 2024-04-25 DIAGNOSIS — Z01.818 PREOP TESTING: ICD-10-CM

## 2024-04-25 DIAGNOSIS — Z98.51 S/P TUBAL LIGATION: ICD-10-CM

## 2024-04-25 DIAGNOSIS — N94.6 DYSMENORRHEA, UNSPECIFIED: ICD-10-CM

## 2024-04-25 DIAGNOSIS — N92.0 MENORRHAGIA WITH REGULAR CYCLE: ICD-10-CM

## 2024-04-25 LAB
ABO + RH BLD: NORMAL
ANION GAP SERPL CALCULATED.3IONS-SCNC: 10 MMOL/L (ref 9–17)
ARM BAND NUMBER: NORMAL
B-HCG SERPL EIA 3RD IS-ACNC: <1 MIU/ML
BACTERIA URNS QL MICRO: ABNORMAL
BASOPHILS # BLD: 0.06 K/UL (ref 0–0.2)
BASOPHILS NFR BLD: 1 % (ref 0–2)
BILIRUB UR QL STRIP: NEGATIVE
BLOOD BANK SAMPLE EXPIRATION: NORMAL
BLOOD GROUP ANTIBODIES SERPL: NEGATIVE
BUN SERPL-MCNC: 13 MG/DL (ref 6–20)
CALCIUM SERPL-MCNC: 8.3 MG/DL (ref 8.6–10.4)
CASTS #/AREA URNS LPF: ABNORMAL /LPF
CHLORIDE SERPL-SCNC: 103 MMOL/L (ref 98–107)
CLARITY UR: CLEAR
CO2 SERPL-SCNC: 25 MMOL/L (ref 20–31)
COLOR UR: YELLOW
CREAT SERPL-MCNC: 0.8 MG/DL (ref 0.5–0.9)
EOSINOPHIL # BLD: 0.13 K/UL (ref 0–0.4)
EOSINOPHILS RELATIVE PERCENT: 2 % (ref 0–4)
EPI CELLS #/AREA URNS HPF: ABNORMAL /HPF
ERYTHROCYTE [DISTWIDTH] IN BLOOD BY AUTOMATED COUNT: 17.2 % (ref 11.5–14.9)
GFR SERPL CREATININE-BSD FRML MDRD: >90 ML/MIN/1.73M2
GLUCOSE SERPL-MCNC: 83 MG/DL (ref 70–99)
GLUCOSE UR STRIP-MCNC: NEGATIVE MG/DL
HCT VFR BLD AUTO: 34.6 % (ref 36–46)
HGB BLD-MCNC: 10.7 G/DL (ref 12–16)
HGB UR QL STRIP.AUTO: NEGATIVE
INR PPP: 1
KETONES UR STRIP-MCNC: NEGATIVE MG/DL
LEUKOCYTE ESTERASE UR QL STRIP: ABNORMAL
LYMPHOCYTES NFR BLD: 1.79 K/UL (ref 1–4.8)
LYMPHOCYTES RELATIVE PERCENT: 28 % (ref 24–44)
MCH RBC QN AUTO: 24.5 PG (ref 26–34)
MCHC RBC AUTO-ENTMCNC: 30.9 G/DL (ref 31–37)
MCV RBC AUTO: 79.2 FL (ref 80–100)
MONOCYTES NFR BLD: 0.58 K/UL (ref 0.1–1.3)
MONOCYTES NFR BLD: 9 % (ref 1–7)
MORPHOLOGY: ABNORMAL
NEUTROPHILS NFR BLD: 60 % (ref 36–66)
NEUTS SEG NFR BLD: 3.84 K/UL (ref 1.3–9.1)
NITRITE UR QL STRIP: NEGATIVE
PARTIAL THROMBOPLASTIN TIME: 28.8 SEC (ref 24–36)
PH UR STRIP: 5.5 [PH] (ref 5–8)
PLATELET # BLD AUTO: 398 K/UL (ref 150–450)
PMV BLD AUTO: 8.2 FL (ref 6–12)
POTASSIUM SERPL-SCNC: 4 MMOL/L (ref 3.7–5.3)
PROT UR STRIP-MCNC: NEGATIVE MG/DL
PROTHROMBIN TIME: 13 SEC (ref 11.8–14.6)
RBC # BLD AUTO: 4.36 M/UL (ref 4–5.2)
RBC #/AREA URNS HPF: ABNORMAL /HPF
SODIUM SERPL-SCNC: 138 MMOL/L (ref 135–144)
SP GR UR STRIP: 1.01 (ref 1–1.03)
UROBILINOGEN UR STRIP-ACNC: NORMAL EU/DL (ref 0–1)
WBC #/AREA URNS HPF: ABNORMAL /HPF
WBC OTHER # BLD: 6.4 K/UL (ref 3.5–11)

## 2024-04-25 PROCEDURE — 86900 BLOOD TYPING SEROLOGIC ABO: CPT

## 2024-04-25 PROCEDURE — 36415 COLL VENOUS BLD VENIPUNCTURE: CPT

## 2024-04-25 PROCEDURE — 81001 URINALYSIS AUTO W/SCOPE: CPT

## 2024-04-25 PROCEDURE — 85610 PROTHROMBIN TIME: CPT

## 2024-04-25 PROCEDURE — 80048 BASIC METABOLIC PNL TOTAL CA: CPT

## 2024-04-25 PROCEDURE — 86850 RBC ANTIBODY SCREEN: CPT

## 2024-04-25 PROCEDURE — 85025 COMPLETE CBC W/AUTO DIFF WBC: CPT

## 2024-04-25 PROCEDURE — 87086 URINE CULTURE/COLONY COUNT: CPT

## 2024-04-25 PROCEDURE — 84702 CHORIONIC GONADOTROPIN TEST: CPT

## 2024-04-25 PROCEDURE — 85730 THROMBOPLASTIN TIME PARTIAL: CPT

## 2024-04-25 PROCEDURE — 86901 BLOOD TYPING SEROLOGIC RH(D): CPT

## 2024-04-25 NOTE — DISCHARGE INSTRUCTIONS
Pre-op Instructions For Out-Patient Surgery    Medication Instructions:  Please stop herbs and any supplements now (includes vitamins and minerals).    Please contact your surgeon and prescribing physician for pre-op instructions for any blood thinners.    If you have inhalers/aerosol treatments at home, please use them the morning of your surgery and bring the inhalers with you to the hospital.    Please take the following medications the morning of your surgery with a sip of water:    None     Surgery Instructions:  After midnight before surgery:  Do not eat or drink anything, including water, mints, gum, and hard candy.  You may brush your teeth without swallowing.  No smoking, chewing tobacco, or street drugs.    Please shower or bathe before surgery.  If you were given Surgical Scrub Chlorhexidine Gluconate Liquid (CHG), please shower the night before and the morning of your surgery following the detailed instructions you received during your pre-admission visit.     Please do not wear any cologne, lotion, powder, deodorant, jewelry, piercings, perfume, makeup, nail polish, hair accessories, or hair spray on the day of surgery.  Wear loose comfortable clothing.    Leave your valuables at home but bring a payment source for any after-surgery prescriptions you plan to fill at [facility].  Bring a storage case for any glasses/contacts.    An adult who is responsible for you MUST drive you home and should be with you for the first 24 hours after surgery.     If having out-patient knee and foot surgeries, please arrange for planned crutches, walker, or wheelchair before arriving to the hospital.    The Day of Surgery:  Arrive at [facility] Surgery Entrance at the time directed by your surgeon and check in at the desk.     If you have a living will or healthcare power of , please bring a copy.    You will be taken to the pre-op holding area where you will be

## 2024-04-26 LAB
MICROORGANISM SPEC CULT: NORMAL
SPECIMEN DESCRIPTION: NORMAL

## 2024-04-28 ENCOUNTER — TELEPHONE (OUTPATIENT)
Dept: INTERNAL MEDICINE CLINIC | Age: 44
End: 2024-04-28

## 2024-04-28 DIAGNOSIS — Z91.89: Primary | ICD-10-CM

## 2024-04-28 RX ORDER — OFLOXACIN 3 MG/ML
1 SOLUTION/ DROPS OPHTHALMIC 4 TIMES DAILY
Qty: 5 ML | Refills: 0 | Status: SHIPPED | OUTPATIENT
Start: 2024-04-28 | End: 2024-05-08

## 2024-05-09 ENCOUNTER — ANESTHESIA EVENT (OUTPATIENT)
Dept: OPERATING ROOM | Age: 44
End: 2024-05-09
Payer: MEDICAID

## 2024-05-09 NOTE — PRE-PROCEDURE INSTRUCTIONS
No answer, left message ?                             Unable to leave message ?    When were you told to arrive at hospital ?  -0700    Do you have a  ?-YES    Are you on any blood thinners ? -NONE                    If yes when did you stop taking ?    Do you have your prep Rx filled and instruction ?  -N/A    Nothing to eat the day before , only clear liquids.-N/A    Are you experiencing any covid symptoms ? -NONE    Do you have any infections or rash we should be aware of ?-NONE      Do you have the Hibiclens soap to use the night before and the morning of surgery ?-N/A    Nothing to eat or drink after midnight, only a sip of water to take any medication instructed to take the night before.-INSTRUCTED    Wear comfortable clothing, leave any valuables at home, remove any jewelry and body piercing .-INSTRUCTED

## 2024-05-10 ENCOUNTER — ANESTHESIA (OUTPATIENT)
Dept: OPERATING ROOM | Age: 44
End: 2024-05-10
Payer: MEDICAID

## 2024-05-10 ENCOUNTER — HOSPITAL ENCOUNTER (OUTPATIENT)
Age: 44
Setting detail: OUTPATIENT SURGERY
Discharge: HOME | End: 2024-05-10
Attending: OBSTETRICS & GYNECOLOGY | Admitting: OBSTETRICS & GYNECOLOGY
Payer: MEDICAID

## 2024-05-10 VITALS
HEART RATE: 77 BPM | TEMPERATURE: 97.1 F | RESPIRATION RATE: 16 BRPM | SYSTOLIC BLOOD PRESSURE: 107 MMHG | DIASTOLIC BLOOD PRESSURE: 68 MMHG | BODY MASS INDEX: 22.5 KG/M2 | WEIGHT: 140 LBS | HEIGHT: 66 IN | OXYGEN SATURATION: 100 %

## 2024-05-10 DIAGNOSIS — G89.18 POST-OP PAIN: Primary | ICD-10-CM

## 2024-05-10 PROBLEM — Z98.890 S/P ENDOMETRIAL ABLATION: Status: ACTIVE | Noted: 2024-05-10

## 2024-05-10 PROCEDURE — 3600000012 HC SURGERY LEVEL 2 ADDTL 15MIN: Performed by: OBSTETRICS & GYNECOLOGY

## 2024-05-10 PROCEDURE — 2709999900 HC NON-CHARGEABLE SUPPLY: Performed by: OBSTETRICS & GYNECOLOGY

## 2024-05-10 PROCEDURE — 7100000030 HC ASPR PHASE II RECOVERY - FIRST 15 MIN: Performed by: OBSTETRICS & GYNECOLOGY

## 2024-05-10 PROCEDURE — 6370000000 HC RX 637 (ALT 250 FOR IP): Performed by: ANESTHESIOLOGY

## 2024-05-10 PROCEDURE — 7100000001 HC PACU RECOVERY - ADDTL 15 MIN: Performed by: OBSTETRICS & GYNECOLOGY

## 2024-05-10 PROCEDURE — 58563 HYSTEROSCOPY ABLATION: CPT | Performed by: OBSTETRICS & GYNECOLOGY

## 2024-05-10 PROCEDURE — 7100000031 HC ASPR PHASE II RECOVERY - ADDTL 15 MIN: Performed by: OBSTETRICS & GYNECOLOGY

## 2024-05-10 PROCEDURE — 3600000002 HC SURGERY LEVEL 2 BASE: Performed by: OBSTETRICS & GYNECOLOGY

## 2024-05-10 PROCEDURE — 2580000003 HC RX 258: Performed by: ANESTHESIOLOGY

## 2024-05-10 PROCEDURE — 2720000010 HC SURG SUPPLY STERILE: Performed by: OBSTETRICS & GYNECOLOGY

## 2024-05-10 PROCEDURE — 2500000003 HC RX 250 WO HCPCS

## 2024-05-10 PROCEDURE — 6360000002 HC RX W HCPCS

## 2024-05-10 PROCEDURE — 2500000003 HC RX 250 WO HCPCS: Performed by: ANESTHESIOLOGY

## 2024-05-10 PROCEDURE — 3700000001 HC ADD 15 MINUTES (ANESTHESIA): Performed by: OBSTETRICS & GYNECOLOGY

## 2024-05-10 PROCEDURE — 3700000000 HC ANESTHESIA ATTENDED CARE: Performed by: OBSTETRICS & GYNECOLOGY

## 2024-05-10 PROCEDURE — 7100000010 HC PHASE II RECOVERY - FIRST 15 MIN: Performed by: OBSTETRICS & GYNECOLOGY

## 2024-05-10 PROCEDURE — 7100000011 HC PHASE II RECOVERY - ADDTL 15 MIN: Performed by: OBSTETRICS & GYNECOLOGY

## 2024-05-10 PROCEDURE — 7100000000 HC PACU RECOVERY - FIRST 15 MIN: Performed by: OBSTETRICS & GYNECOLOGY

## 2024-05-10 PROCEDURE — 81025 URINE PREGNANCY TEST: CPT

## 2024-05-10 RX ORDER — SODIUM CHLORIDE 0.9 % (FLUSH) 0.9 %
5-40 SYRINGE (ML) INJECTION EVERY 12 HOURS SCHEDULED
Status: DISCONTINUED | OUTPATIENT
Start: 2024-05-10 | End: 2024-05-10 | Stop reason: HOSPADM

## 2024-05-10 RX ORDER — ACETAMINOPHEN 500 MG
1000 TABLET ORAL ONCE
Status: COMPLETED | OUTPATIENT
Start: 2024-05-10 | End: 2024-05-10

## 2024-05-10 RX ORDER — ONDANSETRON 4 MG/1
4 TABLET, ORALLY DISINTEGRATING ORAL 3 TIMES DAILY PRN
Qty: 21 TABLET | Refills: 0 | Status: SHIPPED | OUTPATIENT
Start: 2024-05-10

## 2024-05-10 RX ORDER — GLYCOPYRROLATE 0.2 MG/ML
INJECTION INTRAMUSCULAR; INTRAVENOUS PRN
Status: DISCONTINUED | OUTPATIENT
Start: 2024-05-10 | End: 2024-05-10 | Stop reason: SDUPTHER

## 2024-05-10 RX ORDER — SODIUM CHLORIDE, SODIUM LACTATE, POTASSIUM CHLORIDE, CALCIUM CHLORIDE 600; 310; 30; 20 MG/100ML; MG/100ML; MG/100ML; MG/100ML
INJECTION, SOLUTION INTRAVENOUS CONTINUOUS
Status: DISCONTINUED | OUTPATIENT
Start: 2024-05-10 | End: 2024-05-10 | Stop reason: HOSPADM

## 2024-05-10 RX ORDER — MIDAZOLAM HYDROCHLORIDE 1 MG/ML
INJECTION INTRAMUSCULAR; INTRAVENOUS PRN
Status: DISCONTINUED | OUTPATIENT
Start: 2024-05-10 | End: 2024-05-10 | Stop reason: SDUPTHER

## 2024-05-10 RX ORDER — ONDANSETRON 2 MG/ML
4 INJECTION INTRAMUSCULAR; INTRAVENOUS
Status: DISCONTINUED | OUTPATIENT
Start: 2024-05-10 | End: 2024-05-10 | Stop reason: HOSPADM

## 2024-05-10 RX ORDER — SODIUM CHLORIDE 0.9 % (FLUSH) 0.9 %
5-40 SYRINGE (ML) INJECTION PRN
Status: DISCONTINUED | OUTPATIENT
Start: 2024-05-10 | End: 2024-05-10 | Stop reason: HOSPADM

## 2024-05-10 RX ORDER — HYDROMORPHONE HYDROCHLORIDE 2 MG/ML
INJECTION, SOLUTION INTRAMUSCULAR; INTRAVENOUS; SUBCUTANEOUS PRN
Status: DISCONTINUED | OUTPATIENT
Start: 2024-05-10 | End: 2024-05-10 | Stop reason: SDUPTHER

## 2024-05-10 RX ORDER — SODIUM CHLORIDE 9 MG/ML
INJECTION, SOLUTION INTRAVENOUS PRN
Status: DISCONTINUED | OUTPATIENT
Start: 2024-05-10 | End: 2024-05-10 | Stop reason: HOSPADM

## 2024-05-10 RX ORDER — HYDROCODONE BITARTRATE AND ACETAMINOPHEN 5; 325 MG/1; MG/1
1 TABLET ORAL EVERY 6 HOURS PRN
Qty: 5 TABLET | Refills: 0 | Status: SHIPPED | OUTPATIENT
Start: 2024-05-10 | End: 2024-05-13

## 2024-05-10 RX ORDER — LIDOCAINE HYDROCHLORIDE 10 MG/ML
INJECTION, SOLUTION EPIDURAL; INFILTRATION; INTRACAUDAL; PERINEURAL PRN
Status: DISCONTINUED | OUTPATIENT
Start: 2024-05-10 | End: 2024-05-10 | Stop reason: SDUPTHER

## 2024-05-10 RX ORDER — LIDOCAINE HYDROCHLORIDE 10 MG/ML
1 INJECTION, SOLUTION EPIDURAL; INFILTRATION; INTRACAUDAL; PERINEURAL
Status: COMPLETED | OUTPATIENT
Start: 2024-05-10 | End: 2024-05-10

## 2024-05-10 RX ORDER — DIPHENHYDRAMINE HYDROCHLORIDE 50 MG/ML
12.5 INJECTION INTRAMUSCULAR; INTRAVENOUS
Status: DISCONTINUED | OUTPATIENT
Start: 2024-05-10 | End: 2024-05-10 | Stop reason: HOSPADM

## 2024-05-10 RX ORDER — ACETAMINOPHEN 500 MG
1000 TABLET ORAL EVERY 6 HOURS PRN
Qty: 30 TABLET | Refills: 1 | Status: SHIPPED | OUTPATIENT
Start: 2024-05-10

## 2024-05-10 RX ORDER — IBUPROFEN 600 MG/1
600 TABLET ORAL EVERY 6 HOURS PRN
Qty: 40 TABLET | Refills: 0 | Status: SHIPPED | OUTPATIENT
Start: 2024-05-10

## 2024-05-10 RX ORDER — GABAPENTIN 300 MG/1
300 CAPSULE ORAL ONCE
Status: COMPLETED | OUTPATIENT
Start: 2024-05-10 | End: 2024-05-10

## 2024-05-10 RX ORDER — ONDANSETRON 2 MG/ML
INJECTION INTRAMUSCULAR; INTRAVENOUS PRN
Status: DISCONTINUED | OUTPATIENT
Start: 2024-05-10 | End: 2024-05-10 | Stop reason: SDUPTHER

## 2024-05-10 RX ORDER — PROPOFOL 10 MG/ML
INJECTION, EMULSION INTRAVENOUS PRN
Status: DISCONTINUED | OUTPATIENT
Start: 2024-05-10 | End: 2024-05-10 | Stop reason: SDUPTHER

## 2024-05-10 RX ORDER — DEXAMETHASONE SODIUM PHOSPHATE 4 MG/ML
INJECTION, SOLUTION INTRA-ARTICULAR; INTRALESIONAL; INTRAMUSCULAR; INTRAVENOUS; SOFT TISSUE PRN
Status: DISCONTINUED | OUTPATIENT
Start: 2024-05-10 | End: 2024-05-10 | Stop reason: SDUPTHER

## 2024-05-10 RX ORDER — KETOROLAC TROMETHAMINE 30 MG/ML
30 INJECTION, SOLUTION INTRAMUSCULAR; INTRAVENOUS ONCE
Status: DISCONTINUED | OUTPATIENT
Start: 2024-05-10 | End: 2024-05-10 | Stop reason: HOSPADM

## 2024-05-10 RX ORDER — SENNA AND DOCUSATE SODIUM 50; 8.6 MG/1; MG/1
1 TABLET, FILM COATED ORAL DAILY
Qty: 30 TABLET | Refills: 1 | Status: SHIPPED | OUTPATIENT
Start: 2024-05-10

## 2024-05-10 RX ADMIN — LIDOCAINE HYDROCHLORIDE 1 ML: 10 INJECTION, SOLUTION EPIDURAL; INFILTRATION; INTRACAUDAL; PERINEURAL at 07:33

## 2024-05-10 RX ADMIN — DEXAMETHASONE SODIUM PHOSPHATE 8 MG: 4 INJECTION, SOLUTION INTRAMUSCULAR; INTRAVENOUS at 09:01

## 2024-05-10 RX ADMIN — GLYCOPYRROLATE 0.2 MG: 0.2 INJECTION INTRAMUSCULAR; INTRAVENOUS at 09:09

## 2024-05-10 RX ADMIN — GABAPENTIN 300 MG: 300 CAPSULE ORAL at 07:35

## 2024-05-10 RX ADMIN — HYDROMORPHONE HYDROCHLORIDE 0.4 MG: 2 INJECTION, SOLUTION INTRAMUSCULAR; INTRAVENOUS; SUBCUTANEOUS at 09:06

## 2024-05-10 RX ADMIN — SODIUM CHLORIDE, POTASSIUM CHLORIDE, SODIUM LACTATE AND CALCIUM CHLORIDE: 600; 310; 30; 20 INJECTION, SOLUTION INTRAVENOUS at 07:35

## 2024-05-10 RX ADMIN — LIDOCAINE HYDROCHLORIDE 40 MG: 10 INJECTION, SOLUTION EPIDURAL; INFILTRATION; INTRACAUDAL; PERINEURAL at 08:52

## 2024-05-10 RX ADMIN — ACETAMINOPHEN 1000 MG: 500 TABLET ORAL at 07:35

## 2024-05-10 RX ADMIN — MIDAZOLAM 2 MG: 1 INJECTION INTRAMUSCULAR; INTRAVENOUS at 08:49

## 2024-05-10 RX ADMIN — PROPOFOL 150 MG: 10 INJECTION, EMULSION INTRAVENOUS at 08:53

## 2024-05-10 RX ADMIN — ONDANSETRON 4 MG: 2 INJECTION INTRAMUSCULAR; INTRAVENOUS at 09:01

## 2024-05-10 ASSESSMENT — PAIN - FUNCTIONAL ASSESSMENT
PAIN_FUNCTIONAL_ASSESSMENT: 0-10
PAIN_FUNCTIONAL_ASSESSMENT: NONE - DENIES PAIN
PAIN_FUNCTIONAL_ASSESSMENT: NONE - DENIES PAIN

## 2024-05-10 NOTE — OP NOTE
Gynecologic Operative Note      NAME: Norma Gallo   MRN: [id number]  CSN: [id number]  : 1980    PROCEDURE DATE: 5/10/2024     Pre-op Diagnosis: Pre-Op Diagnosis Codes:     * Menorrhagia with regular cycle [N92.0]     * Dysmenorrhea [N94.6]     * Status post tubal ligation [Z98.51]     Post-op Diagnosis: Same    Procedure: Procedure(s):  HYSTEROSCOPY WITH ENDOMETRIAL ABLATION (NOVASURE)    Surgeon: Wendie Burgos DO    Assistant:   Nagi Anderson D.O. PGY2      Circulator Documentation of Staff:  Surgeon(s) and Role:     * Wendie Burgos DO - Primary    OR Staff:  Scrub Person First: Prince Alexander      Anesthesia Type: General  Anesthesia Staff: Anesthesiologist: Deborah Tello MD  CRNA: Sammi Rob APRN - CRNA      Complications: None      Estimated Blood Loss: 0 ml  Total IV Fluids:  800 ml  Urine Output: 10 ml clear    Distention Media: NS (Accurate I/O at the completion of the procedure)    Specimens: * No specimens in log *  Implants: * No implants in log *    Drains:   [REMOVED] Urinary Catheter 05/10/24 Other (comment) (Removed)         Condition: Stable    Findings: small AV uterus without masses      Sound: 10 cm  Net Length: 5 cm  Width: 3.2 cm  Power: 88 maddox  Time: 120 seconds    Description of Procedure: (Understanding of limitations from template op-reports exist)  The patient was seen in the pre-operative area. The procedure risk and complications were reviewed.  The consent , labs , and H&P were reviewed. The patient had all of her questions answered.  The patient was moved to the operative suite where she was placed under general anesthesia by the anesthesiologist.  Time out was preformed. The patient was placed in the dorsal lithotomy position utilizing  Stirrups. The Positioning was checked without stress or pressure on any joints.   Her bladder was drained with a red galvez catheter. She was prepped and draped in sterile fashion.  After bimanual exam  to refrain from intercourse douching or tampons; No hot tubs baths lakes or pools. The patient voiced understanding of the above counseling.    Electronically signed by Wendie Burgos DO on 5/10/24 at 9:20 AM EDT

## 2024-05-10 NOTE — ANESTHESIA PRE PROCEDURE
Department of Anesthesiology  Preprocedure Note       Name:  Norma Gallo   Age:  43 y.o.  :  1980                                          MRN:  [id number]         Date:  5/10/2024      Surgeon: Surgeon(s):  Wendie Burgos DO    Procedure: Procedure(s):  HYSTEROSCOPY WITH ENDOMETRIAL ABLATION (NOVASURE)    Medications prior to admission:   Prior to Admission medications    Medication Sig Start Date End Date Taking? Authorizing Provider   ferrous sulfate (IRON 325) 325 (65 Fe) MG tablet Take 1 tablet by mouth daily (with breakfast) 23   Liberty Lopez, APRN - NP   Cholecalciferol (VITAMIN D3) 50 MCG (2000) CAPS Take by mouth    ProviderAlex MD   vitamin k 100 MCG tablet Take 1 tablet by mouth daily    Provider, MD Alex       Current medications:    Current Facility-Administered Medications   Medication Dose Route Frequency Provider Last Rate Last Admin    lidocaine PF 1 % injection 1 mL  1 mL IntraDERmal Once PRN Mohan Hernandez MD        acetaminophen (TYLENOL) tablet 1,000 mg  1,000 mg Oral Once Mohan Hernandez MD        gabapentin (NEURONTIN) capsule 300 mg  300 mg Oral Once Mohan Hernandez MD        lactated ringers IV soln infusion   IntraVENous Continuous Mohan Hernandez MD        sodium chloride flush 0.9 % injection 5-40 mL  5-40 mL IntraVENous 2 times per day Mohan Hernandez MD        sodium chloride flush 0.9 % injection 5-40 mL  5-40 mL IntraVENous PRN Mohan Hernandez MD        0.9 % sodium chloride infusion   IntraVENous PRN Mohan Hernandez MD        lactated ringers IV soln infusion   IntraVENous Continuous Wendie Burgos DO        sodium chloride flush 0.9 % injection 5-40 mL  5-40 mL IntraVENous 2 times per day Wendie Burgos DO        sodium chloride flush 0.9 % injection 5-40 mL  5-40 mL IntraVENous PRN Wendie Burgos DO        0.9 % sodium chloride infusion   IntraVENous PRN Wendie Burgos DO

## 2024-05-10 NOTE — PROGRESS NOTES
CLINICAL PHARMACY NOTE: MEDS TO BEDS    Total # of Prescriptions Filled: 5   The following medications were delivered to the patient:  Hydrocodone/APAP 5/325mg  Ibuprofen 600mg  Acetaminophen 500mg  Stimulant Laxative  Ondansetron ODT 4mg    Additional Documentation: delivered to Short Stay Maryjo in pharmacy waiting area 5/10/24 10:35am priscila

## 2024-05-10 NOTE — H&P
OB/GYN Pre-Op H&P  [facility]    Patient Name: Norma Gallo     Patient : 1980  Room/Bed: STCZ OR Pool/NONE  Admission Date/Time: 5/10/2024  6:51 AM  Primary Care Physician: Kyle Motley MD  MRN: [id number]    Date: 5/10/2024  Time: 8:27 AM    The patient was seen in pre-op holding. She is here for Hysteroscopy with Endometrial Ablation.    The patient is a 43 year old female with history of menorrhagia with regular cycles, dysmenorrhea and is s/p tubal ligation. The patient had a negative Pap with negative HPV on 24 and negative EMB 24. Patient reports symptoms are affecting her ADLs. Patient counseled on surgical vs medical vs expectant management of her symptoms.  She is requesting surgical management.     The procedure risks and complications were reviewed.  The labs, Consent, and H&P were reviewed and updated.  The patient was counseled on the possibility of  the need of a second surgery.  The patient voiced understanding and had all of her questions answered. The possibility of incomplete removal of abnormal tissue was discussed.    OBSTETRICAL HISTORY:   OB History    Para Term  AB Living   9 7 1 6 2 7   SAB IAB Ectopic Molar Multiple Live Births   2 0 0 0 0 7      # Outcome Date GA Lbr Kaden/2nd Weight Sex Delivery Anes PTL Lv   9 Term 21 38w4d  2.995 kg (6 lb 9.6 oz) F Vag-Spont Local N MALLIKA      Name: JACKY GALLO      Apgar1: 8  Apgar5: 9   8  10/22/16 34w2d  2.47 kg (5 lb 7.1 oz) M Vag-Spont   MALLIKA      Name: SANCHO GALLO      Apgar1: 8  Apgar5: 9   7  13 36w4d  2.637 kg (5 lb 13 oz) M    MALLIKA   6 SAB  6w0d    SAB      5   36w0d  2.835 kg (6 lb 4 oz) F Vag-Spont   MALLIKA   4   36w0d  2.778 kg (6 lb 2 oz) M Vag-Spont   MALLIKA      Birth Comments: Spont PTL/PTD   3 SAB  10w0d             Birth Comments: D&C   2   35w0d  2.325 kg (5 lb 2 oz) M Vag-Spont   MALLIKA      Birth Comments:  04/25/2024 3 to 5 (A)  0 TO 5 /HPF Final    RBC, UA 04/25/2024 0 TO 2  0 TO 2 /HPF Final    Casts UA 04/25/2024 0 TO 2 (A)  None /LPF Final    Epithelial Cells UA 04/25/2024 3 to 5  /HPF Final    Bacteria, UA 04/25/2024 FEW (A)  None Final       DIAGNOSTICS:  Pelvic US 12/28/23  1. The Uterus is heterogeneous and anteverted (8.54 x 5.61 x 4.62 cm)  2. The Endometrial Stripe measurement is 1.61 cm=THICKENED  3. The Left Ovary is without masses or cysts  4. The Right Ovary is without masses or cysts  5. There is not an abnormal amount of cul-de-sac fluid    GYN Cytology  Order: [id number]  Status: Final result       Visible to patient: No (not released)       Next appt: 05/22/2024 at 10:45 AM in Obstetrics and Gynecology (Wendie Burgos, )    1 Result Note       1 HM Topic      Component 1/11/24 0000   Cytology Report Path Number: [id number]    DIAGNOSIS    Imaged ThinPrep Pap - Cervical (1 monolayer slide):  Specimen Adequacy:       Satisfactory for evaluation.       - Endocervical/transformation zone component present.  Descriptive Diagnosis:       Negative for intraepithelial lesion or malignancy.   Comments:       Specimen was screened at [facility], [address] [place] [id number]      Cytotech Screener:  SPEEDY     **Electronically Signed Out**  BERENICE El(ASCP)  cs/1/27/2024    Procedure/Addendum  HPV Procedure Report       Date Ordered:     1/12/2024     Status: Signed Out       Date Complete:     1/16/2024     By: System Interface       Date Reported:     1/16/2024             Sample:  HPV Type 16      Result:   Not Detected      Ref Range:  (Not Detected)  Sample:  HPV Type 18      Result:   Not Detected      Ref Range: (Not  Detected)  Sample:  Other High Risk HPV      Result:   Not Detected      Ref  Range: (Not Detected)  Sample:  HPV Interp      Result:         Ref Range: (Not Detected)  This test amplifies and detects DNA of 14 high-risk HPV types  associated with cervical

## 2024-05-10 NOTE — ANESTHESIA POSTPROCEDURE EVALUATION
Department of Anesthesiology  Postprocedure Note    Patient: Norma Gallo  MRN: [id number]  YOB: 1980  Date of evaluation: 5/10/2024    Procedure Summary       Date: 05/10/24 Room / Location: [address]    Anesthesia Start: 0846 Anesthesia Stop: 0934    Procedure: HYSTEROSCOPY WITH ENDOMETRIAL ABLATION (NOVASURE) (Uterus) Diagnosis:       Menorrhagia with regular cycle      Dysmenorrhea      Status post tubal ligation      (Menorrhagia with regular cycle [N92.0])      (Dysmenorrhea [N94.6])      (Status post tubal ligation [Z98.51])    Surgeons: Wendie Burgos DO Responsible Provider: Deborah Tello MD    Anesthesia Type: general ASA Status: 3            Anesthesia Type: No value filed.    Tyson Phase I: Tyson Score: 10    Tyson Phase II: Tyson Score: 10    Anesthesia Post Evaluation    Comments: POST- ANESTHESIA EVALUATION       Pt Name: Norma Gallo  MRN: [id number]  YOB: 1980  Date of evaluation: 5/10/2024  Time:  12:03 PM      /68   Pulse 77   Temp 97.1 °F (36.2 °C) (Infrared)   Resp 16   Ht 1.689 m (5' 6.5\")   Wt 63.5 kg (140 lb)   SpO2 100%   BMI 22.26 kg/m²      Consciousness Level  Awake  Cardiopulmonary Status  Stable  Pain Adequately Treated YES  Nausea / Vomiting  NO  Adequate Hydration  YES  Anesthesia Related Complications NONE      Electronically signed by Deborah Tello MD on 5/10/2024 at 12:03 PM           No notable events documented.

## 2024-05-10 NOTE — DISCHARGE INSTRUCTIONS
[facility] Obstetrics & Gynecology  [address]  [place] [address]  [phone]          POST-OP INSTRUCTIONS FOR D&C AND/OR HYSTEROSCOPY      AFTER SURGERY:    After surgery you will remain in the recovery room for approximately two hours.  Once you are alert and awake and able to ambulate, you will be discharged home.  If you did not receive a pain medication prescription you may call our office with a pharmacy phone number and we will call in a medication for cramping.  Upon leaving the Out-Patient Department you will need someone to drive you home.  UNDER NO CIRCUMSTANCES SHOULD YOU DRIVE YOURSELF HOME.    AT HOME:    Once arriving at home, plan on resting the entire evening with very limited activity.    If you have small children, you should make arrangements for another adult to be responsible for their care.  It is not uncommon to have nausea after anesthesia.  We recommend a clear liquid diet the evening of surgery, advancing to a regular diet 24 hours after surgery or when the nausea resolves.    You may resume your normal activities the day following surgery, avoiding heavy lifting or straining.  If you are not taking any pain medications, you may be able to drive and leave the house the day following surgery.  If you are uncomfortable enough to require pain medication, we recommend that you continue to limit your activities, increase your rest and post-pone driving and all other activities until you no longer require pain medication.    It is recommended that you do not douche, use tampons or have intercourse for the first two weeks following your surgery.  You may have cramping for a few days and bleeding or spotting for several weeks.      WHEN TO CALL:    For any of the following problems you should call the office during working hours at [phone]  to reach a physician:     temperature greater than 100.6   constant, severe or increasing abdominal or pelvic pain, no relieved  by rest or pain medication  difficulty or painful voiding  persistent or increasing foul vaginal discharge  severe constipation, unrelieved by suppositories or Fleets enema  bleeding heavy enough to soak through a sanitary pad every hour or passing large clots.    FOLLOW-UP APPOINTMENT:    If you have not scheduled a post-operative appointment, please call the office between 9am-1pm or 2:30pm-5pm to schedule your appointment for 2 weeks after your surgery.     DISCHARGE INSTRUCTIONS FOR GENERAL ANESTHESIA    In order to continue your care at home, please follow the instructions below.    Anesthesia - Do not drink any alcoholic beverages or make any legal or important decisions for 24 hours. If your surgery was on an extremity or abdomen, do not drive or operate any machinery until your surgeon approves, otherwise do not drive or operate any machinery for 24 hours or as restricted by your surgeon.     Pain Medications - Please do not drive, operate machinery, or drink alcoholic beverages while taking any prescribed pain medication.

## 2024-05-28 ENCOUNTER — OFFICE VISIT (OUTPATIENT)
Dept: OBGYN CLINIC | Age: 44
End: 2024-05-28

## 2024-05-28 VITALS
BODY MASS INDEX: 21.97 KG/M2 | HEIGHT: 67 IN | WEIGHT: 140 LBS | SYSTOLIC BLOOD PRESSURE: 112 MMHG | DIASTOLIC BLOOD PRESSURE: 68 MMHG

## 2024-05-28 DIAGNOSIS — Z09 POSTOP CHECK: ICD-10-CM

## 2024-05-28 DIAGNOSIS — Z98.890 S/P ENDOMETRIAL ABLATION: Primary | ICD-10-CM

## 2024-05-28 PROCEDURE — 99024 POSTOP FOLLOW-UP VISIT: CPT | Performed by: OBSTETRICS & GYNECOLOGY

## 2024-05-28 NOTE — PROGRESS NOTES
Norma Gallo  2024  12:40 PM      Norma Gallo  Procedure: Novasure endometrial ablation with hysteroscopy      Norma Gallo is a 43 y.o. female       The patient was seen, she denies any complaints. She denied any shortness of breath, chest pain or dizziness. She denied any nausea, vomiting, or diarrhea. There is no fever, chills, or rigors. The patient denies any vaginal bleeding, discharge or odor. All of her pre-operative complaints are now resolved.    Blood pressure 112/68, height 1.689 m (5' 6.5\"), weight 63.5 kg (140 lb), not currently breastfeeding.         Abdominal Exam: soft non-tender. Good bowel sounds. No guarding, rebound or rigidity.  No costal vertebral angle tenderness bilateral. No hernias    Incision: N/A    Extremities: No edema or calf pain noted bilaterally.    Pelvic Exam:Exam deferred.      Results for orders placed or performed during the hospital encounter of 24   Urine culture    Specimen: Urine, clean catch   Result Value Ref Range    Specimen Description .CLEAN CATCH URINE     Culture NO SIGNIFICANT GROWTH    Urinalysis   Result Value Ref Range    Color, UA Yellow Yellow    Turbidity UA Clear Clear    Glucose, Ur NEGATIVE NEGATIVE mg/dL    Bilirubin Urine NEGATIVE NEGATIVE    Ketones, Urine NEGATIVE NEGATIVE mg/dL    Specific Gravity, UA 1.011 1.000 - 1.030    Urine Hgb NEGATIVE NEGATIVE    pH, UA 5.5 5.0 - 8.0    Protein, UA NEGATIVE NEGATIVE mg/dL    Urobilinogen, Urine Normal 0.0 - 1.0 EU/dL    Nitrite, Urine NEGATIVE NEGATIVE    Leukocyte Esterase, Urine TRACE (A) NEGATIVE   Serum pregnancy (quantitative)   Result Value Ref Range    hCG Quant <1.0 <5 mIU/mL   Protime-INR   Result Value Ref Range    Protime 13.0 11.8 - 14.6 sec    INR 1.0    CBC auto differential   Result Value Ref Range    WBC 6.4 3.5 - 11.0 k/uL    RBC 4.36 4.0 - 5.2 m/uL    Hemoglobin 10.7 (L) 12.0 - 16.0 g/dL    Hematocrit 34.6 (L) 36 - 46 %    MCV 79.2 (L) 80 - 100 fL    MCH 24.5 (L) 26

## 2025-01-13 ENCOUNTER — HOSPITAL ENCOUNTER (OUTPATIENT)
Age: 45
Setting detail: SPECIMEN
Discharge: HOME | End: 2025-01-13

## 2025-01-13 ENCOUNTER — OFFICE VISIT (OUTPATIENT)
Dept: OBGYN CLINIC | Age: 45
End: 2025-01-13
Payer: MEDICAID

## 2025-01-13 ENCOUNTER — HOSPITAL ENCOUNTER (OUTPATIENT)
Age: 45
Discharge: HOME | End: 2025-01-13
Payer: MEDICAID

## 2025-01-13 VITALS
WEIGHT: 136 LBS | DIASTOLIC BLOOD PRESSURE: 70 MMHG | BODY MASS INDEX: 21.35 KG/M2 | SYSTOLIC BLOOD PRESSURE: 98 MMHG | HEIGHT: 67 IN

## 2025-01-13 DIAGNOSIS — R10.2 PELVIC PAIN: ICD-10-CM

## 2025-01-13 DIAGNOSIS — N92.0 MENORRHAGIA WITH REGULAR CYCLE: ICD-10-CM

## 2025-01-13 DIAGNOSIS — Z12.11 SCREEN FOR COLON CANCER: Primary | ICD-10-CM

## 2025-01-13 DIAGNOSIS — Z01.419 WELL FEMALE EXAM WITH ROUTINE GYNECOLOGICAL EXAM: Primary | ICD-10-CM

## 2025-01-13 DIAGNOSIS — Z12.31 ENCOUNTER FOR SCREENING MAMMOGRAM FOR MALIGNANT NEOPLASM OF BREAST: ICD-10-CM

## 2025-01-13 PROBLEM — Z98.51 S/P TUBAL LIGATION: Status: RESOLVED | Noted: 2021-11-24 | Resolved: 2025-01-13

## 2025-01-13 LAB
B-HCG SERPL EIA 3RD IS-ACNC: <0.2 MIU/ML (ref 0–7)
BACTERIA URNS QL MICRO: ABNORMAL
BASOPHILS # BLD: 0 K/UL (ref 0–0.2)
BASOPHILS NFR BLD: 1 % (ref 0–2)
BILIRUB UR QL STRIP: NEGATIVE
CASTS #/AREA URNS LPF: ABNORMAL /LPF (ref 0–8)
CLARITY UR: CLEAR
COLOR UR: YELLOW
EOSINOPHIL # BLD: 0.1 K/UL (ref 0–0.4)
EOSINOPHILS RELATIVE PERCENT: 3 % (ref 0–4)
EPI CELLS #/AREA URNS HPF: ABNORMAL /HPF (ref 0–5)
ERYTHROCYTE [DISTWIDTH] IN BLOOD BY AUTOMATED COUNT: 15.5 % (ref 11.5–14.9)
GLUCOSE UR STRIP-MCNC: NEGATIVE MG/DL
HCT VFR BLD AUTO: 38.9 % (ref 36–46)
HGB BLD-MCNC: 12.5 G/DL (ref 12–16)
HGB UR QL STRIP.AUTO: ABNORMAL
INR PPP: 1
KETONES UR STRIP-MCNC: NEGATIVE MG/DL
LEUKOCYTE ESTERASE UR QL STRIP: NEGATIVE
LYMPHOCYTES NFR BLD: 1.5 K/UL (ref 1–4.8)
LYMPHOCYTES RELATIVE PERCENT: 31 % (ref 24–44)
MCH RBC QN AUTO: 26.7 PG (ref 26–34)
MCHC RBC AUTO-ENTMCNC: 32.3 G/DL (ref 31–37)
MCV RBC AUTO: 82.7 FL (ref 80–100)
MONOCYTES NFR BLD: 0.5 K/UL (ref 0.1–1.3)
MONOCYTES NFR BLD: 9 % (ref 1–7)
NEUTROPHILS NFR BLD: 56 % (ref 36–66)
NEUTS SEG NFR BLD: 2.7 K/UL (ref 1.3–9.1)
NITRITE UR QL STRIP: NEGATIVE
PARTIAL THROMBOPLASTIN TIME: 33 SEC (ref 24–36)
PH UR STRIP: 5.5 [PH] (ref 5–8)
PLATELET # BLD AUTO: 349 K/UL (ref 150–450)
PMV BLD AUTO: 8.4 FL (ref 6–12)
PROT UR STRIP-MCNC: NEGATIVE MG/DL
PROTHROMBIN TIME: 14.2 SEC (ref 11.8–14.6)
RBC # BLD AUTO: 4.7 M/UL (ref 4–5.2)
RBC #/AREA URNS HPF: ABNORMAL /HPF (ref 0–4)
SP GR UR STRIP: 1.01 (ref 1–1.03)
TSH SERPL DL<=0.05 MIU/L-ACNC: 2.03 UIU/ML (ref 0.27–4.2)
UROBILINOGEN UR STRIP-ACNC: NORMAL EU/DL (ref 0–1)
WBC #/AREA URNS HPF: ABNORMAL /HPF (ref 0–5)
WBC OTHER # BLD: 4.9 K/UL (ref 3.5–11)

## 2025-01-13 PROCEDURE — 99213 OFFICE O/P EST LOW 20 MIN: CPT | Performed by: NURSE PRACTITIONER

## 2025-01-13 PROCEDURE — 85730 THROMBOPLASTIN TIME PARTIAL: CPT

## 2025-01-13 PROCEDURE — 99396 PREV VISIT EST AGE 40-64: CPT | Performed by: NURSE PRACTITIONER

## 2025-01-13 PROCEDURE — 84702 CHORIONIC GONADOTROPIN TEST: CPT

## 2025-01-13 PROCEDURE — 36415 COLL VENOUS BLD VENIPUNCTURE: CPT

## 2025-01-13 PROCEDURE — 85610 PROTHROMBIN TIME: CPT

## 2025-01-13 PROCEDURE — 84443 ASSAY THYROID STIM HORMONE: CPT

## 2025-01-13 PROCEDURE — 85025 COMPLETE CBC W/AUTO DIFF WBC: CPT

## 2025-01-13 ASSESSMENT — PATIENT HEALTH QUESTIONNAIRE - PHQ9
SUM OF ALL RESPONSES TO PHQ QUESTIONS 1-9: 0
SUM OF ALL RESPONSES TO PHQ QUESTIONS 1-9: 0
2. FEELING DOWN, DEPRESSED OR HOPELESS: NOT AT ALL
SUM OF ALL RESPONSES TO PHQ QUESTIONS 1-9: 0
1. LITTLE INTEREST OR PLEASURE IN DOING THINGS: NOT AT ALL
SUM OF ALL RESPONSES TO PHQ QUESTIONS 1-9: 0
SUM OF ALL RESPONSES TO PHQ9 QUESTIONS 1 & 2: 0

## 2025-01-13 NOTE — PROGRESS NOTES
Answer:   N           The patient, Norma Gallo is a 44 y.o. female, was seen with a total time spent of 20 minutes excluding preventing care   minutes for the visit on this date of service by the E/M provider. The time component had both face to face and non face to face time spent in determining the total time component.  Counseling and education regarding her diagnosis listed below and her options regarding those diagnoses were also included in determining her time component.      Diagnosis Orders   1. Well female exam with routine gynecological exam        2. Encounter for screening mammogram for malignant neoplasm of breast  EVERETTE BOBBI DIGITAL SCREEN BILATERAL      3. Menorrhagia with regular cycle  CBC with Auto Differential    TSH reflex to FT4    HCG, Quantitative, Pregnancy    Protime-INR    APTT           The patient had her preventative health maintenance recommendations and follow-up reviewed with her at the completion of her visit.

## 2025-01-23 LAB — CYTOLOGY REPORT: NORMAL

## 2025-02-04 ENCOUNTER — TELEPHONE (OUTPATIENT)
Dept: OBGYN CLINIC | Age: 45
End: 2025-02-04

## 2025-02-04 NOTE — TELEPHONE ENCOUNTER
LVM for pt regarding appointment cancellation. Pt scheduled for f/u 2/6 with Dr. Massey. PT n/s ultrasound appointment on 1/23. Left detailed vm for pt to contact office to get appointments rescheduled and notified appointment on 2/6 will be cancelled.

## 2025-02-20 ENCOUNTER — TELEPHONE (OUTPATIENT)
Dept: OBGYN CLINIC | Age: 45
End: 2025-02-20

## 2025-02-20 DIAGNOSIS — R10.2 PELVIC PAIN: Primary | ICD-10-CM

## 2025-02-24 ENCOUNTER — HOSPITAL ENCOUNTER (OUTPATIENT)
Dept: ULTRASOUND IMAGING | Age: 45
Discharge: HOME | End: 2025-02-26
Attending: OBSTETRICS & GYNECOLOGY
Payer: MEDICAID

## 2025-02-24 DIAGNOSIS — R10.2 PELVIC PAIN: ICD-10-CM

## 2025-02-24 PROCEDURE — 76856 US EXAM PELVIC COMPLETE: CPT

## 2025-03-07 ENCOUNTER — TELEPHONE (OUTPATIENT)
Age: 45
End: 2025-03-07

## 2025-03-07 NOTE — TELEPHONE ENCOUNTER
Pt had an appt scheduled with JOSEFINA Thomas, on 03/06/25. I called the pt to see if she would like to r/s her appt, peter for the pt to call back. Will send n/s letter

## 2025-03-26 ENCOUNTER — OFFICE VISIT (OUTPATIENT)
Dept: OBGYN CLINIC | Age: 45
End: 2025-03-26
Payer: MEDICAID

## 2025-03-26 VITALS
DIASTOLIC BLOOD PRESSURE: 74 MMHG | SYSTOLIC BLOOD PRESSURE: 114 MMHG | HEIGHT: 67 IN | WEIGHT: 132 LBS | BODY MASS INDEX: 20.72 KG/M2

## 2025-03-26 DIAGNOSIS — N92.0 MENORRHAGIA WITH REGULAR CYCLE: Primary | ICD-10-CM

## 2025-03-26 DIAGNOSIS — R10.2 PELVIC PAIN: ICD-10-CM

## 2025-03-26 DIAGNOSIS — Z12.11 COLON CANCER SCREENING: Primary | ICD-10-CM

## 2025-03-26 DIAGNOSIS — D3A.020 CARCINOID TUMOR OF APPENDIX, UNSPECIFIED WHETHER MALIGNANT (HCC): Chronic | ICD-10-CM

## 2025-03-26 DIAGNOSIS — Z98.890 S/P ENDOMETRIAL ABLATION: ICD-10-CM

## 2025-03-26 PROCEDURE — 99213 OFFICE O/P EST LOW 20 MIN: CPT | Performed by: OBSTETRICS & GYNECOLOGY

## 2025-03-26 NOTE — PROGRESS NOTES
primarily for squamous epithelial  lesions, which is subject to both false negative and false positive  results. Your patient should be reminded to consult you immediately if  she experiences any suspicious signs or symptoms, regardless of her  Pap smear result.  GYNECOLOGIC CYTOLOGY REPORT    Patient Name: SAMUEL GALVEZ  [facility] [place] [facility] Rec: [id number]  [facility]  [employer]  CONSULTING PATHOLOGISTS [facility]  ANATOMIC PATHOLOGY  [address] [place] [address].  [place], [place] [address] [phone]  Fax: [phone]     Human papillomavirus (HPV) DNA probe thin prep high risk   Result Value Ref Range    Specimen Description CERVICAL MATERIAL     HPV Sample .THIN PREP     HPV, Genotype 16 Not Detected Not Detected    HPV, Genotype 18 Not Detected Not Detected    HPV, High Risk Other Not Detected Not Detected    HPV, Interpretation               Assessment:   Diagnosis Orders   1. Menorrhagia with regular cycle        2. Pelvic pain        3. NOVASURE ENDOMETRIAL ABLATION WITH HYSTEROSCOPY 5/10/2024        4. 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 declined opportunity for non-syndromic hearing loss carrier testing by [facility]  Declined connexin diagnostic testing in her daughter, Elier, and has declined testing for non-syndromic hearing loss      Herpes      Priority: High     5/4/2016 History of genital herpes: last outbreak years ago  Call office with any new outbreaks      NOVASURE ENDOMETRIAL ABLATION WITH HYSTEROSCOPY 5/10/2024 05/10/2024    Hepatic hemangioma 11/24/2021    History of MRSA infection 03/24/2021     3/24/21 - Nares MRSA culture #1 completed  4/22/2021 MRSA swab #2 negative       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

## 2025-05-07 ENCOUNTER — OFFICE VISIT (OUTPATIENT)
Age: 45
End: 2025-05-07
Payer: MEDICAID

## 2025-05-07 VITALS
WEIGHT: 133 LBS | TEMPERATURE: 98.2 F | BODY MASS INDEX: 21.38 KG/M2 | DIASTOLIC BLOOD PRESSURE: 67 MMHG | HEIGHT: 66 IN | OXYGEN SATURATION: 98 % | HEART RATE: 81 BPM | SYSTOLIC BLOOD PRESSURE: 101 MMHG

## 2025-05-07 DIAGNOSIS — R19.4 CHANGE IN BOWEL HABITS: ICD-10-CM

## 2025-05-07 DIAGNOSIS — Z90.49 HISTORY OF PARTIAL COLECTOMY: ICD-10-CM

## 2025-05-07 DIAGNOSIS — D3A.020: Primary | Chronic | ICD-10-CM

## 2025-05-07 PROCEDURE — 99204 OFFICE O/P NEW MOD 45 MIN: CPT | Performed by: SURGERY

## 2025-05-07 NOTE — PATIENT INSTRUCTIONS
Complete CT scan.  Please call [phone] to schedule your testing.   We will plan to coordinate with your OBGYN for hysterectomy.  The office will be calling you soon to schedule colonoscopy, please call our office if you do not receive a phone call within 1 week.      [facility] Surgery  Osvaldo Buck MD, FACS  Susannah Alvarado, APRN-CNP  [address], [address]  [place]  [address]  Phone: [phone]  Fax: [phone]    Miralax (Polyethylene Glycol)  STOP Aspirin 7 Days prior to Procedure  STOP all other Blood Thinners 5 Days prior to Procedure    **DO NOT EAT ANY SOLID FOOD THE DAY BEFORE YOUR PROCEDURE**  **YOU MUST BE ON A CLEAR LIQUID DIET ONLY**    Approved Clear Liquids:  Any flavor of soda except Red or Purple  Fruit Juice without pulp  Coffee or tea without dairy products  Jell-O without fruit or toppings, No Red or Purple  Pop-lauren or Italian Ice, No Red or Purple  Chicken or Beef broth and bouillon      DRINK PLENTY OF WATER THROUGHOUT THE DAY    At 10:00 am the day before your procedure, take all 4 Dulcolax Tablets.  At 6:00 pm the day before your procedure, mix the ENTIRE bottle of Miralax (238 gram or Close to it) with 64 ounces of Gatorade (NOT RED or PURPLE).  You must consume the entire 64 ounces by 8:00 pm.  Continue clear liquid diet up until midnight.    NOTHING TO EAT, DRINK, SMOKE, OR CHEW AFTER MIDNIGHT    You may brush your teeth, rinse, gargle, and spit.  Heart or Blood pressure medications ONLY with a small sip of water, unless otherwise directed.  You will be scheduled for a pre-admission phone call prior to your procedure for your chart to be reviewed with a nurse to give you more specific instructions.  Shower with regular soap and water.    YOU MUST HAVE AN ADULT EITHER DRIVE YOU OR RIDE IN A CAB WITH YOU

## 2025-05-07 NOTE — PROGRESS NOTES
[facility] General Surgery   Osvaldo Buck MD, FACS  Susannah PÉREZDar Christiano, APRN-CNP  [address], [address]  [place] [address]  P: [phone], F: [phone]    General and Robotic Surgery  Consult Note               PATIENT NAME: Norma Gallo   :  1980   MRN: [id number]   PCP:  Kyle Motley MD     TODAY'S DATE: 5/10/2025    Chief Complaint   Patient presents with    New Patient     Discuss cscope / Pt asked to see Dr. Buck. Eating/drinking well. Normal bladder/bm function. No other concerns at this time.       HISTORY OF PRESENT ILLNESS: 44 y.o. female presents for evaluation regarding change in bowel habits.  Patient is status post laparoscopic appendectomy by Dr. Teresa back in 2018.  Patient was diagnosed with carcinoid tumor of the appendix and subsequently underwent right hemicolectomy again in 2018.  Patient has complained of some constipation off-and-on.  She has not seen oncologist after the procedure according to the patient.  No recent imaging.  Patient is supposed to get scheduled for hysterectomy with a gynecologist and I was asked to be on standby given her previous surgery.  Does not take any blood thinners.  No major health issues.  Has not had any colon evaluation recently either.  History of Present Illness         PAST MEDICAL HISTORY     Past Medical History:   Diagnosis Date    Anemia     Carcinoid tumor of appendix and colon 2018    S/p laparoscopic appendectomy and right sided hemicolectomy ()      Family history of breast cancer     Family history of cervical cancer     Family history of uterine cancer     Hepatic hemangioma 2021    Herpes     genital, 2 outbreaks total, has been years    MRSA (methicillin resistant staph aureus) culture positive 2017    abscess    Postpartum depression 2016    Tobacco use 2017       PROBLEM LIST     Patient Active Problem List   Diagnosis    Family history of breast cancer

## 2025-05-10 ENCOUNTER — TELEPHONE (OUTPATIENT)
Dept: SURGERY | Age: 45
End: 2025-05-10

## 2025-05-10 DIAGNOSIS — D3A.020: Primary | ICD-10-CM

## 2025-05-10 ASSESSMENT — ENCOUNTER SYMPTOMS
SHORTNESS OF BREATH: 0
SORE THROAT: 0
COUGH: 0
RHINORRHEA: 0

## 2025-05-10 NOTE — TELEPHONE ENCOUNTER
Please refer this patient to [facility] oncology given her history of carcinoid tumor in the past.

## 2025-05-14 ENCOUNTER — HOSPITAL ENCOUNTER (OUTPATIENT)
Dept: CT IMAGING | Age: 45
Discharge: HOME | End: 2025-05-16
Payer: MEDICAID

## 2025-05-14 DIAGNOSIS — R19.4 CHANGE IN BOWEL HABITS: ICD-10-CM

## 2025-05-14 DIAGNOSIS — D3A.020: Chronic | ICD-10-CM

## 2025-05-14 PROCEDURE — 6360000004 HC RX CONTRAST MEDICATION: Performed by: NURSE PRACTITIONER

## 2025-05-14 PROCEDURE — 74177 CT ABD & PELVIS W/CONTRAST: CPT

## 2025-05-14 PROCEDURE — 2500000003 HC RX 250 WO HCPCS: Performed by: NURSE PRACTITIONER

## 2025-05-14 PROCEDURE — 2580000003 HC RX 258: Performed by: NURSE PRACTITIONER

## 2025-05-14 RX ORDER — SODIUM CHLORIDE 0.9 % (FLUSH) 0.9 %
10 SYRINGE (ML) INJECTION PRN
Status: DISCONTINUED | OUTPATIENT
Start: 2025-05-14 | End: 2025-05-17 | Stop reason: HOSPADM

## 2025-05-14 RX ORDER — 0.9 % SODIUM CHLORIDE 0.9 %
100 INTRAVENOUS SOLUTION INTRAVENOUS ONCE
Status: COMPLETED | OUTPATIENT
Start: 2025-05-14 | End: 2025-05-14

## 2025-05-14 RX ORDER — IOPAMIDOL 755 MG/ML
75 INJECTION, SOLUTION INTRAVASCULAR
Status: COMPLETED | OUTPATIENT
Start: 2025-05-14 | End: 2025-05-14

## 2025-05-14 RX ADMIN — BARIUM SULFATE 450 ML: 20 SUSPENSION ORAL at 08:02

## 2025-05-14 RX ADMIN — SODIUM CHLORIDE, PRESERVATIVE FREE 10 ML: 5 INJECTION INTRAVENOUS at 08:59

## 2025-05-14 RX ADMIN — IOPAMIDOL 75 ML: 755 INJECTION, SOLUTION INTRAVENOUS at 08:59

## 2025-05-14 RX ADMIN — SODIUM CHLORIDE 100 ML: 9 INJECTION, SOLUTION INTRAVENOUS at 08:59

## 2025-05-16 ENCOUNTER — HOSPITAL ENCOUNTER (OUTPATIENT)
Age: 45
Setting detail: SPECIMEN
Discharge: HOME | End: 2025-05-16

## 2025-05-16 ENCOUNTER — PROCEDURE VISIT (OUTPATIENT)
Dept: OBGYN CLINIC | Age: 45
End: 2025-05-16

## 2025-05-16 VITALS
HEIGHT: 66 IN | SYSTOLIC BLOOD PRESSURE: 120 MMHG | WEIGHT: 133 LBS | HEART RATE: 82 BPM | DIASTOLIC BLOOD PRESSURE: 82 MMHG | BODY MASS INDEX: 21.38 KG/M2 | RESPIRATION RATE: 18 BRPM

## 2025-05-16 DIAGNOSIS — N92.0 MENORRHAGIA WITH REGULAR CYCLE: Primary | ICD-10-CM

## 2025-05-16 LAB
CONTROL: NORMAL
PREGNANCY TEST URINE, POC: NEGATIVE

## 2025-05-20 ENCOUNTER — RESULTS FOLLOW-UP (OUTPATIENT)
Dept: OBGYN CLINIC | Age: 45
End: 2025-05-20

## 2025-05-20 LAB — SURGICAL PATHOLOGY REPORT: NORMAL

## 2025-05-21 ENCOUNTER — RESULTS FOLLOW-UP (OUTPATIENT)
Age: 45
End: 2025-05-21

## 2025-05-27 ENCOUNTER — TELEPHONE (OUTPATIENT)
Dept: OBGYN CLINIC | Age: 45
End: 2025-05-27

## 2025-05-29 ENCOUNTER — TELEPHONE (OUTPATIENT)
Age: 45
End: 2025-05-29

## 2025-05-29 ENCOUNTER — PREP FOR PROCEDURE (OUTPATIENT)
Age: 45
End: 2025-05-29

## 2025-05-29 DIAGNOSIS — D3A.020: ICD-10-CM

## 2025-05-29 DIAGNOSIS — R19.4 CHANGE IN BOWEL HABIT: ICD-10-CM

## 2025-05-29 RX ORDER — BISACODYL 5 MG
TABLET, DELAYED RELEASE (ENTERIC COATED) ORAL
Qty: 4 TABLET | Refills: 0 | Status: SHIPPED | OUTPATIENT
Start: 2025-05-29

## 2025-05-29 RX ORDER — ONDANSETRON 4 MG/1
4 TABLET, FILM COATED ORAL EVERY 6 HOURS PRN
Qty: 10 TABLET | Refills: 0 | Status: SHIPPED | OUTPATIENT
Start: 2025-05-29

## 2025-05-29 RX ORDER — POLYETHYLENE GLYCOL 3350 17 G/17G
POWDER, FOR SOLUTION ORAL
Qty: 238 G | Refills: 0 | Status: SHIPPED | OUTPATIENT
Start: 2025-05-29

## 2025-05-30 NOTE — TELEPHONE ENCOUNTER
Called patient and scheduled a   Diagnostic Colonoscopy.    Stop Ibuprofen NSAID's 5 days prior to procedure.  
Eladio Rubio MA called from Dr. Burgos office and wanted to know if patient was completed with Dr. Buck office.   Informed her patient needs a cscope.   Eladio stated patient has a tentative date of 7/21/2025 for hysterectomy.  Informed her that writer will call patient and get her scheduled for her  Diagnostic Colonoscopy.  
Lisa, is she scheduled?  
PAT per anesthesia protocol.  Prep and Zofran PRN sent to patient's pharmacy, signing encounter.  
PAT per anesthesia protocol.  Prep and Zofran PRN sent to patient's pharmacy, signing encounter.  
instructed/emailed 5/30/25    Pateint is scheduled

## 2025-06-03 ENCOUNTER — INITIAL CONSULT (OUTPATIENT)
Age: 45
End: 2025-06-03
Payer: MEDICAID

## 2025-06-03 ENCOUNTER — TELEPHONE (OUTPATIENT)
Age: 45
End: 2025-06-03

## 2025-06-03 ENCOUNTER — HOSPITAL ENCOUNTER (OUTPATIENT)
Age: 45
Discharge: HOME | End: 2025-06-03
Payer: MEDICAID

## 2025-06-03 ENCOUNTER — PREP FOR PROCEDURE (OUTPATIENT)
Dept: OBGYN CLINIC | Age: 45
End: 2025-06-03

## 2025-06-03 VITALS
BODY MASS INDEX: 21.08 KG/M2 | RESPIRATION RATE: 18 BRPM | SYSTOLIC BLOOD PRESSURE: 111 MMHG | TEMPERATURE: 97.5 F | HEART RATE: 68 BPM | WEIGHT: 130.6 LBS | DIASTOLIC BLOOD PRESSURE: 74 MMHG | OXYGEN SATURATION: 100 %

## 2025-06-03 DIAGNOSIS — D3A.020 CARCINOID TUMOR OF APPENDIX, UNSPECIFIED WHETHER MALIGNANT (HCC): Primary | ICD-10-CM

## 2025-06-03 DIAGNOSIS — N94.6 DYSMENORRHEA: ICD-10-CM

## 2025-06-03 DIAGNOSIS — D3A.020 CARCINOID TUMOR OF APPENDIX, UNSPECIFIED WHETHER MALIGNANT (HCC): ICD-10-CM

## 2025-06-03 DIAGNOSIS — Z98.890 HISTORY OF ENDOMETRIAL ABLATION: ICD-10-CM

## 2025-06-03 PROBLEM — N92.0 MENORRHAGIA: Status: ACTIVE | Noted: 2025-06-03

## 2025-06-03 LAB
BASOPHILS # BLD: 0 K/UL (ref 0–0.2)
BASOPHILS NFR BLD: 1 % (ref 0–2)
EOSINOPHIL # BLD: 0.1 K/UL (ref 0–0.4)
EOSINOPHILS RELATIVE PERCENT: 2 % (ref 1–4)
ERYTHROCYTE [DISTWIDTH] IN BLOOD BY AUTOMATED COUNT: 16.1 % (ref 12.5–15.4)
HCT VFR BLD AUTO: 38.3 % (ref 36–46)
HGB BLD-MCNC: 11.9 G/DL (ref 12–16)
LYMPHOCYTES NFR BLD: 1.6 K/UL (ref 1–4.8)
LYMPHOCYTES RELATIVE PERCENT: 24 % (ref 24–44)
MCH RBC QN AUTO: 25.1 PG (ref 26–34)
MCHC RBC AUTO-ENTMCNC: 31 G/DL (ref 31–37)
MCV RBC AUTO: 81 FL (ref 80–100)
MONOCYTES NFR BLD: 0.6 K/UL (ref 0.1–1.2)
MONOCYTES NFR BLD: 8 % (ref 2–11)
NEUTROPHILS NFR BLD: 65 % (ref 36–66)
NEUTS SEG NFR BLD: 4.4 K/UL (ref 1.8–7.7)
PLATELET # BLD AUTO: 364 K/UL (ref 140–450)
PMV BLD AUTO: 8.4 FL (ref 6–12)
RBC # BLD AUTO: 4.72 M/UL (ref 4–5.2)
WBC OTHER # BLD: 6.7 K/UL (ref 3.5–11)

## 2025-06-03 PROCEDURE — 99244 OFF/OP CNSLTJ NEW/EST MOD 40: CPT | Performed by: INTERNAL MEDICINE

## 2025-06-03 PROCEDURE — 36415 COLL VENOUS BLD VENIPUNCTURE: CPT

## 2025-06-03 PROCEDURE — 85025 COMPLETE CBC W/AUTO DIFF WBC: CPT

## 2025-06-03 PROCEDURE — 99214 OFFICE O/P EST MOD 30 MIN: CPT | Performed by: INTERNAL MEDICINE

## 2025-06-03 PROCEDURE — 86316 IMMUNOASSAY TUMOR OTHER: CPT

## 2025-06-03 PROCEDURE — 84260 ASSAY OF SEROTONIN: CPT

## 2025-06-03 NOTE — TELEPHONE ENCOUNTER
Instructions   from Dr. Javed Esposito MD    Labs today  Dotatate PET scan  RTC after PET (may 26rh)      Labs today  PET 6/13/25 8:30 am with 8 am arrival time [facility] 6/24/25 at 9 am

## 2025-06-03 NOTE — PROGRESS NOTES
Patient ID: Norma Gallo, 1980, [id number], 44 y.o.  Referred by : Dr. Osvaldo Buck  Reason for consultation:   Appendiceal carcinoid  HISTORY OF PRESENT ILLNESS:    Oncologic History:    Norma Gallo is a 44 y.o. female was seen during initial consultation visit for history of appendiceal carcinoid.    Patient reports that she had pain in her abdomen in 2018 and underwent appendectomy and the pathology showed carcinoid in the appendix.  Subsequently she underwent right hemicolectomy in March 2018 with Dr. AMAURY Tereas,  Which showed no evidence of residual adenocarcinoma ex goblet cell carcinoid, 23 lymph nodes were negative for malignancy.  She did not see any medical oncology at that time.    She denied any abdominal pain nausea vomiting.  She denied any chest pain shortness of breath, unintentional weight loss night sweats fever chills.  She does reports chronic constipation.  She is following with gynecologist for her history of heavy.'s and considering hysterectomy.  She was seen by general surgery who needed to be on standby due to her previous history of hemicolectomy and due to her history of carcinoid in the past she was referred back to medical oncology for further recommendations.    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    S/p laparoscopic appendectomy and right sided hemicolectomy (2018)      Family history of breast cancer     Family history of cervical cancer     Family history of uterine cancer     Hepatic hemangioma 11/24/2021    Herpes     genital, 2 outbreaks total, has been years    MRSA (methicillin resistant staph aureus) culture positive 08/16/2017    abscess    Postpartum depression 11/07/2016    Tobacco use 08/16/2017       Past Surgical History:   Procedure Laterality Date    DILATION AND CURETTAGE      DILATION AND CURETTAGE OF UTERUS      Miscarriage    DILATION AND CURETTAGE OF UTERUS N/A 5/10/2024

## 2025-06-05 LAB — CHROMOGRANIN A: 160 NG/ML (ref 0–187)

## 2025-06-07 LAB — SEROTONIN SER-MCNC: 110 NG/ML (ref 50–220)

## 2025-06-11 ENCOUNTER — TELEPHONE (OUTPATIENT)
Dept: OBGYN CLINIC | Age: 45
End: 2025-06-11

## 2025-06-13 ENCOUNTER — HOSPITAL ENCOUNTER (OUTPATIENT)
Dept: NUCLEAR MEDICINE | Age: 45
Discharge: HOME | End: 2025-06-15
Attending: INTERNAL MEDICINE
Payer: MEDICAID

## 2025-06-13 DIAGNOSIS — D3A.020 CARCINOID TUMOR OF APPENDIX, UNSPECIFIED WHETHER MALIGNANT (HCC): ICD-10-CM

## 2025-06-13 PROCEDURE — 2500000003 HC RX 250 WO HCPCS: Performed by: INTERNAL MEDICINE

## 2025-06-13 PROCEDURE — 3430000000 HC RX DIAGNOSTIC RADIOPHARMACEUTICAL: Performed by: INTERNAL MEDICINE

## 2025-06-13 PROCEDURE — A9592 HC RX DIAGNOSTIC RADIOPHARMACEUTICAL: HCPCS

## 2025-06-13 PROCEDURE — A9592 HC RX DIAGNOSTIC RADIOPHARMACEUTICAL: HCPCS | Performed by: INTERNAL MEDICINE

## 2025-06-13 PROCEDURE — 78815 PET IMAGE W/CT SKULL-THIGH: CPT

## 2025-06-13 PROCEDURE — 3430000000 HC RX DIAGNOSTIC RADIOPHARMACEUTICAL

## 2025-06-13 RX ORDER — SODIUM CHLORIDE 0.9 % (FLUSH) 0.9 %
10 SYRINGE (ML) INJECTION PRN
Status: DISCONTINUED | OUTPATIENT
Start: 2025-06-13 | End: 2025-06-16 | Stop reason: HOSPADM

## 2025-06-13 RX ADMIN — SODIUM CHLORIDE, PRESERVATIVE FREE 10 ML: 5 INJECTION INTRAVENOUS at 07:36

## 2025-06-13 RX ADMIN — COPPER CU 64 DOTATATE 4.47 MILLICURIE: 1 INJECTION, SOLUTION INTRAVENOUS at 07:33

## 2025-06-24 ENCOUNTER — OFFICE VISIT (OUTPATIENT)
Age: 45
End: 2025-06-24
Payer: MEDICAID

## 2025-06-24 ENCOUNTER — TELEPHONE (OUTPATIENT)
Age: 45
End: 2025-06-24

## 2025-06-24 VITALS
BODY MASS INDEX: 21.18 KG/M2 | SYSTOLIC BLOOD PRESSURE: 106 MMHG | WEIGHT: 131.2 LBS | TEMPERATURE: 97.6 F | OXYGEN SATURATION: 100 % | DIASTOLIC BLOOD PRESSURE: 70 MMHG | HEART RATE: 67 BPM

## 2025-06-24 DIAGNOSIS — D3A.020: Primary | Chronic | ICD-10-CM

## 2025-06-24 PROCEDURE — 99211 OFF/OP EST MAY X REQ PHY/QHP: CPT | Performed by: INTERNAL MEDICINE

## 2025-06-24 PROCEDURE — 99204 OFFICE O/P NEW MOD 45 MIN: CPT | Performed by: INTERNAL MEDICINE

## 2025-06-24 PROCEDURE — 99203 OFFICE O/P NEW LOW 30 MIN: CPT | Performed by: INTERNAL MEDICINE

## 2025-06-24 NOTE — TELEPHONE ENCOUNTER
Instructions   from Dr. Javed Esposito MD    RTc in one year w labs prior    RV 6- at 8am with labs

## 2025-06-24 NOTE — PROGRESS NOTES
Patient ID: Norma Gallo, 1980, [id number], 44 y.o.  Referred by : Dr. Osvaldo Buck  Reason for consultation:   Appendiceal carcinoid  HISTORY OF PRESENT ILLNESS:    Oncologic History:    Norma Gallo is a 44 y.o. female was seen during initial consultation visit for history of appendiceal carcinoid.    Patient reports that she had pain in her abdomen in 2018 and underwent appendectomy and the pathology showed carcinoid in the appendix.  Subsequently she underwent right hemicolectomy in March 2018 with Dr. AMAURY Teresa,  Which showed no evidence of residual adenocarcinoma ex goblet cell carcinoid, 23 lymph nodes were negative for malignancy.  She did not see any medical oncology at that time.    She denied any abdominal pain nausea vomiting.  She denied any chest pain shortness of breath, unintentional weight loss night sweats fever chills.  She does reports chronic constipation.  She is following with gynecologist for her history of heavy.'s and considering hysterectomy.  She was seen by general surgery who needed to be on standby due to her previous history of hemicolectomy and due to her history of carcinoid in the past she was referred back to medical oncology for further recommendations.  Interval history:  Patient is returning for follow-up visit and to discuss lab results, PET scan results and further recommendations.  Her PET scan showed no dotatate avid activity.  Denied any abdominal pain nausea matting.  She is following with gynecologist and general surgery and planning to have hysterectomy.  The date is not scheduled yet but she will have prior colonoscopy and scheduled on 7/10    During this visit patient's allergy, social, medical, surgical history and medications were reviewed and updated.    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

## 2025-06-30 ENCOUNTER — HOSPITAL ENCOUNTER (OUTPATIENT)
Dept: PREADMISSION TESTING | Age: 45
Discharge: HOME | End: 2025-07-04

## 2025-06-30 VITALS — HEIGHT: 66 IN | BODY MASS INDEX: 21.05 KG/M2 | WEIGHT: 131 LBS

## 2025-06-30 NOTE — PROGRESS NOTES
Pre-op Instructions For Out-Patient Endoscopy Surgery    Medication Instructions:  Please stop herbs and any supplements now (includes vitamins and minerals).    For these medications:  Dulaglutide (Trulicity), Exenatide (Byetta and Bydureon, Liraglutide (Victoza), Lixisenatide (Adlyxin), Semaglutide (Ozempic and Rybelsus), Tirzepatide (Mounjaro, Zepbound,Wegovy)- Stop 1 week prior if taking weekly or 1 day prior if taking every 12 hours or daily.     Please contact your surgeon and prescribing physician for pre-op instructions for any blood thinners. Stop taking Ibuprofen as directed 5 days prior    If you have inhalers/aerosol treatments at home, please use them the morning of your surgery and bring the inhalers with you to the hospital.    Please take the following medications the morning of your surgery with a sip of water:    None    Surgery Instructions:  After midnight before surgery:  Do not eat or drink anything, including water, mints, gum, and hard candy.  You may brush your teeth without swallowing.  No smoking, chewing tobacco, or street drugs.       ** Please Follow Bowel Prep instructions if given by surgeon's office**    Please shower or bathe before surgery.       Please do not wear any cologne, lotion, powder, jewelry, piercings, perfume, makeup, nail polish, hair accessories, or hair spray on the day of surgery.  Wear loose comfortable clothing.    Leave your valuables at home but bring a payment source for any after-surgery prescriptions you plan to fill at [facility].  Bring a storage case for any glasses/contacts.    An adult who is responsible for you MUST remain in the hospital and drive you home and should be with you for the first 24 hours after surgery.     The Day of Surgery:  Arrive at [facility] Surgery Entrance at the time directed by your surgeon and check in at the desk.     If you have a living will or healthcare power of , please bring a

## 2025-07-08 NOTE — PRE-PROCEDURE INSTRUCTIONS
No answer, left message ?                             Unable to leave message ?    When were you told to arrive at hospital ?  0600    Do you have a  ? YES    Are you on any blood thinners ? NO                    If yes when did you stop taking ?    Do you have your prep Rx filled and instruction ?  YES    Nothing to eat the day before , only clear liquids.    Are you experiencing any covid symptoms ? NO    Do you have any infections or rash we should be aware of ? NO      Do you have the Hibiclens soap to use the night before and the morning of surgery ? N/A    Nothing to eat or drink after midnight, only a sip of water to take any medication instructed to take the night before.  Wear comfortable clothing, leave any valuables at home, remove any jewelry and body piercing .

## 2025-07-09 ENCOUNTER — ANESTHESIA EVENT (OUTPATIENT)
Dept: OPERATING ROOM | Age: 45
End: 2025-07-09
Payer: MEDICAID

## 2025-07-10 ENCOUNTER — HOSPITAL ENCOUNTER (OUTPATIENT)
Age: 45
Setting detail: OUTPATIENT SURGERY
Discharge: HOME | End: 2025-07-10
Attending: SURGERY | Admitting: SURGERY
Payer: MEDICAID

## 2025-07-10 ENCOUNTER — ANESTHESIA (OUTPATIENT)
Dept: OPERATING ROOM | Age: 45
End: 2025-07-10
Payer: MEDICAID

## 2025-07-10 VITALS
OXYGEN SATURATION: 100 % | HEIGHT: 66 IN | TEMPERATURE: 97.5 F | SYSTOLIC BLOOD PRESSURE: 97 MMHG | WEIGHT: 131 LBS | RESPIRATION RATE: 14 BRPM | HEART RATE: 52 BPM | BODY MASS INDEX: 21.05 KG/M2 | DIASTOLIC BLOOD PRESSURE: 62 MMHG

## 2025-07-10 DIAGNOSIS — D3A.020: ICD-10-CM

## 2025-07-10 DIAGNOSIS — R19.4 CHANGE IN BOWEL HABIT: ICD-10-CM

## 2025-07-10 LAB — HCG, PREGNANCY URINE (POC): NEGATIVE

## 2025-07-10 PROCEDURE — 7100000010 HC PHASE II RECOVERY - FIRST 15 MIN: Performed by: SURGERY

## 2025-07-10 PROCEDURE — 3700000001 HC ADD 15 MINUTES (ANESTHESIA): Performed by: SURGERY

## 2025-07-10 PROCEDURE — 7100000001 HC PACU RECOVERY - ADDTL 15 MIN: Performed by: SURGERY

## 2025-07-10 PROCEDURE — 7100000011 HC PHASE II RECOVERY - ADDTL 15 MIN: Performed by: SURGERY

## 2025-07-10 PROCEDURE — 7100000030 HC ASPR PHASE II RECOVERY - FIRST 15 MIN: Performed by: SURGERY

## 2025-07-10 PROCEDURE — 7100000000 HC PACU RECOVERY - FIRST 15 MIN: Performed by: SURGERY

## 2025-07-10 PROCEDURE — 3609010300 HC COLONOSCOPY W/BIOPSY SINGLE/MULTIPLE: Performed by: SURGERY

## 2025-07-10 PROCEDURE — 2709999900 HC NON-CHARGEABLE SUPPLY: Performed by: SURGERY

## 2025-07-10 PROCEDURE — 3700000000 HC ANESTHESIA ATTENDED CARE: Performed by: SURGERY

## 2025-07-10 PROCEDURE — 2580000003 HC RX 258: Performed by: ANESTHESIOLOGY

## 2025-07-10 PROCEDURE — 81025 URINE PREGNANCY TEST: CPT

## 2025-07-10 PROCEDURE — 7100000031 HC ASPR PHASE II RECOVERY - ADDTL 15 MIN: Performed by: SURGERY

## 2025-07-10 PROCEDURE — 6360000002 HC RX W HCPCS: Performed by: NURSE ANESTHETIST, CERTIFIED REGISTERED

## 2025-07-10 PROCEDURE — 88305 TISSUE EXAM BY PATHOLOGIST: CPT

## 2025-07-10 PROCEDURE — 6360000002 HC RX W HCPCS: Performed by: ANESTHESIOLOGY

## 2025-07-10 RX ORDER — LIDOCAINE HYDROCHLORIDE 10 MG/ML
1 INJECTION, SOLUTION EPIDURAL; INFILTRATION; INTRACAUDAL; PERINEURAL
Status: COMPLETED | OUTPATIENT
Start: 2025-07-10 | End: 2025-07-10

## 2025-07-10 RX ORDER — LIDOCAINE HYDROCHLORIDE 20 MG/ML
INJECTION, SOLUTION EPIDURAL; INFILTRATION; INTRACAUDAL; PERINEURAL
Status: DISCONTINUED | OUTPATIENT
Start: 2025-07-10 | End: 2025-07-10 | Stop reason: SDUPTHER

## 2025-07-10 RX ORDER — SODIUM CHLORIDE 0.9 % (FLUSH) 0.9 %
5-40 SYRINGE (ML) INJECTION PRN
Status: DISCONTINUED | OUTPATIENT
Start: 2025-07-10 | End: 2025-07-10 | Stop reason: HOSPADM

## 2025-07-10 RX ORDER — SODIUM CHLORIDE 0.9 % (FLUSH) 0.9 %
5-40 SYRINGE (ML) INJECTION EVERY 12 HOURS SCHEDULED
Status: DISCONTINUED | OUTPATIENT
Start: 2025-07-10 | End: 2025-07-10 | Stop reason: HOSPADM

## 2025-07-10 RX ORDER — SODIUM CHLORIDE, SODIUM LACTATE, POTASSIUM CHLORIDE, CALCIUM CHLORIDE 600; 310; 30; 20 MG/100ML; MG/100ML; MG/100ML; MG/100ML
INJECTION, SOLUTION INTRAVENOUS CONTINUOUS
Status: DISCONTINUED | OUTPATIENT
Start: 2025-07-10 | End: 2025-07-10 | Stop reason: HOSPADM

## 2025-07-10 RX ORDER — ONDANSETRON 2 MG/ML
INJECTION INTRAMUSCULAR; INTRAVENOUS
Status: DISCONTINUED | OUTPATIENT
Start: 2025-07-10 | End: 2025-07-10 | Stop reason: SDUPTHER

## 2025-07-10 RX ORDER — SODIUM CHLORIDE 9 MG/ML
INJECTION, SOLUTION INTRAVENOUS PRN
Status: DISCONTINUED | OUTPATIENT
Start: 2025-07-10 | End: 2025-07-10 | Stop reason: HOSPADM

## 2025-07-10 RX ORDER — DEXAMETHASONE SODIUM PHOSPHATE 4 MG/ML
INJECTION, SOLUTION INTRA-ARTICULAR; INTRALESIONAL; INTRAMUSCULAR; INTRAVENOUS; SOFT TISSUE
Status: DISCONTINUED | OUTPATIENT
Start: 2025-07-10 | End: 2025-07-10 | Stop reason: SDUPTHER

## 2025-07-10 RX ORDER — SIMETHICONE 40MG/0.6ML
SUSPENSION, DROPS(FINAL DOSAGE FORM)(ML) ORAL PRN
Status: DISCONTINUED | OUTPATIENT
Start: 2025-07-10 | End: 2025-07-10 | Stop reason: ALTCHOICE

## 2025-07-10 RX ORDER — PROPOFOL 10 MG/ML
INJECTION, EMULSION INTRAVENOUS
Status: DISCONTINUED | OUTPATIENT
Start: 2025-07-10 | End: 2025-07-10 | Stop reason: SDUPTHER

## 2025-07-10 RX ADMIN — ONDANSETRON 4 MG: 2 INJECTION, SOLUTION INTRAMUSCULAR; INTRAVENOUS at 08:42

## 2025-07-10 RX ADMIN — DEXAMETHASONE SODIUM PHOSPHATE 4 MG: 4 INJECTION, SOLUTION INTRAMUSCULAR; INTRAVENOUS at 08:15

## 2025-07-10 RX ADMIN — PROPOFOL 200 MG: 10 INJECTION, EMULSION INTRAVENOUS at 08:03

## 2025-07-10 RX ADMIN — LIDOCAINE HYDROCHLORIDE 1 ML: 10 INJECTION, SOLUTION EPIDURAL; INFILTRATION; INTRACAUDAL; PERINEURAL at 06:46

## 2025-07-10 RX ADMIN — SODIUM CHLORIDE, POTASSIUM CHLORIDE, SODIUM LACTATE AND CALCIUM CHLORIDE: 600; 310; 30; 20 INJECTION, SOLUTION INTRAVENOUS at 06:46

## 2025-07-10 RX ADMIN — LIDOCAINE HYDROCHLORIDE 80 MG: 20 INJECTION, SOLUTION EPIDURAL; INFILTRATION; INTRACAUDAL; PERINEURAL at 08:03

## 2025-07-10 ASSESSMENT — ENCOUNTER SYMPTOMS
CONSTIPATION: 1
DIARRHEA: 1
RESPIRATORY NEGATIVE: 1
ABDOMINAL PAIN: 1

## 2025-07-10 ASSESSMENT — PAIN - FUNCTIONAL ASSESSMENT
PAIN_FUNCTIONAL_ASSESSMENT: NONE - DENIES PAIN
PAIN_FUNCTIONAL_ASSESSMENT: 0-10

## 2025-07-10 NOTE — ANESTHESIA PRE PROCEDURE
Department of Anesthesiology  Preprocedure Note       Name:  Norma Gallo   Age:  45 y.o.  :  1980                                          MRN:  [id number]         Date:  7/10/2025      Surgeon: Surgeon(s):  Osvaldo Buck MD    Procedure: Procedure(s):  COLONOSCOPY DIAGNOSTIC    Medications prior to admission:   Prior to Admission medications    Medication Sig Start Date End Date Taking? Authorizing Provider   ibuprofen (ADVIL;MOTRIN) 600 MG tablet Take 1 tablet by mouth every 6 hours as needed for Pain 5/10/24  Yes Tracy Anderson DO   ferrous sulfate (IRON 325) 325 (65 Fe) MG tablet Take 1 tablet by mouth daily (with breakfast) 23  Yes Liberty Lopez, APRN - NP   Cholecalciferol (VITAMIN D3) 50 MCG ( UT) CAPS Take by mouth daily as needed   Yes Provider, MD Alex   ondansetron (ZOFRAN) 4 MG tablet Take 1 tablet by mouth every 6 hours as needed for Nausea or Vomiting  Patient not taking: Reported on 7/10/2025 5/29/25   Susannah Alvarado, APRN - CNP       Current medications:    Current Facility-Administered Medications   Medication Dose Route Frequency Provider Last Rate Last Admin   • sodium chloride flush 0.9 % injection 5-40 mL  5-40 mL IntraVENous 2 times per day Al Arce MD       • sodium chloride flush 0.9 % injection 5-40 mL  5-40 mL IntraVENous PRN Al Arce MD       • 0.9 % sodium chloride infusion   IntraVENous PRN Al Arce MD       • lactated ringers infusion   IntraVENous Continuous Al Arce  mL/hr at 07/10/25 0646 New Bag at 07/10/25 0646       Allergies:    Allergies   Allergen Reactions   • Codeine      hallucinations   • Fentanyl Other (See Comments)     hallucinations   • Penicillins Hives       Problem List:    Patient Active Problem List   Diagnosis Code   • Family history of breast cancer Z80.3   • Family history of uterine cancer Z80.49   • Herpes B00.9   • 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

## 2025-07-10 NOTE — OP NOTE
[facility] Surgery   Osvaldo Buck MD, FACS  Susannah Alvarado, APRN-CNP  [address], [address]  [place] [address]  P: [phone], F: [phone]    PROCEDURE NOTE    DATE OF PROCEDURE: 7/10/2025    SURGEON: Osvaldo Buck MD    ASSISTANT: None    PREOPERATIVE DIAGNOSIS: History of appendiceal carcinoid status post right hemicolectomy by Dr. Teresa    POSTOPERATIVE DIAGNOSIS: Suboptimal to fair preparation.  Patent ileocolonic anastomosis.  Prominent fold at the anastomosis multiple biopsies obtained.  Rectosigmoid polyp.  Sigmoid diverticulosis.  Redundant sigmoid.    OPERATION: Total colonoscopy to ileocolonic anastomosis with intubation of terminal ileum.  Biopsy thick fold at the anastomosis.  Rectosigmoid polyp removed with cold biopsy forceps.    ANESTHESIA: General    ESTIMATED BLOOD LOSS: None    COMPLICATIONS: None     SPECIMENS:  Was Obtained:     HISTORY: The patient is a 45 y.o. year old female with history of above preop diagnosis.  I recommended colonoscopy with possible biopsy or polypectomy and I explained the risk, benefits, expected outcome, and alternatives to the procedure.  Risks included but are not limited to bleeding, infection, respiratory distress, hypotension, and perforation of the colon and possibility of missing a lesion.  The patient understands and is in agreement.      PROCEDURE: The patient was given IV conscious sedation.  The patient's SPO2 remained above 90% throughout the procedure. Digital rectal exam was normal.  The colonoscope was inserted through the anus into the rectum and advanced under direct vision to the cecum without difficulty.  Terminal ileum was examined for approximately 2 inches.  The prep was suboptimal to fair.      Findings:  Terminal ileum: normal    Cecum/Ascending colon: Status post right hemicolectomy with patent anastomosis.  Thick fold at the anastomosis.  Pictures taken biopsies obtained    Transverse colon:

## 2025-07-10 NOTE — H&P
HISTORY and PHYSICAL  [facility]       NAME:  Norma Gallo  MRN: [id number]   YOB: 1980   Date: 7/10/2025   Age: 45 y.o.  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    HPI:  Norma Gallo is 45 y.o.,   female, having a Diagnostic Colonoscopy.  No Prior Colonoscopy was done before  Patient denies any  FH of Colon Cancer.    Patient reports  changes in bowel habits. She has alternative between diarrhea and constipation . No GI /Rectal bleeding, experiencing red/ black/ BRBPR stools.    Patient has a history of intermittent abdominal sharp/aching pain in the LLQ  which has been ongoing for al long time. She denies N/V, abdominal bloating or weight loss.   Pain do not related to any kind of food but it often associated with episodes of constipation.     Patient denies any Dysphagia.  Pt has hx of GERD   . No fever or chills, chest pain or SOB     Prep fully completed: yes . Pt reports her last BM is clear liquid     Review of additional significant medical hx:  (See chart for additional detail, including current medications /see ROS for current S/S):     NPO status: pt NPO since the past midnight   Medications taken TODAY (with sip of water): none  Anticoagulation status: none  Denies personal hx of blood clots.  Pt has  hx of MRSA infection 2017.  Denies any personal or family hx of previous complications w/anesthesia.    RECENT IMAGING R/T HPI   PET CT TUMOR IMAGE SKULL THIGH DOTATATE   Result Date: 6/13/2025  1. No DOTATATE-avid disease.       RECENT LABS, IMAGING AND TESTING     Lab Results   Component Value Date    WBC 6.7 06/03/2025    RBC 4.72 06/03/2025    HGB 11.9 (L) 06/03/2025    HCT 38.3 06/03/2025    MCV 81.0 06/03/2025    MCH 25.1 (L) 06/03/2025    MCHC 31.0 06/03/2025    RDW 16.1 (H)

## 2025-07-10 NOTE — ANESTHESIA POSTPROCEDURE EVALUATION
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    Tyson Phase I: Tyson Score: 10    Tyson Phase II: Tyson Score: 10    Anesthesia Post Evaluation    Comments: POST- ANESTHESIA EVALUATION       Pt Name: Norma Gallo  MRN: [id number]  YOB: 1980  Date of evaluation: 7/10/2025  Time:  10:37 AM      BP 97/62   Pulse 52   Temp 97.5 °F (36.4 °C) (Infrared)   Resp 14   Ht 1.676 m (5' 5.98\")   Wt 59.4 kg (131 lb)   SpO2 100%   BMI 21.15 kg/m²      Consciousness Level  Awake  Cardiopulmonary Status  Stable  Pain Adequately Treated YES  Nausea / Vomiting  NO  Adequate Hydration  YES  Anesthesia Related Complications NONE      Electronically signed by Mohan Hernandez MD on 7/10/2025 at 10:37 AM           No notable events documented.

## 2025-07-11 LAB — SURGICAL PATHOLOGY REPORT: NORMAL

## 2025-07-15 PROBLEM — Z98.890 HISTORY OF ENDOMETRIAL ABLATION: Status: ACTIVE | Noted: 2025-06-03

## 2025-07-17 ENCOUNTER — TELEPHONE (OUTPATIENT)
Dept: OBGYN CLINIC | Age: 45
End: 2025-07-17

## 2025-07-17 NOTE — TELEPHONE ENCOUNTER
PRE-OPERATIVE PATIENT INSTRUCTIONS   [facility]  [facility] [address]  [place], [place]  [address] [phone]   Fax [phone]         I CONFIRMED WITH THE PATIENT THE DATE AND TIME OF HER PROCEDURE.    DATE: 08/05/2025 and TIME: 8:00 am    The patient was informed to arrive two hours prior to her scheduled surgical start time.   The patient was notified that a  is required to be present for the entire procedure and available to take the patient home at its completion or the procedure will be canceled.     The patient voiced understanding of the above requirements.        Surgery Boarding Appointment was confirmed with patient; 07/24/25 @ 9am .      PAT appointment was confirmed with patient on 07/24/25 @ 12:30 .    Electronically signed by Marianna Rubio on 7/17/2025 at 11:40 AM

## 2025-07-24 ENCOUNTER — OFFICE VISIT (OUTPATIENT)
Dept: OBGYN CLINIC | Age: 45
End: 2025-07-24
Payer: MEDICAID

## 2025-07-24 ENCOUNTER — HOSPITAL ENCOUNTER (OUTPATIENT)
Dept: PREADMISSION TESTING | Age: 45
Discharge: HOME | End: 2025-07-28
Payer: MEDICAID

## 2025-07-24 ENCOUNTER — RESULTS FOLLOW-UP (OUTPATIENT)
Dept: OBGYN CLINIC | Age: 45
End: 2025-07-24

## 2025-07-24 VITALS
RESPIRATION RATE: 16 BRPM | OXYGEN SATURATION: 100 % | SYSTOLIC BLOOD PRESSURE: 101 MMHG | WEIGHT: 128 LBS | DIASTOLIC BLOOD PRESSURE: 73 MMHG | HEART RATE: 66 BPM | TEMPERATURE: 98 F | BODY MASS INDEX: 20.57 KG/M2 | HEIGHT: 66 IN

## 2025-07-24 VITALS
HEIGHT: 66 IN | BODY MASS INDEX: 20.73 KG/M2 | DIASTOLIC BLOOD PRESSURE: 60 MMHG | SYSTOLIC BLOOD PRESSURE: 100 MMHG | WEIGHT: 129 LBS

## 2025-07-24 DIAGNOSIS — Z01.818 PREOP TESTING: ICD-10-CM

## 2025-07-24 DIAGNOSIS — N94.6 DYSMENORRHEA: ICD-10-CM

## 2025-07-24 DIAGNOSIS — N92.0 MENORRHAGIA WITH REGULAR CYCLE: ICD-10-CM

## 2025-07-24 DIAGNOSIS — R10.2 PELVIC PAIN: ICD-10-CM

## 2025-07-24 DIAGNOSIS — N92.0 MENORRHAGIA WITH REGULAR CYCLE: Primary | ICD-10-CM

## 2025-07-24 DIAGNOSIS — Z98.890 S/P ENDOMETRIAL ABLATION: ICD-10-CM

## 2025-07-24 LAB
ABO + RH BLD: NORMAL
ANION GAP SERPL CALCULATED.3IONS-SCNC: 12 MMOL/L (ref 9–16)
ARM BAND NUMBER: NORMAL
B-HCG SERPL EIA 3RD IS-ACNC: <0.2 MIU/ML (ref 0–7)
BASOPHILS # BLD: 0.05 K/UL (ref 0–0.2)
BASOPHILS NFR BLD: 1 % (ref 0–2)
BILIRUB UR QL STRIP: NEGATIVE
BLOOD BANK SAMPLE EXPIRATION: NORMAL
BLOOD GROUP ANTIBODIES SERPL: NEGATIVE
BUN SERPL-MCNC: 9 MG/DL (ref 6–20)
CALCIUM SERPL-MCNC: 9.1 MG/DL (ref 8.6–10.4)
CHLORIDE SERPL-SCNC: 107 MMOL/L (ref 98–107)
CLARITY UR: CLEAR
CO2 SERPL-SCNC: 23 MMOL/L (ref 20–31)
COLOR UR: YELLOW
COMMENT: NORMAL
CREAT SERPL-MCNC: 0.8 MG/DL (ref 0.7–1.2)
EOSINOPHIL # BLD: 0.08 K/UL (ref 0–0.44)
EOSINOPHILS RELATIVE PERCENT: 2 % (ref 0–4)
ERYTHROCYTE [DISTWIDTH] IN BLOOD BY AUTOMATED COUNT: 15.6 % (ref 11.5–14.9)
GFR, ESTIMATED: >90 ML/MIN/1.73M2
GLUCOSE SERPL-MCNC: 87 MG/DL (ref 74–99)
GLUCOSE UR STRIP-MCNC: NEGATIVE MG/DL
HCT VFR BLD AUTO: 37.8 % (ref 36–46)
HGB BLD-MCNC: 11.7 G/DL (ref 12–16)
HGB UR QL STRIP.AUTO: NEGATIVE
IMM GRANULOCYTES # BLD AUTO: <0.03 K/UL (ref 0–0.3)
IMM GRANULOCYTES NFR BLD: 0 %
INR PPP: 1
KETONES UR STRIP-MCNC: NEGATIVE MG/DL
LEUKOCYTE ESTERASE UR QL STRIP: NEGATIVE
LYMPHOCYTES NFR BLD: 1.78 K/UL (ref 1.1–3.7)
LYMPHOCYTES RELATIVE PERCENT: 36 % (ref 24–44)
MCH RBC QN AUTO: 26.2 PG (ref 26–34)
MCHC RBC AUTO-ENTMCNC: 31 G/DL (ref 31–37)
MCV RBC AUTO: 84.6 FL (ref 80–100)
MONOCYTES NFR BLD: 0.46 K/UL (ref 0.1–1.2)
MONOCYTES NFR BLD: 9 % (ref 3–12)
NEUTROPHILS NFR BLD: 52 % (ref 36–66)
NEUTS SEG NFR BLD: 2.61 K/UL (ref 1.5–8.1)
NITRITE UR QL STRIP: NEGATIVE
NRBC BLD-RTO: 0 PER 100 WBC
PARTIAL THROMBOPLASTIN TIME: 32.9 SEC (ref 24–36)
PH UR STRIP: 7 [PH] (ref 5–8)
PLATELET # BLD AUTO: 371 K/UL (ref 150–450)
PMV BLD AUTO: 10.5 FL (ref 8–13.5)
POTASSIUM SERPL-SCNC: 4 MMOL/L (ref 3.7–5.3)
PROT UR STRIP-MCNC: NEGATIVE MG/DL
PROTHROMBIN TIME: 13.8 SEC (ref 11.8–14.6)
RBC # BLD AUTO: 4.47 M/UL (ref 3.95–5.11)
SODIUM SERPL-SCNC: 142 MMOL/L (ref 136–145)
SP GR UR STRIP: 1.01 (ref 1–1.03)
UROBILINOGEN UR STRIP-ACNC: NORMAL EU/DL (ref 0–1)
WBC OTHER # BLD: 5 K/UL (ref 3.5–11)

## 2025-07-24 PROCEDURE — 99213 OFFICE O/P EST LOW 20 MIN: CPT | Performed by: OBSTETRICS & GYNECOLOGY

## 2025-07-24 PROCEDURE — 86901 BLOOD TYPING SEROLOGIC RH(D): CPT

## 2025-07-24 PROCEDURE — 36415 COLL VENOUS BLD VENIPUNCTURE: CPT

## 2025-07-24 PROCEDURE — 86900 BLOOD TYPING SEROLOGIC ABO: CPT

## 2025-07-24 PROCEDURE — 81003 URINALYSIS AUTO W/O SCOPE: CPT

## 2025-07-24 PROCEDURE — 80048 BASIC METABOLIC PNL TOTAL CA: CPT

## 2025-07-24 PROCEDURE — 85730 THROMBOPLASTIN TIME PARTIAL: CPT

## 2025-07-24 PROCEDURE — 85610 PROTHROMBIN TIME: CPT

## 2025-07-24 PROCEDURE — 84702 CHORIONIC GONADOTROPIN TEST: CPT

## 2025-07-24 PROCEDURE — 87086 URINE CULTURE/COLONY COUNT: CPT

## 2025-07-24 PROCEDURE — 86850 RBC ANTIBODY SCREEN: CPT

## 2025-07-24 PROCEDURE — 85025 COMPLETE CBC W/AUTO DIFF WBC: CPT

## 2025-07-24 RX ORDER — SODIUM CHLORIDE 9 MG/ML
INJECTION, SOLUTION INTRAVENOUS PRN
OUTPATIENT
Start: 2025-07-24

## 2025-07-24 RX ORDER — SODIUM CHLORIDE, SODIUM LACTATE, POTASSIUM CHLORIDE, CALCIUM CHLORIDE 600; 310; 30; 20 MG/100ML; MG/100ML; MG/100ML; MG/100ML
INJECTION, SOLUTION INTRAVENOUS CONTINUOUS
OUTPATIENT
Start: 2025-07-24

## 2025-07-24 RX ORDER — SODIUM CHLORIDE 0.9 % (FLUSH) 0.9 %
5-40 SYRINGE (ML) INJECTION EVERY 12 HOURS SCHEDULED
OUTPATIENT
Start: 2025-07-24

## 2025-07-24 RX ORDER — ACETAMINOPHEN 325 MG/1
1000 TABLET ORAL ONCE
OUTPATIENT
Start: 2025-07-24 | End: 2025-07-24

## 2025-07-24 RX ORDER — SODIUM CHLORIDE 0.9 % (FLUSH) 0.9 %
5-40 SYRINGE (ML) INJECTION PRN
OUTPATIENT
Start: 2025-07-24

## 2025-07-24 ASSESSMENT — PAIN DESCRIPTION - ONSET: ONSET: ON-GOING

## 2025-07-24 ASSESSMENT — PAIN DESCRIPTION - DESCRIPTORS: DESCRIPTORS: STABBING

## 2025-07-24 ASSESSMENT — PAIN DESCRIPTION - FREQUENCY: FREQUENCY: OTHER (COMMENT)

## 2025-07-24 ASSESSMENT — PAIN DESCRIPTION - PAIN TYPE: TYPE: ACUTE PAIN

## 2025-07-24 ASSESSMENT — PAIN DESCRIPTION - LOCATION: LOCATION: ABDOMEN

## 2025-07-24 ASSESSMENT — PAIN SCALES - GENERAL: PAINLEVEL_OUTOF10: 7

## 2025-07-24 ASSESSMENT — PAIN DESCRIPTION - ORIENTATION: ORIENTATION: LEFT;LOWER

## 2025-07-24 NOTE — TELEPHONE ENCOUNTER
[facility] Result Note Patient Contact Communication   [address] [place] [address]      07/24/25   3:50 PM     Patients Name: Norma Gallo   Medical Record Number: [id number]   Contact Serial Number: [id number]  YOB: 1980       Patients Phone Number: [phone]     Description of Recommendations:  The patient was contacted and her identity was confirmed with two of the specific identifiers listed above.  The information regarding her recent testing results and provider recommendations were reviewed with her in detail and all questions were answered.   Recent labs/Cultures/ Imaging Studies/Pathology reports and Urinalysis results are included:      Recommendations given by provider:  ----- Message from EVETTE Omalley NP sent at 7/24/2025 12:02 PM EDT -----  + anemia  Ferrous sulfate 325mg PO QD with 6 refills              The patient verbalized understanding of the information above and the recommendation by the provider. She will contact her PCP if any other questions arise or contact our office for any other clarifications.        Electronically signed by Cristina Traylor on 7/24/2025 at 3:50 PM           Normal

## 2025-07-24 NOTE — TELEPHONE ENCOUNTER
----- Message from EVETTE Omalley - NP sent at 7/24/2025 12:02 PM EDT -----  + anemia  Ferrous sulfate 325mg PO QD with 6 refills

## 2025-07-24 NOTE — DISCHARGE INSTRUCTIONS
Pre-op Instructions For Patient Being Admitted After Surgery    Medication Instructions:  Please stop herbs and any supplements now (includes vitamins and minerals).    For these GLP-1 medications:  Dulaglutide (Trulicity), Exenatide (Byetta and Bydureon, Liraglutide (Victoza), Lixisenatide (Adlyxin), Semaglutide (Ozempic and Rybelsus), Tirzepatide (Mounjaro, Zepbound,Wegovy)- Anesthesia recommends to stop 1 week prior if taking weekly or 1 day prior if taking every 12 hours or daily. Please contact PCP if you have difficulty managing blood sugar for further instructions.     For these SGLT-2 medications: Canagliflozin (Invokana), Dapagliflozin (Farxiga), Empagliflozin (Jardiance), Ertugliflozin (Steglatro), Bexagliflozin (Brenzavvy)-Anesthesia recommends to stop 3 days prior to surgery. Please contact PCP if you have difficulty managing blood sugar for further instructions.      Please contact your surgeon and prescribing physician for pre-op instructions for any blood thinners.    If you have inhalers/aerosol treatments at home, please use them the morning of your surgery and bring the inhalers with you to the hospital.    Please take the following medications the morning of your surgery with a sip of water:    No medications    Surgery Instructions:  After midnight before surgery:  Do not eat or drink anything, including water, mints, gum, and hard candy.  You may brush your teeth without swallowing.  No smoking, chewing tobacco, or street drugs.     ** Please Follow Bowel Prep instructions if given by surgeon's office**    Please shower or bathe before surgery.  If you were given Surgical Scrub Chlorhexidine Gluconate Liquid (CHG), please shower the night before and the morning of your surgery following the detailed instructions you received during your pre-admission visit.     Please do not wear any cologne, lotion, powder, deodorant, jewelry, piercings, perfume, makeup, nail polish, hair accessories, or hair

## 2025-07-24 NOTE — PROGRESS NOTES
[facility]etrics [facility] Gynecology  [address]  [place] [address]  [phone] mn [phone] Fax        Norma Gallo  1980  Primary Care Physician: Kyle Motley MD        HISTORY AND PHYSICAL      Hospital: [facility]      2025  MEDICAID CONSENT COMPLETED: Yes      HPI: Norma Gallo is a 45 y.o. female   she is being seen for the problems listed below and is planning surgical intervention. She was counseled on all conservative medical and surgical options as well as definitive procedures as well. Pt with heavy/ crampy/ clotty periods. Pt s/p endometrial ablation without improvement of symptoms. Pt states her symptoms are increasing in frequency/ intensity affecting her ADL. Pt was counseled on risks/ benefits/ alternative options. Pt wishes to have procedure abdominally. Pt was counseled on all options including minimally invasive but declined.    1. Menorrhagia with regular cycle    2. S/P endometrial ablation    3. Dysmenorrhea    4. 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     Priority: High     Daughter was born deaf   2016 declined opportunity for non-syndromic hearing loss carrier testing by [facility]  Declined connexin diagnostic testing in her daughter, Elier, and has declined testing for non-syndromic hearing loss      Herpes      Priority: High     2016 History of genital herpes: last outbreak years ago  Call office with any new outbreaks      Menorrhagia 2025    Dysmenorrhea 2025    History of endometrial ablation 2025    Change in bowel habit 2025    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

## 2025-07-24 NOTE — H&P
Transfusion  NEUROLOGIC: Denies Migraines, Headaches, CVA, TIA  ENDOCRINE: Denies any Endocrinologic disorders   GYN: heavy periods with cramping/ pain                PHYSICAL EXAM:  Blood pressure 100/60, height 1.676 m (5' 6\"), weight 58.5 kg (129 lb), not currently breastfeeding.         General Appearance:  awake, alert, oriented, in no acute distress, well developed, well nourished, in no acute distress   Skin:  Skin color, texture, turgor normal. No rashes or lesions  Mouth/Throat:  Mucosa moist.  No lesions.  Pharynx without erythema, edema or exudate.  Lungs:  Normal expansion.  Clear to auscultation.  No rales, rhonchi, or wheezing.  Heart:  Heart sounds are normal.  Regular rate and rhythm without murmur, gallop or rub.  Breast:  Declined  Abdomen:  Soft, non-tender, normal bowel sounds.  No bruits, organomegaly or masses.  Extremities: Extremities warm to touch, pink, with no edema.  Musculoskeletal:  negative  Peripheral Pulses:  Normal  Neurologic:  Gait normal. Reflexes normal and symmetric. Sensation grossly intact.  Female Urogen:  Declined  Female Rectal: Declined        Diagnostics:    Narrative & Impression  EXAMINATION:  PELVIC ULTRASOUND     2/24/2025     TECHNIQUE:  Transabdominal pelvic ultrasound duplex ultrasound using B-mode/gray scaled  imaging, Doppler spectral analysis and color flow Doppler was obtained.     COMPARISON:  12/28/2023     HISTORY:  ORDERING SYSTEM PROVIDED HISTORY: Pelvic pain  TECHNOLOGIST PROVIDED HISTORY:  [facility]     44-year-old female with pelvic pain     FINDINGS:     Measurements:     LMP was 1 week ago.     Uterus: 10.3 x 4.7 x 6.4 cm.     Endometrial stripe: 6 mm.     Right Ovary:2.2 x 1.5 x 2.1 cm.     Left Ovary: 2.0 x 1.4 x 3.7 cm.        Ultrasound Findings:     Uterus: Uterus demonstrates normal myometrial echotexture.  Anteverted  uterus.  No uterine mass or fibroid.     Endometrial stripe: Endometrial stripe is within

## 2025-07-25 LAB
MICROORGANISM SPEC CULT: NORMAL
SERVICE CMNT-IMP: NORMAL
SPECIMEN DESCRIPTION: NORMAL

## 2025-07-30 ENCOUNTER — OFFICE VISIT (OUTPATIENT)
Age: 45
End: 2025-07-30

## 2025-07-30 VITALS
WEIGHT: 128.3 LBS | DIASTOLIC BLOOD PRESSURE: 70 MMHG | HEIGHT: 66 IN | BODY MASS INDEX: 20.62 KG/M2 | HEART RATE: 73 BPM | SYSTOLIC BLOOD PRESSURE: 108 MMHG | TEMPERATURE: 97.3 F | OXYGEN SATURATION: 98 %

## 2025-07-30 DIAGNOSIS — Z90.49 HISTORY OF PARTIAL COLECTOMY: ICD-10-CM

## 2025-07-30 DIAGNOSIS — D3A.020 CARCINOID TUMOR OF APPENDIX, UNSPECIFIED WHETHER MALIGNANT (HCC): ICD-10-CM

## 2025-07-30 DIAGNOSIS — K63.5 BENIGN COLON POLYP: ICD-10-CM

## 2025-07-30 DIAGNOSIS — R19.4 CHANGE IN BOWEL HABIT: Primary | ICD-10-CM

## 2025-07-30 DIAGNOSIS — K57.30 SIGMOID DIVERTICULOSIS: ICD-10-CM

## 2025-07-30 DIAGNOSIS — Q43.8 REDUNDANT COLON: ICD-10-CM

## 2025-07-30 RX ORDER — FERROUS SULFATE 325(65) MG
325 TABLET ORAL
COMMUNITY

## 2025-07-31 ENCOUNTER — TELEPHONE (OUTPATIENT)
Dept: OBGYN CLINIC | Age: 45
End: 2025-07-31

## 2025-07-31 NOTE — TELEPHONE ENCOUNTER
Pt calling regarding Bowel Prep not being sent by Dr. Buck yesterday at appointment. Pt has JOSE scheduled for 8/5/25. Pt informed I will contac office and Prep will be sent. Spoke with Dr. Pastor office, orders are pending and need signed off on still. Staff informed pt would like prep sent today as it is her only day off, pharmacy reviewed.

## 2025-08-01 RX ORDER — BISACODYL 5 MG
TABLET, DELAYED RELEASE (ENTERIC COATED) ORAL
Qty: 4 TABLET | Refills: 0 | Status: SHIPPED | OUTPATIENT
Start: 2025-08-01

## 2025-08-01 RX ORDER — POLYETHYLENE GLYCOL 3350 17 G/17G
POWDER, FOR SOLUTION ORAL
Qty: 238 G | Refills: 0 | Status: SHIPPED | OUTPATIENT
Start: 2025-08-01

## 2025-08-01 RX ORDER — ONDANSETRON 4 MG/1
4 TABLET, FILM COATED ORAL EVERY 6 HOURS PRN
Qty: 10 TABLET | Refills: 0 | Status: SHIPPED | OUTPATIENT
Start: 2025-08-01

## 2025-08-01 NOTE — PROGRESS NOTES
Could you please sign the orders for her bowel prep?   
Ducolax/Miralax Bowel Prep discussed with patient after visit with Dr Buck  
Received a call from OBGYN requesting the bowel prep be sent in for the patient, the orders still showed pending. Pt has a procedure on 8/5/25. Please advise   
service by the E/M provider. The time component had both face to face and non face to face time spent in determining the total time component.  Counseling and education regarding the patient's diagnosis listed below and the patient's options regarding those diagnoses were also included in determining the time component.      Please note that portions of this note were completed with Dragon dictation, the computer voice recognition software.  Quite often unanticipated grammatical, syntax, homophones, and other interpretive errors are inadvertently transcribed by the computer software.  Please disregard these errors and any other errors that may have escaped final proofreading.     The patient (or guardian, if applicable) and other individuals in attendance with the patient were advised that Artificial Intelligence will be utilized during this visit to record, process the conversation to generate a clinical note, and support improvement of the AI technology. The patient (or guardian, if applicable) and other individuals in attendance at the appointment consented to the use of AI, including the recording.      Electronically signed by Osvaldo Buck MD  on 8/2/2025 at 6:15 AM

## 2025-08-02 ENCOUNTER — TELEPHONE (OUTPATIENT)
Age: 45
End: 2025-08-02

## 2025-08-02 ASSESSMENT — ENCOUNTER SYMPTOMS
SORE THROAT: 0
COUGH: 0
SHORTNESS OF BREATH: 0
RHINORRHEA: 0

## 2025-08-02 NOTE — TELEPHONE ENCOUNTER
I spoke with the patient.  She has received her bowel prep and instructions.  She will stay on clear liquids on Monday and do the bowel prep as recommended.  She had no further questions.

## 2025-08-04 ENCOUNTER — ANESTHESIA EVENT (OUTPATIENT)
Dept: OPERATING ROOM | Age: 45
DRG: 513 | End: 2025-08-04
Payer: MEDICAID

## 2025-08-05 ENCOUNTER — HOSPITAL ENCOUNTER (INPATIENT)
Age: 45
LOS: 1 days | Discharge: HOME | DRG: 513 | End: 2025-08-06
Attending: OBSTETRICS & GYNECOLOGY | Admitting: OBSTETRICS & GYNECOLOGY
Payer: MEDICAID

## 2025-08-05 ENCOUNTER — ANESTHESIA (OUTPATIENT)
Dept: OPERATING ROOM | Age: 45
DRG: 513 | End: 2025-08-05
Payer: MEDICAID

## 2025-08-05 DIAGNOSIS — N92.0 MENORRHAGIA: ICD-10-CM

## 2025-08-05 DIAGNOSIS — Z41.9 SURGERY, ELECTIVE: Primary | ICD-10-CM

## 2025-08-05 DIAGNOSIS — N94.6 DYSMENORRHEA: ICD-10-CM

## 2025-08-05 DIAGNOSIS — Z98.890 HISTORY OF ENDOMETRIAL ABLATION: ICD-10-CM

## 2025-08-05 PROBLEM — N81.5 ACQUIRED PELVIC ENTEROCELE: Status: ACTIVE | Noted: 2025-08-05

## 2025-08-05 PROBLEM — N80.03 ADENOMYOSIS: Status: ACTIVE | Noted: 2025-08-05

## 2025-08-05 LAB
BILIRUB UR QL STRIP: NEGATIVE
CLARITY UR: CLEAR
COLOR UR: YELLOW
COMMENT: ABNORMAL
GLUCOSE UR STRIP-MCNC: NEGATIVE MG/DL
HCG, PREGNANCY URINE (POC): NEGATIVE
HCT VFR BLD AUTO: 32.9 % (ref 36–46)
HGB BLD-MCNC: 10.1 G/DL (ref 12–16)
HGB UR QL STRIP.AUTO: NEGATIVE
KETONES UR STRIP-MCNC: ABNORMAL MG/DL
LEUKOCYTE ESTERASE UR QL STRIP: NEGATIVE
NITRITE UR QL STRIP: NEGATIVE
PH UR STRIP: 5.5 [PH] (ref 5–8)
PROT UR STRIP-MCNC: NEGATIVE MG/DL
SP GR UR STRIP: 1.02 (ref 1–1.03)
UROBILINOGEN UR STRIP-ACNC: NORMAL EU/DL (ref 0–1)

## 2025-08-05 PROCEDURE — 3700000000 HC ANESTHESIA ATTENDED CARE: Performed by: OBSTETRICS & GYNECOLOGY

## 2025-08-05 PROCEDURE — 6360000002 HC RX W HCPCS

## 2025-08-05 PROCEDURE — 36415 COLL VENOUS BLD VENIPUNCTURE: CPT

## 2025-08-05 PROCEDURE — 6370000000 HC RX 637 (ALT 250 FOR IP): Performed by: OBSTETRICS & GYNECOLOGY

## 2025-08-05 PROCEDURE — 7100000000 HC PACU RECOVERY - FIRST 15 MIN: Performed by: OBSTETRICS & GYNECOLOGY

## 2025-08-05 PROCEDURE — 3600000002 HC SURGERY LEVEL 2 BASE: Performed by: OBSTETRICS & GYNECOLOGY

## 2025-08-05 PROCEDURE — 2580000003 HC RX 258

## 2025-08-05 PROCEDURE — 2500000003 HC RX 250 WO HCPCS: Performed by: NURSE ANESTHETIST, CERTIFIED REGISTERED

## 2025-08-05 PROCEDURE — 2720000010 HC SURG SUPPLY STERILE: Performed by: OBSTETRICS & GYNECOLOGY

## 2025-08-05 PROCEDURE — 0UT70ZZ RESECTION OF BILATERAL FALLOPIAN TUBES, OPEN APPROACH: ICD-10-PCS | Performed by: OBSTETRICS & GYNECOLOGY

## 2025-08-05 PROCEDURE — 81003 URINALYSIS AUTO W/O SCOPE: CPT

## 2025-08-05 PROCEDURE — 2580000003 HC RX 258: Performed by: OBSTETRICS & GYNECOLOGY

## 2025-08-05 PROCEDURE — 81025 URINE PREGNANCY TEST: CPT

## 2025-08-05 PROCEDURE — 6370000000 HC RX 637 (ALT 250 FOR IP): Performed by: ANESTHESIOLOGY

## 2025-08-05 PROCEDURE — 0UT90ZZ RESECTION OF UTERUS, OPEN APPROACH: ICD-10-PCS | Performed by: OBSTETRICS & GYNECOLOGY

## 2025-08-05 PROCEDURE — 2709999900 HC NON-CHARGEABLE SUPPLY: Performed by: OBSTETRICS & GYNECOLOGY

## 2025-08-05 PROCEDURE — 3700000001 HC ADD 15 MINUTES (ANESTHESIA): Performed by: OBSTETRICS & GYNECOLOGY

## 2025-08-05 PROCEDURE — 6360000002 HC RX W HCPCS: Performed by: OBSTETRICS & GYNECOLOGY

## 2025-08-05 PROCEDURE — 6360000002 HC RX W HCPCS: Performed by: NURSE ANESTHETIST, CERTIFIED REGISTERED

## 2025-08-05 PROCEDURE — 6370000000 HC RX 637 (ALT 250 FOR IP)

## 2025-08-05 PROCEDURE — 7100000001 HC PACU RECOVERY - ADDTL 15 MIN: Performed by: OBSTETRICS & GYNECOLOGY

## 2025-08-05 PROCEDURE — 1200000000 HC SEMI PRIVATE

## 2025-08-05 PROCEDURE — 3600000012 HC SURGERY LEVEL 2 ADDTL 15MIN: Performed by: OBSTETRICS & GYNECOLOGY

## 2025-08-05 PROCEDURE — 87086 URINE CULTURE/COLONY COUNT: CPT

## 2025-08-05 PROCEDURE — 0JQC0ZZ REPAIR PELVIC REGION SUBCUTANEOUS TISSUE AND FASCIA, OPEN APPROACH: ICD-10-PCS | Performed by: OBSTETRICS & GYNECOLOGY

## 2025-08-05 PROCEDURE — 85018 HEMOGLOBIN: CPT

## 2025-08-05 PROCEDURE — 88307 TISSUE EXAM BY PATHOLOGIST: CPT

## 2025-08-05 PROCEDURE — 85014 HEMATOCRIT: CPT

## 2025-08-05 RX ORDER — SODIUM CHLORIDE 0.9 % (FLUSH) 0.9 %
5-40 SYRINGE (ML) INJECTION PRN
Status: DISCONTINUED | OUTPATIENT
Start: 2025-08-05 | End: 2025-08-05 | Stop reason: HOSPADM

## 2025-08-05 RX ORDER — LIDOCAINE HYDROCHLORIDE 10 MG/ML
INJECTION, SOLUTION EPIDURAL; INFILTRATION; INTRACAUDAL; PERINEURAL
Status: DISCONTINUED | OUTPATIENT
Start: 2025-08-05 | End: 2025-08-05 | Stop reason: SDUPTHER

## 2025-08-05 RX ORDER — ROCURONIUM BROMIDE 10 MG/ML
INJECTION, SOLUTION INTRAVENOUS
Status: DISCONTINUED | OUTPATIENT
Start: 2025-08-05 | End: 2025-08-05 | Stop reason: SDUPTHER

## 2025-08-05 RX ORDER — ACETAMINOPHEN 500 MG
1000 TABLET ORAL ONCE
Status: COMPLETED | OUTPATIENT
Start: 2025-08-05 | End: 2025-08-05

## 2025-08-05 RX ORDER — ENOXAPARIN SODIUM 100 MG/ML
40 INJECTION SUBCUTANEOUS ONCE
Status: DISCONTINUED | OUTPATIENT
Start: 2025-08-05 | End: 2025-08-06 | Stop reason: HOSPADM

## 2025-08-05 RX ORDER — SODIUM CHLORIDE 0.9 % (FLUSH) 0.9 %
5-40 SYRINGE (ML) INJECTION PRN
Status: DISCONTINUED | OUTPATIENT
Start: 2025-08-05 | End: 2025-08-06 | Stop reason: HOSPADM

## 2025-08-05 RX ORDER — SODIUM CHLORIDE 0.9 % (FLUSH) 0.9 %
5-40 SYRINGE (ML) INJECTION EVERY 12 HOURS SCHEDULED
Status: DISCONTINUED | OUTPATIENT
Start: 2025-08-05 | End: 2025-08-06 | Stop reason: HOSPADM

## 2025-08-05 RX ORDER — SODIUM CHLORIDE 0.9 % (FLUSH) 0.9 %
5-40 SYRINGE (ML) INJECTION EVERY 12 HOURS SCHEDULED
Status: DISCONTINUED | OUTPATIENT
Start: 2025-08-05 | End: 2025-08-05 | Stop reason: HOSPADM

## 2025-08-05 RX ORDER — HYDRALAZINE HYDROCHLORIDE 20 MG/ML
10 INJECTION INTRAMUSCULAR; INTRAVENOUS
Status: DISCONTINUED | OUTPATIENT
Start: 2025-08-05 | End: 2025-08-05 | Stop reason: HOSPADM

## 2025-08-05 RX ORDER — OXYCODONE HYDROCHLORIDE 5 MG/1
5 TABLET ORAL EVERY 6 HOURS PRN
Qty: 18 TABLET | Refills: 0 | Status: SHIPPED | OUTPATIENT
Start: 2025-08-05 | End: 2025-08-10

## 2025-08-05 RX ORDER — METOCLOPRAMIDE HYDROCHLORIDE 5 MG/ML
10 INJECTION INTRAMUSCULAR; INTRAVENOUS
Status: DISCONTINUED | OUTPATIENT
Start: 2025-08-05 | End: 2025-08-05 | Stop reason: HOSPADM

## 2025-08-05 RX ORDER — ACETAMINOPHEN 325 MG/1
650 TABLET ORAL
Status: DISCONTINUED | OUTPATIENT
Start: 2025-08-05 | End: 2025-08-05 | Stop reason: HOSPADM

## 2025-08-05 RX ORDER — 0.9 % SODIUM CHLORIDE 0.9 %
500 INTRAVENOUS SOLUTION INTRAVENOUS ONCE
Status: COMPLETED | OUTPATIENT
Start: 2025-08-05 | End: 2025-08-05

## 2025-08-05 RX ORDER — OXYCODONE HYDROCHLORIDE 5 MG/1
5 TABLET ORAL EVERY 4 HOURS PRN
Status: DISCONTINUED | OUTPATIENT
Start: 2025-08-05 | End: 2025-08-06 | Stop reason: HOSPADM

## 2025-08-05 RX ORDER — ONDANSETRON 2 MG/ML
4 INJECTION INTRAMUSCULAR; INTRAVENOUS EVERY 6 HOURS PRN
Status: DISCONTINUED | OUTPATIENT
Start: 2025-08-05 | End: 2025-08-06 | Stop reason: HOSPADM

## 2025-08-05 RX ORDER — SODIUM CHLORIDE 9 MG/ML
INJECTION, SOLUTION INTRAVENOUS PRN
Status: DISCONTINUED | OUTPATIENT
Start: 2025-08-05 | End: 2025-08-05 | Stop reason: HOSPADM

## 2025-08-05 RX ORDER — DIPHENHYDRAMINE HYDROCHLORIDE 50 MG/ML
12.5 INJECTION, SOLUTION INTRAMUSCULAR; INTRAVENOUS
Status: DISCONTINUED | OUTPATIENT
Start: 2025-08-05 | End: 2025-08-05 | Stop reason: HOSPADM

## 2025-08-05 RX ORDER — MAGNESIUM HYDROXIDE/ALUMINUM HYDROXICE/SIMETHICONE 120; 1200; 1200 MG/30ML; MG/30ML; MG/30ML
30 SUSPENSION ORAL EVERY 6 HOURS PRN
Status: DISCONTINUED | OUTPATIENT
Start: 2025-08-05 | End: 2025-08-06 | Stop reason: HOSPADM

## 2025-08-05 RX ORDER — PROCHLORPERAZINE EDISYLATE 5 MG/ML
10 INJECTION INTRAMUSCULAR; INTRAVENOUS EVERY 6 HOURS PRN
Status: DISCONTINUED | OUTPATIENT
Start: 2025-08-05 | End: 2025-08-06 | Stop reason: HOSPADM

## 2025-08-05 RX ORDER — SENNA AND DOCUSATE SODIUM 50; 8.6 MG/1; MG/1
2 TABLET, FILM COATED ORAL DAILY
Status: DISCONTINUED | OUTPATIENT
Start: 2025-08-05 | End: 2025-08-06 | Stop reason: HOSPADM

## 2025-08-05 RX ORDER — SODIUM CHLORIDE, SODIUM LACTATE, POTASSIUM CHLORIDE, CALCIUM CHLORIDE 600; 310; 30; 20 MG/100ML; MG/100ML; MG/100ML; MG/100ML
INJECTION, SOLUTION INTRAVENOUS CONTINUOUS
Status: DISCONTINUED | OUTPATIENT
Start: 2025-08-05 | End: 2025-08-05

## 2025-08-05 RX ORDER — CLINDAMYCIN PHOSPHATE 900 MG/50ML
900 INJECTION, SOLUTION INTRAVENOUS EVERY 8 HOURS
Status: COMPLETED | OUTPATIENT
Start: 2025-08-05 | End: 2025-08-06

## 2025-08-05 RX ORDER — MEPERIDINE HYDROCHLORIDE 25 MG/ML
12.5 INJECTION INTRAMUSCULAR; INTRAVENOUS; SUBCUTANEOUS EVERY 5 MIN PRN
Status: DISCONTINUED | OUTPATIENT
Start: 2025-08-05 | End: 2025-08-05 | Stop reason: HOSPADM

## 2025-08-05 RX ORDER — GABAPENTIN 300 MG/1
300 CAPSULE ORAL ONCE
Status: COMPLETED | OUTPATIENT
Start: 2025-08-05 | End: 2025-08-05

## 2025-08-05 RX ORDER — SODIUM CHLORIDE, SODIUM LACTATE, POTASSIUM CHLORIDE, CALCIUM CHLORIDE 600; 310; 30; 20 MG/100ML; MG/100ML; MG/100ML; MG/100ML
INJECTION, SOLUTION INTRAVENOUS CONTINUOUS
Status: DISCONTINUED | OUTPATIENT
Start: 2025-08-05 | End: 2025-08-05 | Stop reason: HOSPADM

## 2025-08-05 RX ORDER — ONDANSETRON 2 MG/ML
INJECTION INTRAMUSCULAR; INTRAVENOUS
Status: DISCONTINUED | OUTPATIENT
Start: 2025-08-05 | End: 2025-08-05 | Stop reason: SDUPTHER

## 2025-08-05 RX ORDER — BUPIVACAINE HYDROCHLORIDE 7.5 MG/ML
INJECTION, SOLUTION INTRASPINAL
Status: COMPLETED | OUTPATIENT
Start: 2025-08-05 | End: 2025-08-05

## 2025-08-05 RX ORDER — ONDANSETRON 4 MG/1
4 TABLET, ORALLY DISINTEGRATING ORAL EVERY 8 HOURS PRN
Status: DISCONTINUED | OUTPATIENT
Start: 2025-08-05 | End: 2025-08-06 | Stop reason: HOSPADM

## 2025-08-05 RX ORDER — DEXAMETHASONE SODIUM PHOSPHATE 4 MG/ML
INJECTION, SOLUTION INTRA-ARTICULAR; INTRALESIONAL; INTRAMUSCULAR; INTRAVENOUS; SOFT TISSUE
Status: DISCONTINUED | OUTPATIENT
Start: 2025-08-05 | End: 2025-08-05 | Stop reason: SDUPTHER

## 2025-08-05 RX ORDER — EPHEDRINE SULFATE/0.9% NACL/PF 25 MG/5 ML
SYRINGE (ML) INTRAVENOUS
Status: DISCONTINUED | OUTPATIENT
Start: 2025-08-05 | End: 2025-08-05 | Stop reason: SDUPTHER

## 2025-08-05 RX ORDER — LABETALOL HYDROCHLORIDE 5 MG/ML
10 INJECTION, SOLUTION INTRAVENOUS
Status: DISCONTINUED | OUTPATIENT
Start: 2025-08-05 | End: 2025-08-05 | Stop reason: HOSPADM

## 2025-08-05 RX ORDER — SENNA AND DOCUSATE SODIUM 50; 8.6 MG/1; MG/1
1 TABLET, FILM COATED ORAL DAILY
Qty: 30 TABLET | Refills: 1 | Status: SHIPPED | OUTPATIENT
Start: 2025-08-05

## 2025-08-05 RX ORDER — MIDAZOLAM HYDROCHLORIDE 2 MG/2ML
INJECTION, SOLUTION INTRAMUSCULAR; INTRAVENOUS
Status: DISCONTINUED | OUTPATIENT
Start: 2025-08-05 | End: 2025-08-05 | Stop reason: SDUPTHER

## 2025-08-05 RX ORDER — MORPHINE SULFATE 0.5 MG/ML
INJECTION, SOLUTION EPIDURAL; INTRATHECAL; INTRAVENOUS
Status: COMPLETED | OUTPATIENT
Start: 2025-08-05 | End: 2025-08-05

## 2025-08-05 RX ORDER — LIDOCAINE HYDROCHLORIDE 10 MG/ML
1 INJECTION, SOLUTION EPIDURAL; INFILTRATION; INTRACAUDAL; PERINEURAL
Status: DISCONTINUED | OUTPATIENT
Start: 2025-08-05 | End: 2025-08-05 | Stop reason: HOSPADM

## 2025-08-05 RX ORDER — PROPOFOL 10 MG/ML
INJECTION, EMULSION INTRAVENOUS
Status: DISCONTINUED | OUTPATIENT
Start: 2025-08-05 | End: 2025-08-05 | Stop reason: SDUPTHER

## 2025-08-05 RX ORDER — ONDANSETRON 2 MG/ML
4 INJECTION INTRAMUSCULAR; INTRAVENOUS
Status: DISCONTINUED | OUTPATIENT
Start: 2025-08-05 | End: 2025-08-05 | Stop reason: HOSPADM

## 2025-08-05 RX ORDER — SODIUM CHLORIDE 9 MG/ML
INJECTION, SOLUTION INTRAVENOUS CONTINUOUS
Status: DISCONTINUED | OUTPATIENT
Start: 2025-08-05 | End: 2025-08-06

## 2025-08-05 RX ORDER — FENTANYL CITRATE 0.05 MG/ML
25 INJECTION, SOLUTION INTRAMUSCULAR; INTRAVENOUS EVERY 5 MIN PRN
Status: DISCONTINUED | OUTPATIENT
Start: 2025-08-05 | End: 2025-08-05 | Stop reason: HOSPADM

## 2025-08-05 RX ORDER — ACETAMINOPHEN 500 MG
1000 TABLET ORAL ONCE
Status: DISCONTINUED | OUTPATIENT
Start: 2025-08-05 | End: 2025-08-05 | Stop reason: HOSPADM

## 2025-08-05 RX ORDER — SODIUM CHLORIDE 9 MG/ML
INJECTION, SOLUTION INTRAVENOUS PRN
Status: DISCONTINUED | OUTPATIENT
Start: 2025-08-05 | End: 2025-08-06 | Stop reason: HOSPADM

## 2025-08-05 RX ORDER — ACETAMINOPHEN 500 MG
1000 TABLET ORAL EVERY 6 HOURS SCHEDULED
Status: DISCONTINUED | OUTPATIENT
Start: 2025-08-05 | End: 2025-08-06 | Stop reason: HOSPADM

## 2025-08-05 RX ORDER — IBUPROFEN 600 MG/1
600 TABLET, FILM COATED ORAL EVERY 6 HOURS
Status: DISCONTINUED | OUTPATIENT
Start: 2025-08-05 | End: 2025-08-06 | Stop reason: HOSPADM

## 2025-08-05 RX ADMIN — IBUPROFEN 600 MG: 600 TABLET, FILM COATED ORAL at 11:39

## 2025-08-05 RX ADMIN — ACETAMINOPHEN 1000 MG: 500 TABLET ORAL at 06:58

## 2025-08-05 RX ADMIN — Medication 2000 MG: at 08:24

## 2025-08-05 RX ADMIN — MORPHINE SULFATE 0.3 MG: 0.5 INJECTION EPIDURAL; INTRATHECAL; INTRAVENOUS at 08:12

## 2025-08-05 RX ADMIN — EPHEDRINE SULFATE 10 MG: 5 INJECTION INTRAVENOUS at 10:06

## 2025-08-05 RX ADMIN — SODIUM CHLORIDE 500 ML: 0.9 INJECTION, SOLUTION INTRAVENOUS at 21:08

## 2025-08-05 RX ADMIN — DEXAMETHASONE SODIUM PHOSPHATE 4 MG: 4 INJECTION INTRA-ARTICULAR; INTRALESIONAL; INTRAMUSCULAR; INTRAVENOUS; SOFT TISSUE at 09:04

## 2025-08-05 RX ADMIN — LIDOCAINE HYDROCHLORIDE 50 MG: 10 INJECTION, SOLUTION EPIDURAL; INFILTRATION; INTRACAUDAL; PERINEURAL at 08:18

## 2025-08-05 RX ADMIN — GABAPENTIN 300 MG: 300 CAPSULE ORAL at 06:58

## 2025-08-05 RX ADMIN — SODIUM CHLORIDE, POTASSIUM CHLORIDE, SODIUM LACTATE AND CALCIUM CHLORIDE: 600; 310; 30; 20 INJECTION, SOLUTION INTRAVENOUS at 06:54

## 2025-08-05 RX ADMIN — SODIUM CHLORIDE, POTASSIUM CHLORIDE, SODIUM LACTATE AND CALCIUM CHLORIDE: 600; 310; 30; 20 INJECTION, SOLUTION INTRAVENOUS at 08:45

## 2025-08-05 RX ADMIN — MIDAZOLAM HYDROCHLORIDE 2 MG: 1 INJECTION, SOLUTION INTRAMUSCULAR; INTRAVENOUS at 08:02

## 2025-08-05 RX ADMIN — ONDANSETRON 4 MG: 2 INJECTION, SOLUTION INTRAMUSCULAR; INTRAVENOUS at 12:56

## 2025-08-05 RX ADMIN — ONDANSETRON 4 MG: 2 INJECTION, SOLUTION INTRAMUSCULAR; INTRAVENOUS at 09:38

## 2025-08-05 RX ADMIN — SODIUM CHLORIDE: 0.9 INJECTION, SOLUTION INTRAVENOUS at 11:43

## 2025-08-05 RX ADMIN — OXYCODONE HYDROCHLORIDE 5 MG: 5 TABLET ORAL at 11:38

## 2025-08-05 RX ADMIN — PROPOFOL 150 MG: 10 INJECTION, EMULSION INTRAVENOUS at 08:18

## 2025-08-05 RX ADMIN — CLINDAMYCIN PHOSPHATE 900 MG: 900 INJECTION, SOLUTION INTRAVENOUS at 16:21

## 2025-08-05 RX ADMIN — ROCURONIUM BROMIDE 50 MG: 10 INJECTION, SOLUTION INTRAVENOUS at 08:18

## 2025-08-05 RX ADMIN — BUPIVACAINE HYDROCHLORIDE IN DEXTROSE 5 MG: 7.5 INJECTION, SOLUTION SUBARACHNOID at 08:12

## 2025-08-05 RX ADMIN — Medication 2000 MG: at 17:34

## 2025-08-05 RX ADMIN — PROCHLORPERAZINE EDISYLATE 10 MG: 5 INJECTION INTRAMUSCULAR; INTRAVENOUS at 13:56

## 2025-08-05 ASSESSMENT — PAIN - FUNCTIONAL ASSESSMENT
PAIN_FUNCTIONAL_ASSESSMENT: 0-10
PAIN_FUNCTIONAL_ASSESSMENT: 0-10

## 2025-08-05 ASSESSMENT — PAIN SCALES - GENERAL
PAINLEVEL_OUTOF10: 0
PAINLEVEL_OUTOF10: 0
PAINLEVEL_OUTOF10: 4
PAINLEVEL_OUTOF10: 3

## 2025-08-05 ASSESSMENT — PAIN DESCRIPTION - ORIENTATION: ORIENTATION: LOWER;MID

## 2025-08-05 ASSESSMENT — PAIN DESCRIPTION - LOCATION: LOCATION: ABDOMEN

## 2025-08-06 VITALS
RESPIRATION RATE: 16 BRPM | WEIGHT: 128 LBS | DIASTOLIC BLOOD PRESSURE: 54 MMHG | HEIGHT: 66 IN | SYSTOLIC BLOOD PRESSURE: 83 MMHG | TEMPERATURE: 97.9 F | BODY MASS INDEX: 20.57 KG/M2 | OXYGEN SATURATION: 99 % | HEART RATE: 66 BPM

## 2025-08-06 LAB
ANION GAP SERPL CALCULATED.3IONS-SCNC: 9 MMOL/L (ref 9–16)
BASOPHILS # BLD: <0.03 K/UL (ref 0–0.2)
BASOPHILS NFR BLD: 0 % (ref 0–2)
BUN SERPL-MCNC: 6 MG/DL (ref 6–20)
CALCIUM SERPL-MCNC: 7.7 MG/DL (ref 8.6–10.4)
CHLORIDE SERPL-SCNC: 110 MMOL/L (ref 98–107)
CO2 SERPL-SCNC: 23 MMOL/L (ref 20–31)
CREAT SERPL-MCNC: 0.7 MG/DL (ref 0.7–1.2)
EOSINOPHIL # BLD: 0.04 K/UL (ref 0–0.44)
EOSINOPHILS RELATIVE PERCENT: 1 % (ref 0–4)
ERYTHROCYTE [DISTWIDTH] IN BLOOD BY AUTOMATED COUNT: 17.4 % (ref 11.5–14.9)
GFR, ESTIMATED: >90 ML/MIN/1.73M2
GLUCOSE SERPL-MCNC: 99 MG/DL (ref 74–99)
HCT VFR BLD AUTO: 29.5 % (ref 36–46)
HCT VFR BLD AUTO: 31.2 % (ref 36–46)
HGB BLD-MCNC: 9 G/DL (ref 12–16)
HGB BLD-MCNC: 9.4 G/DL (ref 12–16)
IMM GRANULOCYTES # BLD AUTO: <0.03 K/UL (ref 0–0.3)
IMM GRANULOCYTES NFR BLD: 0 %
LYMPHOCYTES NFR BLD: 1.79 K/UL (ref 1.1–3.7)
LYMPHOCYTES RELATIVE PERCENT: 22 % (ref 24–44)
MCH RBC QN AUTO: 26.7 PG (ref 26–34)
MCHC RBC AUTO-ENTMCNC: 30.5 G/DL (ref 31–37)
MCV RBC AUTO: 87.5 FL (ref 80–100)
MICROORGANISM SPEC CULT: NO GROWTH
MONOCYTES NFR BLD: 0.69 K/UL (ref 0.1–1.2)
MONOCYTES NFR BLD: 9 % (ref 3–12)
NEUTROPHILS NFR BLD: 68 % (ref 36–66)
NEUTS SEG NFR BLD: 5.43 K/UL (ref 1.5–8.1)
NRBC BLD-RTO: 0 PER 100 WBC
PLATELET # BLD AUTO: 280 K/UL (ref 150–450)
PMV BLD AUTO: 10.3 FL (ref 8–13.5)
POTASSIUM SERPL-SCNC: 3.6 MMOL/L (ref 3.7–5.3)
RBC # BLD AUTO: 3.37 M/UL (ref 3.95–5.11)
SODIUM SERPL-SCNC: 142 MMOL/L (ref 136–145)
SPECIMEN DESCRIPTION: NORMAL
SURGICAL PATHOLOGY REPORT: NORMAL
WBC OTHER # BLD: 8 K/UL (ref 3.5–11)

## 2025-08-06 PROCEDURE — 36415 COLL VENOUS BLD VENIPUNCTURE: CPT

## 2025-08-06 PROCEDURE — 6370000000 HC RX 637 (ALT 250 FOR IP)

## 2025-08-06 PROCEDURE — 85014 HEMATOCRIT: CPT

## 2025-08-06 PROCEDURE — 85025 COMPLETE CBC W/AUTO DIFF WBC: CPT

## 2025-08-06 PROCEDURE — 6360000002 HC RX W HCPCS

## 2025-08-06 PROCEDURE — 80048 BASIC METABOLIC PNL TOTAL CA: CPT

## 2025-08-06 PROCEDURE — 2580000003 HC RX 258

## 2025-08-06 PROCEDURE — 2500000003 HC RX 250 WO HCPCS

## 2025-08-06 PROCEDURE — 85018 HEMOGLOBIN: CPT

## 2025-08-06 RX ADMIN — Medication 2000 MG: at 00:34

## 2025-08-06 RX ADMIN — SODIUM CHLORIDE: 0.9 INJECTION, SOLUTION INTRAVENOUS at 02:00

## 2025-08-06 RX ADMIN — SODIUM CHLORIDE, PRESERVATIVE FREE 10 ML: 5 INJECTION INTRAVENOUS at 08:38

## 2025-08-06 RX ADMIN — Medication 2000 MG: at 08:41

## 2025-08-06 RX ADMIN — CLINDAMYCIN PHOSPHATE 900 MG: 900 INJECTION, SOLUTION INTRAVENOUS at 08:54

## 2025-08-06 RX ADMIN — ACETAMINOPHEN 1000 MG: 500 TABLET ORAL at 06:36

## 2025-08-06 RX ADMIN — IBUPROFEN 600 MG: 600 TABLET, FILM COATED ORAL at 12:01

## 2025-08-06 RX ADMIN — IBUPROFEN 600 MG: 600 TABLET, FILM COATED ORAL at 06:36

## 2025-08-06 RX ADMIN — CLINDAMYCIN PHOSPHATE 900 MG: 900 INJECTION, SOLUTION INTRAVENOUS at 00:33

## 2025-08-06 ASSESSMENT — PAIN DESCRIPTION - ORIENTATION
ORIENTATION: RIGHT

## 2025-08-06 ASSESSMENT — PAIN DESCRIPTION - LOCATION
LOCATION: GROIN
LOCATION: ABDOMEN
LOCATION: INCISION

## 2025-08-06 ASSESSMENT — PAIN SCALES - GENERAL
PAINLEVEL_OUTOF10: 3
PAINLEVEL_OUTOF10: 4
PAINLEVEL_OUTOF10: 1

## 2025-08-06 ASSESSMENT — PAIN - FUNCTIONAL ASSESSMENT
PAIN_FUNCTIONAL_ASSESSMENT: ACTIVITIES ARE NOT PREVENTED
PAIN_FUNCTIONAL_ASSESSMENT: PREVENTS OR INTERFERES SOME ACTIVE ACTIVITIES AND ADLS

## 2025-08-06 ASSESSMENT — PAIN DESCRIPTION - DESCRIPTORS
DESCRIPTORS: CRAMPING;OTHER (COMMENT)
DESCRIPTORS: TEARING
DESCRIPTORS: SHOOTING

## 2025-08-06 ASSESSMENT — PAIN DESCRIPTION - ONSET: ONSET: GRADUAL

## 2025-08-06 ASSESSMENT — PAIN DESCRIPTION - PAIN TYPE: TYPE: SURGICAL PAIN

## 2025-08-07 ENCOUNTER — TELEPHONE (OUTPATIENT)
Dept: INTERNAL MEDICINE CLINIC | Age: 45
End: 2025-08-07

## 2025-08-13 ENCOUNTER — HOSPITAL ENCOUNTER (OUTPATIENT)
Age: 45
Setting detail: SPECIMEN
Discharge: HOME | End: 2025-08-13

## 2025-08-13 ENCOUNTER — TELEPHONE (OUTPATIENT)
Dept: OBGYN CLINIC | Age: 45
End: 2025-08-13

## 2025-08-13 DIAGNOSIS — R30.9 PAIN WITH URINATION: Primary | ICD-10-CM

## 2025-08-13 DIAGNOSIS — R30.9 PAIN WITH URINATION: ICD-10-CM

## 2025-08-13 LAB
BILIRUB UR QL STRIP: NEGATIVE
CLARITY UR: CLEAR
COLOR UR: YELLOW
COMMENT: ABNORMAL
GLUCOSE UR STRIP-MCNC: NEGATIVE MG/DL
HGB UR QL STRIP.AUTO: NEGATIVE
KETONES UR STRIP-MCNC: NEGATIVE MG/DL
LEUKOCYTE ESTERASE UR QL STRIP: NEGATIVE
NITRITE UR QL STRIP: NEGATIVE
PH UR STRIP: 8.5 [PH] (ref 5–8)
PROT UR STRIP-MCNC: NEGATIVE MG/DL
SP GR UR STRIP: 1.01 (ref 1–1.03)
UROBILINOGEN UR STRIP-ACNC: NORMAL EU/DL (ref 0–1)

## 2025-08-19 ENCOUNTER — OFFICE VISIT (OUTPATIENT)
Dept: OBGYN CLINIC | Age: 45
End: 2025-08-19

## 2025-08-19 VITALS
BODY MASS INDEX: 20.57 KG/M2 | DIASTOLIC BLOOD PRESSURE: 78 MMHG | HEIGHT: 66 IN | RESPIRATION RATE: 18 BRPM | WEIGHT: 128 LBS | HEART RATE: 80 BPM | SYSTOLIC BLOOD PRESSURE: 116 MMHG

## 2025-08-19 DIAGNOSIS — Z09 POSTOP CHECK: ICD-10-CM

## 2025-08-19 DIAGNOSIS — Z90.710 S/P TAH (TOTAL ABDOMINAL HYSTERECTOMY): Primary | ICD-10-CM

## 2025-08-19 PROCEDURE — 99024 POSTOP FOLLOW-UP VISIT: CPT | Performed by: OBSTETRICS & GYNECOLOGY

## 2025-08-26 ENCOUNTER — OFFICE VISIT (OUTPATIENT)
Dept: INTERNAL MEDICINE CLINIC | Age: 45
End: 2025-08-26

## 2025-08-26 VITALS
HEART RATE: 93 BPM | OXYGEN SATURATION: 98 % | SYSTOLIC BLOOD PRESSURE: 116 MMHG | WEIGHT: 130 LBS | BODY MASS INDEX: 20.98 KG/M2 | DIASTOLIC BLOOD PRESSURE: 62 MMHG

## 2025-08-26 DIAGNOSIS — Z13.220 LIPID SCREENING: Primary | ICD-10-CM

## 2025-08-26 DIAGNOSIS — Z00.00 ANNUAL PHYSICAL EXAM: ICD-10-CM

## (undated) DEVICE — Device

## (undated) DEVICE — LAPAROSCOPIC ELECTRODE WITH A 3/32" PIN CONNECTOR, SPATULA 5 MM X 32 MM: Brand: CONMED

## (undated) DEVICE — SOLUTION PREP PAINT POV IOD FOR SKIN MUCOUS MEM

## (undated) DEVICE — 3M™ TEGADERM™ TRANSPARENT FILM DRESSING FRAME STYLE, 1626W, 4 IN X 4-3/4 IN (10 CM X 12 CM), 50/CT 4CT/CASE: Brand: 3M™ TEGADERM™

## (undated) DEVICE — SEALER LAP SM L18.8CM OPN JAW HAND/FOOT SWCH FORCETRIAD

## (undated) DEVICE — DUP USE 240185 SOLUTION IV IRRIG WATER 1000ML IRRIG BAG 2B7114

## (undated) DEVICE — SUTURE VICRYL SZ 0 L36IN ABSRB UD CT-1 L36MM 1/2 CIR TAPR PNT VCP946H

## (undated) DEVICE — SUTURE VICRYL + SZ 2-0 L27IN ABSRB UD CT-2 L26MM 1/2 CIR TAPR VCP269H

## (undated) DEVICE — HYPODERMIC SAFETY NEEDLE: Brand: MAGELLAN

## (undated) DEVICE — RELOAD STPL H1-2.5X45MM VASC THN TISS WHT 6 ROW B FRM SGL

## (undated) DEVICE — GAUZE,SPONGE,4"X4",16PLY,STRL,LF,10/TRAY: Brand: MEDLINE

## (undated) DEVICE — RELOAD STPL L75MM OPN H3.8MM CLS 1.5MM WIRE DIA0.2MM REG

## (undated) DEVICE — TRAY URIN CATH 16FR DRNGE BG STATLOK STBL DEV F SURSTP

## (undated) DEVICE — INSTRUMENT WARMER: Brand: SCOPE WARMER DISPOSABLE SEAL

## (undated) DEVICE — SPONGE: SPECIALTY PEANUT XR 100/CS: Brand: MEDICAL ACTION INDUSTRIES

## (undated) DEVICE — 3M™ STERI-STRIP™ ANTIMICROBIAL SKIN CLOSURES 1 IN X 5 IN, 25/CAR, 4 CAR/CASE A1848: Brand: 3M™ STERI-STRIP™

## (undated) DEVICE — SUTURE ABSORBABLE MONOFILAMENT 3-0 SH 27 IN VIO PDS + PDP316H

## (undated) DEVICE — DRESSING TRNSPAR W4XL10IN FLM MIC POR SURESITE 123

## (undated) DEVICE — SURGICAL SCRUB TRAY PREM DRY SKIN VLY LF

## (undated) DEVICE — SOLUTION SCRB 4OZ 7.5% POVIDONE IOD ANTIMIC BTL

## (undated) DEVICE — CUTTER ENDOSCP L340MM LIN ARTC SGL STROKE FIRING ENDOPATH

## (undated) DEVICE — SINGLE USE AIR/WATER, SUCTION AND BIOPSY VALVES SET: Brand: ORCAPOD™

## (undated) DEVICE — PENCIL ES L3M BTTN SWCH HOLSTER W/ BLDE ELECTRD EDGE

## (undated) DEVICE — SUTURE VICRYL COAT  + LIGAPAK LIG REEL 54 IN SZ 0 UD BRAID VCP287G

## (undated) DEVICE — SCISSOR SURG CRV ENDOCUT TIP FOR LAP DISP

## (undated) DEVICE — SOLUTION IV IRRIG POUR BRL 0.9% SODIUM CHL 2F7124

## (undated) DEVICE — SUTURE PDS II SZ 0 L60IN ABSRB VLT L48MM CTX 1/2 CIR Z990G

## (undated) DEVICE — GLOVE SURG SZ 65 THK91MIL LTX FREE SYN POLYISOPRENE

## (undated) DEVICE — THIS PRODUCT IS SINGLE USE AND INTENDED TO BE USED FOR BLUNT DISSECTION OF TISSUE.: Brand: ASPEN® ENDOSCOPIC KITTNER, SINGLE TIP

## (undated) DEVICE — SUTURE PDS + SZ 2 0 L27IN ABSRB VLT L26MM SH 1 2 CIR PDP317H

## (undated) DEVICE — SUTURE PERMAHAND SZ 3-0 L18IN NONABSORBABLE BLK L26MM SH C013D

## (undated) DEVICE — CATHETER URETH 16FR BLLN 5CC SIL ALLY W/ SIL HYDRGEL 2 W F

## (undated) DEVICE — GLOVE ORANGE PI 8   MSG9080

## (undated) DEVICE — SOLUTION ANTIFOG VIS SYS CLEARIFY LAPSCP

## (undated) DEVICE — BLANKET WARMING L 2010 X W 760 MM UPPER BODY 2 HOSE INLET

## (undated) DEVICE — ST CHARLES PERI-GYN: Brand: MEDLINE INDUSTRIES, INC.

## (undated) DEVICE — CATHETER,URETHRAL,VINYL,MALE,16",14 FR: Brand: MEDLINE

## (undated) DEVICE — GLOVE SURG SZ 7 L12IN FNGR THK13MIL BRN LTX SYN POLYMER W

## (undated) DEVICE — 3M™ BAIR HUGGER™ MULTI-POSITION UPPER BODY BLANKET, 10 PER CASE, 62200: Brand: BAIR HUGGER™

## (undated) DEVICE — PAD,NON-ADHERENT,3X8,STERILE,LF,1/PK: Brand: MEDLINE

## (undated) DEVICE — MANIFOLD SUCT SMK EVAC SGL PRT DISP NEPTUNE 2

## (undated) DEVICE — DRESSING HYDROCOLLOID BORDER 35X10 IN ALUM PRIMASEAL

## (undated) DEVICE — GLOVE ORANGE PI 7   MSG9070

## (undated) DEVICE — SUTURE PERMAHAND SZ 3-0 L30IN NONABSORBABLE BLK SH L26MM K832H

## (undated) DEVICE — SUTURE SZ 0 27IN 5/8 CIR UR-6  TAPER PT VIOLET ABSRB VICRYL J603H

## (undated) DEVICE — SOLUTION IRRIG 1000ML H2O PIC PLAS SHATTERPROOF CONTAINER

## (undated) DEVICE — GOWN,AURORA,NONREINFORCED,LARGE: Brand: MEDLINE

## (undated) DEVICE — ENDOSCOPIC KIT 1.1+ 10 FT OP4 CA DE

## (undated) DEVICE — SUTURE VICRYL + SZ 2-0 L27IN ABSRB CLR CT-1 1/2 CIR TAPERCUT VCP259H

## (undated) DEVICE — SET,IRRIGATION,CYSTO/TUR,90": Brand: MEDLINE

## (undated) DEVICE — TROCARS: Brand: KII® BLUNT TIP ACCESS SYSTEM

## (undated) DEVICE — ST CHARLES GEN LAPAROSCOPY PK: Brand: MEDLINE INDUSTRIES, INC.

## (undated) DEVICE — GEL US 20GM NONIRRITATING OVERWRAPPED FILE PCH TRNSMIT

## (undated) DEVICE — SINGLE PORT MANIFOLD: Brand: NEPTUNE 2

## (undated) DEVICE — GLOVE ORANGE PI 8 1/2   MSG9085

## (undated) DEVICE — FOLEY TRAY 1 LAYR 16 FR 10 CC URIMTR SNAPSECURE URO175816S

## (undated) DEVICE — COVER MAYOSTAND XL W30XL57IN POLYPR DISP

## (undated) DEVICE — 20 ML SYRINGE LUER-LOCK TIP: Brand: MONOJECT

## (undated) DEVICE — SOLUTION IRRIG 3000ML 0.9% SOD CHL USP UROMATIC PLAS CONT

## (undated) DEVICE — SET IRRIGATION CYSTO/TUR 90IN

## (undated) DEVICE — FORCEPS BX L240CM WRK CHN 2.8MM STD CAP W/ NDL MIC MESH

## (undated) DEVICE — KIT CLN UP LIN W/ STD SAHARA TBL SHT 40X60IN DRAW/LIFT SHT

## (undated) DEVICE — GOWN SURG XL L47IN BLU NONREINFORCED HK AND LOOP AAMI LEV 3

## (undated) DEVICE — DRESSING TRNSPAR W6XL8IN FLM SURESITE 123

## (undated) DEVICE — STERILE POLYISOPRENE POWDER-FREE SURGICAL GLOVES: Brand: PROTEXIS

## (undated) DEVICE — KIT THERMOABLATION 6MM ENDOMET DEV NOVASURE - SEE COMMENT

## (undated) DEVICE — POOLE SUCTION HANDLE: Brand: CARDINAL HEALTH

## (undated) DEVICE — GOWN,POLY REINFORCED,LG: Brand: MEDLINE

## (undated) DEVICE — GLOVE ORANGE PI 7 1/2   MSG9075

## (undated) DEVICE — SUTURE VICRYL + SZ 4-0 L27IN ABSRB WHT FS-2 3/8 CIR REV CUT VCP422H

## (undated) DEVICE — 3M™ WARMING BLANKET, UPPER BODY, 10 PER CASE, 42268: Brand: BAIR HUGGER™

## (undated) DEVICE — SEALER ENDOSCP NANO COAT OPN DIV CRV L JAW LIGASURE IMPACT

## (undated) DEVICE — SUTURE PDS + SZ 4 0 L27IN ABSRB VLT L26MM SH 1 2 CIR PDP315H

## (undated) DEVICE — SUTURE VICRYL + SZ 3-0 L27IN ABSRB UD L26MM SH 1/2 CIR VCP416H

## (undated) DEVICE — 3000ML,PRESSURE INFUSER W/STOPCOCK: Brand: MEDLINE

## (undated) DEVICE — TROCAR: Brand: KII® SLEEVE

## (undated) DEVICE — STAPLER INT L75MM CUT LN L73MM STPL LN L77MM BLU B FRM 8

## (undated) DEVICE — TIP SUCTION TRANSPAR RIB SURF STD BLB RIG  YANKAUER

## (undated) DEVICE — ST CHARLES MAJOR ABDOMINAL PK: Brand: MEDLINE INDUSTRIES, INC.

## (undated) DEVICE — SOLUTION IV 500ML 0.9% SOD CHL PH 5 INJ USP VIAFLX PLAS

## (undated) DEVICE — SYSTEM WND DEBRIDEMENT AND CLEANSING IRRISEPT